# Patient Record
Sex: MALE | Race: WHITE | Employment: OTHER | URBAN - METROPOLITAN AREA
[De-identification: names, ages, dates, MRNs, and addresses within clinical notes are randomized per-mention and may not be internally consistent; named-entity substitution may affect disease eponyms.]

---

## 2017-01-13 NOTE — PERIOP NOTES
[facility]  Ambulatory Surgery Unit  Pre-operative Instructions    Surgery/Procedure Date  1/20/17            Tentative Arrival Time TBA      1. On the day of your surgery/procedure, please report to the Ambulatory Surgery Unit Registration Desk and sign in at your designated time. The Ambulatory Surgery Unit is located in [facility] on the [facility] side of the [facility] across from the [address] [place] [address]. Please have all of your health insurance cards and a photo ID. 2. You must have someone with you to drive you home, as you should not drive a car for 24 hours following anesthesia. Please make arrangements for a responsible adult friend or family member to stay with you for at least the first 24 hours after your surgery. 3. Do not have anything to eat or drink (including water, gum, mints, coffee, juice) after midnight   1/19/17. This may not apply to medications prescribed by your physician. (Please note below the special instructions with medications to take the morning of surgery, if applicable.)    4. We recommend you do not drink any alcoholic beverages for 24 hours before and after your surgery. 5. Stop all Aspirin, non-steroidal anti-inflammatory drugs (i.e. Advil, Aleve), vitamins, and supplements as directed by your surgeon's office. **If you are currently taking Plavix, Coumadin, or other blood-thinning agents, contact your surgeon for instructions. **    6. In an effort to help prevent surgical site infection, we ask that you shower with an anti-bacterial soap (i.e. Dial or Safeguard) for 3 days prior to and on the morning of surgery, using a fresh towel after each shower. (Please begin this process with fresh bed linens.) Do not apply any lotions, powders, or deodorants after the shower on the day of your procedure. If applicable, please do not shave the operative site for 48 hours prior to surgery. 7. Wear comfortable clothes. Wear glasses instead of contacts. Do not bring any jewelry or money (other than copays or fees as instructed). Do not wear make-up, particularly mascara, the morning of your surgery. Do not wear nail polish, particularly if you are having foot /hand surgery. Wear your hair loose or down, no ponytails, buns, la pins or clips. All body piercings must be removed. 8. You should understand that if you do not follow these instructions your surgery may be cancelled. If your physical condition changes (i.e. fever, cold or flu) please contact your surgeon as soon as possible. 9. It is important that you be on time. If a situation occurs where you may be late, or if you have any questions or problems, please call [phone].    10. Your surgery time may be subject to change. You will receive a phone call the day prior to surgery to confirm your arrival time. 11. Pediatric patients: please bring a change of clothes, diapers, bottle/sippy cup, pacifier, etc.      Special Instructions:    MEDICATIONS TO TAKE THE MORNING OF SURGERY WITH A SIP OF WATER: synthroid, coreg, amlodipine      I understand a pre-operative phone call will be made to verify my surgery time. In the event that I am not available, I give permission for a message to be left on my answering service and/or with another person?       Yes     (instructions given verbally during phone assessment- pt voiced understanding)     ___________________      ___________________      ________________  (Signature of Patient)          (Witness)                   (Date and Time)

## 2017-01-26 ENCOUNTER — ANESTHESIA EVENT (OUTPATIENT)
Dept: SURGERY | Age: 62
End: 2017-01-26
Payer: COMMERCIAL

## 2017-01-26 PROBLEM — M75.101 RIGHT ROTATOR CUFF TEAR: Status: ACTIVE | Noted: 2017-01-26

## 2017-01-26 NOTE — H&P
PRE- OP History and Physical                             Subjective:     Patient is a 59 y.o. male presented with a history of right shoulder pain. 2 weeks ago he was playing around rolling down a hill and developed some subsequent right shoulder pain, then 4 days later he simply went to zip up his pants and felt a pop in his right biceps. He had immediate 10/10 pain and subsequently developed some bruising. He has had severe pain ever since and rates it as a 9/10 at its worse. Pain with pushing and pulling, lifting, overhead reaching, reaching out and away from body, reaching behind, and reaching across body. He notes clicking and popping. Movement makes it worse. Rest makes it better. .  The patient's condition has been resistant to non-operative treatment and is being admitted for surgical management of this condition. Patient Active Problem List    Diagnosis Date Noted    Right rotator cuff tear 01/26/2017    SBO (small bowel obstruction) ([facility] [address] 11/03/2015     Past Medical History   Diagnosis Date    CAD (coronary artery disease)      h/o stent- '10    Cancer [facility]) 2010     prostate    Hypertension     Stroke [facility]) May or June 2010     lacunar  infarctions (found on scan- no sxs)    Thyroid disease      hypo      Past Surgical History   Procedure Laterality Date    Hx heent  1962     T&A    Pr excis knee cartilage,medial or lat  1980's x3    Hx orthopaedic  1974     Ganglion cyst left wrist    Hx cholecystectomy  1991    Hx hernia repair  1996    Hx prostatectomy  2010      Prior to Admission medications    Medication Sig Start Date End Date Taking? Authorizing Provider   Aliskiren (TEKTURNA) 300 mg tablet Take 1 Tab by mouth nightly. Start if blood pressure remain >=140 upper or 90/100 lower 11/7/15  Yes Michelle Tripp MD   lisinopril (PRINIVIL, ZESTRIL) 40 mg tablet Take 1 Tab by mouth two (2) times a day.  Start if blood pressure remain >=140 upper or 90/100 lower 11/7/15  Yes Kelvin Gallagher MD   amLODIPine (NORVASC) 5 mg tablet Take 5 mg by mouth daily. Yes Historical Provider   aspirin (ASPIRIN) 325 mg tablet Take 325 mg by mouth daily. Yes Asa Ross MD   carvedilol (COREG) 25 mg tablet Take 25 mg by mouth two (2) times daily (with meals). Yes Asa Ross MD   pantoprazole (PROTONIX) 40 mg tablet Take 40 mg by mouth daily. Yes Asa Ross MD   rosuvastatin (CRESTOR) 5 mg tablet Take 5 mg by mouth nightly. Yes Asa Ross MD   cholecalciferol, vitamin d3, (VITAMIN D) 1,000 unit tablet Take 2,000 Units by mouth daily. Yes Historical Provider   MULTI-VITAMIN HI-PO PO Take  by mouth daily. 10/11/10  Yes Historical Provider   levothyroxine (SYNTHROID) 50 mcg tablet Take 50 mcg by mouth daily (before breakfast). Yes Historical Provider     No Known Allergies   Social History   Substance Use Topics    Smoking status: Never Smoker    Smokeless tobacco: Current User    Alcohol use Yes      Comment: occas      History reviewed. No pertinent family history. Review of Systems  A comprehensive review of systems was negative except for that written in the HPI. Objective:     Patient Vitals for the past 8 hrs:   BP Temp Pulse Resp SpO2 Height Weight   01/27/17 0619 141/78 98.2 °F (36.8 °C) 64 18 96 % 6' (1.829 m) 111.6 kg (246 lb)     Visit Vitals    /78 (BP 1 Location: Right arm, BP Patient Position: At rest)    Pulse 64    Temp 98.2 °F (36.8 °C)    Resp 18    Ht 6' (1.829 m)    Wt 111.6 kg (246 lb)    SpO2 96%    BMI 33.36 kg/m2     General:  Alert, cooperative, no distress, appears stated age. Head:  Normocephalic, without obvious abnormality, atraumatic. Eyes:  Conjunctivae/corneas clear. PERRL, EOMs intact. Ears:  Normal TMs and external ear canals both ears. Nose: Nares normal. Septum midline. Mucosa normal. No drainage or sinus tenderness.    Throat: Lips, mucosa, and tongue normal. Teeth and gums normal.   Neck: Supple, symmetrical, trachea midline, no adenopathy, thyroid: no enlargement/tenderness/nodules, no carotid bruit and no JVD. Back:   Symmetric, no curvature. ROM normal. No CVA tenderness. Lungs:   Clear to auscultation bilaterally. Chest wall:  No tenderness or deformity. Heart:  Regular rate and rhythm, S1, S2, no murmur, click, rub or gallop. Abdomen:   Soft, non-tender. Bowel sounds normal. No masses,  No organomegaly. Extremities: Extremities normal except Range of motion right/left: elevation 170/170 with a positive drop arm test on the right. Positive Arlin Plump' on the right. Good strength with external rotation testing bilaterally. , atraumatic, no cyanosis or edema. Pulses: 2+ and symmetric all extremities. Skin: Skin color, texture, turgor normal. No rashes or lesions   Lymph nodes: Cervical, supraclavicular, and axillary nodes normal.   Neurologic: CNII-XII intact. Neurovascular exam intact in distal extremities        Imaging Review   MRI of his right shoulder which shows full thickness rotator cuff tear of supraspinatus, and medial biceps dislocation. Assessment:     Active Problems:    Right rotator cuff tear (1/26/2017)        Plan:     Operative and non-operative treatments have been discussed with the patient including risks and benefits of each. I reviewed risks, benefits, and recovery of RCR with patient, emphasizing risk of retear and stiffness, and gave a RCR handout detailing the same. I reviewed risk factors for reparability and healing to include smoking, diabetes, age over 61, genetic predisposition, and chronicity of tear. I also discussed risks, benefits, and recovery of biceps tenodesis. He agrees to proceed with my recommendation of open biceps tenodesis and rotator cuff repair. After consideration of risks, benefits limitations to the consented procedures and alternative options for treatment, the patient has consented to surgical interventions.  Questions were answered and Pre-op teaching was completed.       FAVIO Hernandez

## 2017-01-27 ENCOUNTER — ANESTHESIA (OUTPATIENT)
Dept: SURGERY | Age: 62
End: 2017-01-27
Payer: COMMERCIAL

## 2017-01-27 ENCOUNTER — HOSPITAL ENCOUNTER (OUTPATIENT)
Age: 62
Setting detail: OUTPATIENT SURGERY
Discharge: HOME | End: 2017-01-27
Attending: ORTHOPAEDIC SURGERY | Admitting: ORTHOPAEDIC SURGERY
Payer: COMMERCIAL

## 2017-01-27 VITALS
HEART RATE: 68 BPM | SYSTOLIC BLOOD PRESSURE: 133 MMHG | HEIGHT: 72 IN | RESPIRATION RATE: 16 BRPM | WEIGHT: 246 LBS | DIASTOLIC BLOOD PRESSURE: 77 MMHG | OXYGEN SATURATION: 95 % | TEMPERATURE: 97.8 F | BODY MASS INDEX: 33.32 KG/M2

## 2017-01-27 PROCEDURE — 77030018823 HC SLV COMPR VENO -B: Performed by: ORTHOPAEDIC SURGERY

## 2017-01-27 PROCEDURE — 77030020255 HC SOL INJ LR 1000ML BG: Performed by: ORTHOPAEDIC SURGERY

## 2017-01-27 PROCEDURE — 77030018074 HC RTVR SUT ARTH4 S&N -B: Performed by: ORTHOPAEDIC SURGERY

## 2017-01-27 PROCEDURE — 74011250636 HC RX REV CODE- 250/636: Performed by: ANESTHESIOLOGY

## 2017-01-27 PROCEDURE — 77030002923 HC SUT FBRWRE ARTH -A: Performed by: ORTHOPAEDIC SURGERY

## 2017-01-27 PROCEDURE — 77030019908 HC STETH ESOPH SIMS -A: Performed by: NURSE ANESTHETIST, CERTIFIED REGISTERED

## 2017-01-27 PROCEDURE — 77030013135: Performed by: ORTHOPAEDIC SURGERY

## 2017-01-27 PROCEDURE — 76210000046 HC AMBSU PH II REC FIRST 0.5 HR: Performed by: ORTHOPAEDIC SURGERY

## 2017-01-27 PROCEDURE — 74011250636 HC RX REV CODE- 250/636

## 2017-01-27 PROCEDURE — 74011000250 HC RX REV CODE- 250: Performed by: ANESTHESIOLOGY

## 2017-01-27 PROCEDURE — 74011000250 HC RX REV CODE- 250

## 2017-01-27 PROCEDURE — 77030013837 HC NERV BLK KT BBMI -B: Performed by: ORTHOPAEDIC SURGERY

## 2017-01-27 PROCEDURE — 77030018850 HC STOCK ANTIEMB THG COVD -A: Performed by: ORTHOPAEDIC SURGERY

## 2017-01-27 PROCEDURE — 74011250636 HC RX REV CODE- 250/636: Performed by: ORTHOPAEDIC SURGERY

## 2017-01-27 PROCEDURE — 77030020788: Performed by: ORTHOPAEDIC SURGERY

## 2017-01-27 PROCEDURE — 77030010516 HC APPL HEMA CLP TELE -B: Performed by: ORTHOPAEDIC SURGERY

## 2017-01-27 PROCEDURE — 77030011640 HC PAD GRND REM COVD -A: Performed by: ORTHOPAEDIC SURGERY

## 2017-01-27 PROCEDURE — 77030031139 HC SUT VCRL2 J&J -A: Performed by: ORTHOPAEDIC SURGERY

## 2017-01-27 PROCEDURE — 76060000062 HC AMB SURG ANES 1 TO 1.5 HR: Performed by: ORTHOPAEDIC SURGERY

## 2017-01-27 PROCEDURE — 76210000040 HC AMBSU PH I REC FIRST 0.5 HR: Performed by: ORTHOPAEDIC SURGERY

## 2017-01-27 PROCEDURE — 77030011265 HC ELECTRD BLD HEX COVD -A: Performed by: ORTHOPAEDIC SURGERY

## 2017-01-27 PROCEDURE — 76030000001 HC AMB SURG OR TIME 1 TO 1.5: Performed by: ORTHOPAEDIC SURGERY

## 2017-01-27 PROCEDURE — 77030013079 HC BLNKT BAIR HGGR 3M -A: Performed by: NURSE ANESTHETIST, CERTIFIED REGISTERED

## 2017-01-27 PROCEDURE — 77030003602 HC NDL NRV BLK BBMI -B: Performed by: ORTHOPAEDIC SURGERY

## 2017-01-27 PROCEDURE — 77030008684 HC TU ET CUF COVD -B: Performed by: NURSE ANESTHETIST, CERTIFIED REGISTERED

## 2017-01-27 PROCEDURE — 64415 NJX AA&/STRD BRCH PLXS IMG: CPT | Performed by: ORTHOPAEDIC SURGERY

## 2017-01-27 PROCEDURE — 74011000250 HC RX REV CODE- 250: Performed by: ORTHOPAEDIC SURGERY

## 2017-01-27 PROCEDURE — C1713 ANCHOR/SCREW BN/BN,TIS/BN: HCPCS | Performed by: ORTHOPAEDIC SURGERY

## 2017-01-27 PROCEDURE — 77030026438 HC STYL ET INTUB CARD -A: Performed by: NURSE ANESTHETIST, CERTIFIED REGISTERED

## 2017-01-27 DEVICE — ANCHOR SUT L14.7MM DIA5.5MM PEEK W/ 3 SZ 2 FIBERWIRE CRKSCR: Type: IMPLANTABLE DEVICE | Site: SHOULDER | Status: FUNCTIONAL

## 2017-01-27 RX ORDER — OXYCODONE AND ACETAMINOPHEN 5; 325 MG/1; MG/1
1 TABLET ORAL ONCE
Status: DISCONTINUED | OUTPATIENT
Start: 2017-01-27 | End: 2017-01-27 | Stop reason: HOSPADM

## 2017-01-27 RX ORDER — SODIUM CHLORIDE 0.9 % (FLUSH) 0.9 %
5-10 SYRINGE (ML) INJECTION AS NEEDED
Status: DISCONTINUED | OUTPATIENT
Start: 2017-01-27 | End: 2017-01-27 | Stop reason: HOSPADM

## 2017-01-27 RX ORDER — GLYCOPYRROLATE 0.2 MG/ML
INJECTION INTRAMUSCULAR; INTRAVENOUS AS NEEDED
Status: DISCONTINUED | OUTPATIENT
Start: 2017-01-27 | End: 2017-01-27 | Stop reason: HOSPADM

## 2017-01-27 RX ORDER — CEFAZOLIN SODIUM IN 0.9 % NACL 2 G/100 ML
PLASTIC BAG, INJECTION (ML) INTRAVENOUS
Status: DISCONTINUED
Start: 2017-01-27 | End: 2017-01-27 | Stop reason: HOSPADM

## 2017-01-27 RX ORDER — MIDAZOLAM HYDROCHLORIDE 1 MG/ML
INJECTION, SOLUTION INTRAMUSCULAR; INTRAVENOUS AS NEEDED
Status: DISCONTINUED | OUTPATIENT
Start: 2017-01-27 | End: 2017-01-27 | Stop reason: HOSPADM

## 2017-01-27 RX ORDER — HYDROMORPHONE HYDROCHLORIDE 1 MG/ML
.2-.5 INJECTION, SOLUTION INTRAMUSCULAR; INTRAVENOUS; SUBCUTANEOUS ONCE
Status: DISCONTINUED | OUTPATIENT
Start: 2017-01-27 | End: 2017-01-27 | Stop reason: HOSPADM

## 2017-01-27 RX ORDER — LIDOCAINE HYDROCHLORIDE 20 MG/ML
INJECTION, SOLUTION EPIDURAL; INFILTRATION; INTRACAUDAL; PERINEURAL AS NEEDED
Status: DISCONTINUED | OUTPATIENT
Start: 2017-01-27 | End: 2017-01-27 | Stop reason: HOSPADM

## 2017-01-27 RX ORDER — LIDOCAINE HYDROCHLORIDE 10 MG/ML
0.1 INJECTION, SOLUTION EPIDURAL; INFILTRATION; INTRACAUDAL; PERINEURAL AS NEEDED
Status: DISCONTINUED | OUTPATIENT
Start: 2017-01-27 | End: 2017-01-27 | Stop reason: HOSPADM

## 2017-01-27 RX ORDER — LIDOCAINE HYDROCHLORIDE 20 MG/ML
INJECTION, SOLUTION INFILTRATION; PERINEURAL
Status: DISCONTINUED
Start: 2017-01-27 | End: 2017-01-27 | Stop reason: HOSPADM

## 2017-01-27 RX ORDER — FENTANYL CITRATE 50 UG/ML
INJECTION, SOLUTION INTRAMUSCULAR; INTRAVENOUS AS NEEDED
Status: DISCONTINUED | OUTPATIENT
Start: 2017-01-27 | End: 2017-01-27 | Stop reason: HOSPADM

## 2017-01-27 RX ORDER — MORPHINE SULFATE 10 MG/ML
2 INJECTION, SOLUTION INTRAMUSCULAR; INTRAVENOUS
Status: DISCONTINUED | OUTPATIENT
Start: 2017-01-27 | End: 2017-01-27 | Stop reason: HOSPADM

## 2017-01-27 RX ORDER — FENTANYL CITRATE 50 UG/ML
25 INJECTION, SOLUTION INTRAMUSCULAR; INTRAVENOUS
Status: DISCONTINUED | OUTPATIENT
Start: 2017-01-27 | End: 2017-01-27 | Stop reason: HOSPADM

## 2017-01-27 RX ORDER — SUCCINYLCHOLINE CHLORIDE 20 MG/ML
INJECTION INTRAMUSCULAR; INTRAVENOUS AS NEEDED
Status: DISCONTINUED | OUTPATIENT
Start: 2017-01-27 | End: 2017-01-27 | Stop reason: HOSPADM

## 2017-01-27 RX ORDER — ONDANSETRON 2 MG/ML
INJECTION INTRAMUSCULAR; INTRAVENOUS AS NEEDED
Status: DISCONTINUED | OUTPATIENT
Start: 2017-01-27 | End: 2017-01-27 | Stop reason: HOSPADM

## 2017-01-27 RX ORDER — PHENYLEPHRINE HCL IN 0.9% NACL 0.4MG/10ML
SYRINGE (ML) INTRAVENOUS AS NEEDED
Status: DISCONTINUED | OUTPATIENT
Start: 2017-01-27 | End: 2017-01-27 | Stop reason: HOSPADM

## 2017-01-27 RX ORDER — MIDAZOLAM HYDROCHLORIDE 1 MG/ML
INJECTION, SOLUTION INTRAMUSCULAR; INTRAVENOUS
Status: DISCONTINUED
Start: 2017-01-27 | End: 2017-01-27 | Stop reason: HOSPADM

## 2017-01-27 RX ORDER — CEFAZOLIN SODIUM IN 0.9 % NACL 2 G/100 ML
2 PLASTIC BAG, INJECTION (ML) INTRAVENOUS ONCE
Status: COMPLETED | OUTPATIENT
Start: 2017-01-27 | End: 2017-01-27

## 2017-01-27 RX ORDER — SODIUM CHLORIDE, SODIUM LACTATE, POTASSIUM CHLORIDE, CALCIUM CHLORIDE 600; 310; 30; 20 MG/100ML; MG/100ML; MG/100ML; MG/100ML
25 INJECTION, SOLUTION INTRAVENOUS CONTINUOUS
Status: DISCONTINUED | OUTPATIENT
Start: 2017-01-27 | End: 2017-01-27 | Stop reason: HOSPADM

## 2017-01-27 RX ORDER — PROPOFOL 10 MG/ML
INJECTION, EMULSION INTRAVENOUS AS NEEDED
Status: DISCONTINUED | OUTPATIENT
Start: 2017-01-27 | End: 2017-01-27 | Stop reason: HOSPADM

## 2017-01-27 RX ORDER — ROCURONIUM BROMIDE 10 MG/ML
INJECTION, SOLUTION INTRAVENOUS AS NEEDED
Status: DISCONTINUED | OUTPATIENT
Start: 2017-01-27 | End: 2017-01-27 | Stop reason: HOSPADM

## 2017-01-27 RX ORDER — SODIUM CHLORIDE 0.9 % (FLUSH) 0.9 %
5-10 SYRINGE (ML) INJECTION EVERY 8 HOURS
Status: DISCONTINUED | OUTPATIENT
Start: 2017-01-27 | End: 2017-01-27 | Stop reason: HOSPADM

## 2017-01-27 RX ORDER — ROPIVACAINE HYDROCHLORIDE 5 MG/ML
INJECTION, SOLUTION EPIDURAL; INFILTRATION; PERINEURAL
Status: DISCONTINUED
Start: 2017-01-27 | End: 2017-01-27 | Stop reason: HOSPADM

## 2017-01-27 RX ORDER — LIDOCAINE HYDROCHLORIDE AND EPINEPHRINE 10; 10 MG/ML; UG/ML
INJECTION, SOLUTION INFILTRATION; PERINEURAL AS NEEDED
Status: DISCONTINUED | OUTPATIENT
Start: 2017-01-27 | End: 2017-01-27 | Stop reason: HOSPADM

## 2017-01-27 RX ORDER — DIPHENHYDRAMINE HYDROCHLORIDE 50 MG/ML
12.5 INJECTION, SOLUTION INTRAMUSCULAR; INTRAVENOUS AS NEEDED
Status: DISCONTINUED | OUTPATIENT
Start: 2017-01-27 | End: 2017-01-27 | Stop reason: HOSPADM

## 2017-01-27 RX ADMIN — CEFAZOLIN 2 G: 10 INJECTION, POWDER, FOR SOLUTION INTRAVENOUS; PARENTERAL at 07:29

## 2017-01-27 RX ADMIN — GLYCOPYRROLATE 0.2 MG: 0.2 INJECTION INTRAMUSCULAR; INTRAVENOUS at 08:22

## 2017-01-27 RX ADMIN — Medication 100 MCG: at 08:00

## 2017-01-27 RX ADMIN — ROCURONIUM BROMIDE 5 MG: 10 INJECTION, SOLUTION INTRAVENOUS at 07:35

## 2017-01-27 RX ADMIN — LIDOCAINE HYDROCHLORIDE 0.1 ML: 10 INJECTION, SOLUTION EPIDURAL; INFILTRATION; INTRACAUDAL; PERINEURAL at 06:37

## 2017-01-27 RX ADMIN — ONDANSETRON 4 MG: 2 INJECTION INTRAMUSCULAR; INTRAVENOUS at 07:53

## 2017-01-27 RX ADMIN — Medication 100 MCG: at 08:07

## 2017-01-27 RX ADMIN — ROCURONIUM BROMIDE 15 MG: 10 INJECTION, SOLUTION INTRAVENOUS at 07:47

## 2017-01-27 RX ADMIN — FENTANYL CITRATE 100 MCG: 50 INJECTION, SOLUTION INTRAMUSCULAR; INTRAVENOUS at 07:35

## 2017-01-27 RX ADMIN — MIDAZOLAM HYDROCHLORIDE 3 MG: 1 INJECTION, SOLUTION INTRAMUSCULAR; INTRAVENOUS at 07:09

## 2017-01-27 RX ADMIN — SODIUM CHLORIDE, SODIUM LACTATE, POTASSIUM CHLORIDE, AND CALCIUM CHLORIDE 25 ML/HR: 600; 310; 30; 20 INJECTION, SOLUTION INTRAVENOUS at 06:36

## 2017-01-27 RX ADMIN — SUCCINYLCHOLINE CHLORIDE 160 MG: 20 INJECTION INTRAMUSCULAR; INTRAVENOUS at 07:35

## 2017-01-27 RX ADMIN — LIDOCAINE HYDROCHLORIDE 80 MG: 20 INJECTION, SOLUTION EPIDURAL; INFILTRATION; INTRACAUDAL; PERINEURAL at 07:35

## 2017-01-27 RX ADMIN — PROPOFOL 200 MG: 10 INJECTION, EMULSION INTRAVENOUS at 07:35

## 2017-01-27 NOTE — PERIOP NOTES
Josselynmonica [facility]  1955  [id number]    Situation:  Verbal report given from:  ROLANDA Rangel  Procedure: Procedure(s):  RIGHT SHOULDER OPEN ROTATOR CUFF REPAIR, OPEN BICEPS TENODESIS    Background:    Preoperative diagnosis: Rotator Cuff Tear, Biceps Rupture Right    Postoperative diagnosis: Rotator Cuff Tear, Biceps Rupture Right    :  Dr. Tennille Vu    Assistant(s): Circ-1: Kalani Mcallister RN  Circ-2: Feliciano Oliveira  Circ-Relief: Jordy Small RN  Scrub Tech-1: Katelyn Rosa    Specimens: * No specimens in log *    Assessment:  Intra-procedure medications         Anesthesia gave intra-procedure sedation and medications, see anesthesia flow sheet     Intravenous fluids: LR@ KVO     Vital signs stable       Recommendation:    Permission to share finding with family or friend yes

## 2017-01-27 NOTE — PERIOP NOTES
PACU~    Pt awake & alert, joking. Discharge instructions reviewed, prescriptions reviewed. Dressing supplies given & instructed how and when to change. Questions answered. 0932- Pt discharged home in stable condition via w/c to car, gait steady.

## 2017-01-27 NOTE — IP AVS SNAPSHOT
[address] [facility] 
[phone] Patient: Sidra Serrano MRN: [id number] AOT:0/5/3316 You are allergic to the following No active allergies Recent Documentation Height Weight BMI Smoking Status 1.829 m 111.6 kg 33.36 kg/m2 Never Smoker Emergency Contacts Name Discharge Info Relation Home Work Mobile [id number] [facility] CAREGIVER [3] Spouse [3]   [phone] About your hospitalization You were admitted on:  January 27, 2017 You last received care in the:  [facility] A[facility] PACU You were discharged on:  January 27, 2017 Unit phone number:  [phone] Why you were hospitalized Your primary diagnosis was:  Not on File Your diagnoses also included:  Right Rotator Cuff Tear Providers Seen During Your Hospitalizations Provider Role Specialty Primary office phone Patricia Rodrigues MD Attending Provider Orthopedic Surgery [phone] Your Primary Care Physician (PCP) Primary Care Physician Office Phone Office Fax Domo Dick [phone] Follow-up Information Follow up With Details Comments Contact Info Danny Clayton MD   [address] [place] [address] [address] [facility] 
[phone] Current Discharge Medication List  
  
CONTINUE these medications which have NOT CHANGED Dose & Instructions Dispensing Information Comments Morning Noon Evening Bedtime  
 aliskiren 300 mg tablet Commonly known as:  V[employer]om Your next dose is: Today, Tomorrow Other:  _________ Dose:  300 mg Take 1 Tab by mouth nightly. Start if blood pressure remain >=140 upper or 90/100 lower Quantity:  30 Tab Refills:  0  
     
   
   
   
  
 aspirin 325 mg tablet Commonly known as:  ASPIRIN Your next dose is: Today, Tomorrow Other:  _________ Dose:  325 mg Take 325 mg by mouth daily. Refills:  0 COREG 25 mg tablet Generic drug:  carvedilol Your next dose is: Today, Tomorrow Other:  _________ Dose:  25 mg Take 25 mg by mouth two (2) times daily (with meals). Refills:  0  
     
   
   
   
  
 CRESTOR 5 mg tablet Generic drug:  rosuvastatin Your next dose is: Today, Tomorrow Other:  _________ Dose:  5 mg Take 5 mg by mouth nightly. Refills:  0  
     
   
   
   
  
 levothyroxine 50 mcg tablet Commonly known as:  SYNTHROID Your next dose is: Today, Tomorrow Other:  _________ Dose:  50 mcg Take 50 mcg by mouth daily (before breakfast). Refills:  0  
     
   
   
   
  
 lisinopril 40 mg tablet Commonly known as:  Ronalee Ruffing Your next dose is: Today, Tomorrow Other:  _________ Dose:  40 mg Take 1 Tab by mouth two (2) times a day. Start if blood pressure remain >=140 upper or 90/100 lower Quantity:  30 Tab Refills:  0 MULTI-VITAMIN HI-PO PO Your next dose is: Today, Tomorrow Other:  _________ Take  by mouth daily. Refills:  0 NORVASC 5 mg tablet Generic drug:  amLODIPine Your next dose is: Today, Tomorrow Other:  _________ Dose:  5 mg Take 5 mg by mouth daily. Refills:  0 PROTONIX 40 mg tablet Generic drug:  pantoprazole Your next dose is: Today, Tomorrow Other:  _________ Dose:  40 mg Take 40 mg by mouth daily. Refills:  0  
     
   
   
   
  
 VITAMIN D3 1,000 unit tablet Generic drug:  cholecalciferol Your next dose is: Today, Tomorrow Other:  _________ Dose:  2000 Units Take 2,000 Units by mouth daily. Refills:  0 Discharge Instructions Rotator Cuff Repair Post-Op Instructions Milagros Vargas M.D. 
[phone] Sling and Motion:  You will use your sling for five (5) weeks. You may remove your arm from the sling for showers. You may also remove your arm from the sling and let your arm hang down so your elbow doesn't get too stiff. You are encouraged to perform hand, wrist and elbow range of motion, arm dangles, and shoulder blade pinches at least 5 times per day. These exercises will help to decrease swelling and stiffness. I will teach your how to do dangles and shoulder blade pinches starting right after your surgery. Swelling and bruising is common after surgery. You may also notice swelling in you hand, if you do, adjust your sling so the hand is slightly above the elbow, you may squeeze a ball like a stress ball and you can do some bicep curls without weight. These exercises will help with the swelling. The essential point is that your are NOT allowed to actively lift your elbow away from your side (under its own power) for the first five (5) weeks. Dressing: Your dressing will be left in place for 2 days. You may notice bloody drainage on the dressing. That is fine. You will remove the dressing two (2) days after the surgery and cover the incisions with circular band-aids. Shower with band-aids on, then remove and replace band-aids after showers. Sleeping:  Patients are generally more comfortable sleeping in a reclining chair or with some pillows propped behind the shoulder. You can wear your sling when sleeping, or you may remove the sling and just slide a bed pillow up underneath your arm and sleep like that. Some difficulty with sleeping is common for 2-3 weeks after surgery. Therapy:  Arrangements will be made at your post-op appointment. Your Physical Therapist will progress your activity appropriately. Medications:  
-You should resume your daily medications unless instructed differently. -You will go home with two pain medication prescriptions. Hydromorphone and Oxycodone. DO NOT take both at once. Pick one or the other. I recommend trying Hydromorphone first.  If you have problems with it, wait three hours and switch over to the Oxycodone. 
 
-Do not wait until you are in a lot of pain before taking the medication. It takes the medication 30-45 minutes to take effect. -DO NOT take NSAIDs (Advil, Aleve, Motrin, Ibuprofen, etc.) of the first month. NSAIDs are reported to delay tendon healing. Take Tylenol or Acetaminophen instead. No more than 2000 mg daily. 
 
-It is not uncommon to have some stomach upset with the use of narcotic medication. For this reason, take you medication with food. If your symptoms are severe, please call the office. 
 
-The use of narcotics can lead to constipation. A high fiber diet, and lots of fluids can prevent this occurring. Over the counter laxatives (milk of magnesia, dulcolax, magnesium citrate) can be used as directed. -If a refill of medication is needed, please call the office during regular business hours, Monday - Friday 8:00AM-5:00PM.  Dr. Tyshawn Daily may be in surgery and not readily available to sign a prescription so it is important to call at least a day before your prescription will run out. Refills will not be made after hours, so please plan ahead. We also cannot guarantee a same day prescription. Driving: DO NOT drive while taking narcotic medication. It is okay to drive in your sling, just follow same rules, no lifting elbow away from your side. Return to school/work: This will depend on your job requirements and level of activity. If you work at a desk job or in a supervisory role, you may return as early as 3-4 days after surgery. Average return to regular activities is 4-6 months post-op. Follow up:   You will be given an appointment card for your follow-up appointment at our [facility] office. At that time your sutures will be removed and physical therapy will be arranged. If unexpected problems, emergencies or other issues occur and you need to speak with our office, please call. A physician is on call 24 hours a day, 7 days a week for emergencies and may be reached through the answering service by calling the regular office number [phone]. Lincoln Feliciano M.D. Do not move arm out and away from body. Only dangle. TAKE NARCOTIC PAIN MEDICATIONS WITH FOOD, and if given 2 pain narcotics do NOT take together! Narcotics tend to be constipating and we recommend taking a stool softener such as Colace or Miralax (follow package instructions). If you were given prescriptions, please review the written information on the prescribed medications. DO NOT DRIVE WHILE TAKING NARCOTIC PAIN MEDICATIONS. DISCHARGE SUMMARY from Nurse The following personal items collected during your admission are returned to you:  
Dental Appliance: Dental Appliances: None Vision: Visual Aid: Glasses (glasses given to wife) Hearing Aid:   
Jewelry: Jewelry: None Clothing: Clothing: Other (comment) (belonging bag) Other Valuables: Other Valuables: None Valuables sent to safe:   
 
 
PATIENT INSTRUCTIONS: 
 
After General Anesthesia or Intravenous Sedation, for 24 hours or while taking prescription Narcotics: · Someone should be with you for the next 24 hours. · For your own safety, a responsible adult must drive you home. · Limit your activities · Recommended activity: Rest today, up with assistance today. Do not climb stairs or shower unattended for the next 24 hours. · Start with a soft bland diet and advance as tolerated (no nausea) to regular diet. · If you have a sore throat some things that may help are: fluids, warm salt water gargle, or throat lozenges. If this does not improve after several days please follow up with your family physician. · Do not drive and operate hazardous machinery · Do not make important personal or business decisions · Do  not drink alcoholic beverages · If you have not urinated within 8 hours after discharge, please contact your surgeon on call. Report the following to your surgeon: 
· Excessive pain, swelling, redness or odor of or around the surgical area · Temperature over 100.5 · Nausea and vomiting lasting longer than 4 hours or if unable to take medications · Any signs of decreased circulation or nerve impairment to extremity: change in color, persistent  numbness, tingling, coldness or increase pain · If you received an upper extremity nerve block, please wear your sling until the block has worn off, then refer to your surgeons post-operative instructions. If you have had a shoulder block or a block near your collar bone, you may have symptoms such as: 1. Mild shortness of breath 2. A hoarse voice 3. Blurry vision 4. Unequal pupils 5. Drooping of your face on the same side as the nerve block. These symptoms will disappear as the nerve block wears off. · You will receive a Post Operative Call from one of the Recovery Room Nurses on the day after your surgery to check on you. It is very important for us to know how you are recovering after your surgery. If you have an issue please call your surgeon, do not wait for the post operative call. · You may receive an e-mail or letter in the mail from [facility] regarding your experience with us in the Ambulatory Surgery Unit. Your feedback is valuable to us and we appreciate your participation in the survey. ·  
· If the above instructions are not adequate, please contact Ish El RN, [facility] anesthesia Nurse Manager or our Anesthesiologist, at [phone]. If you are having problems after your surgery, call your physician at his office number. ·  
· We wish youre a speedy recovery ? What to do at Home: *  Please give a list of your current medications to your Primary Care Provider. *  Please update this list whenever your medications are discontinued, doses are 
    changed, or new medications (including over-the-counter products) are added. *  Please carry medication information at all times in case of emergency situations. [address] THROMBOSIS AND PULMONARY EMBOLUS 
 
SURGICAL PATIENTS Surgical patients are the #1 risk for DVT and PE. WHAT IS DVT? WHAT IS PE? 
DVT is a serious condition where blood clots develop deep in the veins of the legs. PE occurs when a blood clot breaks loose from the wall of a vein and travels to the lungs blocking the pulmonary artery or one of its branches impairing blood flow from the heart, which could result in death. RISK FACTORS 
? Surgery lasting longer than 45 minutes ? History of inflammatory bowel disease 
? Oral contraceptive or hormone replacement therapy ? Immobilization ? Varicose veins / swollen legs ? Smoking  
? CHF / Acute MI / Irregular heart beat ? Family history of thrombosis ? General anesthesia greater than 2 hours ? Obesity ? Infection of less than one month ? Less than 1 month postpartum 
? COPD / Pneumonia ? Arthroscopic surgery ? Malignancy / cancer ? Spine surgery ? Blood abnormalities ? Stroke / Paralysis / Coma SIGNS AND SYMPTOMS OF DEEP VEIN TROMBOSIS Usually occurs in one leg, above or below the knee ? Swelling  one calf or thigh may be larger than the other ? Feeling increased warmth in the area of the leg that is swollen or painful ? Leg pain, which may increase when standing or walking ? Swelling along the vein of the leg ? When swollen areas is pressed with a finger, a depression may remain ? Tenderness of the leg that may be confined to one area ? Change in leg color (bluish or red) SIGNS AND SYMPTOMS OF PULMONARY EMBOLUS 
 ? Chest pain that gets worse with deep breath, coughing or chest movement ? Coughing up blood ? Sweating ? Shortness of breath or difficulty breathing ? Rapid heart beat ? Lightheadedness HOW TO REDUCE THE POSSIBLE RISK OF DVT ? Exercise  simple activities as rotating ankles and wrists, wiggling toes and fingers, tightening and relaxing muscles in calves and thighs promotes circulation while recovering from surgery. Please do these exercises every hour during waking hours ? JOSEPH hose  If you have been given white support hose while having surgery, wear them home. You may remove them for half and hour every 8-hour period and stop wearing them 48 hours after surgery or as prescribed by your physician. JOSEPH hose may be reused for air travel or extended car travel ? Take mediation as prescribed by your physician (Lovenox, Coumadin, Aspirin) ? Resume your normal activities as soon as your doctor advises you to do so. 
? Remember, when traveling, to [place] your legs frequently. Wear non skid shoes when JOSEPH hose are on. They are very slippery! PATIENTS WHO BELIEVE THEY MAY BE EXPERIENCING SIGNS AND SYMPTOMS OF DVT OR PE SHOULD SEEK MEDICAL HELP IMMEDIATELY These are general instructions for a healthy lifestyle: No smoking/ No tobacco products/ Avoid exposure to second hand smoke Surgeon General's Warning:  Quitting smoking now greatly reduces serious risk to your health. Obesity, smoking, and sedentary lifestyle greatly increases your risk for illness A healthy diet, regular physical exercise & weight monitoring are important for maintaining a healthy lifestyle You may be retaining fluid if you have a history of heart failure or if you experience any of the following symptoms:  Weight gain of 3 pounds or more overnight or 5 pounds in a week, increased swelling in our hands or feet or shortness of breath while lying flat in bed.   Please call your doctor as soon as you notice any of these symptoms; do not wait until your next office visit. Recognize signs and symptoms of STROKE: 
 
F-face looks uneven A-arms unable to move or move even S-speech slurred or non-existent T-time-call 911 as soon as signs and symptoms begin-DO NOT go Back to bed or wait to see if you get better-TIME IS BRAIN. If you have not had your influenza or pneumococcal vaccines, please follow up with your primary care physician. The discharge information has been reviewed with the patient and family. The patient and family verbalized understanding. Discharge Instructions Attachments/References OXYCODONE, RAPID RELEASE (BY MOUTH) (ENGLISH) HYDROMORPHONE (BY MOUTH) (ENGLISH) Discharge Orders None Introducing [facility]! Dear Raffi Helm: Thank you for requesting a [employer] account. Our records indicate that you have previously registered for a [employer] account but its currently inactive. Please call our [employer] support line at [phone]. Additional Information If you have questions, please visit the Frequently Asked Questions section of the [employer] website at [email or web address] Remember, [employer] is NOT to be used for urgent needs. For medical emergencies, dial 911. Now available from your iPhone and Android! General Information Please provide this summary of care documentation to your next provider. Patient Signature:  ____________________________________________________________ Date:  ____________________________________________________________  
  
Hoang Arce Provider Signature:  ____________________________________________________________ Date:  ____________________________________________________________ More Information Oxycodone, Rapid Release (By mouth) Oxycodone Hydrochloride (an-l-GBE-done angela-rakesh-KLOR-montrell) Treats moderate to severe pain. This medicine is a narcotic pain reliever. Brand Name(s):ETH-Oxydose, Oxaydo, Oxy IR, Roxicodone There may be other brand names for this medicine. When This Medicine Should Not Be Used: This medicine is not right for everyone. Do not use it if you had an allergic reaction to oxycodone, codeine, hydrocodone, dihydrocodeine, or morphine. How to Use This Medicine:  
Capsule, Liquid, Tablet · Take your medicine as directed. Your dose may need to be changed several times to find what works best for you. · Measure the oral liquid medicine with a marked measuring spoon, oral syringe, or medicine cup. · Swallow the Oxaydo tablet whole with enough water to swallow it completely. Do not break, crush, chew, or dissolve it. Do not wet the tablet before you put it in your mouth. · Missed dose: Take a dose as soon as you remember. If it is almost time for your next dose, wait until then and take a regular dose. Do not take extra medicine to make up for a missed dose. · Store the medicine in a closed container at room temperature, away from heat, moisture, and direct light. Store the medicine in a secure place to prevent others from getting it. Ask your pharmacist about the best way to dispose of medicine you do not use. Drugs and Foods to Avoid: Ask your doctor or pharmacist before using any other medicine, including over-the-counter medicines, vitamins, and herbal products. · Some medicines can affect how oxycodone works. Tell your doctor if you are using any of the following: ¨ Amiodarone, quinidine ¨ MAO inhibitor (MAOI) ¨ Medicine to treat an infection (such as erythromycin, ketoconazole, rifampin) ¨ Medicine to treat HIV/AIDS (such as ritonavir) ¨ Medicine to treat depression or anxiety ¨ Medicine to treat seizures (such as carbamazepine, phenytoin) ¨ Phenothiazine medicine (such as chlorpromazine, perphenazine, prochlorperazine, promethazine, thioridazine) · Tell your doctor if you use anything else that makes you sleepy. Some examples are allergy medicine, narcotic pain medicine, and alcohol. Also tell your doctor if you are using buprenorphine, butorphanol, nalbuphine, pentazocine, or a muscle relaxer. · Do not drink alcohol while you are using this medicine. Warnings While Using This Medicine: · Tell your doctor if you are pregnant or breastfeeding, or if you have kidney disease, liver disease, heart disease, low blood pressure, lung disease or breathing problems (such as asthma, COPD), scoliosis, an enlarged prostate or trouble urinating, an underactive thyroid, [place] disease, gallbladder or pancreas problems, or stomach or bowel problems. Tell your doctor if you have a history of head injury, mental health problems, seizures, or alcohol or drug addiction. · This medicine may cause the following problems: 
¨ High risk of overdose, which can lead to death ¨ Respiratory depression (serious breathing problem that can be life-threatening) ¨ Low blood pressure · This medicine may make you dizzy, drowsy, or faint. Do not drive or do anything else that could be dangerous until you know how this medicine affects you. Sit or lie down if you feel dizzy. Stand up carefully. · This medicine may cause constipation, especially with long-term use. Ask your doctor if you should use a laxative to prevent and treat constipation. Drink plenty of liquids to help avoid constipation. · This medicine can be habit-forming. Do not use more than your prescribed dose. Call your doctor if you think your medicine is not working. · Do not stop using this medicine suddenly. Your doctor will need to slowly decrease your dose before you stop it completely. · Keep all medicine out of the reach of children. Never share your medicine with anyone. Possible Side Effects While Using This Medicine:  
Call your doctor right away if you notice any of these side effects: · Allergic reaction: Itching or hives, swelling in your face or hands, swelling or tingling in your mouth or throat, chest tightness, trouble breathing · Blue lips, fingernails, or skin, trouble breathing · Extreme dizziness or weakness, shallow breathing, slow heartbeat, sweating, cold or clammy skin, seizures · Lightheadedness, dizziness, fainting · Severe constipation If you notice these less serious side effects, talk with your doctor: · Mild constipation, nausea, or vomiting · Sleepiness, tiredness If you notice other side effects that you think are caused by this medicine, tell your doctor. Call your doctor for medical advice about side effects. You may report side effects to FDA at 7-108-WAR-3042 © 2016 [address] is for End User's use only and may not be sold, redistributed or otherwise used for commercial purposes. The above information is an  only. It is not intended as medical advice for individual conditions or treatments. Talk to your doctor, nurse or pharmacist before following any medical regimen to see if it is safe and effective for you. Hydromorphone (By mouth) Hydromorphone (skh-gvnu-PCM-fone) Treats moderate to severe pain. This medicine is a narcotic pain reliever. Brand Name(s):Jyotsna Landin There may be other brand names for this medicine. When This Medicine Should Not Be Used: This medicine is not right for everyone. Do not use it if you had an allergic reaction to hydromorphone or sulfites, or if you have severe lung or breathing problems, paralytic ileus, or stomach or bowel blockage or narrowing. How to Use This Medicine:  
Long Acting Capsule, Liquid, Tablet, Long Acting Tablet · Take your medicine as directed. Your dose may need to be changed several times to find what works best for you. An overdose can be dangerous. Follow directions carefully so you do not get too much medicine at one time. · Measure the oral liquid medicine with a marked measuring spoon, oral syringe, or medicine cup. · Swallow the extended-release capsule whole. Do not crush, break, or chew it. · Swallow the extended-release tablet whole. Do not crush, break, or chew it. · If you take the extended-release tablet, part of the tablet may pass into your stools. This is normal and is nothing to worry about. · If you get any of the liquid medicine on your skin, rinse it with cool water right away. · Hydromorphone extended-release capsules or tablets work differently than regular hydromorphone tablets, even at the same dose. Do not switch from one form to another unless your doctor tells you to. · This medicine should come with a Medication Guide. Ask your pharmacist for a copy if you do not have one. · Missed dose:  
¨ Extended-release capsules or tablets: If you miss a dose, skip the missed dose and take your next dose at the usual time the next day. Do not double doses. ¨ Liquid: Take a dose as soon as you remember. If it is almost time for your next dose, wait until then and take a regular dose. Do not take extra medicine to make up for a missed dose. · Store the medicine in a closed container at room temperature, away from heat, moisture, and direct light. Store the medicine in a safe and secure place. Do not throw unused medicine in the trash. Ask your pharmacist about the best way to dispose of medicine you do not use. Drugs and Foods to Avoid: Ask your doctor or pharmacist before using any other medicine, including over-the-counter medicines, vitamins, and herbal products. · Some medicines can affect how hydromorphone works. Tell your doctor if you are using any of the following: ¨ A phenothiazine medicine ¨ An MAO inhibitor (MAOI) within the past 14 days · Tell your doctor if you use anything else that makes you sleepy. Some examples are allergy medicine, narcotic pain medicine, and alcohol.  Tell your doctor if you are also using buprenorphine, butorphanol, nalbuphine, pentazocine, or a muscle relaxer. · Do not drink alcohol while you are using this medicine. Warnings While Using This Medicine: · Tell your doctor if you are pregnant or breastfeeding, or if you have kidney disease, liver disease, lung disease or breathing problems (such as asthma or COPD), low blood pressure, an underactive thyroid, Appling disease, cystic fibrosis, pancreas problems, gallbladder problems, an enlarged prostate, trouble urinating, or stomach or bowel problems. Tell your doctor if you have a history of head injury, brain tumor, seizures, depression, or alcohol or drug abuse. · This medicine may cause the following problems: 
¨ High risk of overdose, which can lead to death ¨ Respiratory depression (serious breathing problem that can be life-threatening) · This medicine can be habit-forming. Do not use more than your prescribed dose. Call your doctor if you think your medicine is not working. · Do not stop using this medicine suddenly. Your doctor will need to slowly decrease your dose before you stop it completely. · This medicine may make you dizzy, drowsy, or lightheaded. Do not drive or do anything else that could be dangerous until you know how this medicine affects you. Sit or lie down if you feel dizzy. Stand up carefully. · This medicine may cause constipation, especially with long-term use. Ask your doctor if you should use a laxative to prevent and treat constipation. · Keep all medicine out of the reach of children. Never share your medicine with anyone. Possible Side Effects While Using This Medicine:  
Call your doctor right away if you notice any of these side effects: · Allergic reaction: Itching or hives, swelling in your face or hands, swelling or tingling in your mouth or throat, chest tightness, trouble breathing · Blue lips, fingernails, or skin · Extreme dizziness or weakness, shallow breathing, slow or uneven heartbeat, sweating, cold or clammy skin, seizures · Severe confusion, lightheadedness, dizziness, fainting · Severe constipation, stomach pain, or vomiting · Trouble breathing or slow breathing If you notice these less serious side effects, talk with your doctor: · Mild constipation, nausea, or vomiting · Mild sleepiness or tiredness If you notice other side effects that you think are caused by this medicine, tell your doctor. Call your doctor for medical advice about side effects. You may report side effects to FDA at 1-415-QTG-0597 © 2016 [address] is for End User's use only and may not be sold, redistributed or otherwise used for commercial purposes. The above information is an  only. It is not intended as medical advice for individual conditions or treatments. Talk to your doctor, nurse or pharmacist before following any medical regimen to see if it is safe and effective for you.

## 2017-01-27 NOTE — ANESTHESIA PREPROCEDURE EVALUATION
Anesthetic History   No history of anesthetic complications            Review of Systems / Medical History  Patient summary reviewed, nursing notes reviewed and pertinent labs reviewed    Pulmonary  Within defined limits                 Neuro/Psych       CVA (no symptoms, seen on scan)       Cardiovascular    Hypertension          CAD (s/p stent 2010)    Exercise tolerance: >4 METS     GI/Hepatic/Renal  Within defined limits              Endo/Other      Hypothyroidism: well controlled  Obesity and cancer (prostate)     Other Findings              Physical Exam    Airway  Mallampati: I  TM Distance: > 6 cm  Neck ROM: normal range of motion   Mouth opening: Normal     Cardiovascular    Rhythm: regular  Rate: normal      Pertinent negatives: No murmur   Dental      Comments: Multiple missing teeth   Pulmonary  Breath sounds clear to auscultation               Abdominal  GI exam deferred       Other Findings            Anesthetic Plan    ASA: 2  Anesthesia type: general and regional - interscalene block    Monitoring Plan: BIS      Induction: Intravenous  Anesthetic plan and risks discussed with: Patient      Took beta blocker at 530 am

## 2017-01-27 NOTE — BRIEF OP NOTE
BRIEF OPERATIVE NOTE    Date of Procedure: 1/27/2017   Preoperative Diagnosis: Rotator Cuff Tear, Biceps Rupture Right  Postoperative Diagnosis: Rotator Cuff Tear, Biceps Rupture Right    Procedure(s):  RIGHT SHOULDER OPEN ROTATOR CUFF REPAIR, OPEN BICEPS TENODESIS  Surgeon(s) and Role: * Paulette Harrison MD - Primary        Shilpi Fish PA-C - Assist    Surgical Staff:  Circ-1: Xuan Simon RN  Circ-2: Cami Méndez  Circ-Relief: Hardy Cruz RN  Scrub Tech-1: Nahomi Hemphill  Event Time In   Incision Start 1007   Incision Close 1638     Anesthesia: General   Estimated Blood Loss: 50cc  Specimens: * No specimens in log *   Findings: see above   Complications: none  Implants:   Implant Name Type Inv.  Item Serial No.  Lot No. LRB No. Used Action   SCR CORK TRIPL PLAY PEEK 5.5MM --  - [id number]   SCR CORK TRIPL PLAY PEEK 5.5MM --  [id number] [id number] Right 3 Implanted

## 2017-01-27 NOTE — ANESTHESIA PROCEDURE NOTES
Peripheral Block    Start time: 1/27/2017 7:05 AM  End time: 1/27/2017 7:17 AM  Performed by: Cy Melendez  Authorized by: Cy Melendez       Pre-procedure: Indications: at surgeon's request and post-op pain management    Preanesthetic Checklist: patient identified, risks and benefits discussed, site marked, timeout performed, anesthesia consent given and patient being monitored    Timeout Time: 07:08          Block Type:   Block Type:   Interscalene  Laterality:  Right  Monitoring:  Standard ASA monitoring, responsive to questions and oxygen  Injection Technique:  Single shot  Procedures: ultrasound guided    Patient Position: supine  Prep: chlorhexidine    Location:  Interscalene  Needle Type:  Stimuplex  Needle Gauge:  22 G  Needle Localization:  Ultrasound guidance  Medication Injected:  0.5%  ropivacaine  Volume (mL):  30    Assessment:  Number of attempts:  1  Injection Assessment:  Incremental injection every 5 mL, no paresthesia, ultrasound image on chart, local visualized surrounding nerve on ultrasound, negative aspiration for blood and no intravascular symptoms  Patient tolerance:  Patient tolerated the procedure well with no immediate complications

## 2017-01-27 NOTE — PERIOP NOTES
Dr. Skylar Pro  Performed a Right ISB in pre-op with the U/S machine. Pt. Tolerated well. Pt. Was on cardiac monitoring, pulse oximetry, and 3L NC O2. VSS. Pt received 3mg of versed IV for sedation from Dr. Skylar Pro. Pt. Is awake and alert post block.   Will continue to monitor

## 2017-01-27 NOTE — ANESTHESIA POSTPROCEDURE EVALUATION
Post-Anesthesia Evaluation and Assessment    Patient: Abner Marques MRN: [id number]  SSN: [id number]    YOB: 1955  Age: 59 y.o. Sex: male       Cardiovascular Function/Vital Signs  Visit Vitals    /77    Pulse 68    Temp 36.6 °C (97.8 °F)    Resp 16    Ht 6' (1.829 m)    Wt 111.6 kg (246 lb)    SpO2 95%    BMI 33.36 kg/m2       Patient is status post general, regional anesthesia for Procedure(s):  RIGHT SHOULDER OPEN ROTATOR CUFF REPAIR, OPEN BICEPS TENODESIS. Nausea/Vomiting: None    Postoperative hydration reviewed and adequate. Pain:  Pain Scale 1: Numeric (0 - 10) (01/27/17 1307)  Pain Intensity 1: 0 (01/27/17 8800)   Managed. Right interscalene block working well. Neurological Status:   Neuro (WDL): Within Defined Limits (alert, talkative & joking) (01/27/17 0918)   At baseline    Mental Status and Level of Consciousness: Arousable    Pulmonary Status:   O2 Device: Room air (01/27/17 0918)   Adequate oxygenation and airway patent    Complications related to anesthesia: None    Post-anesthesia assessment completed.  No concerns    Signed By: Ghulam Perez MD     January 27, 2017

## 2017-01-27 NOTE — DISCHARGE INSTRUCTIONS
Rotator Cuff Repair  Post-Op Instructions  Thorp J. Lenna Canavan, M.D.  [phone]    Sling and Motion:  You will use your sling for five (5) weeks. You may remove your arm from the sling for showers. You may also remove your arm from the sling and let your arm hang down so your elbow doesn't get too stiff. You are encouraged to perform hand, wrist and elbow range of motion, arm dangles, and shoulder blade pinches at least 5 times per day. These exercises will help to decrease swelling and stiffness. I will teach your how to do dangles and shoulder blade pinches starting right after your surgery. Swelling and bruising is common after surgery. You may also notice swelling in you hand, if you do, adjust your sling so the hand is slightly above the elbow, you may squeeze a ball like a stress ball and you can do some bicep curls without weight. These exercises will help with the swelling. The essential point is that your are NOT allowed to actively lift your elbow away from your side (under its own power) for the first five (5) weeks. Dressing: Your dressing will be left in place for 2 days. You may notice bloody drainage on the dressing. That is fine. You will remove the dressing two (2) days after the surgery and cover the incisions with circular band-aids. Shower with band-aids on, then remove and replace band-aids after showers. Sleeping:  Patients are generally more comfortable sleeping in a reclining chair or with some pillows propped behind the shoulder. You can wear your sling when sleeping, or you may remove the sling and just slide a bed pillow up underneath your arm and sleep like that. Some difficulty with sleeping is common for 2-3 weeks after surgery. Therapy:  Arrangements will be made at your post-op appointment. Your Physical Therapist will progress your activity appropriately.     Medications:   -You should resume your daily medications unless instructed differently.    -You will go home with two pain medication prescriptions. Hydromorphone and Oxycodone. DO NOT take both at once. Pick one or the other. I recommend trying Hydromorphone first.  If you have problems with it, wait three hours and switch over to the Oxycodone.    -Do not wait until you are in a lot of pain before taking the medication. It takes the medication 30-45 minutes to take effect. -DO NOT take NSAIDs (Advil, Aleve, Motrin, Ibuprofen, etc.) of the first month. NSAIDs are reported to delay tendon healing. Take Tylenol or Acetaminophen instead. No more than 2000 mg daily.    -It is not uncommon to have some stomach upset with the use of narcotic medication. For this reason, take you medication with food. If your symptoms are severe, please call the office.    -The use of narcotics can lead to constipation. A high fiber diet, and lots of fluids can prevent this occurring. Over the counter laxatives (milk of magnesia, dulcolax, magnesium citrate) can be used as directed. -If a refill of medication is needed, please call the office during regular business hours, Monday - Friday 8:00AM-5:00PM.  Dr. Alice Vidal may be in surgery and not readily available to sign a prescription so it is important to call at least a day before your prescription will run out. Refills will not be made after hours, so please plan ahead. We also cannot guarantee a same day prescription. Driving: DO NOT drive while taking narcotic medication. It is okay to drive in your sling, just follow same rules, no lifting elbow away from your side. Return to school/work: This will depend on your job requirements and level of activity. If you work at a desk job or in a supervisory role, you may return as early as 3-4 days after surgery. Average return to regular activities is 4-6 months post-op. Follow up: You will be given an appointment card for your follow-up appointment at our [facility] office.  At that time your sutures will be removed and physical therapy will be arranged. If unexpected problems, emergencies or other issues occur and you need to speak with our office, please call. A physician is on call 24 hours a day, 7 days a week for emergencies and may be reached through the answering service by calling the regular office number [phone]. Jordana Hoover M.D. Do not move arm out and away from body. Only dangle. TAKE NARCOTIC PAIN MEDICATIONS WITH FOOD, and if given 2 pain narcotics do NOT take together! Narcotics tend to be constipating and we recommend taking a stool softener such as Colace or Miralax (follow package instructions). If you were given prescriptions, please review the written information on the prescribed medications. DO NOT DRIVE WHILE TAKING NARCOTIC PAIN MEDICATIONS. DISCHARGE SUMMARY from Nurse    The following personal items collected during your admission are returned to you:   Dental Appliance: Dental Appliances: None  Vision: Visual Aid: Glasses (glasses given to wife)  Hearing Aid:    Jewelry: Jewelry: None  Clothing: Clothing: Other (comment) (belonging bag)  Other Valuables: Other Valuables: None  Valuables sent to safe:        PATIENT INSTRUCTIONS:    After General Anesthesia or Intravenous Sedation, for 24 hours or while taking prescription Narcotics:  · Someone should be with you for the next 24 hours. · For your own safety, a responsible adult must drive you home. · Limit your activities  · Recommended activity: Rest today, up with assistance today. Do not climb stairs or shower unattended for the next 24 hours. · Start with a soft bland diet and advance as tolerated (no nausea) to regular diet. · If you have a sore throat some things that may help are: fluids, warm salt water gargle, or throat lozenges. If this does not improve after several days please follow up with your family physician.   · Do not drive and operate hazardous machinery  · Do not make important personal or business decisions  · Do  not drink alcoholic beverages  · If you have not urinated within 8 hours after discharge, please contact your surgeon on call. Report the following to your surgeon:  · Excessive pain, swelling, redness or odor of or around the surgical area  · Temperature over 100.5  · Nausea and vomiting lasting longer than 4 hours or if unable to take medications  · Any signs of decreased circulation or nerve impairment to extremity: change in color, persistent  numbness, tingling, coldness or increase pain    · If you received an upper extremity nerve block, please wear your sling until the block has worn off, then refer to your surgeons post-operative instructions. If you have had a shoulder block or a block near your collar bone, you may have symptoms such as:          1. Mild shortness of breath        2. A hoarse voice        3. Blurry vision        4. Unequal pupils        5. Drooping of your face on the same side as the nerve block. These symptoms will disappear as the nerve block wears off. · You will receive a Post Operative Call from one of the Recovery Room Nurses on the day after your surgery to check on you. It is very important for us to know how you are recovering after your surgery. If you have an issue please call your surgeon, do not wait for the post operative call. · You may receive an e-mail or letter in the mail from [place] regarding your experience with us in the Ambulatory Surgery Unit. Your feedback is valuable to us and we appreciate your participation in the survey. ·   · If the above instructions are not adequate, please contact Vera Dawn RN, [facility] anesthesia Nurse Manager or our Anesthesiologist, at [phone]. If you are having problems after your surgery, call your physician at his office number. ·   · We wish youre a speedy recovery ?       What to do at Home:    *  Please give a list of your current medications to your Primary Care Provider. *  Please update this list whenever your medications are discontinued, doses are      changed, or new medications (including over-the-counter products) are added. *  Please carry medication information at all times in case of emergency situations. David Hoang 41. THROMBOSIS AND PULMONARY EMBOLUS    SURGICAL PATIENTS  Surgical patients are the #1 risk for DVT and PE. WHAT IS DVT? WHAT IS PE?  DVT is a serious condition where blood clots develop deep in the veins of the legs. PE occurs when a blood clot breaks loose from the wall of a vein and travels to the lungs blocking the pulmonary artery or one of its branches impairing blood flow from the heart, which could result in death.   RISK FACTORS   Surgery lasting longer than 45 minutes   History of inflammatory bowel disease   Oral contraceptive or hormone replacement therapy   Immobilization   Varicose veins / swollen legs   Smoking    CHF / Acute MI / Irregular heart beat   Family history of thrombosis   General anesthesia greater than 2 hours   Obesity   Infection of less than one month   Less than 1 month postpartum   COPD / Pneumonia   Arthroscopic surgery   Malignancy / cancer   Spine surgery   Blood abnormalities   Stroke / Paralysis / Coma    SIGNS AND SYMPTOMS OF DEEP VEIN TROMBOSIS  Usually occurs in one leg, above or below the knee   Swelling - one calf or thigh may be larger than the other   Feeling increased warmth in the area of the leg that is swollen or painful   Leg pain, which may increase when standing or walking   Swelling along the vein of the leg   When swollen areas is pressed with a finger, a depression may remain   Tenderness of the leg that may be confined to one area   Change in leg color (bluish or red)    SIGNS AND SYMPTOMS OF PULMONARY EMBOLUS   Chest pain that gets worse with deep breath, coughing or chest movement   Coughing up blood   Sweating   Shortness of breath or difficulty breathing   Rapid heart beat   Lightheadedness    HOW TO REDUCE THE POSSIBLE RISK OF DVT   Exercise - simple activities as rotating ankles and wrists, wiggling toes and fingers, tightening and relaxing muscles in calves and thighs promotes circulation while recovering from surgery. Please do these exercises every hour during waking hours   JOSEPH hose - If you have been given white support hose while having surgery, wear them home. You may remove them for half and hour every 8-hour period and stop wearing them 48 hours after surgery or as prescribed by your physician. JOSEPH hose may be reused for air travel or extended car travel   Take mediation as prescribed by your physician (Lovenox, Coumadin, Aspirin)   Resume your normal activities as soon as your doctor advises you to do so.  Remember, when traveling, to [place] your legs frequently. Wear non skid shoes when JOSEPH hose are on. They are very slippery! PATIENTS WHO BELIEVE THEY MAY BE EXPERIENCING SIGNS AND SYMPTOMS OF DVT OR PE SHOULD SEEK MEDICAL HELP IMMEDIATELY                 These are general instructions for a healthy lifestyle:    No smoking/ No tobacco products/ Avoid exposure to second hand smoke    Surgeon General's Warning:  Quitting smoking now greatly reduces serious risk to your health. Obesity, smoking, and sedentary lifestyle greatly increases your risk for illness    A healthy diet, regular physical exercise & weight monitoring are important for maintaining a healthy lifestyle    You may be retaining fluid if you have a history of heart failure or if you experience any of the following symptoms:  Weight gain of 3 pounds or more overnight or 5 pounds in a week, increased swelling in our hands or feet or shortness of breath while lying flat in bed. Please call your doctor as soon as you notice any of these symptoms; do not wait until your next office visit.     Recognize signs and symptoms of STROKE:    F-face looks uneven    A-arms unable to move or move even    S-speech slurred or non-existent    T-time-call 911 as soon as signs and symptoms begin-DO NOT go       Back to bed or wait to see if you get better-TIME IS BRAIN. If you have not had your influenza or pneumococcal vaccines, please follow up with your primary care physician. The discharge information has been reviewed with the patient and family. The patient and family verbalized understanding.

## 2017-01-27 NOTE — OP NOTES
[facility]   [facility], [address]   OP NOTE       Name:  Aiyana Rose   MR#:  [id number]   :  1955   Account #:  [de-identified]    Surgery Date:  2017   Date of Adm:  2017       PREOPERATIVE DIAGNOSES    1. Right subscapularis rotator cuff tear. 2. Biceps instability medial.       POSTOPERATIVE DIAGNOSES    1. Right subscapularis rotator cuff tear. 2. Biceps instability medial.     PROCEDURES PERFORMED: Open rotator cuff repair, subscapularis   tendon and open biceps tenodesis. SURGEON: Zuleima Pinzon. Licha Ward MD    ASSISTANT: FAVIO Argueta    ESTIMATED BLOOD LOSS: 5 mL. SPECIMENS REMOVED: None. ANESTHESIA: General endotracheal.    INDICATION: This is a retracted subscap tear with biceps instability,   comes in for surgical repair. This is a complex surgical procedure   requiring assistant and 1 was used. DESCRIPTION OF PROCEDURE: The patient was brought to the   operating theater, given airway, IV antibiotics, preoperative   interscalene block. This patient was massively obese, so positioning   required extra assistance and about 50% extra surgical time due to his   obesity, so a modified 22 is addended. He was positioned and then prepped   and draped. After time-out, infiltrated ropivacaine along the   deltopectoral raphae and then made an incision in the deltopectoral   interval and gently dissected down through his mass, down to the   deltopectoral interval, where the vein was dissected free from the   pectoralis and taken laterally with the deltoid, developing the   deltopectoral interval. We carefully elevated the soft tissue, just lateral   to the short flexors of the biceps and retracted those medially and then   gently dissected up underneath the deltoid fascia so that we could put   a deltoid brown retractor in the subdeltoid space, exposing the   proximal humerus.  We found a ruptured subscap tendon retracted to   the glenoid rim with medial biceps instability. We amputated the biceps   at its stump on the proximal glenoid and then tenodesed it to the   pectoralis major tendon using #2 FiberWire sutures. Then excised and   removed the excess proximal biceps tendon. Then, we roughened up   the footprint of the lesser tuberosity and put in 3 anchors. These are   Arthrex 5.5 mm bioabsorbable anchors, each has 3 sutures on it. We   did release this around the subscapularis, so we could bounce out of   the lesser tuberosity and then put all these 6 stitches in the   subscapularis tendon. Two modified [place]-Andrzej sutures and 4 simple   sutures. We tied these down with the arm in internal rotation. We then   put a third anchor in the biceps groove and did 3 inverted horizontal   mattress sutures medial to the original suture line and  tied these down   as a reinforcing layer. When we then internally and externally rotated   the arm, there was no motion at the repair site. Copiously irrigated with   bacitracin and saline and closed in layers. Took the patient to recovery   in stable condition.         Ruel Cash MD TD / GEOVANY   D:  01/27/2017   09:30   T:  01/27/2017   11:53   Job #:  [id number]

## 2017-02-07 ENCOUNTER — HOSPITAL ENCOUNTER (EMERGENCY)
Age: 62
Discharge: HOME | End: 2017-02-07
Attending: EMERGENCY MEDICINE
Payer: COMMERCIAL

## 2017-02-07 ENCOUNTER — APPOINTMENT (OUTPATIENT)
Dept: GENERAL RADIOLOGY | Age: 62
End: 2017-02-07
Attending: EMERGENCY MEDICINE
Payer: COMMERCIAL

## 2017-02-07 ENCOUNTER — APPOINTMENT (OUTPATIENT)
Dept: CT IMAGING | Age: 62
End: 2017-02-07
Attending: EMERGENCY MEDICINE
Payer: COMMERCIAL

## 2017-02-07 VITALS
WEIGHT: 250 LBS | SYSTOLIC BLOOD PRESSURE: 133 MMHG | OXYGEN SATURATION: 98 % | HEIGHT: 72 IN | TEMPERATURE: 98 F | DIASTOLIC BLOOD PRESSURE: 74 MMHG | RESPIRATION RATE: 15 BRPM | HEART RATE: 65 BPM | BODY MASS INDEX: 33.86 KG/M2

## 2017-02-07 DIAGNOSIS — Z98.890 RECENT MAJOR SURGERY: ICD-10-CM

## 2017-02-07 DIAGNOSIS — R06.02 SOB (SHORTNESS OF BREATH): Primary | ICD-10-CM

## 2017-02-07 LAB
ALBUMIN SERPL BCP-MCNC: 3.7 G/DL (ref 3.5–5)
ALBUMIN/GLOB SERPL: 1 {RATIO} (ref 1.1–2.2)
ALP SERPL-CCNC: 84 U/L (ref 45–117)
ALT SERPL-CCNC: 21 U/L (ref 12–78)
ANION GAP BLD CALC-SCNC: 11 MMOL/L (ref 5–15)
AST SERPL W P-5'-P-CCNC: 11 U/L (ref 15–37)
ATRIAL RATE: 65 BPM
BASOPHILS # BLD AUTO: 0 K/UL (ref 0–0.1)
BASOPHILS # BLD: 1 % (ref 0–1)
BILIRUB SERPL-MCNC: 0.5 MG/DL (ref 0.2–1)
BUN SERPL-MCNC: 11 MG/DL (ref 6–20)
BUN/CREAT SERPL: 11 (ref 12–20)
CALCIUM SERPL-MCNC: 8.3 MG/DL (ref 8.5–10.1)
CALCULATED P AXIS, ECG09: 20 DEGREES
CALCULATED R AXIS, ECG10: 36 DEGREES
CALCULATED T AXIS, ECG11: 33 DEGREES
CHLORIDE SERPL-SCNC: 105 MMOL/L (ref 97–108)
CK SERPL-CCNC: 63 U/L (ref 39–308)
CO2 SERPL-SCNC: 26 MMOL/L (ref 21–32)
CREAT SERPL-MCNC: 0.99 MG/DL (ref 0.7–1.3)
DIAGNOSIS, 93000: NORMAL
EOSINOPHIL # BLD: 0.3 K/UL (ref 0–0.4)
EOSINOPHIL NFR BLD: 4 % (ref 0–7)
ERYTHROCYTE [DISTWIDTH] IN BLOOD BY AUTOMATED COUNT: 13 % (ref 11.5–14.5)
GLOBULIN SER CALC-MCNC: 3.7 G/DL (ref 2–4)
GLUCOSE SERPL-MCNC: 121 MG/DL (ref 65–100)
HCT VFR BLD AUTO: 32.4 % (ref 36.6–50.3)
HGB BLD-MCNC: 10.8 G/DL (ref 12.1–17)
LYMPHOCYTES # BLD AUTO: 16 % (ref 12–49)
LYMPHOCYTES # BLD: 1.1 K/UL (ref 0.8–3.5)
MCH RBC QN AUTO: 31.2 PG (ref 26–34)
MCHC RBC AUTO-ENTMCNC: 33.3 G/DL (ref 30–36.5)
MCV RBC AUTO: 93.6 FL (ref 80–99)
MONOCYTES # BLD: 0.5 K/UL (ref 0–1)
MONOCYTES NFR BLD AUTO: 7 % (ref 5–13)
NEUTS SEG # BLD: 5.3 K/UL (ref 1.8–8)
NEUTS SEG NFR BLD AUTO: 72 % (ref 32–75)
P-R INTERVAL, ECG05: 168 MS
PLATELET # BLD AUTO: 296 K/UL (ref 150–400)
POTASSIUM SERPL-SCNC: 3.5 MMOL/L (ref 3.5–5.1)
PROT SERPL-MCNC: 7.4 G/DL (ref 6.4–8.2)
Q-T INTERVAL, ECG07: 422 MS
QRS DURATION, ECG06: 88 MS
QTC CALCULATION (BEZET), ECG08: 438 MS
RBC # BLD AUTO: 3.46 M/UL (ref 4.1–5.7)
SODIUM SERPL-SCNC: 142 MMOL/L (ref 136–145)
TROPONIN I BLD-MCNC: <0.04 NG/ML (ref 0–0.08)
TROPONIN I SERPL-MCNC: <0.04 NG/ML
VENTRICULAR RATE, ECG03: 65 BPM
WBC # BLD AUTO: 7.2 K/UL (ref 4.1–11.1)

## 2017-02-07 PROCEDURE — 77030027138 HC INCENT SPIROMETER -A

## 2017-02-07 PROCEDURE — 84484 ASSAY OF TROPONIN QUANT: CPT | Performed by: EMERGENCY MEDICINE

## 2017-02-07 PROCEDURE — 85025 COMPLETE CBC W/AUTO DIFF WBC: CPT | Performed by: EMERGENCY MEDICINE

## 2017-02-07 PROCEDURE — 74011250637 HC RX REV CODE- 250/637: Performed by: EMERGENCY MEDICINE

## 2017-02-07 PROCEDURE — 36415 COLL VENOUS BLD VENIPUNCTURE: CPT | Performed by: EMERGENCY MEDICINE

## 2017-02-07 PROCEDURE — 74011000250 HC RX REV CODE- 250: Performed by: EMERGENCY MEDICINE

## 2017-02-07 PROCEDURE — 99284 EMERGENCY DEPT VISIT MOD MDM: CPT

## 2017-02-07 PROCEDURE — 82550 ASSAY OF CK (CPK): CPT | Performed by: EMERGENCY MEDICINE

## 2017-02-07 PROCEDURE — 71275 CT ANGIOGRAPHY CHEST: CPT

## 2017-02-07 PROCEDURE — 74011636320 HC RX REV CODE- 636/320: Performed by: EMERGENCY MEDICINE

## 2017-02-07 PROCEDURE — 93005 ELECTROCARDIOGRAM TRACING: CPT

## 2017-02-07 PROCEDURE — 93971 EXTREMITY STUDY: CPT

## 2017-02-07 PROCEDURE — 80053 COMPREHEN METABOLIC PANEL: CPT | Performed by: EMERGENCY MEDICINE

## 2017-02-07 PROCEDURE — 71010 XR CHEST PORT: CPT

## 2017-02-07 PROCEDURE — 74011250636 HC RX REV CODE- 250/636: Performed by: EMERGENCY MEDICINE

## 2017-02-07 PROCEDURE — 94640 AIRWAY INHALATION TREATMENT: CPT

## 2017-02-07 PROCEDURE — 77030029684 HC NEB SM VOL KT MONA -A

## 2017-02-07 RX ORDER — SODIUM CHLORIDE 0.9 % (FLUSH) 0.9 %
10 SYRINGE (ML) INJECTION
Status: COMPLETED | OUTPATIENT
Start: 2017-02-07 | End: 2017-02-07

## 2017-02-07 RX ORDER — SODIUM CHLORIDE 9 MG/ML
50 INJECTION, SOLUTION INTRAVENOUS
Status: COMPLETED | OUTPATIENT
Start: 2017-02-07 | End: 2017-02-07

## 2017-02-07 RX ORDER — OXYCODONE HYDROCHLORIDE 5 MG/1
10 TABLET ORAL
Status: COMPLETED | OUTPATIENT
Start: 2017-02-07 | End: 2017-02-07

## 2017-02-07 RX ORDER — ALBUTEROL SULFATE 90 UG/1
2 AEROSOL, METERED RESPIRATORY (INHALATION)
Qty: 1 INHALER | Refills: 1 | Status: ON HOLD | OUTPATIENT
Start: 2017-02-07 | End: 2019-08-07

## 2017-02-07 RX ORDER — IPRATROPIUM BROMIDE AND ALBUTEROL SULFATE 2.5; .5 MG/3ML; MG/3ML
3 SOLUTION RESPIRATORY (INHALATION)
Status: COMPLETED | OUTPATIENT
Start: 2017-02-07 | End: 2017-02-07

## 2017-02-07 RX ADMIN — IOPAMIDOL 100 ML: 755 INJECTION, SOLUTION INTRAVENOUS at 10:19

## 2017-02-07 RX ADMIN — Medication 10 ML: at 10:19

## 2017-02-07 RX ADMIN — IPRATROPIUM BROMIDE AND ALBUTEROL SULFATE 3 ML: .5; 3 SOLUTION RESPIRATORY (INHALATION) at 10:49

## 2017-02-07 RX ADMIN — SODIUM CHLORIDE 50 ML/HR: 900 INJECTION, SOLUTION INTRAVENOUS at 10:19

## 2017-02-07 RX ADMIN — OXYCODONE HYDROCHLORIDE 10 MG: 5 TABLET ORAL at 12:43

## 2017-02-07 NOTE — PROGRESS NOTES
[facility]  Preliminary Report:  Venous Duplex Arm    Right arm venous duplex was performed. All deep and superficial veins appear compressible with normal Doppler characteristics. Final report to follow.

## 2017-02-07 NOTE — CONSULTS
Cardiology Consult Note    CC: Dyspnea. Cordelia Santillan MD  Reason for consult:  Dyspnea; h/o CAD Requesting MD:  Dr. Jered Fagan. Admit Date: 2/7/2017   Today's Date: 2/7/2017   Cardiologist:  Dr Brodie Maki. Cardiac Assessment/Plan:   CAD: Dyspnea with no CP, No acute ECG changes; initial enzymes negative. If second set of cardiac enzymes are negative/unchanged > 4 hours from first set, ECG remains stable, and no new symptoms occur, I recommend discharge for outpatient stress cardiolite; please have the patient call [phone] to schedule (to be done in approx 2 weeks to allow further recovery from surgery). HTN: Stable. Rhythm: Sinus    Volume status:euvolemic  Renal function: stable    LUE swelling: per ER MD/ortho; Consider LUE ultrasound despite neg CTA for PE. Dispo: per Er MD.     The patient reports no CP nor DENG PTA. He has been active w/o Sx. No PND, orthopnea, palpitations, pre-syncope, syncope, peripheral edema, or decrease in exercise tolerance. No current complaints. He had shoulder surgery 2 weeks ago; recently worse swelling and ecchymosis. Significant shoulder pain: worse at night. Acute dyspnea/diaphoresis this am; No CP per se. Normal sats. Rx with neb: now feels much better. ECG: normal  Neg CK/trop x1    Neg CTA for PE.  ________________________________________________________________________________  Notable prior cardiac history:    @ OV 1/17/17:        Mr Denice Schreiber has a h/o HTN, dyslipidemia, CAD (D BMS 11/2010), and previous intermittent atypical CP.    6/2014: He has 1 month of intermittent atypical CP (1-2 x/week; 1 hr to 5 days; tight/sharp; to back/left arm/left flank; worse with deep breath/moving arm). No exertional CP. + DENG. He has occ palpitations (2 x/week; fast/skipping; 10-60 sec; last few weeks): Neg EVR and MPI. Dx with clavicle separation - Sx resolved with injection. Palps resolved with pain resolution. 12/2014: No Sx. Active.     1/2017: No recent CP/DENG; >4 METs exertional capacity w/o Sx. He is having shoulder surgery later this month. IMPRESSION AND PLAN  01. Encounter for preprocedural cardiovascular exam:  The proposed procedure in general carries a low risk of cardiac complications. Overall assessment suggests that the patient's risk of periprocedural cardiac complications should be low. I would proceed with the proposed procedure without further cardiac testing. 02. Pain in right shoulder   03. Atherosclerotic heart disease of native coronary artery without angina pectoris:  No anginal symptoms. Remote diagonal BMS; We discussed the signs and symptoms of unstable angina, myocardial infarction and malignant arrhythmia. The patient knows to seek immediate medical attention should they occur. ECG done today   04. Essential (primary) hypertension:  Adequately controlled. 05. Mixed hyperlipidemia:  Patient is tolerating lipid lowering therapy well. Lipid labs drawn by PCP.   06. Nicotine dependence, unspecified, uncomplicated:  He continues to use smokeless tobacco.  The patient was instructed on tobacco cessation. 07. Palpitations:  Previous negative evaluations. Resolved. 08. Body mass index (BMI) 34.0-34.9, adult:  The patient was instructed on AHA diet and regular exercise. ORDERS:  1 ECG done today   2 Return office visit with Ian Chowdhury MD in 1 Year. 3 The patient was instructed on AHA diet and regular exercise.    6 Healthy Food Choices Educational Materials   6 Hypertension Educational Materials   6 Tobacco cessation counseling       1/17/17 MEDICATION LIST  Medication Sig Description   amlodipine 5 mg Tab Take 1 tablet by mouth once a day   aspirin 325 mg Tab, Delayed Release Take 1 tablet by mouth once a day   Coreg 25 mg Tab Take 1 tablet by mouth twice a day   Crestor 5 mg tablet take 1 tablet by oral route  every day   lisinopril 40 mg Tab Take 1 tablet by mouth twice a day   Motrin  mg tablet take 3 tablet by oral route  every  day  as needed with food   multivitamin Tab Take 1 tablet by mouth once a day   Protonix 40 mg Tab Take 1 tablet by mouth once a day   Synthroid 50 mcg Tab Take 1 tablet by mouth once a day   Tekturna 300 mg Tab Take 1 tablet by mouth once a day   Vitamin D3 2,000 unit capsule 1 po qd     _________________________________________________________________________  CARDIAC HISTORY  CAD:  1 Chest Pain Syndrome, atypical. Pre-op eval. [Normal EF, 50% PLB, 75% Diagonal:  BMS to D.] - 18/7839   2 Chest Pain Syndrome, exertional/Dyspnea: ever since stent. [Non-Ischemic Stress, Marked HTN: Sx improved with BP control.] - 1/2011   3 Chest Pain Syndrome, atypical. [Non-Ischemic Stress] - 7/2014     ARRHYTHMIA:  1 Palpitations [Neg holter.] - 1/2011   2 Palpitations [Neg EVR and MPI.] - 6/2014     RISK FACTORS:  1 Hypertension   2 Dyslipidemia   3 Peripheral Vascular Disease       CARDIOVASCULAR PROCEDURES  CATH LAB:  Cath (Normal EF, 50% PLB, 75% Diagonal:  BMS to D.) - 80/8390   ECHO/MUGA:  Echo (EF 0.65 (65%), Moderate AR, Mild MR, No change vs 6/2010.) - 10/27/2010   ELECTROPHYSIOLOGY:  Holter (Sinus Rhythm, No Malignant Arrhythmias, No Sx.) - 7/13/2011   EKG (Sinus Chadwick, NS IVCD. ) - 12/11/2014   EVR (Sinus Rhythm, No Malignant Arrhythmias, No events. ) - 7/2014   EKG (Sinus Rhythm, borderline poor r wave progression, NS IVCD (111). ) - 1/17/2017   STRESS TESTS:  William MPI (EF 0.50 (50%), No Ischemia) - 1/19/2011   MPI (EF 0.58 (58%), No Ischemia, 9:30.) - 7/9/2014       ALLERGIES/INTOLERANCES:    Ingredient Reaction Medication Name Comment   FENOFIBRATE,MICRONIZED myalgia Tricor    FISH OIL couldn't tolerate     SIMVASTATIN myalgia     NIACIN decreased vision Niaspan Extended-Release    FENOFIBRATE NANOCRYSTALLIZED myalgia Tricor    ATORVASTATIN CALCIUM decreased vision Lipitor    CLONIDINE HCL anxiety CLONIDINE HCL        PROBLEM LIST:  Problem Description Chronic   Chest pain Y   CAD Y   Benign HTN Y   Mixed hyperlipidaemia Y   CVA Y         PAST MEDICAL/SURGICAL HISTORY  (Detailed)    Disease/disorder Onset Date Management Date Comments     Cholecystectomy       Tonsillectomy     Anxiety       Depression       Hyperlipidemia       Hypertension       Hypothyroidism. knee surgery       prostectomy           Family History  (Detailed)  Relationship Family Member Name  Age at Death Condition Onset Age Cause of Death   Brother  N  Hypertension  N   Brother  [de-identified] and well     Father  Y  Hypertension  N   Father  Y  Stroke 55 Y     SOCIAL HISTORY  (Detailed)  Tobacco use reviewed. Preferred language is [place]. MARITAL STATUS/FAMILY/SOCIAL SUPPORT  Currently . Smoking status: Current every day smoker. SMOKING STATUS  Use Status Type Smoking Status Usage Per Day Years Used Total Pack Years   yes Chewing Current every day smoker        TOBACCO CESSATION INFORMATION  Date Counseled By Order Status Description Code Tobacco Cessation Information   2014      Smoking cessation education   2014 Ahsan Yu Tobacco cessation counseling completed   Tobacco Cessation Counseling   2017 Mera Hitchcock Tobacco cessation counseling completed   Tobacco Cessation Counseling     ______________________________________________________________________  Patient Active Problem List    Diagnosis Date Noted    Right rotator cuff tear 2017    SBO (small bowel obstruction) ([facility] [address] 2015        Past Medical History   Diagnosis Date    CAD (coronary artery disease)      h/o stent- '10    Cancer [facility])      prostate    Hypertension     Stroke [facility]) May or 2010     lacunar  infarctions (found on scan- no sxs)    Thyroid disease      hypo      Past Surgical History   Procedure Laterality Date    Hx heent       T&A    Pr excis knee cartilage,medial or lat   x3    Hx orthopaedic       Ganglion cyst left wrist    Hx cholecystectomy      Hx hernia repair  1996    Hx prostatectomy  2010     No Known Allergies   History reviewed. No pertinent family history. Social History     Social History    Marital status:      Spouse name: N/A    Number of children: N/A    Years of education: N/A     Occupational History    Not on file. Social History Main Topics    Smoking status: Never Smoker    Smokeless tobacco: Current User    Alcohol use Yes      Comment: occas    Drug use: No    Sexual activity: Not on file     Other Topics Concern    Not on file     Social History Narrative     No current facility-administered medications for this encounter. Current Outpatient Prescriptions   Medication Sig    albuterol (PROVENTIL HFA, VENTOLIN HFA, PROAIR HFA) 90 mcg/actuation inhaler Take 2 Puffs by inhalation every four (4) hours as needed for Wheezing.  Aliskiren (TEKTURNA) 300 mg tablet Take 1 Tab by mouth nightly. Start if blood pressure remain >=140 upper or 90/100 lower    lisinopril (PRINIVIL, ZESTRIL) 40 mg tablet Take 1 Tab by mouth two (2) times a day. Start if blood pressure remain >=140 upper or 90/100 lower    amLODIPine (NORVASC) 5 mg tablet Take 5 mg by mouth daily.  aspirin (ASPIRIN) 325 mg tablet Take 325 mg by mouth daily.  carvedilol (COREG) 25 mg tablet Take 25 mg by mouth two (2) times daily (with meals).  pantoprazole (PROTONIX) 40 mg tablet Take 40 mg by mouth daily.  rosuvastatin (CRESTOR) 5 mg tablet Take 5 mg by mouth nightly.  cholecalciferol, vitamin d3, (VITAMIN D) 1,000 unit tablet Take 2,000 Units by mouth daily.  MULTI-VITAMIN HI-PO PO Take  by mouth daily.  levothyroxine (SYNTHROID) 50 mcg tablet Take 50 mcg by mouth daily (before breakfast). Prior to Admission Medications:  Prior to Admission medications    Medication Sig Start Date End Date Taking?  Authorizing Provider   albuterol (PROVENTIL HFA, VENTOLIN HFA, PROAIR HFA) 90 mcg/actuation inhaler Take 2 Puffs by inhalation every four (4) hours as needed for Wheezing. 17  Yes Josette Santillan MD   Aliskiren (TEKTURNA) 300 mg tablet Take 1 Tab by mouth nightly. Start if blood pressure remain >=140 upper or 90/100 lower 11/7/15   Agustin Venegas MD   lisinopril (PRINIVIL, ZESTRIL) 40 mg tablet Take 1 Tab by mouth two (2) times a day. Start if blood pressure remain >=140 upper or 90/100 lower 11/7/15   Agustin Venegas MD   amLODIPine (NORVASC) 5 mg tablet Take 5 mg by mouth daily. Historical Provider   aspirin (ASPIRIN) 325 mg tablet Take 325 mg by mouth daily. Asa Ross MD   carvedilol (COREG) 25 mg tablet Take 25 mg by mouth two (2) times daily (with meals). Asa Ross MD   pantoprazole (PROTONIX) 40 mg tablet Take 40 mg by mouth daily. Asa Ross MD   rosuvastatin (CRESTOR) 5 mg tablet Take 5 mg by mouth nightly. Asa Ross MD   cholecalciferol, vitamin d3, (VITAMIN D) 1,000 unit tablet Take 2,000 Units by mouth daily. Historical Provider   MULTI-VITAMIN HI-PO PO Take  by mouth daily. 10/11/10   Historical Provider   levothyroxine (SYNTHROID) 50 mcg tablet Take 50 mcg by mouth daily (before breakfast). Historical Provider        Review of Symptoms: Except as noted in HPI, patient denies recent fever or chills, nausea, vomiting, diarrhea, hemoptysis, hematemesis, dysuria, myalgias, focal neurologic symptoms, ecchymosis, angioedema, odynophagia, dysphagia, sore throat, earache,rash, melena, hematochezia, depression, GERD, cold intolerance, petechia, bleeding gums, or significant weight loss.      24 hr VS reviewed, overall VSSAF  Temp (24hrs), Av °F (36.7 °C), Min:98 °F (36.7 °C), Max:98 °F (36.7 °C)    Patient Vitals for the past 8 hrs:   Pulse   17 1015 65   17 0945 62   17 0924 65   17 0914 63    Patient Vitals for the past 8 hrs:   Resp   02/07/17 1015 15   17 0945 19   17 0924 12   17 0914 20    Patient Vitals for the past 8 hrs:   BP   17 1015 133/74   02/07/17 0945 143/67   02/07/17 0924 135/70   02/07/17 0914 147/69        No intake or output data in the 24 hours ending 02/07/17 1055      Physical Exam (complete single organ system exam)    Cons: The patient is no distress. Appears stated age. HEENT: Normal conjunctivae and palate. No xanthelasma. Neck: Flat JVP without appreciable HJR. Resp: Normal respiratory effort with clear lungs bilaterally. CV: Regular rate and rhythm. PMI not palpated. Normal S1,S2  No gallop or rubs appreciated. No murmur appreciated. Intact carotid upstroke bilaterally with right carotid bruits. Abdominal aorta not palpated; no abdominal bruit noted. Normal femoral pulses without bruits. Intact pedal pulses. No peripheral edema. GI: No abd mass noted, soft; no organomegaly noted. Bowel sounds present. Muscular:  No significant kyphosis. Strength WNL for age. Ext: No cyanosis, clubbing, or stigmata of peripheral embolization. Derm: No ulcers or stasis dermatitis of lower extremities. Neuro: Alert and oriented x 3;  Grossly non-focal. Normal mood and affect.      Labs:   Recent Results (from the past 24 hour(s))   EKG, 12 LEAD, INITIAL    Collection Time: 02/07/17  9:22 AM   Result Value Ref Range    Ventricular Rate 65 BPM    Atrial Rate 65 BPM    P-R Interval 168 ms    QRS Duration 88 ms    Q-T Interval 422 ms    QTC Calculation (Bezet) 438 ms    Calculated P Axis 20 degrees    Calculated R Axis 36 degrees    Calculated T Axis 33 degrees    Diagnosis       Sinus rhythm with premature atrial complexes  Confirmed by Jessica Verdin ([id number]) on 2/7/2017 10:43:30 AM     CBC WITH AUTOMATED DIFF    Collection Time: 02/07/17  9:28 AM   Result Value Ref Range    WBC 7.2 4.1 - 11.1 K/uL    RBC 3.46 (L) 4.10 - 5.70 M/uL    HGB 10.8 (L) 12.1 - 17.0 g/dL    HCT 32.4 (L) 36.6 - 50.3 %    MCV 93.6 80.0 - 99.0 FL    MCH 31.2 26.0 - 34.0 PG    MCHC 33.3 30.0 - 36.5 g/dL    RDW 13.0 11.5 - 14.5 %    PLATELET 450 059 - 400 K/uL    NEUTROPHILS 72 32 - 75 %    LYMPHOCYTES 16 12 - 49 %    MONOCYTES 7 5 - 13 %    EOSINOPHILS 4 0 - 7 %    BASOPHILS 1 0 - 1 %    ABS. NEUTROPHILS 5.3 1.8 - 8.0 K/UL    ABS. LYMPHOCYTES 1.1 0.8 - 3.5 K/UL    ABS. MONOCYTES 0.5 0.0 - 1.0 K/UL    ABS. EOSINOPHILS 0.3 0.0 - 0.4 K/UL    ABS. BASOPHILS 0.0 0.0 - 0.1 K/UL   TROPONIN I    Collection Time: 02/07/17  9:28 AM   Result Value Ref Range    Troponin-I, Qt. <0.04 <0.05 ng/mL   CK W/ REFLX CKMB    Collection Time: 02/07/17  9:28 AM   Result Value Ref Range    CK 63 39 - 287 U/L   METABOLIC PANEL, COMPREHENSIVE    Collection Time: 02/07/17  9:28 AM   Result Value Ref Range    Sodium 142 136 - 145 mmol/L    Potassium 3.5 3.5 - 5.1 mmol/L    Chloride 105 97 - 108 mmol/L    CO2 26 21 - 32 mmol/L    Anion gap 11 5 - 15 mmol/L    Glucose 121 (H) 65 - 100 mg/dL    BUN 11 6 - 20 MG/DL    Creatinine 0.99 0.70 - 1.30 MG/DL    BUN/Creatinine ratio 11 (L) 12 - 20      GFR est AA >60 >60 ml/min/1.73m2    GFR est non-AA >60 >60 ml/min/1.73m2    Calcium 8.3 (L) 8.5 - 10.1 MG/DL    Bilirubin, total 0.5 0.2 - 1.0 MG/DL    ALT (SGPT) 21 12 - 78 U/L    AST (SGOT) 11 (L) 15 - 37 U/L    Alk.  phosphatase 84 45 - 117 U/L    Protein, total 7.4 6.4 - 8.2 g/dL    Albumin 3.7 3.5 - 5.0 g/dL    Globulin 3.7 2.0 - 4.0 g/dL    A-G Ratio 1.0 (L) 1.1 - 2.2       Recent Labs      02/07/17   0928   TROIQ  <0.04       Debbie Collins MD

## 2017-02-07 NOTE — ED PROVIDER NOTES
HPI Comments: Natalia Angulo is a 57 y.o. male with PMhx significant for HTN, CAD, cardiac stent placement, and stroke who presents ambulatory to the ED with cc of sudden onset SOB and associated diaphoresis today (2/7/17). Pt describes the SOB as his \"lungs not working on the left side. \" Pt reports surgery on his right shoulder on (1/27/17); he reports associated pain, but states his shoulder is otherwise healing well. Pt denies any leg swelling since having surgery. Pt denies hx of similar symptoms. He denies hx of lung disease or kidney disease. Pt specifically denies any cough, fever, chest pain, palpitations, and abdominal pain. He notes that the pain is not exacerbated by inspiration. He denies recent travel or prolonged immobilization. Social: (-) tobacco    PCP: Pepper Anderson MD    There are no other complaints, changes or physical findings at this time. The history is provided by the patient. Past Medical History:   Diagnosis Date    CAD (coronary artery disease)      h/o stent- '10    Cancer [facility]) 2010     prostate    Hypertension     Stroke [facility]) May or June 2010     lacunar  infarctions (found on scan- no sxs)    Thyroid disease      hypo       Past Surgical History:   Procedure Laterality Date    Hx heent  1962     T&A    Pr excis knee cartilage,medial or lat  1980's x3    Hx orthopaedic  1974     Ganglion cyst left wrist    Hx cholecystectomy  1991    Hx hernia repair  1996    Hx prostatectomy  2010         History reviewed. No pertinent family history. Social History     Social History    Marital status:      Spouse name: N/A    Number of children: N/A    Years of education: N/A     Occupational History    Not on file.      Social History Main Topics    Smoking status: Never Smoker    Smokeless tobacco: Current User    Alcohol use Yes      Comment: occas    Drug use: No    Sexual activity: Not on file     Other Topics Concern    Not on file Social History Narrative         ALLERGIES: Review of patient's allergies indicates no known allergies. Review of Systems   Constitutional: Positive for diaphoresis. Negative for chills and fever. Respiratory: Positive for shortness of breath. Negative for cough. Cardiovascular: Negative for chest pain, palpitations and leg swelling. Gastrointestinal: Negative for abdominal pain, constipation, diarrhea, nausea and vomiting. Neurological: Negative for weakness and numbness. All other systems reviewed and are negative. Patient Vitals for the past 12 hrs:   Temp Pulse Resp BP SpO2   02/07/17 1015 - 65 15 133/74 98 %   02/07/17 0945 - 62 19 143/67 100 %   02/07/17 0924 - 65 12 135/70 97 %   02/07/17 0914 98 °F (36.7 °C) 63 20 147/69 98 %              Physical Exam   Constitutional: He is oriented to person, place, and time. He appears well-developed and well-nourished. HENT:   Head: Normocephalic and atraumatic. Eyes: Conjunctivae and EOM are normal.   Neck: Normal range of motion. Neck supple. Cardiovascular: Normal rate and regular rhythm. Pulmonary/Chest: Effort normal and breath sounds normal. Tachypnea (slight) noted. No respiratory distress. Diminished breath sounds on the left side more than the right. Abdominal: Soft. He exhibits no distension. There is no tenderness. Musculoskeletal: Normal range of motion. RUE surgical incision healing well. Old ecchymosis and swelling to right shoulder and upper arm     Neurological: He is alert and oriented to person, place, and time. Skin: Skin is warm and dry. Psychiatric: His mood appears anxious. Nursing note and vitals reviewed. MDM  Number of Diagnoses or Management Options  Recent major surgery:   SOB (shortness of breath):   Diagnosis management comments: Patient presents with SOB. DDx: COPD/Asthma exacerbation, CHF exacerbation, ACS, PE, PNA, PTX, Anxiety, atelectasis/mucus plug. Will get labs and cxr and ekg.  PE concerning diagnosis given recent surgery. Amount and/or Complexity of Data Reviewed  Clinical lab tests: ordered and reviewed  Tests in the radiology section of CPT®: ordered and reviewed  Tests in the medicine section of CPT®: ordered and reviewed  Review and summarize past medical records: yes  Discuss the patient with other providers: yes (Cardiology)  Independent visualization of images, tracings, or specimens: yes      ED Course       Procedures     ED EKG interpretation:  Rhythm: normal sinus rhythm; and regular . Rate (approx.): 65; Axis: normal; P wave: normal; QRS interval: normal ; ST/T wave: normal;. This EKG was interpreted by Sara Cordero MD,ED Provider. PROGRESS NOTE:  10:49 AM  Pt is not back to baseline, but reports feeling much better after being placed on non-rebreather and having CT. Written by Eddie López, ED Scribe, as dictated by Sara Cordero M.D. Consult Note:  11:02 AM  Sara Cordero MD spoke with Dr. Pablo Jones  Specialty: Cardiology  Discussed pts hx, disposition, and available diagnostic and imaging results. Reviewed care plans. Consultant agrees with plans as outlined.       LABORATORY TESTS:  Recent Results (from the past 12 hour(s))   EKG, 12 LEAD, INITIAL    Collection Time: 02/07/17  9:22 AM   Result Value Ref Range    Ventricular Rate 65 BPM    Atrial Rate 65 BPM    P-R Interval 168 ms    QRS Duration 88 ms    Q-T Interval 422 ms    QTC Calculation (Bezet) 438 ms    Calculated P Axis 20 degrees    Calculated R Axis 36 degrees    Calculated T Axis 33 degrees    Diagnosis       Sinus rhythm with premature atrial complexes  Confirmed by Viji Puentes ([id number]) on 2/7/2017 10:43:30 AM     CBC WITH AUTOMATED DIFF    Collection Time: 02/07/17  9:28 AM   Result Value Ref Range    WBC 7.2 4.1 - 11.1 K/uL    RBC 3.46 (L) 4.10 - 5.70 M/uL    HGB 10.8 (L) 12.1 - 17.0 g/dL    HCT 32.4 (L) 36.6 - 50.3 %    MCV 93.6 80.0 - 99.0 FL    MCH 31.2 26.0 - 34.0 PG    MCHC 33.3 30.0 - 36.5 g/dL    RDW 13.0 11.5 - 14.5 %    PLATELET 099 416 - 514 K/uL    NEUTROPHILS 72 32 - 75 %    LYMPHOCYTES 16 12 - 49 %    MONOCYTES 7 5 - 13 %    EOSINOPHILS 4 0 - 7 %    BASOPHILS 1 0 - 1 %    ABS. NEUTROPHILS 5.3 1.8 - 8.0 K/UL    ABS. LYMPHOCYTES 1.1 0.8 - 3.5 K/UL    ABS. MONOCYTES 0.5 0.0 - 1.0 K/UL    ABS. EOSINOPHILS 0.3 0.0 - 0.4 K/UL    ABS. BASOPHILS 0.0 0.0 - 0.1 K/UL   TROPONIN I    Collection Time: 02/07/17  9:28 AM   Result Value Ref Range    Troponin-I, Qt. <0.04 <0.05 ng/mL   CK W/ REFLX CKMB    Collection Time: 02/07/17  9:28 AM   Result Value Ref Range    CK 63 39 - 779 U/L   METABOLIC PANEL, COMPREHENSIVE    Collection Time: 02/07/17  9:28 AM   Result Value Ref Range    Sodium 142 136 - 145 mmol/L    Potassium 3.5 3.5 - 5.1 mmol/L    Chloride 105 97 - 108 mmol/L    CO2 26 21 - 32 mmol/L    Anion gap 11 5 - 15 mmol/L    Glucose 121 (H) 65 - 100 mg/dL    BUN 11 6 - 20 MG/DL    Creatinine 0.99 0.70 - 1.30 MG/DL    BUN/Creatinine ratio 11 (L) 12 - 20      GFR est AA >60 >60 ml/min/1.73m2    GFR est non-AA >60 >60 ml/min/1.73m2    Calcium 8.3 (L) 8.5 - 10.1 MG/DL    Bilirubin, total 0.5 0.2 - 1.0 MG/DL    ALT (SGPT) 21 12 - 78 U/L    AST (SGOT) 11 (L) 15 - 37 U/L    Alk. phosphatase 84 45 - 117 U/L    Protein, total 7.4 6.4 - 8.2 g/dL    Albumin 3.7 3.5 - 5.0 g/dL    Globulin 3.7 2.0 - 4.0 g/dL    A-G Ratio 1.0 (L) 1.1 - 2.2         IMAGING RESULTS:  CXR Results  (Last 48 hours)               02/07/17 0957  XR CHEST PORT Final result    Impression:  IMPRESSION: No acute cardiopulmonary disease. Narrative:  INDICATION: Woke up this morning shortness of breath and diaphoresis. Portable AP view of the chest.       Direct comparison made to prior chest x-ray dated November 3, 2015. Cardiomediastinal silhouette is stable. Lungs are clear bilaterally. Pleural   spaces are normal. Osseous structures are intact.                CT Results  (Last 48 hours)               02/07/17 [address] [facility] CONT Final result    Impression:  IMPRESSION: No evidence of pulmonary embolism or acute abnormality. Narrative:  EXAM:  CTA CHEST W WO CONT       INDICATION:   Acute shortness of breath this morning, recent shoulder surgery       COMPARISON: 7/19/2014. TECHNIQUE:    Precontrast  images were obtained to localize the volume for acquisition. Multislice helical CT arteriography was performed from the diaphragm to the   thoracic inlet during uneventful rapid bolus of 80 cc Isovue-370. Lung and soft   tissue windows were generated. Coronal and sagittal images were generated and   3D post processing consisting of coronal maximum intensity images was performed. CT dose reduction was achieved through use of a standardized protocol tailored   for this examination and automatic exposure control for dose modulation. FINDINGS:   CHEST:   Chest wall/thoracic inlet: Within normal limits. Thyroid: Within normal limits. Mediastinum/marge: Within normal limits. Heart/vessels: Within normal limits. There is no evidence of pulmonary embolism. Multivessel coronary artery calcification is noted. Lungs/Pleura: Within normal limits. ABDOMEN:   The gallbladder is surgically absent. The visualized solid organs are   unremarkable. MSK: Degenerative changes are seen in the thoracic spine. MEDICATIONS GIVEN:  Medications   iopamidol (ISOVUE-370) 76 % injection 100 mL (100 mL IntraVENous Given 2/7/17 1019)   sodium chloride (NS) flush 10 mL (10 mL IntraVENous Given 2/7/17 1019)   0.9% sodium chloride infusion (0 mL/hr IntraVENous IV Completed 2/7/17 1041)   albuterol-ipratropium (DUO-NEB) 2.5 MG-0.5 MG/3 ML (3 mL Nebulization Given 2/7/17 1049)   oxyCODONE IR (ROXICODONE) tablet 10 mg (10 mg Oral Given 2/7/17 1243)       IMPRESSION:  1. SOB (shortness of breath)    2. Recent major surgery        PLAN:  1.  Discharge for follow up with PCP    Current Discharge Medication List      START taking these medications    Details   albuterol (PROVENTIL HFA, VENTOLIN HFA, PROAIR HFA) 90 mcg/actuation inhaler Take 2 Puffs by inhalation every four (4) hours as needed for Wheezing. Qty: 1 Inhaler, Refills: 1         CONTINUE these medications which have NOT CHANGED    Details   Aliskiren (TEKTURNA) 300 mg tablet Take 1 Tab by mouth nightly. Start if blood pressure remain >=140 upper or 90/100 lower  Qty: 30 Tab, Refills: 0      lisinopril (PRINIVIL, ZESTRIL) 40 mg tablet Take 1 Tab by mouth two (2) times a day. Start if blood pressure remain >=140 upper or 90/100 lower  Qty: 30 Tab, Refills: 0      amLODIPine (NORVASC) 5 mg tablet Take 5 mg by mouth daily. aspirin (ASPIRIN) 325 mg tablet Take 325 mg by mouth daily. carvedilol (COREG) 25 mg tablet Take 25 mg by mouth two (2) times daily (with meals). pantoprazole (PROTONIX) 40 mg tablet Take 40 mg by mouth daily. rosuvastatin (CRESTOR) 5 mg tablet Take 5 mg by mouth nightly. cholecalciferol, vitamin d3, (VITAMIN D) 1,000 unit tablet Take 2,000 Units by mouth daily. MULTI-VITAMIN HI-PO PO Take  by mouth daily. levothyroxine (SYNTHROID) 50 mcg tablet Take 50 mcg by mouth daily (before breakfast). 2.   Follow-up Information     Follow up With Details Comments Contact Info    Kayli Bowie MD  As needed [address] [place] [address]  [phone]          Return to ED if worse     DISCHARGE NOTE:  1:12 PM  The patient is ready for discharge. The patients signs, symptoms, diagnosis, and instructions for discharge have been discussed and the pt has conveyed their understanding. The patient is to follow up as recommended with PCP or return to the ER should their symptoms worsen. Plan has been discussed and patient has conveyed their agreement.         This note is prepared by Ragini Jj, acting as Scribe for Anshu Hewitt MD.    Anshu Hewitt MD: The kianna's documentation has been prepared under my direction and personally reviewed by me in its entirety. I confirm that the note above accurately reflects all work, treatment, procedures, and medical decision making performed by me.

## 2017-02-07 NOTE — PROCEDURES
[facility]  *** FINAL REPORT ***    Name: Luz Carney  MRN: [id number]    Inpatient  : 1955  HIS Order #: [id number]  [address] [facility] Visit #: [id number]  Date: 2017    TYPE OF TEST: Peripheral Venous Testing    REASON FOR TEST  Limb swelling    Right Arm:-  Deep venous thrombosis:           No  Superficial venous thrombosis:    No      INTERPRETATION/FINDINGS  PROCEDURE:  Venous duplex examination using B-mode, color flow and  spectral Doppler of the upper extremity veins. Right arm :  1. Deep vein(s) visualized include the internal jugular, subclavian,  axillary, brachial, radial and ulnar veins. 2. No evidence of deep venous thrombosis detected in the veins  visualized. 3. No evidence of deep vein thrombosis in the contralateral subclavian   vein. 4. Superficial vein(s) visualized include the basilic (upper arm) and  cephalic (upper arm) veins. 5. No evidence of superficial thrombosis detected. ADDITIONAL COMMENTS    I have personally reviewed the data relevant to the interpretation of  this  study.     TECHNOLOGIST: Kim Mccollum RVT  Signed: 2017 01:36 PM    PHYSICIAN: Era Moss MD  Signed: 2017 08:50 AM

## 2019-05-03 ENCOUNTER — OFFICE VISIT (OUTPATIENT)
Dept: INTERNAL MEDICINE CLINIC | Age: 64
End: 2019-05-03

## 2019-05-03 VITALS
WEIGHT: 249 LBS | DIASTOLIC BLOOD PRESSURE: 76 MMHG | SYSTOLIC BLOOD PRESSURE: 170 MMHG | OXYGEN SATURATION: 96 % | HEIGHT: 72 IN | HEART RATE: 61 BPM | BODY MASS INDEX: 33.72 KG/M2 | RESPIRATION RATE: 16 BRPM | TEMPERATURE: 98.1 F

## 2019-05-03 DIAGNOSIS — C61 PROSTATE CANCER (HCC): ICD-10-CM

## 2019-05-03 DIAGNOSIS — D64.9 ANEMIA, UNSPECIFIED TYPE: ICD-10-CM

## 2019-05-03 DIAGNOSIS — I10 ESSENTIAL HYPERTENSION: ICD-10-CM

## 2019-05-03 DIAGNOSIS — E78.2 MIXED HYPERLIPIDEMIA: ICD-10-CM

## 2019-05-03 DIAGNOSIS — Z00.00 INITIAL MEDICARE ANNUAL WELLNESS VISIT: Primary | ICD-10-CM

## 2019-05-03 DIAGNOSIS — Z86.73 HISTORY OF LACUNAR CEREBROVASCULAR ACCIDENT (CVA): ICD-10-CM

## 2019-05-03 DIAGNOSIS — E03.9 ACQUIRED HYPOTHYROIDISM: ICD-10-CM

## 2019-05-03 DIAGNOSIS — I25.10 CORONARY ARTERY DISEASE INVOLVING NATIVE CORONARY ARTERY OF NATIVE HEART WITHOUT ANGINA PECTORIS: ICD-10-CM

## 2019-05-03 DIAGNOSIS — S46.011S TRAUMATIC TEAR OF RIGHT ROTATOR CUFF, UNSPECIFIED TEAR EXTENT, SEQUELA: ICD-10-CM

## 2019-05-03 RX ORDER — ALISKIREN 300 MG/1
300 TABLET, FILM COATED ORAL
Qty: 90 TAB | Refills: 3 | Status: SHIPPED | OUTPATIENT
Start: 2019-05-03 | End: 2020-02-26

## 2019-05-03 RX ORDER — AMLODIPINE BESYLATE 5 MG/1
5 TABLET ORAL DAILY
Qty: 90 TAB | Refills: 3 | Status: SHIPPED | OUTPATIENT
Start: 2019-05-03 | End: 2020-01-28

## 2019-05-03 RX ORDER — LEVOTHYROXINE SODIUM 50 UG/1
50 TABLET ORAL
COMMUNITY
End: 2019-05-03 | Stop reason: SDUPTHER

## 2019-05-03 RX ORDER — ROSUVASTATIN CALCIUM 5 MG/1
5 TABLET, COATED ORAL
Qty: 90 TAB | Refills: 1 | OUTPATIENT
Start: 2019-05-03

## 2019-05-03 RX ORDER — LEVOTHYROXINE SODIUM 50 UG/1
50 TABLET ORAL
Qty: 90 TAB | Refills: 3 | Status: SHIPPED | OUTPATIENT
Start: 2019-05-03 | End: 2020-07-12

## 2019-05-03 RX ORDER — CARVEDILOL 25 MG/1
25 TABLET ORAL 2 TIMES DAILY WITH MEALS
Qty: 180 TAB | Refills: 3 | Status: ON HOLD | OUTPATIENT
Start: 2019-05-03 | End: 2020-08-10

## 2019-05-03 RX ORDER — PANTOPRAZOLE SODIUM 40 MG/1
40 TABLET, DELAYED RELEASE ORAL DAILY
Qty: 90 TAB | Refills: 3 | Status: SHIPPED | OUTPATIENT
Start: 2019-05-03 | End: 2020-02-26

## 2019-05-03 RX ORDER — LISINOPRIL 40 MG/1
40 TABLET ORAL 2 TIMES DAILY
Qty: 180 TAB | Refills: 3 | Status: SHIPPED | OUTPATIENT
Start: 2019-05-03 | End: 2020-02-26

## 2019-05-03 NOTE — PATIENT INSTRUCTIONS
Medicare Wellness Visit, Male The best way to live healthy is to have a lifestyle where you eat a well-balanced diet, exercise regularly, limit alcohol use, and quit all forms of tobacco/nicotine, if applicable. Regular preventive services are another way to keep healthy. Preventive services (vaccines, screening tests, monitoring & exams) can help personalize your care plan, which helps you manage your own care. Screening tests can find health problems at the earliest stages, when they are easiest to treat. [facility] follows the current, evidence-based guidelines published by the [facility] Jairo Norma ([facility]) when recommending preventive services for our patients. Because we follow these guidelines, sometimes recommendations change over time as research supports it. (For example, a prostate screening blood test is no longer routinely recommended for men with no symptoms.) Of course, you and your doctor may decide to screen more often for some diseases, based on your risk and co-morbidities (chronic disease you are already diagnosed with). Preventive services for you include: - Medicare offers their members a free annual wellness visit, which is time for you and your primary care provider to discuss and plan for your preventive service needs. Take advantage of this benefit every year! 
-All adults over age 72 should receive the recommended pneumonia vaccines. Current USPSTF guidelines recommend a series of two vaccines for the best pneumonia protection.  
-All adults should have a flu vaccine yearly and an ECG.  All adults age 61 and older should receive a shingles vaccine once in their lifetime.   
-All adults age 38-68 who are overweight should have a diabetes screening test once every three years.  
-Other screening tests & preventive services for persons with diabetes include: an eye exam to screen for diabetic retinopathy, a kidney function test, a foot exam, and stricter control over your cholesterol.  
-Cardiovascular screening for adults with routine risk involves an electrocardiogram (ECG) at intervals determined by the provider.  
-Colorectal cancer screening should be done for adults age 54-65 with no increased risk factors for colorectal cancer. There are a number of acceptable methods of screening for this type of cancer. Each test has its own benefits and drawbacks. Discuss with your provider what is most appropriate for you during your annual wellness visit. The different tests include: colonoscopy (considered the best screening method), a fecal occult blood test, a fecal DNA test, and sigmoidoscopy. 
-All adults born between [facility] should be screened once for Hepatitis C. 
-An Abdominal Aortic Aneurysm (AAA) Screening is recommended for men age 73-68 who has ever smoked in their lifetime. Here is a list of your current Health Maintenance items (your personalized list of preventive services) with a due date: 
Health Maintenance Due Topic Date Due  
 Hepatitis C Test  1955  DTaP/Tdap/Td  (1 - Tdap) 02/07/1974  Shingles Vaccine (1 of 2) 02/07/2003  Stool testing for trace blood  02/07/2003  Glaucoma Screening   02/07/2018  Pneumococcal Vaccine (1 of 2 - PCV13) 02/07/2018 Come back for fasting labs, lab opens 8 am, mon-fri. No appt needed.

## 2019-05-03 NOTE — PROGRESS NOTES
Reviewed record in preparation for visit and have obtained necessary documentation. Identified pt with two pt identifiers(name and ). Chief Complaint Patient presents with [employer] Health Maintenance Due Topic Date Due  
 Hepatitis C Screening  1955  DTaP/Tdap/Td series (1 - Tdap) 1974  Shingrix Vaccine Age 50> (1 of 2) 2003  FOBT Q 1 YEAR AGE 50-75  2003  GLAUCOMA SCREENING Q2Y  2018  Pneumococcal 65+ years (1 of 2 - PCV13) 2018 Mr. Arthur Casiano has a reminder for a \"due or due soon\" health maintenance. I have asked that he discuss health maintenance topic(s) due with His  primary care provider. Coordination of Care Questionnaire: 
:  
 
1) Have you been to an emergency room, urgent care clinic since your last visit? no  
Hospitalized since your last visit? no          
 
2) Have you seen or consulted any other health care providers outside of [address] [place] [address] since your last visit? no  (Include any pap smears or colon screenings in this section.) 3) Do you have an Advance Directive on file? no 
 
4) Are you interested in receiving information on Advance Directives? YES Patient is accompanied by self I have received verbal consent from Nica Kebede to discuss any/all medical information while they are present in the room.

## 2019-05-03 NOTE — PROGRESS NOTES
Abner Marques is a 75 y.o. male who presents for evaluation of new pt visit, awv. Used to see dr Lorenza Young, last saw him over a year ago. Got frustrated with long waits in his office. Overall doing well, no concerns. Follows bp at home, usually 130-140/. ROS: 
Constitutional: negative for fevers, chills, anorexia and weight loss Eyes:   negative for visual disturbance and irritation ENT:   negative for tinnitus,sore throat,nasal congestion,ear pain,hoarseness Respiratory:  negative for cough, hemoptysis, dyspnea,wheezing CV:   negative for chest pain, palpitations, lower extremity edema GI:   negative for nausea, vomiting, diarrhea, abdominal pain,melena Genitourinary: negative for frequency, dysuria and hematuria Musculoskel: negative for myalgias, arthralgias, back pain, muscle weakness, joint pain Neurological:  negative for headaches, dizziness, focal weakness, numbness Psychiatric:     Negative for depression or anxiety Past Medical History:  
Diagnosis Date  CAD (coronary artery disease)   
 h/o stent- '10  
 Cancer [facility]) 2010  
 prostate  Hypertension  Stroke [facility]) May or June 2010  
 lacunar  infarctions (found on scan- no sxs)  Thyroid disease   
 hypo Past Surgical History:  
Procedure Laterality Date  CARDIAC SURG PROCEDURE UNLIST  2010  
 coronary stent  EXCIS KNEE CARTILAGE,MEDIAL OR LAT  1980's x3  
 HX CHOLECYSTECTOMY  1991  
  [address] [place] [address] [facility] [address] [place] [address] Nw  HX MALIGNANT SKIN LESION EXCISION    
 2004 and 2017 [address] Ganglion cyst left wrist  
 HX ORTHOPAEDIC Left 2017  
 shoulder repair  HX PROSTATECTOMY  2010 Family History Problem Relation Age of Onset  Dementia Mother  Other Father 55  
     cerebral hemorrhage Social History Socioeconomic History  Marital status:  Spouse name: Not on file  Number of children: Not on file  Years of education: Not on file  Highest education level: Not on file Occupational History  Not on file Social Needs  Financial resource strain: Not on file  Food insecurity:  
  Worry: Not on file Inability: Not on file  Transportation needs:  
  Medical: Not on file Non-medical: Not on file Tobacco Use  Smoking status: Never Smoker  Smokeless tobacco: Current User Substance and Sexual Activity  Alcohol use: Yes Comment: occas  Drug use: Never  Sexual activity: Not Currently Lifestyle  Physical activity:  
  Days per week: Not on file Minutes per session: Not on file  Stress: Not on file Relationships  Social connections:  
  Talks on phone: Not on file Gets together: Not on file Attends Judaism service: Not on file Active member of club or organization: Not on file Attends meetings of clubs or organizations: Not on file Relationship status: Not on file  Intimate partner violence:  
  Fear of current or ex partner: Not on file Emotionally abused: Not on file Physically abused: Not on file Forced sexual activity: Not on file Other Topics Concern  Not on file Social History Narrative  Not on file Visit Vitals /76 (BP 1 Location: Right arm, BP Patient Position: Sitting) Pulse 61 Temp 98.1 °F (36.7 °C) (Oral) Resp 16 Ht 6' (1.829 m) Wt 249 lb (112.9 kg) SpO2 96% BMI 33.77 kg/m² Physical Examination:  
General - Well appearing male HEENT - PERRL, TM no erythema/opacification, normal nasal turbinates, no oropharyngeal erythema or exudate, MMM Neck - supple, no bruits, no thyroidomegaly, no lymphadenopathy Pulm - clear to auscultation bilaterally Cardio - RRR, normal S1 S2, no murmur Abd - soft, nontender, no masses, no HSM Extrem - no edema, +2 distal pulses Neuro-  No focal deficits, CN intact Assessment/Plan: 1.  Htn, with component white coat--continue norvasc, lisinopril, coreg, tekturna. Had been seeing dr Rubina Marshall in the past 
2. Cad with stents--on asa, bb, acei 3. Hx lacunar cva--on asa 4. Hx prostate cancer--sp prostatectomy, check psa. Dr Reji Encinas 5. Hypothyroid--on synthroid, check tsh 6.  hyperlipids--check flp. Had to stop crestor due to myalgias 7. Right rotator cuff tear--sp sx with shahla still Had eye exam dr dick, had colon dr Leroy Fitzpatrick. Pneumovax 23 given today, as he had prevnar last year. Get records from dr Tray La. rtc [address], DO This is an Initial Medicare Annual Wellness Exam (AWV) (Performed 12 months after IPPE or effective date of Medicare Part B enrollment, Once in a lifetime) I have reviewed the patient's medical history in detail and updated the computerized patient record. History Past Medical History:  
Diagnosis Date  CAD (coronary artery disease)   
 h/o stent- '10  
 Cancer [facility]) 2010  
 prostate  Hypertension  Stroke [facility]) May or June 2010  
 lacunar  infarctions (found on scan- no sxs)  Thyroid disease   
 hypo Past Surgical History:  
Procedure Laterality Date  CARDIAC SURG PROCEDURE UNLIST  2010  
 coronary stent  EXCIS KNEE CARTILAGE,MEDIAL OR LAT  1980's x3  
 HX CHOLECYSTECTOMY  1991  
  [address] [facility] [address]  HX MALIGNANT SKIN LESION EXCISION    
 2004 and 2017 [address] Ganglion cyst left wrist  
 HX ORTHOPAEDIC Left 2017  
 shoulder repair  HX PROSTATECTOMY  2010 Current Outpatient Medications Medication Sig Dispense Refill  Aliskiren (TEKTURNA) 300 mg tablet Take 1 Tab by mouth nightly. Start if blood pressure remain >=140 upper or 90/100 lower 30 Tab 0  
 lisinopril (PRINIVIL, ZESTRIL) 40 mg tablet Take 1 Tab by mouth two (2) times a day.  Start if blood pressure remain >=140 upper or 90/100 lower 30 Tab 0  
  amLODIPine (NORVASC) 5 mg tablet Take 5 mg by mouth daily.  aspirin (ASPIRIN) 325 mg tablet Take 325 mg by mouth daily.  carvedilol (COREG) 25 mg tablet Take 25 mg by mouth two (2) times daily (with meals).  pantoprazole (PROTONIX) 40 mg tablet Take 40 mg by mouth daily.  cholecalciferol, vitamin d3, (VITAMIN D) 1,000 unit tablet Take 2,000 Units by mouth daily.  MULTI-VITAMIN HI-PO PO Take  by mouth daily.  levothyroxine (SYNTHROID) 50 mcg tablet Take 50 mcg by mouth daily (before breakfast).  albuterol (PROVENTIL HFA, VENTOLIN HFA, PROAIR HFA) 90 mcg/actuation inhaler Take 2 Puffs by inhalation every four (4) hours as needed for Wheezing. 1 Inhaler 1  
 rosuvastatin (CRESTOR) 5 mg tablet Take 5 mg by mouth nightly. No Known Allergies Family History Problem Relation Age of Onset  Dementia Mother  Other Father 55  
     cerebral hemorrhage Social History Tobacco Use  Smoking status: Never Smoker  Smokeless tobacco: Current User Substance Use Topics  Alcohol use: Yes Comment: occas Patient Active Problem List  
Diagnosis Code  
 [facility] R41.323  Right rotator cuff tear M75. [place]  Coronary artery disease involving native coronary artery of native heart without angina pectoris I25.10  History of lacunar cerebrovascular accident (CVA) Z80.78  
 Prostate cancer ([facility] C61  
 Essential hypertension I10  
 Acquired hypothyroidism E03.9  Mixed hyperlipidemia E78.2 Depression Risk Factor Screening:  
 
3 most recent PHQ Screens 5/3/2019 Little interest or pleasure in doing things Not at all Feeling down, depressed, irritable, or hopeless Not at all Total Score PHQ 2 0 Alcohol Risk Factor Screening: You average more than 14 drinks a week. Typically 2-3 drinks per day, most days of week. Functional Ability and Level of Safety:  
 
Hearing Loss Hearing is good. Activities of Daily Living The home contains: no safety equipment. Patient does total self care Fall Risk Fall Risk Assessment, last 12 mths 5/3/2019 Able to walk? Yes Fall in past 12 months? No  
 
 
Abuse Screen Patient is not abused. Lives with wife of 52 years. Cognitive Screening Evaluation of Cognitive Function: 
Has your family/caregiver stated any concerns about your memory: no 
Normal 
 
Patient Care Team  
Patient Care Team: 
Tiffanie Marc DO as PCP - General (Internal Medicine) Assessment/Plan Education and counseling provided: 
Are appropriate based on today's review and evaluation End-of-Life planning (with patient's consent) Pneumococcal Vaccine Influenza Vaccine Diagnoses and all orders for this visit: 
 
1. Initial Medicare annual wellness visit Health Maintenance Due Topic Date Due  
 Hepatitis C Screening  1955  DTaP/Tdap/Td series (1 - Tdap) 02/07/1974  Shingrix Vaccine Age 50> (1 of 2) 02/07/2003  FOBT Q 1 YEAR AGE 50-75  02/07/2003  GLAUCOMA SCREENING Q2Y  02/07/2018  Pneumococcal 65+ years (1 of 2 - PCV13) 02/07/2018

## 2019-05-06 ENCOUNTER — TELEPHONE (OUTPATIENT)
Dept: INTERNAL MEDICINE CLINIC | Age: 64
End: 2019-05-06

## 2019-05-06 NOTE — TELEPHONE ENCOUNTER
Called, spoke to pt. Two identifiers confirmed. Clarified pt's AVS.   Pt verbalized understanding of information discussed w/ no further questions at this time.

## 2019-05-06 NOTE — TELEPHONE ENCOUNTER
----- Message from Devendra Marques sent at 5/3/2019  4:51 PM EDT -----  Regarding: Dr Earl Dalton  Pt is reaching out to Katelyn regarding vaccination he received.     Best contact # [phone]        Message copied/pasted from [facility]

## 2019-05-13 ENCOUNTER — HOSPITAL ENCOUNTER (OUTPATIENT)
Dept: LAB | Age: 64
Discharge: HOME | End: 2019-05-13
Payer: MEDICARE

## 2019-05-13 PROCEDURE — 86803 HEPATITIS C AB TEST: CPT

## 2019-05-13 PROCEDURE — 83550 IRON BINDING TEST: CPT

## 2019-05-13 PROCEDURE — 84153 ASSAY OF PSA TOTAL: CPT

## 2019-05-13 PROCEDURE — 85025 COMPLETE CBC W/AUTO DIFF WBC: CPT

## 2019-05-13 PROCEDURE — 84443 ASSAY THYROID STIM HORMONE: CPT

## 2019-05-13 PROCEDURE — 80061 LIPID PANEL: CPT

## 2019-05-13 PROCEDURE — 87389 HIV-1 AG W/HIV-1&-2 AB AG IA: CPT

## 2019-05-13 PROCEDURE — 83036 HEMOGLOBIN GLYCOSYLATED A1C: CPT

## 2019-05-13 PROCEDURE — 80053 COMPREHEN METABOLIC PANEL: CPT

## 2019-05-13 PROCEDURE — 36415 COLL VENOUS BLD VENIPUNCTURE: CPT

## 2019-05-13 PROCEDURE — 82728 ASSAY OF FERRITIN: CPT

## 2019-05-14 DIAGNOSIS — E78.2 MIXED HYPERLIPIDEMIA: Primary | ICD-10-CM

## 2019-05-14 LAB
ALBUMIN SERPL-MCNC: 4.4 G/DL (ref 3.6–4.8)
ALBUMIN/GLOB SERPL: 1.9 {RATIO} (ref 1.2–2.2)
ALP SERPL-CCNC: 59 IU/L (ref 39–117)
ALT SERPL-CCNC: 22 IU/L (ref 0–44)
AST SERPL-CCNC: 17 IU/L (ref 0–40)
BASOPHILS # BLD AUTO: 0 X10E3/UL (ref 0–0.2)
BASOPHILS NFR BLD AUTO: 1 %
BILIRUB SERPL-MCNC: 0.4 MG/DL (ref 0–1.2)
BUN SERPL-MCNC: 15 MG/DL (ref 8–27)
BUN/CREAT SERPL: 17 (ref 10–24)
CALCIUM SERPL-MCNC: 9.2 MG/DL (ref 8.6–10.2)
CHLORIDE SERPL-SCNC: 98 MMOL/L (ref 96–106)
CHOLEST SERPL-MCNC: 253 MG/DL (ref 100–199)
CO2 SERPL-SCNC: 25 MMOL/L (ref 20–29)
CREAT SERPL-MCNC: 0.9 MG/DL (ref 0.76–1.27)
EOSINOPHIL # BLD AUTO: 0.3 X10E3/UL (ref 0–0.4)
EOSINOPHIL NFR BLD AUTO: 6 %
ERYTHROCYTE [DISTWIDTH] IN BLOOD BY AUTOMATED COUNT: 13.5 % (ref 12.3–15.4)
EST. AVERAGE GLUCOSE BLD GHB EST-MCNC: 103 MG/DL
FERRITIN SERPL-MCNC: 161 NG/ML (ref 30–400)
GLOBULIN SER CALC-MCNC: 2.3 G/DL (ref 1.5–4.5)
GLUCOSE SERPL-MCNC: 117 MG/DL (ref 65–99)
HBA1C MFR BLD: 5.2 % (ref 4.8–5.6)
HCT VFR BLD AUTO: 42.1 % (ref 37.5–51)
HCV AB S/CO SERPL IA: <0.1 S/CO RATIO (ref 0–0.9)
HDLC SERPL-MCNC: 41 MG/DL
HGB BLD-MCNC: 14 G/DL (ref 13–17.7)
HIV 1+2 AB+HIV1 P24 AG SERPL QL IA: NON REACTIVE
IMM GRANULOCYTES # BLD AUTO: 0 X10E3/UL (ref 0–0.1)
IMM GRANULOCYTES NFR BLD AUTO: 0 %
IRON SATN MFR SERPL: 21 % (ref 15–55)
IRON SERPL-MCNC: 74 UG/DL (ref 38–169)
LDLC SERPL CALC-MCNC: 164 MG/DL (ref 0–99)
LYMPHOCYTES # BLD AUTO: 1.9 X10E3/UL (ref 0.7–3.1)
LYMPHOCYTES NFR BLD AUTO: 35 %
MCH RBC QN AUTO: 31.4 PG (ref 26.6–33)
MCHC RBC AUTO-ENTMCNC: 33.3 G/DL (ref 31.5–35.7)
MCV RBC AUTO: 94 FL (ref 79–97)
MONOCYTES # BLD AUTO: 0.4 X10E3/UL (ref 0.1–0.9)
MONOCYTES NFR BLD AUTO: 8 %
NEUTROPHILS # BLD AUTO: 2.8 X10E3/UL (ref 1.4–7)
NEUTROPHILS NFR BLD AUTO: 50 %
PLATELET # BLD AUTO: 222 X10E3/UL (ref 150–379)
POTASSIUM SERPL-SCNC: 4.1 MMOL/L (ref 3.5–5.2)
PROT SERPL-MCNC: 6.7 G/DL (ref 6–8.5)
PSA SERPL-MCNC: <0.1 NG/ML (ref 0–4)
RBC # BLD AUTO: 4.46 X10E6/UL (ref 4.14–5.8)
SODIUM SERPL-SCNC: 139 MMOL/L (ref 134–144)
TIBC SERPL-MCNC: 345 UG/DL (ref 250–450)
TRIGL SERPL-MCNC: 242 MG/DL (ref 0–149)
TSH SERPL DL<=0.005 MIU/L-ACNC: 2.17 UIU/ML (ref 0.45–4.5)
UIBC SERPL-MCNC: 271 UG/DL (ref 111–343)
VLDLC SERPL CALC-MCNC: 48 MG/DL (ref 5–40)
WBC # BLD AUTO: 5.5 X10E3/UL (ref 3.4–10.8)

## 2019-05-14 NOTE — PROGRESS NOTES
Called, spoke to pt. Two identifiers confirmed. Pt notified of results/recommendations per Dr. Yonas Rajput. Pt verbalized understanding of information discussed w/ no further questions at this time. Copy of lab results mailed to pt.

## 2019-05-14 NOTE — PROGRESS NOTES
Overall labs look good. No longer anemic, and iron counts are good. Cholesterol levels are way too high though, for somebody who had cva and has heart stents. If he could not tolerate statins (crestor, lipitor, zocor), then he should be on repatha--so please work on arranging for him. Otherwise, continue with same meds.

## 2019-08-06 ENCOUNTER — HOSPITAL ENCOUNTER (INPATIENT)
Age: 64
LOS: 3 days | Discharge: HOME | DRG: 286 | End: 2019-08-10
Attending: EMERGENCY MEDICINE | Admitting: INTERNAL MEDICINE
Payer: MEDICARE

## 2019-08-06 ENCOUNTER — APPOINTMENT (OUTPATIENT)
Dept: GENERAL RADIOLOGY | Age: 64
DRG: 286 | End: 2019-08-06
Attending: EMERGENCY MEDICINE
Payer: MEDICARE

## 2019-08-06 DIAGNOSIS — I24.9 ACS (ACUTE CORONARY SYNDROME) (HCC): ICD-10-CM

## 2019-08-06 DIAGNOSIS — I50.9 CONGESTIVE HEART FAILURE, UNSPECIFIED HF CHRONICITY, UNSPECIFIED HEART FAILURE TYPE (HCC): Primary | ICD-10-CM

## 2019-08-06 PROCEDURE — 96375 TX/PRO/DX INJ NEW DRUG ADDON: CPT

## 2019-08-06 PROCEDURE — 96376 TX/PRO/DX INJ SAME DRUG ADON: CPT

## 2019-08-06 PROCEDURE — 93005 ELECTROCARDIOGRAM TRACING: CPT

## 2019-08-06 PROCEDURE — 82550 ASSAY OF CK (CPK): CPT

## 2019-08-06 PROCEDURE — 71046 X-RAY EXAM CHEST 2 VIEWS: CPT

## 2019-08-06 PROCEDURE — 83880 ASSAY OF NATRIURETIC PEPTIDE: CPT

## 2019-08-06 PROCEDURE — 80053 COMPREHEN METABOLIC PANEL: CPT

## 2019-08-06 PROCEDURE — 85025 COMPLETE CBC W/AUTO DIFF WBC: CPT

## 2019-08-06 PROCEDURE — 84484 ASSAY OF TROPONIN QUANT: CPT

## 2019-08-06 PROCEDURE — 96374 THER/PROPH/DIAG INJ IV PUSH: CPT

## 2019-08-06 PROCEDURE — 99285 EMERGENCY DEPT VISIT HI MDM: CPT

## 2019-08-06 NOTE — Clinical Note
Single view of the left ventricle obtained using power injection. Total volume = 24 mL. Rate = 8 mL/sec. Pressure = 900 PSI. Rate of rise = 1.0 sec.

## 2019-08-06 NOTE — Clinical Note
TRANSFER - OUT REPORT:  
 
Verbal report given to: Deedee Gaitan RN. Report consisted of patient's Situation, Background, Assessment and  
Recommendations(SBAR). Opportunity for questions and clarification was provided. Patient transported with a Registered Nurse and [address] [place] [address] / [facility]. Patient transported to: [facility].

## 2019-08-06 NOTE — Clinical Note
Patient transported with a Registered Nurse and [address] [place] [address] / [facility].

## 2019-08-07 ENCOUNTER — APPOINTMENT (OUTPATIENT)
Dept: NON INVASIVE DIAGNOSTICS | Age: 64
DRG: 286 | End: 2019-08-07
Attending: INTERNAL MEDICINE
Payer: MEDICARE

## 2019-08-07 ENCOUNTER — APPOINTMENT (OUTPATIENT)
Dept: CT IMAGING | Age: 64
DRG: 286 | End: 2019-08-07
Attending: EMERGENCY MEDICINE
Payer: MEDICARE

## 2019-08-07 PROBLEM — I50.9 CHF (CONGESTIVE HEART FAILURE) (HCC): Status: ACTIVE | Noted: 2019-08-07

## 2019-08-07 LAB
ALBUMIN SERPL-MCNC: 3.8 G/DL (ref 3.5–5)
ALBUMIN/GLOB SERPL: 1.1 {RATIO} (ref 1.1–2.2)
ALP SERPL-CCNC: 74 U/L (ref 45–117)
ALT SERPL-CCNC: 30 U/L (ref 12–78)
ANION GAP SERPL CALC-SCNC: 6 MMOL/L (ref 5–15)
AST SERPL-CCNC: 13 U/L (ref 15–37)
ATRIAL RATE: 58 BPM
ATRIAL RATE: 66 BPM
BASOPHILS # BLD: 0 K/UL (ref 0–0.1)
BASOPHILS NFR BLD: 1 % (ref 0–1)
BILIRUB SERPL-MCNC: 0.5 MG/DL (ref 0.2–1)
BNP SERPL-MCNC: 794 PG/ML
BUN SERPL-MCNC: 12 MG/DL (ref 6–20)
BUN/CREAT SERPL: 12 (ref 12–20)
CALCIUM SERPL-MCNC: 8.5 MG/DL (ref 8.5–10.1)
CALCULATED P AXIS, ECG09: 46 DEGREES
CALCULATED P AXIS, ECG09: 70 DEGREES
CALCULATED R AXIS, ECG10: 34 DEGREES
CALCULATED R AXIS, ECG10: 59 DEGREES
CALCULATED T AXIS, ECG11: 53 DEGREES
CALCULATED T AXIS, ECG11: 65 DEGREES
CHLORIDE SERPL-SCNC: 108 MMOL/L (ref 97–108)
CK SERPL-CCNC: 90 U/L (ref 39–308)
CO2 SERPL-SCNC: 27 MMOL/L (ref 21–32)
CREAT SERPL-MCNC: 0.98 MG/DL (ref 0.7–1.3)
D DIMER PPP FEU-MCNC: 1.16 MG/L FEU (ref 0–0.65)
DIAGNOSIS, 93000: NORMAL
DIAGNOSIS, 93000: NORMAL
DIFFERENTIAL METHOD BLD: ABNORMAL
ECHO AV PEAK GRADIENT: 10.4 MMHG
ECHO AV PEAK VELOCITY: 161.62 CM/S
ECHO AV REGURGITANT PHT: 847.7 CM
ECHO EST RA PRESSURE: 10 MMHG
ECHO LV INTERNAL DIMENSION DIASTOLIC: 4.82 CM (ref 4.2–5.9)
ECHO LV INTERNAL DIMENSION SYSTOLIC: 2.96 CM
ECHO LV IVSD: 1.55 CM (ref 0.6–1)
ECHO LV MASS 2D: 380.1 G (ref 88–224)
ECHO LV MASS INDEX 2D: 162.3 G/M2 (ref 49–115)
ECHO LV POSTERIOR WALL DIASTOLIC: 1.52 CM (ref 0.6–1)
ECHO LVOT DIAM: 1.98 CM
ECHO MV A VELOCITY: 65.8 CM/S
ECHO MV AREA PHT: 2.4 CM2
ECHO MV E DECELERATION TIME (DT): 312.6 MS
ECHO MV E VELOCITY: 88.41 CM/S
ECHO MV E/A RATIO: 1.34
ECHO MV PRESSURE HALF TIME (PHT): 90.6 MS
ECHO MV REGURGITANT PEAK GRADIENT: 3.8 MMHG
ECHO MV REGURGITANT PEAK VELOCITY: 96.98 CM/S
ECHO PULMONARY ARTERY SYSTOLIC PRESSURE (PASP): 16.1 MMHG
ECHO RIGHT VENTRICULAR SYSTOLIC PRESSURE (RVSP): 16.1 MMHG
ECHO TV REGURGITANT MAX VELOCITY: 123.73 CM/S
ECHO TV REGURGITANT PEAK GRADIENT: 6.1 MMHG
EOSINOPHIL # BLD: 0.2 K/UL (ref 0–0.4)
EOSINOPHIL NFR BLD: 3 % (ref 0–7)
ERYTHROCYTE [DISTWIDTH] IN BLOOD BY AUTOMATED COUNT: 12.5 % (ref 11.5–14.5)
GLOBULIN SER CALC-MCNC: 3.5 G/DL (ref 2–4)
GLUCOSE SERPL-MCNC: 96 MG/DL (ref 65–100)
HCT VFR BLD AUTO: 38.6 % (ref 36.6–50.3)
HGB BLD-MCNC: 12.8 G/DL (ref 12.1–17)
IMM GRANULOCYTES # BLD AUTO: 0 K/UL (ref 0–0.04)
IMM GRANULOCYTES NFR BLD AUTO: 0 % (ref 0–0.5)
LYMPHOCYTES # BLD: 1.7 K/UL (ref 0.8–3.5)
LYMPHOCYTES NFR BLD: 23 % (ref 12–49)
MCH RBC QN AUTO: 31.6 PG (ref 26–34)
MCHC RBC AUTO-ENTMCNC: 33.2 G/DL (ref 30–36.5)
MCV RBC AUTO: 95.3 FL (ref 80–99)
MONOCYTES # BLD: 0.5 K/UL (ref 0–1)
MONOCYTES NFR BLD: 7 % (ref 5–13)
NEUTS SEG # BLD: 5 K/UL (ref 1.8–8)
NEUTS SEG NFR BLD: 66 % (ref 32–75)
NRBC # BLD: 0 K/UL (ref 0–0.01)
NRBC BLD-RTO: 0 PER 100 WBC
P-R INTERVAL, ECG05: 160 MS
P-R INTERVAL, ECG05: 182 MS
PISA AR MAX VEL: 282.46 CM/S
PLATELET # BLD AUTO: 197 K/UL (ref 150–400)
PMV BLD AUTO: 11.8 FL (ref 8.9–12.9)
POTASSIUM SERPL-SCNC: 3.5 MMOL/L (ref 3.5–5.1)
PROT SERPL-MCNC: 7.3 G/DL (ref 6.4–8.2)
Q-T INTERVAL, ECG07: 450 MS
Q-T INTERVAL, ECG07: 490 MS
QRS DURATION, ECG06: 104 MS
QRS DURATION, ECG06: 104 MS
QTC CALCULATION (BEZET), ECG08: 471 MS
QTC CALCULATION (BEZET), ECG08: 481 MS
RBC # BLD AUTO: 4.05 M/UL (ref 4.1–5.7)
SODIUM SERPL-SCNC: 141 MMOL/L (ref 136–145)
TROPONIN I SERPL-MCNC: <0.05 NG/ML
VENTRICULAR RATE, ECG03: 58 BPM
VENTRICULAR RATE, ECG03: 66 BPM
WBC # BLD AUTO: 7.5 K/UL (ref 4.1–11.1)

## 2019-08-07 PROCEDURE — 74011250636 HC RX REV CODE- 250/636: Performed by: INTERNAL MEDICINE

## 2019-08-07 PROCEDURE — 74011250637 HC RX REV CODE- 250/637: Performed by: INTERNAL MEDICINE

## 2019-08-07 PROCEDURE — 36415 COLL VENOUS BLD VENIPUNCTURE: CPT

## 2019-08-07 PROCEDURE — 96375 TX/PRO/DX INJ NEW DRUG ADDON: CPT

## 2019-08-07 PROCEDURE — 74011250637 HC RX REV CODE- 250/637: Performed by: NEUROMUSCULOSKELETAL MEDICINE & OMM

## 2019-08-07 PROCEDURE — 85379 FIBRIN DEGRADATION QUANT: CPT

## 2019-08-07 PROCEDURE — 87040 BLOOD CULTURE FOR BACTERIA: CPT

## 2019-08-07 PROCEDURE — 74011250636 HC RX REV CODE- 250/636: Performed by: EMERGENCY MEDICINE

## 2019-08-07 PROCEDURE — 96374 THER/PROPH/DIAG INJ IV PUSH: CPT

## 2019-08-07 PROCEDURE — 74011000250 HC RX REV CODE- 250: Performed by: EMERGENCY MEDICINE

## 2019-08-07 PROCEDURE — 71275 CT ANGIOGRAPHY CHEST: CPT

## 2019-08-07 PROCEDURE — 74011636320 HC RX REV CODE- 636/320: Performed by: EMERGENCY MEDICINE

## 2019-08-07 PROCEDURE — 93005 ELECTROCARDIOGRAM TRACING: CPT

## 2019-08-07 PROCEDURE — 96376 TX/PRO/DX INJ SAME DRUG ADON: CPT

## 2019-08-07 PROCEDURE — 94640 AIRWAY INHALATION TREATMENT: CPT

## 2019-08-07 PROCEDURE — 94760 N-INVAS EAR/PLS OXIMETRY 1: CPT

## 2019-08-07 PROCEDURE — 74011250637 HC RX REV CODE- 250/637: Performed by: EMERGENCY MEDICINE

## 2019-08-07 PROCEDURE — 65660000000 HC RM CCU STEPDOWN

## 2019-08-07 PROCEDURE — 77030029684 HC NEB SM VOL KT MONA -A

## 2019-08-07 PROCEDURE — 93306 TTE W/DOPPLER COMPLETE: CPT

## 2019-08-07 RX ORDER — ROSUVASTATIN CALCIUM 10 MG/1
5 TABLET, COATED ORAL
Status: DISCONTINUED | OUTPATIENT
Start: 2019-08-07 | End: 2019-08-09 | Stop reason: CLARIF

## 2019-08-07 RX ORDER — NITROGLYCERIN 0.4 MG/1
0.4 TABLET SUBLINGUAL AS NEEDED
Status: DISCONTINUED | OUTPATIENT
Start: 2019-08-07 | End: 2019-08-10 | Stop reason: HOSPADM

## 2019-08-07 RX ORDER — IBUPROFEN 200 MG
600 TABLET ORAL
Status: ON HOLD | COMMUNITY
End: 2020-01-30

## 2019-08-07 RX ORDER — MELATONIN
2000 DAILY
Status: DISCONTINUED | OUTPATIENT
Start: 2019-08-07 | End: 2019-08-10 | Stop reason: HOSPADM

## 2019-08-07 RX ORDER — FUROSEMIDE 10 MG/ML
40 INJECTION INTRAMUSCULAR; INTRAVENOUS
Status: COMPLETED | OUTPATIENT
Start: 2019-08-07 | End: 2019-08-07

## 2019-08-07 RX ORDER — ACETAMINOPHEN 325 MG/1
650 TABLET ORAL
Status: DISCONTINUED | OUTPATIENT
Start: 2019-08-07 | End: 2019-08-10 | Stop reason: HOSPADM

## 2019-08-07 RX ORDER — FENTANYL CITRATE 50 UG/ML
25 INJECTION, SOLUTION INTRAMUSCULAR; INTRAVENOUS
Status: COMPLETED | OUTPATIENT
Start: 2019-08-07 | End: 2019-08-07

## 2019-08-07 RX ORDER — ENOXAPARIN SODIUM 100 MG/ML
40 INJECTION SUBCUTANEOUS EVERY 24 HOURS
Status: DISCONTINUED | OUTPATIENT
Start: 2019-08-07 | End: 2019-08-10 | Stop reason: HOSPADM

## 2019-08-07 RX ORDER — ALBUTEROL SULFATE 90 UG/1
2 AEROSOL, METERED RESPIRATORY (INHALATION)
Status: DISCONTINUED | OUTPATIENT
Start: 2019-08-07 | End: 2019-08-10 | Stop reason: HOSPADM

## 2019-08-07 RX ORDER — HYDROCODONE BITARTRATE AND ACETAMINOPHEN 5; 325 MG/1; MG/1
1 TABLET ORAL
Status: DISCONTINUED | OUTPATIENT
Start: 2019-08-07 | End: 2019-08-10 | Stop reason: HOSPADM

## 2019-08-07 RX ORDER — ASPIRIN 325 MG
325 TABLET ORAL DAILY
Status: DISCONTINUED | OUTPATIENT
Start: 2019-08-08 | End: 2019-08-07 | Stop reason: SDUPTHER

## 2019-08-07 RX ORDER — IPRATROPIUM BROMIDE AND ALBUTEROL SULFATE 2.5; .5 MG/3ML; MG/3ML
3 SOLUTION RESPIRATORY (INHALATION)
Status: COMPLETED | OUTPATIENT
Start: 2019-08-07 | End: 2019-08-07

## 2019-08-07 RX ORDER — ALISKIREN 150 MG/1
300 TABLET, FILM COATED ORAL
Status: DISCONTINUED | OUTPATIENT
Start: 2019-08-07 | End: 2019-08-08

## 2019-08-07 RX ORDER — ENOXAPARIN SODIUM 100 MG/ML
40 INJECTION SUBCUTANEOUS EVERY 24 HOURS
Status: DISCONTINUED | OUTPATIENT
Start: 2019-08-07 | End: 2019-08-07

## 2019-08-07 RX ORDER — SODIUM CHLORIDE 0.9 % (FLUSH) 0.9 %
10 SYRINGE (ML) INJECTION
Status: COMPLETED | OUTPATIENT
Start: 2019-08-07 | End: 2019-08-07

## 2019-08-07 RX ORDER — ONDANSETRON 2 MG/ML
4 INJECTION INTRAMUSCULAR; INTRAVENOUS
Status: DISCONTINUED | OUTPATIENT
Start: 2019-08-07 | End: 2019-08-10 | Stop reason: HOSPADM

## 2019-08-07 RX ORDER — ASPIRIN 325 MG
325 TABLET ORAL DAILY
Status: DISCONTINUED | OUTPATIENT
Start: 2019-08-07 | End: 2019-08-08

## 2019-08-07 RX ORDER — AMLODIPINE BESYLATE 5 MG/1
5 TABLET ORAL DAILY
Status: DISCONTINUED | OUTPATIENT
Start: 2019-08-07 | End: 2019-08-08

## 2019-08-07 RX ORDER — LISINOPRIL 20 MG/1
40 TABLET ORAL 2 TIMES DAILY
Status: DISCONTINUED | OUTPATIENT
Start: 2019-08-07 | End: 2019-08-10 | Stop reason: HOSPADM

## 2019-08-07 RX ORDER — PANTOPRAZOLE SODIUM 40 MG/1
40 TABLET, DELAYED RELEASE ORAL DAILY
Status: DISCONTINUED | OUTPATIENT
Start: 2019-08-07 | End: 2019-08-10 | Stop reason: HOSPADM

## 2019-08-07 RX ORDER — SODIUM CHLORIDE 0.9 % (FLUSH) 0.9 %
5-40 SYRINGE (ML) INJECTION AS NEEDED
Status: DISCONTINUED | OUTPATIENT
Start: 2019-08-07 | End: 2019-08-10 | Stop reason: HOSPADM

## 2019-08-07 RX ORDER — CARVEDILOL 12.5 MG/1
25 TABLET ORAL 2 TIMES DAILY WITH MEALS
Status: DISCONTINUED | OUTPATIENT
Start: 2019-08-07 | End: 2019-08-10 | Stop reason: HOSPADM

## 2019-08-07 RX ORDER — SODIUM CHLORIDE 0.9 % (FLUSH) 0.9 %
5-40 SYRINGE (ML) INJECTION EVERY 8 HOURS
Status: DISCONTINUED | OUTPATIENT
Start: 2019-08-07 | End: 2019-08-10 | Stop reason: HOSPADM

## 2019-08-07 RX ORDER — LEVOTHYROXINE SODIUM 25 UG/1
50 TABLET ORAL
Status: DISCONTINUED | OUTPATIENT
Start: 2019-08-07 | End: 2019-08-10 | Stop reason: HOSPADM

## 2019-08-07 RX ORDER — CYCLOBENZAPRINE HCL 10 MG
10 TABLET ORAL
Status: COMPLETED | OUTPATIENT
Start: 2019-08-07 | End: 2019-08-07

## 2019-08-07 RX ORDER — ENOXAPARIN SODIUM 100 MG/ML
40 INJECTION SUBCUTANEOUS EVERY 12 HOURS
Status: DISCONTINUED | OUTPATIENT
Start: 2019-08-07 | End: 2019-08-07

## 2019-08-07 RX ORDER — AZITHROMYCIN 250 MG/1
500 TABLET, FILM COATED ORAL DAILY
Status: DISCONTINUED | OUTPATIENT
Start: 2019-08-07 | End: 2019-08-10 | Stop reason: HOSPADM

## 2019-08-07 RX ORDER — KETOROLAC TROMETHAMINE 30 MG/ML
15 INJECTION, SOLUTION INTRAMUSCULAR; INTRAVENOUS
Status: COMPLETED | OUTPATIENT
Start: 2019-08-07 | End: 2019-08-07

## 2019-08-07 RX ADMIN — LEVOTHYROXINE SODIUM 50 MCG: 50 TABLET ORAL at 08:50

## 2019-08-07 RX ADMIN — CYCLOBENZAPRINE HYDROCHLORIDE 10 MG: 10 TABLET, FILM COATED ORAL at 00:36

## 2019-08-07 RX ADMIN — FUROSEMIDE 40 MG: 10 INJECTION, SOLUTION INTRAMUSCULAR; INTRAVENOUS at 05:14

## 2019-08-07 RX ADMIN — Medication 10 ML: at 14:00

## 2019-08-07 RX ADMIN — HYDROCODONE BITARTRATE AND ACETAMINOPHEN 1 TABLET: 5; 325 TABLET ORAL at 23:49

## 2019-08-07 RX ADMIN — VITAMIN D, TAB 1000IU (100/BT) 2000 UNITS: 25 TAB at 08:50

## 2019-08-07 RX ADMIN — CARVEDILOL 25 MG: 12.5 TABLET, FILM COATED ORAL at 08:50

## 2019-08-07 RX ADMIN — FENTANYL CITRATE 25 MCG: 50 INJECTION, SOLUTION INTRAMUSCULAR; INTRAVENOUS at 03:02

## 2019-08-07 RX ADMIN — CARVEDILOL 25 MG: 12.5 TABLET, FILM COATED ORAL at 17:05

## 2019-08-07 RX ADMIN — PANTOPRAZOLE SODIUM 40 MG: 40 TABLET, DELAYED RELEASE ORAL at 08:50

## 2019-08-07 RX ADMIN — IOPAMIDOL 80 ML: 755 INJECTION, SOLUTION INTRAVENOUS at 03:30

## 2019-08-07 RX ADMIN — Medication 10 ML: at 03:30

## 2019-08-07 RX ADMIN — FENTANYL CITRATE 25 MCG: 50 INJECTION, SOLUTION INTRAMUSCULAR; INTRAVENOUS at 06:06

## 2019-08-07 RX ADMIN — KETOROLAC TROMETHAMINE 15 MG: 30 INJECTION, SOLUTION INTRAMUSCULAR at 00:35

## 2019-08-07 RX ADMIN — AMLODIPINE BESYLATE 5 MG: 5 TABLET ORAL at 08:50

## 2019-08-07 RX ADMIN — AZITHROMYCIN 500 MG: 250 TABLET, FILM COATED ORAL at 13:02

## 2019-08-07 RX ADMIN — LISINOPRIL 40 MG: 20 TABLET ORAL at 08:50

## 2019-08-07 RX ADMIN — Medication 10 ML: at 23:51

## 2019-08-07 RX ADMIN — ENOXAPARIN SODIUM 40 MG: 40 INJECTION SUBCUTANEOUS at 13:12

## 2019-08-07 RX ADMIN — LISINOPRIL 40 MG: 20 TABLET ORAL at 17:05

## 2019-08-07 RX ADMIN — ASPIRIN 325 MG: 325 TABLET ORAL at 06:45

## 2019-08-07 RX ADMIN — HYDROCODONE BITARTRATE AND ACETAMINOPHEN 1 TABLET: 5; 325 TABLET ORAL at 17:27

## 2019-08-07 RX ADMIN — ACETAMINOPHEN 650 MG: 325 TABLET ORAL at 16:09

## 2019-08-07 RX ADMIN — IPRATROPIUM BROMIDE AND ALBUTEROL SULFATE 3 ML: .5; 3 SOLUTION RESPIRATORY (INHALATION) at 00:36

## 2019-08-07 NOTE — ED TRIAGE NOTES
Patient presents with complaints of chest pain that goes up into his neck for past 3 days. Also complaints of feelings of shortness of breath. Dyspnea on exertion. History of cardiac stent. Wife with patient.

## 2019-08-07 NOTE — ED NOTES
Pt awaiting reassessment by this md at this time. No distress noted. Pt's wife remains at bedside. Comfort and safety measures in place. Call light in reach.

## 2019-08-07 NOTE — ED PROVIDER NOTES
EMERGENCY DEPARTMENT HISTORY AND PHYSICAL EXAM      Date: 8/6/2019  Patient Name: Mario Adkins    History of Presenting Illness     Chief Complaint   Patient presents with    Chest Pain    Shortness of Breath       History Provided By: Patient    HPI: Mario Adkins, 75 y.o. male with PMHx significant for coronary artery disease, status post stent and hypertension, presents to the emergency room with chief complaint of chest pain and shortness of breath especially with exertion for the last 3 days. Patient reports that 3 days ago he was playing in the pool with his grandchildren and he did a balance on the diving board and then dove into the pool. Night he had some pain in his back that he attributed to his diving episode. The next day he developed some chest pains, left-sided neck pain, and some shortness of breath at rest that was worsened with exertion. He denies nausea, vomiting, diarrhea. He does report some palpitations. He denies calf pain, recent travel, smoking history. His chest pain is not worsened with deep breathing. Damion Jones He reports he has not followed up with Dr. Thad Mckeon as he has been instructed, and he thinks his last stress test was about 5 or 6 years ago. PCP: Waleska Bethea,     No current facility-administered medications on file prior to encounter. Current Outpatient Medications on File Prior to Encounter   Medication Sig Dispense Refill    evolocumab (REPATHA SURECLICK) pen injection 1 mL by SubCUTAneous route every fourteen (14) days. 2 Pen 1    aliskiren (TEKTURNA) 300 mg tablet Take 1 Tab by mouth nightly. Start if blood pressure remain >=140 upper or 90/100 lower 90 Tab 3    amLODIPine (NORVASC) 5 mg tablet Take 1 Tab by mouth daily. 90 Tab 3    carvedilol (COREG) 25 mg tablet Take 1 Tab by mouth two (2) times daily (with meals). 180 Tab 3    levothyroxine (SYNTHROID) 50 mcg tablet Take 1 Tab by mouth Daily (before breakfast).  90 Tab 3    lisinopril (PRINIVIL, ZESTRIL) 40 mg tablet Take 1 Tab by mouth two (2) times a day. Start if blood pressure remain >=140 upper or 90/100 lower 180 Tab 3    pantoprazole (PROTONIX) 40 mg tablet Take 1 Tab by mouth daily. 90 Tab 3    albuterol (PROVENTIL HFA, VENTOLIN HFA, PROAIR HFA) 90 mcg/actuation inhaler Take 2 Puffs by inhalation every four (4) hours as needed for Wheezing. 1 Inhaler 1    aspirin (ASPIRIN) 325 mg tablet Take 325 mg by mouth daily.  rosuvastatin (CRESTOR) 5 mg tablet Take 5 mg by mouth nightly.  cholecalciferol, vitamin d3, (VITAMIN D) 1,000 unit tablet Take 2,000 Units by mouth daily.  MULTI-VITAMIN HI-PO PO Take  by mouth daily. Past History     Past Medical History:  Past Medical History:   Diagnosis Date    CAD (coronary artery disease)     h/o stent- '10    Cancer [facility]) 2010    prostate    Hypertension     Stroke [facility]) May or June 2010    lacunar  infarctions (found on scan- no sxs)    Thyroid disease     hypo       Past Surgical History:  Past Surgical History:   Procedure Laterality Date    CARDIAC SURG PROCEDURE UNLIST  2010    coronary stent    EXCIS KNEE CARTILAGE,MEDIAL OR LAT  1980's x3    HX CHOLECYSTECTOMY  1991    HX HEENT  1962    T&A    HX HERNIA REPAIR  1996    HX MALIGNANT SKIN LESION EXCISION      2004 and 2017    HX ORTHOPAEDIC  1974    Ganglion cyst left wrist    HX ORTHOPAEDIC Left 2017    shoulder repair    HX PROSTATECTOMY  2010       Family History:  Family History   Problem Relation Age of Onset    Dementia Mother     Other Father 55        cerebral hemorrhage       Social History:  Social History     Tobacco Use    Smoking status: Never Smoker    Smokeless tobacco: Current User   Substance Use Topics    Alcohol use: Yes     Comment: occas    Drug use: Never       Allergies:  No Known Allergies      Review of Systems   Review of Systems   Constitutional: Negative for chills and fever.    HENT: Negative for congestion, ear pain, rhinorrhea and sore throat. Eyes: Negative. Respiratory: Positive for shortness of breath. Negative for cough, chest tightness and wheezing. Cardiovascular: Positive for chest pain, palpitations and leg swelling. Gastrointestinal: Negative for abdominal pain, constipation, diarrhea, nausea and vomiting. Genitourinary: Negative for dysuria, flank pain and hematuria. Musculoskeletal: Positive for back pain, myalgias and neck pain. Skin: Negative for rash and wound. Neurological: Negative for dizziness, syncope, light-headedness and headaches. Psychiatric/Behavioral: Negative for confusion. The patient is not nervous/anxious.           Physical Exam   General appearance -overweight, well appearing, and in no distress  Eyes - pupils equal and reactive, extraocular eye movements intact  ENT - mucous membranes moist, pharynx normal without lesions  Neck - supple, no significant adenopathy; tender to palpation left lateral neck and left para cervical musculature  Chest - clear to auscultation, no wheezes, rales or rhonchi; tender to palpation anterior chest wall  Heart - normal rate and regular rhythm, S1 and S2 normal, no murmurs noted  Abdomen - soft, nontender, nondistended, no masses or organomegaly  Musculoskeletal - no joint tenderness, deformity or swelling; normal ROM  Back-tender to palpation over left trapezius muscle and left mid back, no midline vertebral tenderness  Extremities - peripheral pulses normal, trace bilateral pedal edema  Skin - normal coloration and turgor, no rashes  Neurological - alert, oriented x3, normal speech, no focal findings or movement disorder noted    Diagnostic Study Results     Labs -     Recent Results (from the past 12 hour(s))   EKG, 12 LEAD, INITIAL    Collection Time: 08/06/19 10:50 PM   Result Value Ref Range    Ventricular Rate 66 BPM    Atrial Rate 66 BPM    P-R Interval 182 ms    QRS Duration 104 ms    Q-T Interval 450 ms    QTC Calculation (Bezet) 471 ms    Calculated P Axis 46 degrees    Calculated R Axis 59 degrees    Calculated T Axis 65 degrees    Diagnosis       Normal sinus rhythm  Normal ECG  When compared with ECG of 07-FEB-2017 09:22,  premature atrial complexes are no longer present     CBC WITH AUTOMATED DIFF    Collection Time: 08/06/19 11:51 PM   Result Value Ref Range    WBC 7.5 4.1 - 11.1 K/uL    RBC 4.05 (L) 4.10 - 5.70 M/uL    HGB 12.8 12.1 - 17.0 g/dL    HCT 38.6 36.6 - 50.3 %    MCV 95.3 80.0 - 99.0 FL    MCH 31.6 26.0 - 34.0 PG    MCHC 33.2 30.0 - 36.5 g/dL    RDW 12.5 11.5 - 14.5 %    PLATELET 944 783 - 223 K/uL    MPV 11.8 8.9 - 12.9 FL    NRBC 0.0 0  WBC    ABSOLUTE NRBC 0.00 0.00 - 0.01 K/uL    NEUTROPHILS 66 32 - 75 %    LYMPHOCYTES 23 12 - 49 %    MONOCYTES 7 5 - 13 %    EOSINOPHILS 3 0 - 7 %    BASOPHILS 1 0 - 1 %    IMMATURE GRANULOCYTES 0 0.0 - 0.5 %    ABS. NEUTROPHILS 5.0 1.8 - 8.0 K/UL    ABS. LYMPHOCYTES 1.7 0.8 - 3.5 K/UL    ABS. MONOCYTES 0.5 0.0 - 1.0 K/UL    ABS. EOSINOPHILS 0.2 0.0 - 0.4 K/UL    ABS. BASOPHILS 0.0 0.0 - 0.1 K/UL    ABS. IMM. GRANS. 0.0 0.00 - 0.04 K/UL    DF AUTOMATED     METABOLIC PANEL, COMPREHENSIVE    Collection Time: 08/06/19 11:51 PM   Result Value Ref Range    Sodium 141 136 - 145 mmol/L    Potassium 3.5 3.5 - 5.1 mmol/L    Chloride 108 97 - 108 mmol/L    CO2 27 21 - 32 mmol/L    Anion gap 6 5 - 15 mmol/L    Glucose 96 65 - 100 mg/dL    BUN 12 6 - 20 MG/DL    Creatinine 0.98 0.70 - 1.30 MG/DL    BUN/Creatinine ratio 12 12 - 20      GFR est AA >60 >60 ml/min/1.73m2    GFR est non-AA >60 >60 ml/min/1.73m2    Calcium 8.5 8.5 - 10.1 MG/DL    Bilirubin, total 0.5 0.2 - 1.0 MG/DL    ALT (SGPT) 30 12 - 78 U/L    AST (SGOT) 13 (L) 15 - 37 U/L    Alk.  phosphatase 74 45 - 117 U/L    Protein, total 7.3 6.4 - 8.2 g/dL    Albumin 3.8 3.5 - 5.0 g/dL    Globulin 3.5 2.0 - 4.0 g/dL    A-G Ratio 1.1 1.1 - 2.2     CK W/ REFLX CKMB    Collection Time: 08/06/19 11:51 PM   Result Value Ref Range    CK 90 39 - 308 U/L   TROPONIN I Collection Time: 08/06/19 11:51 PM   Result Value Ref Range    Troponin-I, Qt. <0.05 <0.05 ng/mL       Radiologic Studies -   XR CHEST PA LAT   Final Result   impression: Blunting of the costophrenic angles bilaterally. CT Results  (Last 48 hours)    None        CXR Results  (Last 48 hours)               08/07/19 0005  XR CHEST PA LAT Final result    Impression:  impression: Blunting of the costophrenic angles bilaterally. Narrative:  Clinical indication: Chest pain. Frontal and lateral views of the chest obtained. Comparison February 7, 2017. The a heart size is stable normal. There is no acute infiltrate. Mild blunting   of the costophrenic angles. Medical Decision Making   I am the first provider for this patient. I reviewed the vital signs, available nursing notes, past medical history, past surgical history, family history and social history. Vital Signs-Reviewed the patient's vital signs. Patient Vitals for the past 12 hrs:   Temp Pulse Resp BP SpO2   08/06/19 2246 98 °F (36.7 °C) 67 16 158/80 98 %       EKG: Normal sinus rhythm, 66 bpm, normal axis, normal CO, QRS, QTc intervals, no ischemic changes    Records Reviewed: Nursing Notes and Old Medical Records    Provider Notes (Medical Decision Making):   Differential diagnosis: Congestive heart failure, muscle strain, pneumonia, acute coronary syndrome, PE    ED Course:   Initial assessment performed. The patients presenting problems have been discussed, and they are in agreement with the care plan formulated and outlined with them. I have encouraged them to ask questions as they arise throughout their visit. Progress Notes:   CTA negative for PE but shows bilateral infiltrates and bilateral pleural effusions consistent with congestive heart failure. Will diurese and admit to hospitalist    Disposition:  Admit to hospitalist        Diagnosis     Clinical Impression:   1. Congestive heart failure  2. Bilateral pleural effusions and bibasilar infiltrates

## 2019-08-07 NOTE — PROGRESS NOTES
Problem: Patient Education: Go to Patient Education Activity  Goal: Patient/Family Education  8/7/2019 1354 by Jalil Lerma  Outcome: Progressing Towards Goal  8/7/2019 1128 by Jalil Lerma  Outcome: Progressing Towards Goal     Problem: Unstable angina/NSTEMI: Day of Admission/Day 1  Goal: Off Pathway (Use only if patient is Off Pathway)  8/7/2019 1354 by Jalil Lerma  Outcome: Progressing Towards Goal  8/7/2019 1128 by Jalil Lerma  Outcome: Progressing Towards Goal  Goal: Activity/Safety  8/7/2019 1354 by Jalil Lerma  Outcome: Progressing Towards Goal  8/7/2019 1128 by Jalil Lerma  Outcome: Progressing Towards Goal

## 2019-08-07 NOTE — PROGRESS NOTES
TRANSFER - IN REPORT:    Verbal report received from [address] on [address]  being received from ED (unit) for routine progression of care      Report consisted of patients Situation, Background, Assessment and   Recommendations(SBAR). Information from the following report(s) SBAR, Kardex and Recent Results was reviewed with the receiving nurse. Opportunity for questions and clarification was provided. Assessment completed upon patients arrival to unit and care assumed.

## 2019-08-07 NOTE — PROGRESS NOTES
1015    Patient complaining of chest pressure and chest pain that radiates to back and neck. States it is the same as the chest pain he experienced in ED. Clinical Care Lead Ana at bedside to assess patient. Determination was made to call Rapid Response: Chest Pain. Patient stated he did not want rapid response called. Stated, \"once the cardiologist gets here, I'm out of here. \"     Patient refusing NTG and now denying chest pain. Patient stated \"don't you worry about my pain. I'm not having pain. \"     1025    Patient continues to deny chest pain, resting quietly in bed with eyes closed. EKG obtained. Most recent vitals listed below:    Visit Vitals  /71   Pulse (!) 57   Temp 98.1 °F (36.7 °C)   Resp 16   Ht 6' (1.829 m)   Wt 113.5 kg (250 lb 3.6 oz)   SpO2 95%   BMI 33.94 kg/m²     1800    Spoke with Dr. Gretchen Ward who said patient can eat overnight    1900      Bedside shift change report GIVEN TO Cyril Newman RN. Report included the following information SBAR and Kardex. SIGNIFICANT CHANGES DURING SHIFT:  See above      CONCERNS TO ADDRESS WITH MD:  None          [facility] NURSING NOTE   Admission Date 8/6/2019   Admission Diagnosis CHF (congestive heart failure) ([facility] [address] [I50.9]   Consults IP CONSULT TO HOSPITALIST  IP CONSULT TO CARDIOLOGY      Cardiac Monitoring [x] Yes [] No      Purposeful Hourly Rounding [x] Yes    Lillian Score Total Score: 1   Lillian score 3 or > [] Bed Alarm [] Avasys [] 1:1 sitter [] Patient refused (Signed refusal form in chart)   Kenroy Score Kenroy Score: 21   Kenroy score 14 or < [] PMT consult [] Wound Care consult    []  Specialty bed  [] Nutrition consult      Influenza Vaccine Received Flu Vaccine for Current Season (usually Sept-March): Not Flu Season           Oxygen needs? [x] Room air Oxygen @  []1L    []2L    []3L   []4L    []5L   []6L via  NC   Chronic home O2 use?  [] Yes [x] No  Perform O2 challenge test and document in progress note using smartphrase (.Homeoxygen) Last bowel movement Last Bowel Movement Date: 08/06/19      Urinary Catheter             LDAs               Peripheral IV 08/06/19 Left Antecubital (Active)   Site Assessment Clean, dry, & intact 8/7/2019  4:00 PM   Phlebitis Assessment 0 8/7/2019  4:00 PM   Infiltration Assessment 0 8/7/2019  4:00 PM   Dressing Status Clean, dry, & intact 8/7/2019  4:00 PM   Dressing Type Transparent 8/7/2019  4:00 PM                         Readmission Risk Assessment Tool Score Medium Risk            13       Total Score        2 . Living with Significant Other. Assisted Living. LTAC. SNF. or   Rehab    5 Pt.  Coverage (Medicare=5 , Medicaid, or Self-Pay=4)    6 Charlson Comorbidity Score (Age + Comorbid Conditions)        Criteria that do not apply:    Has Seen PCP in Last 6 Months (Yes=3, No=0)    Patient Length of Stay (>5 days = 3)    IP Visits Last 12 Months (1-3=4, 4=9, >4=11)       Expected Length of Stay 1d 19h   Actual Length of Stay 0

## 2019-08-07 NOTE — PROGRESS NOTES
Hospitalist Progress Note    NAME: Faith Lee   :  1955   MRN:  [id number]       Assessment / Plan:  Chest pain rule out non-STEMI  sp stent to lad in . No issues since then. Cardiology consult pneding. Reports compliance with aspirin. Shortness of breath most likely secondary to acute CHF  40 lasix was given   echo pending. penumonia - mild. Place on azithromcyin and get blood culture. Hypothyroidism  continue Synthyroid 50 mcg daily    Hypertension  continue home coreg, norvasc, tecturna, prinivil    Hyperlipidemia  Continue Crestor 5 mg daily        Code Status: Full   Surrogate Decision Maker:  Jacqueline Peck Spouse            DVT Prophylaxis: Lovenox   GI Prophylaxis: not indicated     Baseline: independent         30.0 - 39.9 Obese / Body mass index is 33.94 kg/m². Subjective:     Chief Complaint / Reason for Physician Visit  \"no cp today. Has been having cp for last 4 days. \". Discussed with RN events overnight. Review of Systems:  Symptom Y/N Comments  Symptom Y/N Comments   Fever/Chills    Chest Pain     Poor Appetite    Edema     Cough    Abdominal Pain     Sputum    Joint Pain     SOB/DENG    Pruritis/Rash     Nausea/vomit    Tolerating PT/OT     Diarrhea    Tolerating Diet     Constipation    Other       Could NOT obtain due to:      Objective:     VITALS:   Last 24hrs VS reviewed since prior progress note.  Most recent are:  Patient Vitals for the past 24 hrs:   Temp Pulse Resp BP SpO2   19 0749 98 °F (36.7 °C) (!) 58 16 158/80 96 %   19 0645  (!) 59 14 141/72 92 %   19 0630  61 15 152/70 93 %   19 0615  61 10 149/76 95 %   19 0600  (!) 59 14 152/76 95 %   19 0545  61 16 153/64 94 %   19 0530  61 14 152/69 96 %   19 0515  61 19 156/78 92 %   19 0514  67  156/78    19 0500  61 21 137/65 90 %   19 0445  62 21 142/63 92 %   19 0430  61 17 139/64 94 %   19 0426 98.3 °F (36.8 °C) 60 18 138/66 96 %   08/07/19 0300  63 12 147/66 95 %   08/07/19 0052     96 %   08/06/19 2246 98 °F (36.7 °C) 67 16 158/80 98 %     No intake or output data in the 24 hours ending 08/07/19 1015     PHYSICAL EXAM:  General: WD, WN. Alert, cooperative, no acute distress    EENT:  EOMI. Anicteric sclerae. MMM  Resp:  CTA bilaterally, no wheezing or rales. No accessory muscle use  CV:  Regular  rhythm,  No edema  GI:  Soft, Non distended, Non tender.  +Bowel sounds  Neurologic:  Alert and oriented X 3, normal speech,   Psych:   Good insight. Not anxious nor agitated  Skin:  No rashes. No jaundice    Reviewed most current lab test results and cultures  YES  Reviewed most current radiology test results   YES  Review and summation of old records today    NO  Reviewed patient's current orders and MAR    YES  PMH/SH reviewed - no change compared to H&P  ________________________________________________________________________  Care Plan discussed with:    Comments   Patient     Family      RN     Care Manager     Consultant                        Multidiciplinary team rounds were held today with , nursing, pharmacist and clinical coordinator. Patient's plan of care was discussed; medications were reviewed and discharge planning was addressed. ________________________________________________________________________  Total NON critical care TIME:  20   Minutes    Total CRITICAL CARE TIME Spent:   Minutes non procedure based      Comments   >50% of visit spent in counseling and coordination of care     ________________________________________________________________________  Zenobia Chamberlainws, DO     Procedures: see electronic medical records for all procedures/Xrays and details which were not copied into this note but were reviewed prior to creation of Plan. LABS:  I reviewed today's most current labs and imaging studies.   Pertinent labs include:  Recent Labs     08/06/19  2351   WBC 7.5 HGB 12.8   HCT 38.6        Recent Labs     08/06/19  2351      K 3.5      CO2 27   GLU 96   BUN 12   CREA 0.98   CA 8.5   ALB 3.8   TBILI 0.5   SGOT 13*   ALT 30       Signed: Evelyne Lane DO

## 2019-08-07 NOTE — PROGRESS NOTES
Spiritual Care Assessment/Progress Note  [facility]      NAME: Arnol Ta      MRN: [id number]  AGE: 75 y.o. SEX: male  Yazidism Affiliation: Restoration   Language: English     8/7/2019     Total Time (in minutes): 14     Spiritual Assessment begun in [facility] CARDIOPULMONARY CARE through conversation with:         [x]Patient        [] Family    [] Friend(s)        Reason for Consult: Rapid response team     Spiritual beliefs: (Please include comment if needed)     [x] Identifies with a keyona tradition:         [] Supported by a keyona community:            [] Claims no spiritual orientation:           [] Seeking spiritual identity:                [] Adheres to an individual form of spirituality:           [] Not able to assess:                           Identified resources for coping:      [x] Prayer                               [] Music                  [] Guided Imagery     [x] Family/friends                 [] Pet visits     [] Devotional reading                         [] Unknown     [] Other:                                           Interventions offered during this visit: (See comments for more details)    Patient Interventions: Crisis     Family/Friend(s):  Affirmation of keyona, Affirmation of emotions/emotional suffering, Catharsis/review of pertinent events in supportive environment, Iconic (affirming the presence of God/Higher Power), Prayer (assurance of), Normalization of emotional/spiritual concerns     Plan of Care:     [x] Support spiritual and/or cultural needs    [] Support AMD and/or advance care planning process      [] Support grieving process   [] Coordinate Rites and/or Rituals    [] Coordination with community clergy   [] No spiritual needs identified at this time   [] Detailed Plan of Care below (See Comments)  [] Make referral to Music Therapy  [] Make referral to Pet Therapy     [] Make referral to Addiction services  [] Make referral to [facility]  [] Make referral to Spiritual Care Partner  [] No future visits requested        [x] Follow up visits as needed     Comments:  Responded to RRT on [facility] unit. Patient's wife was present. Patient is being evaluated by clinical staff. Provided pastoral presence and support as wife spoke about patient's current medical concerns. She added that they spent a good amount of time in the ER and neither of them have rested well, but she is coping effectively at this time. She was receptive to assurance of prayer. Advised her of ongoing availability of  as needed.     ROMA Tripp, [facility], [address] [place] [address] Avenue    [address] Paging Service  [phone])

## 2019-08-07 NOTE — PROGRESS NOTES
Rapid response calling,nurse reporting that patient complained of chest pain. However patient is denying chest pain,saying that he did not have any chest pain  He is laying comfortably in bed. VS stable.   EKG ordered

## 2019-08-07 NOTE — CONSULTS
Pt seen and examined. Full consult dictated. Troponins negative so far. Would start with an echocardiogram which has been ordered. Would not cath at this point.  Suspect this is more CHF

## 2019-08-07 NOTE — CONSULTS
[facility]    Name:  Kait Muñiz  MR#:  [id number]  :  1955  ACCOUNT #:  [de-identified]  DATE OF SERVICE:  2019    REQUESTING PHYSICIAN:  Lynn Anderson MD    REASON FOR CONSULTATION:  Evaluate possible acute coronary syndrome. CHIEF COMPLAINT:  Shortness of breath and weakness. HISTORY OF PRESENT ILLNESS:  The patient is a 57-year-old man with a history of known coronary artery disease with remote PTCA and stenting of a diagonal branch in  with a bare-metal stent. The patient also has a history of hypertension and dyslipidemia, but no history of diabetes. His prior ejection fraction has been normal.  No history of valve problems or CHF. He is followed by Dr. Eleazar Branham and Dr. Kaden Briceno. The patient presented to the ER because of progressive shortness of breath. He noticed weakness and shortness of breath that had been present for some time, but seemed to get worse over the last 4 days. He has had some vague chest discomfort radiating to his neck, not related to exertion. He denies cough, fever, or chills. He denies swelling. He may have gained some weight, but this has been fairly gradual.  He came to the [facility] ER where his initial cardiac enzymes were negative. An EKG showed no obvious ischemia. ProBNP was mildly elevated and a CTA of the chest showed no evidence of pulmonary embolism, but bibasilar infiltrates predominantly on the left as well as right pleural effusion. Because of this, the patient was evaluated. He has had 2 sets of negative cardiac enzymes. He says his blood pressure has been reasonably well controlled and he has felt okay since admission. PAST MEDICAL HISTORY:  As noted above. Also remote history of prostate cancer, history of hypothyroidism. SURGERIES:  Prior prostatectomy, prior cholecystectomy, prior tonsillectomy, prior knee surgery, prior left wrist surgery for ganglion cysts.     CURRENT MEDICATIONS:  Lisinopril, Tekturna, Norvasc, carvedilol, Crestor, aspirin, L-thyroxine, Lovenox 40 mg every 12 hours, vitamin D3, Protonix. SOCIAL HISTORY:  The patient does not smoke. He drinks about 2 alcoholic drinks a day. He is . FAMILY HISTORY:  The patient's brother had hypertension. REVIEW OF SYSTEMS:  No abdominal or unusual back pain. No melena, no hematochezia. No syncope. No palpitations. No fever, no chills. No cough. PHYSICAL EXAMINATION:  GENERAL:  Exam reveals an obese, late middle-aged white male, in no acute distress. VITAL SIGNS:  Blood pressure 158/80, pulse 58, respirations 16, temperature 98. HEENT:  Pupils are equal.  Oropharynx reveals moist oral mucosa. NECK:  No obvious masses or thyromegaly. No cervical or supraclavicular adenopathy. No definite carotid bruits. No JVD. CHEST:  Crackles at the bases bilaterally, right greater than left. SKIN:  Warm and dry. No rashes. CARDIAC:  Regular rate and rhythm. Normal S1 and S2. No definite murmurs, rubs, or gallops. ABDOMEN:  Obese, soft, nontender, no masses or organomegaly. Bowel sounds positive. EXTREMITIES:  No cyanosis, clubbing, or edema. Distal pulses 1+ in the feet bilaterally. NEUROLOGIC:  No obvious gross motor deficits. IMPRESSION:  1. Possible mild congestive heart failure with unknown ejection fraction. 2.  History of coronary artery disease with remote percutaneous transluminal coronary angioplasty and stenting of a diagonal branch. 3.  Hypertension and hypertensive heart disease. 4.  Dyslipidemia. 5.  Obesity. 6.  Remote history of prostate cancer status post prostatectomy. 7.  Hypothyroidism. RECOMMENDATIONS:  At this point with negative cardiac enzymes and unremarkable EKG, I think the likelihood of an acute coronary syndrome is quite low. I am more concerned about the possibility of  heart failure. I would start with an echocardiogram as you are doing.   I think for now, the patient can eat today and we can adjust medications and see how he does. We also may want to add a little dose of a diuretic at some point. I will review the echocardiogram and make further recommendations. Thank you for this consult.       MD CINDY [email or web address]  D:  08/07/2019 12:52  T:  08/07/2019 12:59  JOB #:  [id number]  CC:  MD Flaco Herrera MD Spero Cleverly, MD

## 2019-08-07 NOTE — H&P
Hospitalist Admission Note    NAME: Radha Li   :  1955   MRN:  [id number]     Date/Time:  2019 6:47 AM    Patient PCP: Sharlene Butterfield, DO  ______________________________________________________________________  Given the patient's current clinical presentation, I have a high level of concern for decompensation if discharged from the emergency department. Complex decision making was performed, which includes reviewing the patient's available past medical records, laboratory results, and x-ray films. My assessment of this patient's clinical condition and my plan of care is as follows.     Assessment / Plan:    Chest pain rule out non-STEMI  Admit patient to [facility]  Follow-up serial troponin  echocardiogram  cardiology consultation  continue aspirin    Shortness of breath most likely secondary to acute CHF  40 lasix was given   Cardiology consultation  -daily weight     Hypothyroidism  continue Synthyroid 50 mcg daily    Hypertension  continue home antihypertensive medication    Hyperlipidemia  Continue Crestor 5 mg daily      Code Status: Full   Surrogate Decision Maker:  Diaz Motley Spouse         DVT Prophylaxis: Lovenox   GI Prophylaxis: not indicated    Baseline: independent       Subjective:   CHIEF COMPLAINT: chest pain     HISTORY OF PRESENT ILLNESS:     75years old male from home with past medical history significant for coronary artery disease, hypertension, hyperlipidemia, hypothyroidism presented to the hospital complaining from chest pain associated with shortness of breath, patient stated his chest pain started about 45 days ago worsened with exertion chest pain in the left side radiated to the back on the left arm radiated to the neck patient denies any nausea vomiting any dizziness, blood work in ED show no significant abnormality EKG was done shortness acute abnormality CTA chest was done and show bibasilar infiltrate and bilateral pleural effusion. We were asked to admit for work up and evaluation of the above problems. Past Medical History:   Diagnosis Date    CAD (coronary artery disease)     h/o stent- '10    Cancer [facility]) 2010    prostate    Hypertension     Stroke [facility]) May or June 2010    lacunar  infarctions (found on scan- no sxs)    Thyroid disease     hypo        Past Surgical History:   Procedure Laterality Date    CARDIAC SURG PROCEDURE UNLIST  2010    coronary stent    EXCIS KNEE CARTILAGE,MEDIAL OR LAT  1980's x3    HX CHOLECYSTECTOMY  1991    HX HEENT  1962    T&A    HX HERNIA REPAIR  1996    HX MALIGNANT SKIN LESION EXCISION      2004 and 2017    HX ORTHOPAEDIC  1974    Ganglion cyst left wrist    HX ORTHOPAEDIC Left 2017    shoulder repair    HX PROSTATECTOMY  2010       Social History     Tobacco Use    Smoking status: Never Smoker    Smokeless tobacco: Current User   Substance Use Topics    Alcohol use: Yes     Comment: occas        Family History   Problem Relation Age of Onset    Dementia Mother     Other Father 55        cerebral hemorrhage     No Known Allergies     Prior to Admission medications    Medication Sig Start Date End Date Taking? Authorizing Provider   evolocumab (REPATHA SURECLICK) pen injection 1 mL by SubCUTAneous route every fourteen (14) days. 5/14/19   Ai Villa III, DO   aliskiren (TEKTURNA) 300 mg tablet Take 1 Tab by mouth nightly. Start if blood pressure remain >=140 upper or 90/100 lower 5/3/19   Herman Villa III, DO   amLODIPine (NORVASC) 5 mg tablet Take 1 Tab by mouth daily. 5/3/19   Herman Villa III, DO   carvedilol (COREG) 25 mg tablet Take 1 Tab by mouth two (2) times daily (with meals). 5/3/19   Xin Racer III, DO   levothyroxine (SYNTHROID) 50 mcg tablet Take 1 Tab by mouth Daily (before breakfast).  5/3/19   Herman Villa III, DO   lisinopril (PRINIVIL, ZESTRIL) 40 mg tablet Take 1 Tab by mouth two (2) times a day. Start if blood pressure remain >=140 upper or 90/100 lower 5/3/19   Wellington Villa III, DO   pantoprazole (PROTONIX) 40 mg tablet Take 1 Tab by mouth daily. 5/3/19   Wellington Villa III, DO   albuterol (PROVENTIL HFA, VENTOLIN HFA, PROAIR HFA) 90 mcg/actuation inhaler Take 2 Puffs by inhalation every four (4) hours as needed for Wheezing. 2/7/17   Lamar Tello MD   aspirin (ASPIRIN) 325 mg tablet Take 325 mg by mouth daily. Other, MD Asa   rosuvastatin (CRESTOR) 5 mg tablet Take 5 mg by mouth nightly. Other, MD Asa   cholecalciferol, vitamin d3, (VITAMIN D) 1,000 unit tablet Take 2,000 Units by mouth daily. Provider, Historical   MULTI-VITAMIN HI-PO PO Take  by mouth daily. 10/11/10   Provider, Historical       REVIEW OF SYSTEMS:     I am not able to complete the review of systems because:    The patient is intubated and sedated    The patient has altered mental status due to his acute medical problems    The patient has baseline aphasia from prior stroke(s)    The patient has baseline dementia and is not reliable historian    The patient is in acute medical distress and unable to provide information           Total of 12 systems reviewed as follows:       POSITIVE= underlined text  Negative = text not underlined  General:  fever, chills, sweats, generalized weakness, weight loss/gain,      loss of appetite   Eyes:    blurred vision, eye pain, loss of vision, double vision  ENT:    rhinorrhea, pharyngitis   Respiratory:   cough, sputum production, SOB, DENG, wheezing, pleuritic pain   Cardiology:   chest pain, palpitations, orthopnea, PND, edema, syncope   Gastrointestinal:  abdominal pain , N/V, diarrhea, dysphagia, constipation, bleeding   Genitourinary:  frequency, urgency, dysuria, hematuria, incontinence   Muskuloskeletal :  arthralgia, myalgia, back pain  Hematology:  easy bruising, nose or gum bleeding, lymphadenopathy   Dermatological: rash, ulceration, pruritis, color change / jaundice  Endocrine:   hot flashes or polydipsia   Neurological:  headache, dizziness, confusion, focal weakness, paresthesia,     Speech difficulties, memory loss, gait difficulty  Psychological: Feelings of anxiety, depression, agitation    Objective:   VITALS:    Visit Vitals  /76   Pulse 61   Temp 98.3 °F (36.8 °C)   Resp 10   Ht 6' (1.829 m)   Wt 113.5 kg (250 lb 3.6 oz)   SpO2 95%   BMI 33.94 kg/m²       PHYSICAL EXAM:    General:    Alert, cooperative, no distress, appears stated age. HEENT: Atraumatic, anicteric sclerae, pink conjunctivae     No oral ulcers, mucosa moist, throat clear, dentition fair  Neck:  Supple, symmetrical,  thyroid: non tender  Lungs:   Clear to auscultation bilaterally. No Wheezing or Rhonchi. No rales. Chest wall:  No tenderness  No Accessory muscle use. Heart:   Regular  rhythm,  No  murmur b/l edema  Abdomen:   Soft, non-tender. Not distended. Bowel sounds normal  Extremities: No cyanosis. No clubbing,      Skin turgor normal, Capillary refill normal, Radial dial pulse 2+  Skin:     Not pale. Not Jaundiced  No rashes   Psych:  Good insight. Not depressed. Not anxious or agitated. Neurologic: EOMs intact. No facial asymmetry. No aphasia or slurred speech. Symmetrical strength, Sensation grossly intact.  Alert and oriented X 4.     _______________________________________________________________________  Care Plan discussed with:    Comments   Patient y    Family      RN y    Care Manager                    Consultant:      _______________________________________________________________________  Expected  Disposition:   Home with Family y   HH/PT/OT/RN    SNF/LTC    CARLOS    ________________________________________________________________________  TOTAL TIME:  72  Minutes    Critical Care Provided     Minutes non procedure based      Comments    y Reviewed previous records   >50% of visit spent in counseling and coordination of care y Discussion with patient and/or family and questions answered       ________________________________________________________________________  Signed: Lashell Sheridan MD    Procedures: see electronic medical records for all procedures/Xrays and details which were not copied into this note but were reviewed prior to creation of Plan. LAB DATA REVIEWED:    Recent Results (from the past 24 hour(s))   EKG, 12 LEAD, INITIAL    Collection Time: 08/06/19 10:50 PM   Result Value Ref Range    Ventricular Rate 66 BPM    Atrial Rate 66 BPM    P-R Interval 182 ms    QRS Duration 104 ms    Q-T Interval 450 ms    QTC Calculation (Bezet) 471 ms    Calculated P Axis 46 degrees    Calculated R Axis 59 degrees    Calculated T Axis 65 degrees    Diagnosis       Normal sinus rhythm  Normal ECG  When compared with ECG of 07-FEB-2017 09:22,  premature atrial complexes are no longer present     CBC WITH AUTOMATED DIFF    Collection Time: 08/06/19 11:51 PM   Result Value Ref Range    WBC 7.5 4.1 - 11.1 K/uL    RBC 4.05 (L) 4.10 - 5.70 M/uL    HGB 12.8 12.1 - 17.0 g/dL    HCT 38.6 36.6 - 50.3 %    MCV 95.3 80.0 - 99.0 FL    MCH 31.6 26.0 - 34.0 PG    MCHC 33.2 30.0 - 36.5 g/dL    RDW 12.5 11.5 - 14.5 %    PLATELET 856 715 - 470 K/uL    MPV 11.8 8.9 - 12.9 FL    NRBC 0.0 0  WBC    ABSOLUTE NRBC 0.00 0.00 - 0.01 K/uL    NEUTROPHILS 66 32 - 75 %    LYMPHOCYTES 23 12 - 49 %    MONOCYTES 7 5 - 13 %    EOSINOPHILS 3 0 - 7 %    BASOPHILS 1 0 - 1 %    IMMATURE GRANULOCYTES 0 0.0 - 0.5 %    ABS. NEUTROPHILS 5.0 1.8 - 8.0 K/UL    ABS. LYMPHOCYTES 1.7 0.8 - 3.5 K/UL    ABS. MONOCYTES 0.5 0.0 - 1.0 K/UL    ABS. EOSINOPHILS 0.2 0.0 - 0.4 K/UL    ABS. BASOPHILS 0.0 0.0 - 0.1 K/UL    ABS. IMM.  GRANS. 0.0 0.00 - 0.04 K/UL    DF AUTOMATED     METABOLIC PANEL, COMPREHENSIVE    Collection Time: 08/06/19 11:51 PM   Result Value Ref Range    Sodium 141 136 - 145 mmol/L    Potassium 3.5 3.5 - 5.1 mmol/L    Chloride 108 97 - 108 mmol/L    CO2 27 21 - 32 mmol/L    Anion gap 6 5 - 15 mmol/L    Glucose 96 65 - 100 mg/dL    BUN 12 6 - 20 MG/DL    Creatinine 0.98 0.70 - 1.30 MG/DL    BUN/Creatinine ratio 12 12 - 20      GFR est AA >60 >60 ml/min/1.73m2    GFR est non-AA >60 >60 ml/min/1.73m2    Calcium 8.5 8.5 - 10.1 MG/DL    Bilirubin, total 0.5 0.2 - 1.0 MG/DL    ALT (SGPT) 30 12 - 78 U/L    AST (SGOT) 13 (L) 15 - 37 U/L    Alk.  phosphatase 74 45 - 117 U/L    Protein, total 7.3 6.4 - 8.2 g/dL    Albumin 3.8 3.5 - 5.0 g/dL    Globulin 3.5 2.0 - 4.0 g/dL    A-G Ratio 1.1 1.1 - 2.2     CK W/ REFLX CKMB    Collection Time: 08/06/19 11:51 PM   Result Value Ref Range    CK 90 39 - 308 U/L   TROPONIN I    Collection Time: 08/06/19 11:51 PM   Result Value Ref Range    Troponin-I, Qt. <0.05 <0.05 ng/mL   NT-PRO BNP    Collection Time: 08/06/19 11:51 PM   Result Value Ref Range    NT pro- (H) <125 PG/ML   D DIMER    Collection Time: 08/07/19 12:47 AM   Result Value Ref Range    D-dimer 1.16 (H) 0.00 - 0.65 mg/L FEU

## 2019-08-07 NOTE — PROGRESS NOTES
RAPID RESPONSE TEAM  Responded to overhead page for rapid response-chest pain. Upon Padilla , RN stated that patient requested for rapid response to be cancelled. Apparently he is concerned about billing. Per her assessment, patient describes pressure radiating to back and neck, recurrent from last night,     Dr. Clovis Neville was apparently here and left prior to completion of the EKG. Tigertext sent to Dr. Clovis Neville to notify of completion of EKG. I see no significant changes. Plan for patient to stay in room for now. Please call back if needed. Ext. [phone]    Troponin-I, Qt. Date Value Ref Range Status   08/07/2019 <0.05 <0.05 ng/mL Final   08/06/2019 <0.05 <0.05 ng/mL Final     Comment:     The presence of detectable troponin above the reference range indicates myocardial injury which may be due to ischemia, myocarditis, trauma, etc.  Clinical correlation is necessary to establish the significance of this finding. Sequential testing is recommended to determine if the typical rise and fall of cTnI is demonstrated. Note:  Cardiac troponin I has a relatively long half life and may be present well after the CK MB has returned to baseline. The reference range is based on the 99th percentile of the referent population.

## 2019-08-07 NOTE — CDMP QUERY
Pt admitted with ?nstemi. Pt noted to have chf. If possible, please document in progress notes and d/c summary if you are evaluating and /or treating any of the following: ? Acute on Chronic Systolic CHF ? Acute on Chronic Diastolic CHF ? Acute on Chronic Systolic and Diastolic CHF ? Acute Systolic CHF ? Acute Diastolic CHF ? Acute Systolic and Diastolic CHF ? Chronic Systolic CHF ? Chronic Diastolic CHF ? Chronic Systolic and Diastolic CHF 
? Other, please specify ? Clinically unable to determine The medical record reflects the following: 
  Risk Factors: htn, stroke Clinical Indicators: *H&P-Shortness of breath most likely secondary to acute CHF, pbnp 794 Treatment:card cx, IV Lasix, daily wt Thanks, Madiha Prieto RN/CDMP

## 2019-08-07 NOTE — PROGRESS NOTES
Problem: Patient Education: Go to Patient Education Activity  Goal: Patient/Family Education  Outcome: Progressing Towards Goal     Problem: Unstable angina/NSTEMI: Day of Admission/Day 1  Goal: Off Pathway (Use only if patient is Off Pathway)  Outcome: Progressing Towards Goal  Goal: Activity/Safety  Outcome: Progressing Towards Goal

## 2019-08-07 NOTE — PROGRESS NOTES
Pharmacy Clarification of the Prior to Admission Medication Regimen Retrospective to the Admission Medication Reconciliation    The patient was interviewed regarding clarification of the prior to admission medication regimen. Wife was present in room and obtained permission from patient to discuss drug regimen with visitor(s) present. Patient was questioned regarding use of any other inhalers, topical products, over the counter medications, herbal medications, vitamin products or ophthalmic/nasal/otic medication use. Information Obtained From: Patient, personal med list, RX Query    Recommendations/Findings: The following amendments were made to the patient's active medication list on [place] at [id number]:     1) Additions:   ibuprofen (MOTRIN) 200 mg tablet    2) Removals:   repatha  rosuvastatin    3) Changes: NONE    4) Pertinent Pharmacy Findings: NONE     PTA medication list was corrected to the following:     Prior to Admission Medications   Prescriptions Last Dose Informant Patient Reported? Taking? MULTI-VITAMIN HI-PO PO 8/6/2019 at Unknown time Self Yes Yes   Sig: Take 1 Tab by mouth daily. aliskiren (TEKTURNA) 300 mg tablet 8/5/2019 at Unknown time Self No Yes   Sig: Take 1 Tab by mouth nightly. Start if blood pressure remain >=140 upper or 90/100 lower   amLODIPine (NORVASC) 5 mg tablet 8/6/2019 at Unknown time Self No Yes   Sig: Take 1 Tab by mouth daily. aspirin (ASPIRIN) 325 mg tablet 8/5/2019 at Unknown time Self Yes Yes   Sig: Take 325 mg by mouth nightly. carvedilol (COREG) 25 mg tablet 8/6/2019 at Unknown time Self No Yes   Sig: Take 1 Tab by mouth two (2) times daily (with meals). cholecalciferol, vitamin D3, (VITAMIN D3) 2,000 unit tab 8/6/2019 at Unknown time Self Yes Yes   Sig: Take 2,000 Units by mouth daily. ibuprofen (MOTRIN) 200 mg tablet 8/5/2019 at Unknown time Self Yes Yes   Sig: Take 600 mg by mouth nightly.    levothyroxine (SYNTHROID) 50 mcg tablet 8/6/2019 at Unknown time Self No Yes   Sig: Take 1 Tab by mouth Daily (before breakfast). lisinopril (PRINIVIL, ZESTRIL) 40 mg tablet 8/6/2019 at Unknown time Self No Yes   Sig: Take 1 Tab by mouth two (2) times a day. Start if blood pressure remain >=140 upper or 90/100 lower   pantoprazole (PROTONIX) 40 mg tablet 8/6/2019 at Unknown time Self No Yes   Sig: Take 1 Tab by mouth daily.       Facility-Administered Medications: None          Thank you,  Dara Soto CPhT  Medication History Pharmacy Technician

## 2019-08-07 NOTE — PROGRESS NOTES
Lovenox 40 mg sc daily for dvt prophylaxis,  pt wt is 113 kg,  crcl=96,  bmi=34. Lovenox dosing adjusted to 40 mg sc q12h.  MALVIN GómezD

## 2019-08-08 ENCOUNTER — HOSPITAL ENCOUNTER (OUTPATIENT)
Dept: NON INVASIVE DIAGNOSTICS | Age: 64
Discharge: HOME | DRG: 286 | End: 2019-08-08
Attending: INTERNAL MEDICINE
Payer: MEDICARE

## 2019-08-08 ENCOUNTER — APPOINTMENT (OUTPATIENT)
Dept: GENERAL RADIOLOGY | Age: 64
DRG: 286 | End: 2019-08-08
Attending: INTERNAL MEDICINE
Payer: MEDICARE

## 2019-08-08 ENCOUNTER — DOCUMENTATION ONLY (OUTPATIENT)
Dept: CASE MANAGEMENT | Age: 64
End: 2019-08-08

## 2019-08-08 ENCOUNTER — APPOINTMENT (OUTPATIENT)
Dept: CT IMAGING | Age: 64
DRG: 286 | End: 2019-08-08
Attending: INTERNAL MEDICINE
Payer: MEDICARE

## 2019-08-08 ENCOUNTER — APPOINTMENT (OUTPATIENT)
Dept: NUCLEAR MEDICINE | Age: 64
DRG: 286 | End: 2019-08-08
Attending: INTERNAL MEDICINE
Payer: MEDICARE

## 2019-08-08 LAB
ANION GAP SERPL CALC-SCNC: 7 MMOL/L (ref 5–15)
BUN SERPL-MCNC: 14 MG/DL (ref 6–20)
BUN/CREAT SERPL: 13 (ref 12–20)
CALCIUM SERPL-MCNC: 8.4 MG/DL (ref 8.5–10.1)
CHLORIDE SERPL-SCNC: 108 MMOL/L (ref 97–108)
CO2 SERPL-SCNC: 26 MMOL/L (ref 21–32)
CREAT SERPL-MCNC: 1.08 MG/DL (ref 0.7–1.3)
ERYTHROCYTE [DISTWIDTH] IN BLOOD BY AUTOMATED COUNT: 12.6 % (ref 11.5–14.5)
GLUCOSE SERPL-MCNC: 101 MG/DL (ref 65–100)
HCT VFR BLD AUTO: 38.1 % (ref 36.6–50.3)
HGB BLD-MCNC: 12.6 G/DL (ref 12.1–17)
MCH RBC QN AUTO: 31.7 PG (ref 26–34)
MCHC RBC AUTO-ENTMCNC: 33.1 G/DL (ref 30–36.5)
MCV RBC AUTO: 95.7 FL (ref 80–99)
NRBC # BLD: 0 K/UL (ref 0–0.01)
NRBC BLD-RTO: 0 PER 100 WBC
PLATELET # BLD AUTO: 222 K/UL (ref 150–400)
PMV BLD AUTO: 11.5 FL (ref 8.9–12.9)
POTASSIUM SERPL-SCNC: 3.5 MMOL/L (ref 3.5–5.1)
RBC # BLD AUTO: 3.98 M/UL (ref 4.1–5.7)
SODIUM SERPL-SCNC: 141 MMOL/L (ref 136–145)
WBC # BLD AUTO: 6.1 K/UL (ref 4.1–11.1)

## 2019-08-08 PROCEDURE — 93017 CV STRESS TEST TRACING ONLY: CPT

## 2019-08-08 PROCEDURE — 78452 HT MUSCLE IMAGE SPECT MULT: CPT

## 2019-08-08 PROCEDURE — 74011250636 HC RX REV CODE- 250/636: Performed by: INTERNAL MEDICINE

## 2019-08-08 PROCEDURE — 74011250637 HC RX REV CODE- 250/637: Performed by: NEUROMUSCULOSKELETAL MEDICINE & OMM

## 2019-08-08 PROCEDURE — 70450 CT HEAD/BRAIN W/O DYE: CPT

## 2019-08-08 PROCEDURE — 74011250637 HC RX REV CODE- 250/637: Performed by: INTERNAL MEDICINE

## 2019-08-08 PROCEDURE — 85027 COMPLETE CBC AUTOMATED: CPT

## 2019-08-08 PROCEDURE — 36415 COLL VENOUS BLD VENIPUNCTURE: CPT

## 2019-08-08 PROCEDURE — 65660000000 HC RM CCU STEPDOWN

## 2019-08-08 PROCEDURE — 72052 X-RAY EXAM NECK SPINE 6/>VWS: CPT

## 2019-08-08 PROCEDURE — 80048 BASIC METABOLIC PNL TOTAL CA: CPT

## 2019-08-08 PROCEDURE — 94760 N-INVAS EAR/PLS OXIMETRY 1: CPT

## 2019-08-08 RX ORDER — EZETIMIBE 10 MG/1
10 TABLET ORAL DAILY
Status: DISCONTINUED | OUTPATIENT
Start: 2019-08-08 | End: 2019-08-10 | Stop reason: HOSPADM

## 2019-08-08 RX ORDER — AMLODIPINE BESYLATE 5 MG/1
5 TABLET ORAL
Status: COMPLETED | OUTPATIENT
Start: 2019-08-08 | End: 2019-08-08

## 2019-08-08 RX ORDER — AMLODIPINE BESYLATE 5 MG/1
10 TABLET ORAL DAILY
Status: DISCONTINUED | OUTPATIENT
Start: 2019-08-09 | End: 2019-08-10 | Stop reason: HOSPADM

## 2019-08-08 RX ORDER — HYDRALAZINE HYDROCHLORIDE 20 MG/ML
20 INJECTION INTRAMUSCULAR; INTRAVENOUS
Status: DISCONTINUED | OUTPATIENT
Start: 2019-08-08 | End: 2019-08-10 | Stop reason: HOSPADM

## 2019-08-08 RX ORDER — ALISKIREN 300 MG/1
300 TABLET, FILM COATED ORAL
Status: DISCONTINUED | OUTPATIENT
Start: 2019-08-08 | End: 2019-08-08

## 2019-08-08 RX ORDER — ALISKIREN 300 MG/1
300 TABLET, FILM COATED ORAL
Status: DISCONTINUED | OUTPATIENT
Start: 2019-08-08 | End: 2019-08-10 | Stop reason: HOSPADM

## 2019-08-08 RX ORDER — CYCLOBENZAPRINE HCL 10 MG
5 TABLET ORAL 2 TIMES DAILY
Status: DISCONTINUED | OUTPATIENT
Start: 2019-08-08 | End: 2019-08-10 | Stop reason: HOSPADM

## 2019-08-08 RX ORDER — IBUPROFEN 200 MG
1 TABLET ORAL DAILY
Status: DISCONTINUED | OUTPATIENT
Start: 2019-08-09 | End: 2019-08-08

## 2019-08-08 RX ORDER — IBUPROFEN 200 MG
1 TABLET ORAL DAILY
Status: DISCONTINUED | OUTPATIENT
Start: 2019-08-08 | End: 2019-08-10 | Stop reason: HOSPADM

## 2019-08-08 RX ORDER — ASPIRIN 325 MG
325 TABLET ORAL
Status: DISCONTINUED | OUTPATIENT
Start: 2019-08-08 | End: 2019-08-10 | Stop reason: HOSPADM

## 2019-08-08 RX ADMIN — LEVOTHYROXINE SODIUM 50 MCG: 50 TABLET ORAL at 06:09

## 2019-08-08 RX ADMIN — Medication 10 ML: at 06:09

## 2019-08-08 RX ADMIN — AMLODIPINE BESYLATE 5 MG: 5 TABLET ORAL at 08:11

## 2019-08-08 RX ADMIN — HYDROCODONE BITARTRATE AND ACETAMINOPHEN 1 TABLET: 5; 325 TABLET ORAL at 06:09

## 2019-08-08 RX ADMIN — LISINOPRIL 40 MG: 20 TABLET ORAL at 18:09

## 2019-08-08 RX ADMIN — CARVEDILOL 25 MG: 12.5 TABLET, FILM COATED ORAL at 08:11

## 2019-08-08 RX ADMIN — AMLODIPINE BESYLATE 5 MG: 5 TABLET ORAL at 13:14

## 2019-08-08 RX ADMIN — CYCLOBENZAPRINE HYDROCHLORIDE 5 MG: 10 TABLET, FILM COATED ORAL at 16:32

## 2019-08-08 RX ADMIN — ALISKIREN 300 MG: 150 TABLET, FILM COATED ORAL at 00:17

## 2019-08-08 RX ADMIN — EZETIMIBE 10 MG: 10 TABLET ORAL at 13:14

## 2019-08-08 RX ADMIN — CYCLOBENZAPRINE HYDROCHLORIDE 5 MG: 10 TABLET, FILM COATED ORAL at 21:24

## 2019-08-08 RX ADMIN — ENOXAPARIN SODIUM 40 MG: 40 INJECTION SUBCUTANEOUS at 13:15

## 2019-08-08 RX ADMIN — ASPIRIN 325 MG: 325 TABLET ORAL at 21:24

## 2019-08-08 RX ADMIN — Medication 10 ML: at 21:24

## 2019-08-08 RX ADMIN — CARVEDILOL 25 MG: 12.5 TABLET, FILM COATED ORAL at 18:09

## 2019-08-08 RX ADMIN — NITROGLYCERIN 1 INCH: 20 OINTMENT TOPICAL at 22:49

## 2019-08-08 RX ADMIN — PANTOPRAZOLE SODIUM 40 MG: 40 TABLET, DELAYED RELEASE ORAL at 08:11

## 2019-08-08 RX ADMIN — VITAMIN D, TAB 1000IU (100/BT) 2000 UNITS: 25 TAB at 08:11

## 2019-08-08 RX ADMIN — ALISKIREN 300 MG: 300 TABLET, FILM COATED ORAL at 21:26

## 2019-08-08 RX ADMIN — LISINOPRIL 40 MG: 20 TABLET ORAL at 08:11

## 2019-08-08 RX ADMIN — Medication 10 ML: at 13:15

## 2019-08-08 RX ADMIN — AZITHROMYCIN 500 MG: 250 TABLET, FILM COATED ORAL at 08:11

## 2019-08-08 NOTE — PROGRESS NOTES
Nuc Med - Stress test Friday morning - please call stress lab [phone] for time   Please keep pt.  NPO after midnight

## 2019-08-08 NOTE — PROGRESS NOTES
0700  Bedside shift change report given to JUAN JOSE Andrews (oncoming nurse) by Jose Champion (offgoing nurse). Report included the following information SBAR, Kardex, Intake/Output, MAR, Accordion, Recent Results, Med Rec Status and Cardiac Rhythm NSR/ Sinus Joao Pryor. 0800  Pt stated he takes Aspirin at nighttime and would like to have it rescheduled to take as he does at home. He would also like his neck pain addressed. He said he is not feeling pain at this time but when he does feel it, it is a pressure feeling he can feel with his heart beat. When asked where he feels the pain he points to his occipital down the back of his neck to his shoulder.   - Pt also states after receiving lovenox yesterday experienced worsening of neck pain into head.

## 2019-08-08 NOTE — PROGRESS NOTES
SUZY:   1. OP f/u appts with PCP and Cardiologist  2. In Basket message to Ambulatory NN - sent  3. Will need insurance information updated in chart - wife to bring card to hospital      Reason for Admission: Chest pain, SOB secondary to CHF  *Pt is on the HF Bundle, Sound Bundle, and followed by the HUG team.                  RRAT Score:  16                Do you (patient/family) have any concerns for transition/discharge? No concerns regarding discharge indicated at this time. However, pt informed CM that his secondary insurance is no long [facility]. Pt's wife stated she will bring his [place] insurance card to the hospital tomorrow. Plan for utilizing home health: None. Pt declined H2H when offered. No hx of HH/SNF/Rehab. Hx of OP PT at [facility]. Current Advanced Directive/Advance Care Plan:  AMD not on file. Full code. Transition of Care Plan: Home with PCP/Cardio follow-up appts    CM met with patient and his wife at the bedside to introduce role, verify demographics, and discuss discharge planning. Patient was awake and alert/oriented x4. Pt is a 76 yo  male admitted to [facility] on 8/7/19 for c/o chest pain and SOB secondary to CHF. PMHx includes CAD, HTN, hypothyroidism, hyperlipidemia, and hx of prostate cancer. Pt is being followed by Cardiology during this admission and is scheduled to undergo stress testing today and tomorrow. Pt is on the HF Bundle, Sound Bundle, and HUG. Pt lives with his wife in a two story home. Pt declined concern regarding ambulation/mobility and reported no DME use. Pt has previously received OP PT following a rotator cuff injury. No hx of HH/SNF/Rehab. Pt is independent in ADL/IADLs to include driving. Pt's wife will transport upon d/c. Pt declined [facility] visit when offered and stated that his wife and daughter can assist if needed. CM will continue to follow and facilitate and appropriate SUZY plan.      PCP: Dr. Hammad García III  Cardiologist: Dr. Alejandrina Jules  Ambulatory NN: Tami Lopez  HF Bundle NN: [address]: Dharmesh Hammond on [address] [place] [address] in [address] [place] [address] Management Interventions  PCP Verified by CM: Yes(Dr. Cam Ortega)  Last Visit to PCP: 05/03/19  Mode of Transport at Discharge:  Other (see comment)(wife)  Transition of Care Consult (CM Consult): Discharge Planning(initial eval - anticipate home with PCP/Cardio appts)  MyChart Signup: No  Discharge Durable Medical Equipment: No(BP monitor)  Physical Therapy Consult: No  Occupational Therapy Consult: No  Speech Therapy Consult: No  Current Support Network: Lives with Spouse, Own Home(lives with wife in two story home)  Confirm Follow Up Transport: Self  Plan discussed with Pt/Family/Caregiver: Yes(discussed with pt and wife at the bedside)  Discharge Location  Discharge Placement: Home with family assistance    Brenton Ruby, MSW  Care Manager  [phone]

## 2019-08-08 NOTE — PROGRESS NOTES
Cardiology Progress Note  8/8/2019     Admit Date: 8/6/2019  Admit Diagnosis: CHF (congestive heart failure) ([facility] [I50.9]  CC: none currently  Cardiac Assessment/Plan:   1) CAD: Diagonal BMS in 2010; Intermittent atypical CP since then; Neg MPI 2011 & 2014 with stress test rec in 2017 (atypical CP after shoulder surgery) but pt didn't set it up.  2) AI: moderate in 2010.   3) dyslipidemia  4) palpitations: neg holter/EVR 2011/2014.  5) Remote history of prostate cancer status post prostatectomy. 6)  Hypothyroidism. Admitted with CP/dyspnea; CXR: \"Blunting of the costophrenic angles bilaterally\"; proBNP 700s. Echo 8/7: EF 55-60%; No WMA; Mild LVH; Mod AI; Mild MR & TR; PAp 16. CAD: atypical/non-cardiac CP; no acute ecg changes; no MI.  DENG certainly could be related to CAD: MPI tomorrow (resting today if able). HTN: Too high; adjust Rx. Rhythm: sinus; Brief ASx PAT: watch/cont bblocker. Lipids: LDL way too high 5/2019; add zetia. Volume status: looks euvolemic  Renal function: stable   For other plans, see orders. ______________________________________________________________________  @ last OV 1/17/17:       Mr Wyatt Perez has a h/o HTN, dyslipidemia, CAD (D BMS 11/2010), and previous intermittent atypical CP.    6/2014: He has 1 month of intermittent atypical CP (1-2 x/week; 1 hr to 5 days; tight/sharp; to back/left arm/left flank; worse with deep breath/moving arm). No exertional CP. + DENG. He has occ palpitations (2 x/week; fast/skipping; 10-60 sec; last few weeks): Neg EVR and MPI. Dx with clavicle separation - Sx resolved with injection. Palps resolved with pain resolution. 12/2014: No Sx. Active. 1/2017: No recent CP/DENG; >4 METs exertional capacity w/o Sx. He is having shoulder surgery later this month. IMPRESSION AND PLAN  01. Encounter for preprocedural cardiovascular exam:  The proposed procedure in general carries a low risk of cardiac complications.   Overall assessment suggests that the patient's risk of periprocedural cardiac complications should be low. I would proceed with the proposed procedure without further cardiac testing. 02. Pain in right shoulder   03. Atherosclerotic heart disease of native coronary artery without angina pectoris:  No anginal symptoms. Remote diagonal BMS; We discussed the signs and symptoms of unstable angina, myocardial infarction and malignant arrhythmia. The patient knows to seek immediate medical attention should they occur. ECG done today   04. Essential (primary) hypertension:  Adequately controlled. 05. Mixed hyperlipidemia:  Patient is tolerating lipid lowering therapy well. Lipid labs drawn by PCP.   06. Nicotine dependence, unspecified, uncomplicated:  He continues to use smokeless tobacco.  The patient was instructed on tobacco cessation. 07. Palpitations:  Previous negative evaluations. Resolved. 08. Body mass index (BMI) 34.0-34.9, adult:  The patient was instructed on AHA diet and regular exercise. ORDERS:  1 ECG done today   2 Return office visit with Gerardo Harmon. Trenton BRYAN in 1 Year. 3 The patient was instructed on AHA diet and regular exercise.    Tato   6 Hypertension Educational Materials   6 Tobacco cessation counseling       1/2017 MEDICATION LIST    Medication Sig Description   amlodipine 5 mg Tab Take 1 tablet by mouth once a day   aspirin 325 mg Tab, Delayed Release Take 1 tablet by mouth once a day   Coreg 25 mg Tab Take 1 tablet by mouth twice a day   Crestor 5 mg tablet take 1 tablet by oral route  every day   lisinopril 40 mg Tab Take 1 tablet by mouth twice a day   Motrin  mg tablet take 3 tablet by oral route  every  day  as needed with food   multivitamin Tab Take 1 tablet by mouth once a day   Protonix 40 mg Tab Take 1 tablet by mouth once a day   Synthroid 50 mcg Tab Take 1 tablet by mouth once a day   Tekturna 300 mg Tab Take 1 tablet by mouth once a day   Vitamin D3 2,000 unit capsule 1 po qd       _________________________________________________________________________  CARDIAC HISTORY  CAD:  1 Chest Pain Syndrome, atypical. Pre-op eval. [Normal EF, 50% PLB, 75% Diagonal:  BMS to D.] - 56/9000   2 Chest Pain Syndrome, exertional/Dyspnea: ever since stent. [Non-Ischemic Stress, Marked HTN: Sx improved with BP control.] - 1/2011   3 Chest Pain Syndrome, atypical. [Non-Ischemic Stress] - 7/2014     ARRHYTHMIA:  1 Palpitations [Neg holter.] - 1/2011   2 Palpitations [Neg EVR and MPI.] - 6/2014     RISK FACTORS:  1 Hypertension   2 Dyslipidemia   3 Peripheral Vascular Disease       CARDIOVASCULAR PROCEDURES  CATH LAB:  Cath (Normal EF, 50% PLB, 75% Diagonal:  BMS to D.) - 31/9591   ECHO/MUGA:  Echo (EF 0.65 (65%), Moderate AR, Mild MR, No change vs 6/2010.) - 10/27/2010   ELECTROPHYSIOLOGY:  Holter (Sinus Rhythm, No Malignant Arrhythmias, No Sx.) - 7/13/2011   EKG (Sinus Chadwick, NS IVCD. ) - 12/11/2014   EVR (Sinus Rhythm, No Malignant Arrhythmias, No events. ) - 7/2014   EKG (Sinus Rhythm, borderline poor r wave progression, NS IVCD (111). ) - 1/17/2017   STRESS TESTS:  William MPI (EF 0.50 (50%), No Ischemia) - 1/19/2011   MPI (EF 0.58 (58%), No Ischemia, 9:30.) - 7/9/2014       ALLERGIES/INTOLERANCES:    Ingredient Reaction Medication Name   FENOFIBRATE,MICRONIZED myalgia Tricor   FISH OIL couldn't tolerate    SIMVASTATIN myalgia    NIACIN decreased vision Niaspan Extended-Release   FENOFIBRATE NANOCRYSTALLIZED myalgia Tricor   ATORVASTATIN CALCIUM decreased vision Lipitor   CLONIDINE HCL anxiety CLONIDINE HCL       PROBLEM LIST:  Problem Description Chronic   Chest pain Y   CAD Y   Benign HTN Y   Mixed hyperlipidaemia Y   CVA Y         PAST MEDICAL/SURGICAL HISTORY  (Detailed)    Disease/disorder Onset Date Management Date Comments    1991 Cholecystectomy       Tonsillectomy     Anxiety       Depression       Hyperlipidemia       Hypertension Hypothyroidism. knee surgery       prostectomy 2010          Family History  (Detailed)  Relationship Family Member Name  Age at Death Condition Onset Age Cause of Death   Brother  N  Hypertension  N   Brother  [de-identified] and well     Father  Y  Hypertension  N   Father  Y  Stroke 55 Y     SOCIAL HISTORY  (Detailed)  Tobacco use reviewed. Preferred language is [place]. MARITAL STATUS/FAMILY/SOCIAL SUPPORT  Currently . Smoking status: Current every day smoker. SMOKING STATUS  Use Status Type Smoking Status Usage Per Day Years Used Total Pack Years   yes Chewing Current every day smoker        TOBACCO CESSATION INFORMATION  Date Counseled By Order Status Description Code Tobacco Cessation Information   2014      Smoking cessation education   2014 Rebecca Bird Tobacco cessation counseling completed   Tobacco Cessation Counseling   2017 Mera Hitchcock Tobacco cessation counseling completed   Tobacco Cessation Counseling       Hospital problem list   Active Hospital Problems    Diagnosis Date Noted    [facility]        Subjective: Pablo Srivastava reports   Chest pain X none  consistent with:  Non-cardiac CP         Atypical CP     None now  On going  Anginal CP     Dyspnea X none  at rest  with exertion         improved  unchanged  worse              PND X none  overnight       Orthopnea X none  improved  unchanged  worse   Presyncope X none  improved  unchanged  worse     Ambulated in hallway without symptoms   Yes   Ambulated in room without symptoms  Yes   Objective:    Physical Exam:  Overall VSSAF;    Visit Vitals  /88 (BP 1 Location: Right arm, BP Patient Position: Sitting)   Pulse 62   Temp 98.6 °F (37 °C)   Resp 20   Ht 6' (1.829 m)   Wt 112 kg (246 lb 14.4 oz)   SpO2 94%   BMI 33.49 kg/m²     Temp (24hrs), Av.3 °F (36.8 °C), Min:98.1 °F (36.7 °C), Max:98.6 °F (37 °C)    Patient Vitals for the past 8 hrs:   Pulse 08/08/19 0748 62   08/08/19 0346 (!) 58     Patient Vitals for the past 8 hrs:   Resp   08/08/19 0748 20   08/08/19 0346 20     Patient Vitals for the past 8 hrs:   BP   08/08/19 0748 193/88   08/08/19 0627 176/72   08/08/19 0346 171/77       Intake/Output Summary (Last 24 hours) at 8/8/2019 0908  Last data filed at 8/8/2019 0805  Gross per 24 hour   Intake 2460 ml   Output 2550 ml   Net -90 ml     General Appearance: Well developed, well nourished, no acute distress. Ears/Nose/Mouth/Throat:   Normal MM; anicteric. JVP: WNL   Resp:   clear to auscultation bilaterally. Nl resp effort. Cardiovascular:  RRR, S1, S2 normal, no new murmur. No gallop or rub. Abdomen:   Soft, non-tender, bowel sounds are present. Extremities: No edema bilaterally. Skin:  Neuro: Warm and dry. A/O x3, grossly nonfocal   cath site intact w/o hematoma or new bruit; distal pulse unchanged  Yes   Data Review:     Telemetry independently reviewed x sinus  chronic afib x Brief ASx PAT  NSVT     ECG independently reviewed  NSR  afib  no significant changes  NSST-Tw chgs   x no new ECG provided for review   Lab results reviewed as noted below. Current medications reviewed as noted below. No results for input(s): PH, PCO2, PO2 in the last 72 hours.   Recent Labs     08/07/19  1214 08/07/19  0636 08/06/19  2351   TROIQ <0.05 <0.05 <0.05     Recent Labs     08/08/19  0301 08/06/19  2351    141   K 3.5 3.5    108   CO2 26 27   BUN 14 12   CREA 1.08 0.98   GFRAA >60 >60   * 96   CA 8.4* 8.5   ALB  --  3.8   WBC 6.1 7.5   HGB 12.6 12.8   HCT 38.1 38.6    197     Lab Results   Component Value Date/Time    Cholesterol, total 253 (H) 05/13/2019 11:51 AM    HDL Cholesterol 41 05/13/2019 11:51 AM    LDL, calculated 164 (H) 05/13/2019 11:51 AM    Triglyceride 242 (H) 05/13/2019 11:51 AM    CHOL/HDL Ratio 5.5 (H) 11/02/2010 04:40 AM     Recent Labs     08/06/19  2351   SGOT 13*   AP 74   TP 7.3   ALB 3.8   GLOB 3.5 No results for input(s): INR, PTP, APTT in the last 72 hours.     No lab exists for component: INREXT   No components found for: GLPOC    Current Facility-Administered Medications   Medication Dose Route Frequency    aliskiren (TEKTURNA) tablet 300 mg (Patient Supplied)  300 mg Oral QHS    aspirin tablet 325 mg  325 mg Oral DAILY    sodium chloride (NS) flush 5-40 mL  5-40 mL IntraVENous Q8H    sodium chloride (NS) flush 5-40 mL  5-40 mL IntraVENous PRN    acetaminophen (TYLENOL) tablet 650 mg  650 mg Oral Q6H PRN    ondansetron (ZOFRAN) injection 4 mg  4 mg IntraVENous Q6H PRN    nitroglycerin (NITROSTAT) tablet 0.4 mg  0.4 mg SubLINGual PRN    carvedilol (COREG) tablet 25 mg  25 mg Oral BID WITH MEALS    rosuvastatin (CRESTOR) tablet 5 mg  5 mg Oral QHS    lisinopril (PRINIVIL, ZESTRIL) tablet 40 mg  40 mg Oral BID    albuterol (PROVENTIL HFA, VENTOLIN HFA, PROAIR HFA) inhaler 2 Puff  2 Puff Inhalation Q4H PRN    amLODIPine (NORVASC) tablet 5 mg  5 mg Oral DAILY    pantoprazole (PROTONIX) tablet 40 mg  40 mg Oral DAILY    levothyroxine (SYNTHROID) tablet 50 mcg  50 mcg Oral ACB    cholecalciferol (VITAMIN D3) tablet 2,000 Units  2,000 Units Oral DAILY    azithromycin (ZITHROMAX) tablet 500 mg  500 mg Oral DAILY    enoxaparin (LOVENOX) injection 40 mg  40 mg SubCUTAneous Q24H    HYDROcodone-acetaminophen (NORCO) 5-325 mg per tablet 1 Tab  1 Tab Oral Q6H PRN        Darlene Zhao MD

## 2019-08-08 NOTE — PROGRESS NOTES
Transitional Care Team: Initial HUG Note    Date of Assessment: 08/08/19  Time of Assessment:  1:34 PM    Yoli Lawson is a 75 y.o. male inpatient at  [facility]. Assessment & Plan   Pt off nielsen having stress test performed. Spouse in room. Per MD notes still evaluating cardiac disease, NT pro BNP only mildly elevated at 700 range. Echocardiogram done yesterday with EF in 55% range with left ventricle normal wall motion. Pt with elevated triglycerides and Cholesterol. MD adding Zetia to previously prescribed Crestor. Spouse requesting more information about food choices to improve his health. Anticipates return home- with possible benefit for [facility] services. Primary Diagnosis: heart failure? Advance Directive:  Has no AMD on file. Current Code Status:  full    Referral to Hospice/ Palliative Care Appropriate: no.    Awareness of Medical Conditions: (Trajectory of illness and pts expectations). await results of current scans and tests    Discharge Needs: (to include safety issues) possible need for [facility]     Barriers Identified: none    Patient is willing to go to SNF/Inpatient Rehab if recommended: does not seem will be needed    Medication Review:  Await AVS.    Can patient afford medications:  Current meds. Patient is Compliant with Medication regimen:yes    Who manages medications at home: self    Best Patient Contact Number: [phone]  HUG (Healthy Understanding of Goals) program introduced to patient/family. The Transitional Care Team bridges the gaps in care and education surrounding discharge from the acute care facility. The objective is to empower the patient and family in taking a proactive role in preventing readmission within the first thirty days after discharge. The team is also involved in the efforts to reduce readmission to the acute care setting after stabilization and discharge from the acute care environment either to skilled nursing facilities or community. G RN/NP will return with [facility] Calendar/ follow up appointments/ Ambulatory Nurse Navigator name and contact information when the patient is ready for discharge. Future Appointments   Date Time Provider Josefa Foster   8/9/2019  2:35 PM [facility] RM [facility]       Non-[facility] follow up appointment(s): none yet  [facility]: call information given to on discharge calendar       Patient education focused on readmission zones as described as: The Red Zone: High risk for readmission, days 1-21  The Yellow Zone: Moderate  risk for readmission, days 22-29   The Green Zone: Lower risk for readmission, days                30 and after    The [facility] Team will attempt to follow the patient from a distance while inpatient as well as be available for further transition/disposition needs. The JEAN Murdock team will continue to offer support during the 30- 90 day discharge from acute care setting. Will notify Ambulatory {Blank Single Select Template:20061[de-identified] \"SUZY   RN.     Past Medical History:   Diagnosis Date    CAD (coronary artery disease)     h/o stent- '10    Cancer [facility]) 2010    prostate    Hypertension     Stroke [facility]) May or June 2010    lacunar  infarctions (found on scan- no sxs)    Thyroid disease     hypo

## 2019-08-08 NOTE — PROGRESS NOTES
Problem: Unstable angina/NSTEMI: Day of Admission/Day 1  Goal: Off Pathway (Use only if patient is Off Pathway)  Outcome: Progressing Towards Goal

## 2019-08-08 NOTE — PROGRESS NOTES
Bedside shift change report GIVEN TO Kelton Leone RN. Report included the following information SBAR. SIGNIFICANT CHANGES DURING SHIFT:          CONCERNS TO ADDRESS WITH MD:    Pt would like aspirin switched to nightly med. Pt refused crestor stating he hasnt taken it in a year    Pain medication only relieving pain for about an hour. Pain is in neck and back of head. Pt describes it as throbbing. Have applied heat to help relieve pain. BP's consistently in 170's, no PRN's ordered. [facility] NURSING NOTE   Admission Date 8/6/2019   Admission Diagnosis CHF (congestive heart failure) ([facility] [I50.9]   Consults IP CONSULT TO HOSPITALIST  IP CONSULT TO CARDIOLOGY      Cardiac Monitoring [x] Yes [] No      Purposeful Hourly Rounding [x] Yes    Lillian Score Total Score: 1   Lillian score 3 or > [] Bed Alarm [] Avasys [] 1:1 sitter [] Patient refused (Signed refusal form in chart)   Kenroy Score Kenroy Score: 21   Kenroy score 14 or < [] PMT consult [] Wound Care consult    []  Specialty bed  [] Nutrition consult      Influenza Vaccine Received Flu Vaccine for Current Season (usually Sept-March): Not Flu Season           Oxygen needs? [x] Room air Oxygen @  []1L    []2L    []3L   []4L    []5L   []6L via  NC   Chronic home O2 use? [] Yes [] No  Perform O2 challenge test and document in progress note using smartphrase (.Homeoxygen)      Last bowel movement Last Bowel Movement Date: 08/06/19      Urinary Catheter             LDAs               Peripheral IV 08/06/19 Left Antecubital (Active)   Site Assessment Clean, dry, & intact 8/8/2019  3:46 AM   Phlebitis Assessment 0 8/8/2019  3:46 AM   Infiltration Assessment 0 8/8/2019  3:46 AM   Dressing Status Clean, dry, & intact 8/8/2019  3:46 AM   Dressing Type Transparent 8/8/2019  3:46 AM   Hub Color/Line Status Pink 8/8/2019  3:46 AM                         Readmission Risk Assessment Tool Score Medium Risk            13       Total Score        2 .  Living with Significant Other. Assisted Living. LTAC. SNF. or   Rehab    5 Pt.  Coverage (Medicare=5 , Medicaid, or Self-Pay=4)    6 Charlson Comorbidity Score (Age + Comorbid Conditions)        Criteria that do not apply:    Has Seen PCP in Last 6 Months (Yes=3, No=0)    Patient Length of Stay (>5 days = 3)    IP Visits Last 12 Months (1-3=4, 4=9, >4=11)       Expected Length of Stay 1d 19h   Actual Length of Stay 1

## 2019-08-08 NOTE — PROGRESS NOTES
[facility] INTERDISCIPLINARY ROUNDS    Cardiopulmonary Care Interdisciplinary Rounds were held today to discuss patient's plan of care and outcomes. The following members were present: NP/Physician, Pharmacy, Nursing and Case Management.   Expected Length of Stay:  1d 19h    PLAN OF CARE:   Continue current treatment plan

## 2019-08-08 NOTE — CARDIO/PULMONARY
Cardiac Rehab Note: chart review Pt is a 76 yo admitted with chest pain and shortness of breath. CHF BUNDLE   
 
EF 60%  on 8/7/19 per echo Pt established with Dr. Kassandra Broussard and seen by him on rounds this am.  Pt and family member reported that MD \"gave no indication that he had new CHF\". Pt is scheduled for stress test for further evaluation of chest pain. Educational materials on CHF NOT given to pt after clarification of MD findings with pt. CP Rehab will follow pt during this admission and provide pt education as appropriate.

## 2019-08-08 NOTE — PROGRESS NOTES
Hospitalist Progress Note    NAME: Zully Mcmahan   :  1955   MRN:  [id number]       Assessment / Plan:  Chest pain:atypical  -Serial troponin:not elevated  -ECHO:EF 56 - 60%,no wall motion abnormality.  -Cardiology consulted. Has indicated that the likelihood of ACS is low. Possible acute CHF  -Pt presenting with sob and elevated pro-BNP  -40 lasix was given   -ECHO c/w EF 56-60%  -Cardiology consulted. Will follow recommendations.  -Daily weight     Neck pain  -Xray of neck    Headaches  -CT head  -Tylenol prn    Hypothyroidism  -Continue Synthyroid 50 mcg daily    Hypertension  -Ccontinue home antihypertensive medication    Hyperlipidemia  -Continue Crestor 5 mg daily    Body mass index is 33.49 kg/m².     Code Status: Full   Surrogate Decision Maker:  Ambreen Rell Spouse     DVT Prophylaxis: Lovenox   GI Prophylaxis: not indicated  Baseline: independent        Subjective:     Chief Complaint / Reason for Physician Visit  Admitted for chest pain and possible CHF as he presented with chest which has been going on for sometimes,short of breath. Pt c/o headaches and neck pain. Discussed with RN events overnight. Review of Systems:  Symptom Y/N Comments  Symptom Y/N Comments   Fever/Chills n   Chest Pain n    Poor Appetite n   Edema n    Cough n   Abdominal Pain n    Sputum n   Joint Pain n    SOB/DENG y   Pruritis/Rash     Nausea/vomit n   Tolerating PT/OT     Diarrhea    Tolerating Diet     Constipation    Other y Headaches,neck pain     Could NOT obtain due to:      Objective:     VITALS:   Last 24hrs VS reviewed since prior progress note.  Most recent are:  Patient Vitals for the past 24 hrs:   Temp Pulse Resp BP SpO2   19 0748 98.6 °F (37 °C) 62 20 193/88 94 %   19 0627    176/72    19 0346 98.3 °F (36.8 °C) (!) 58 20 171/77 95 %   19 0001 98.1 °F (36.7 °C) (!) 59 18 156/70 94 %   19 1929 98.2 °F (36.8 °C) 92 18 123/59 100 %   19 1528 98.4 °F (36.9 °C) 60 16 135/66 94 %   08/07/19 1304    149/76    08/07/19 1010 98.1 °F (36.7 °C) (!) 57 16 152/71 95 %       Intake/Output Summary (Last 24 hours) at 8/8/2019 0832  Last data filed at 8/8/2019 0805  Gross per 24 hour   Intake 2460 ml   Output 2550 ml   Net -90 ml        PHYSICAL EXAM:  General: WD, WN. Alert, cooperative, no acute distress    EENT:  EOMI. Anicteric sclerae. MMM  Neck:               Neck pain with range of motions. Resp:  CTA bilaterally, no wheezing or rales. No accessory muscle use  CV:  Regular  rhythm,  No edema  GI:  Soft, Non distended, Non tender.  +Bowel sounds  Neurologic:  Alert and oriented X 3, normal speech,   Psych:   Good insight. Not anxious nor agitated  Skin:  No rashes. No jaundice    Reviewed most current lab test results and cultures  YES  Reviewed most current radiology test results   YES  Review and summation of old records today    NO  Reviewed patient's current orders and MAR    YES  PMH/ reviewed - no change compared to H&P  ________________________________________________________________________  Care Plan discussed with:    Comments   Patient y    Family  y wife   RN y    Care Manager     Consultant                        Multidiciplinary team rounds were held today with , nursing, pharmacist and clinical coordinator. Patient's plan of care was discussed; medications were reviewed and discharge planning was addressed. ________________________________________________________________________  Total NON critical care TIME:  30   Minutes    Total CRITICAL CARE TIME Spent:   Minutes non procedure based      Comments   >50% of visit spent in counseling and coordination of care     ________________________________________________________________________  Patrick Joshi MD     Procedures: see electronic medical records for all procedures/Xrays and details which were not copied into this note but were reviewed prior to creation of Plan.       LABS:  I reviewed today's most current labs and imaging studies.   Pertinent labs include:  Recent Labs     08/08/19  0301 08/06/19  2351   WBC 6.1 7.5   HGB 12.6 12.8   HCT 38.1 38.6    197     Recent Labs     08/08/19  0301 08/06/19  2351    141   K 3.5 3.5    108   CO2 26 27   * 96   BUN 14 12   CREA 1.08 0.98   CA 8.4* 8.5   ALB  --  3.8   TBILI  --  0.5   SGOT  --  13*   ALT  --  30       Signed: Mukesh Rock MD

## 2019-08-08 NOTE — PROGRESS NOTES
RAPID RESPONSE TEAM-Follow up  Rounded on patient due to rapid response chest pain on 8/7. D/W Dr. Yogesh Sandoval and Kelton Leone, RN  No reports of chest pain, but now pain in neck/head. Dr. Yogesh Sandvoal considering CT head/neck. No RRT interventions at this time. Please call back if needed.  Ext. [phone]  Vitals w/ MEWS Score (last day)     Date/Time MEWS Score Pulse Resp Temp BP Level of Consciousness SpO2    08/08/19 0748  1  62  20  98.6 °F (37 °C)  193/88  Alert  94 %    08/08/19 0627          176/72        08/08/19 0346  1  (!) 58  20  98.3 °F (36.8 °C)  171/77  Alert  95 %    08/08/19 0001  1  (!) 59  18  98.1 °F (36.7 °C)  156/70  Alert  94 %    08/07/19 1929  1  92  18  98.2 °F (36.8 °C)  123/59  Alert  100 %    08/07/19 1528  1  60  16  98.4 °F (36.9 °C)  135/66  Alert  94 %    08/07/19 1304          149/76        08/07/19 1010  1  (!) 57  16  98.1 °F (36.7 °C)  152/71  Alert  95 %    08/07/19 0749  1  (!) 58  16  98 °F (36.7 °C)  158/80  Alert  96 %    08/07/19 0645    (!) 59  14    141/72    92 %    08/07/19 0630    61  15    152/70    93 %    08/07/19 0615    61  10    149/76    95 %    08/07/19 0600    (!) 59  14    152/76    95 %    08/07/19 0545    61  16    153/64    94 %    08/07/19 0530    61  14    152/69    96 %    08/07/19 0515    61  19    156/78    92 %    08/07/19 0514    67      156/78        08/07/19 0500    61  21    137/65    90 %    08/07/19 0445    62  21    142/63    92 %    08/07/19 0430    61  17    139/64    94 %    08/07/19 04:26:50  1  60  18  98.3 °F (36.8 °C)  138/66  Alert  96 %    08/07/19 0300    63  12    147/66    95 %    08/07/19 00:52:44              96 %

## 2019-08-08 NOTE — PROGRESS NOTES
RAPID RESPONSE TEAM- Follow Up    Rounded on patient due to recent rapid response chest pain (8/7/19). Discussed with primary RN, Huang Serrano. No acute concerns, VSS, MEWS 1. Troponin 0.05. No RRT interventions indicated at this time. Please call with any questions or concerns.      Opal Oakley  Rapid Response RN  Stephen Gar

## 2019-08-09 ENCOUNTER — APPOINTMENT (OUTPATIENT)
Dept: NUCLEAR MEDICINE | Age: 64
DRG: 286 | End: 2019-08-09
Attending: INTERNAL MEDICINE
Payer: MEDICARE

## 2019-08-09 LAB
ANION GAP SERPL CALC-SCNC: 10 MMOL/L (ref 5–15)
BUN SERPL-MCNC: 13 MG/DL (ref 6–20)
BUN/CREAT SERPL: 14 (ref 12–20)
CALCIUM SERPL-MCNC: 8.3 MG/DL (ref 8.5–10.1)
CHLORIDE SERPL-SCNC: 110 MMOL/L (ref 97–108)
CO2 SERPL-SCNC: 25 MMOL/L (ref 21–32)
CREAT SERPL-MCNC: 0.91 MG/DL (ref 0.7–1.3)
END DIASTOLIC PRESSURE: 20
ERYTHROCYTE [DISTWIDTH] IN BLOOD BY AUTOMATED COUNT: 12.4 % (ref 11.5–14.5)
GLUCOSE SERPL-MCNC: 101 MG/DL (ref 65–100)
HCT VFR BLD AUTO: 36.1 % (ref 36.6–50.3)
HGB BLD-MCNC: 12 G/DL (ref 12.1–17)
MCH RBC QN AUTO: 31.8 PG (ref 26–34)
MCHC RBC AUTO-ENTMCNC: 33.2 G/DL (ref 30–36.5)
MCV RBC AUTO: 95.8 FL (ref 80–99)
NRBC # BLD: 0 K/UL (ref 0–0.01)
NRBC BLD-RTO: 0 PER 100 WBC
PLATELET # BLD AUTO: 197 K/UL (ref 150–400)
PMV BLD AUTO: 10.9 FL (ref 8.9–12.9)
POTASSIUM SERPL-SCNC: 3.4 MMOL/L (ref 3.5–5.1)
RBC # BLD AUTO: 3.77 M/UL (ref 4.1–5.7)
SODIUM SERPL-SCNC: 145 MMOL/L (ref 136–145)
STRESS BASELINE HR: 63 BPM
STRESS ESTIMATED WORKLOAD: 1 METS
STRESS EXERCISE DUR MIN: NORMAL
STRESS PEAK DIAS BP: 81 MMHG
STRESS PEAK SYS BP: 178 MMHG
STRESS PERCENT HR ACHIEVED: 51 %
STRESS POST PEAK HR: 78 BPM
STRESS RATE PRESSURE PRODUCT: NORMAL BPM*MMHG
STRESS TARGET HR: 154 BPM
WBC # BLD AUTO: 5.9 K/UL (ref 4.1–11.1)

## 2019-08-09 PROCEDURE — 74011250636 HC RX REV CODE- 250/636: Performed by: INTERNAL MEDICINE

## 2019-08-09 PROCEDURE — 77030004549 HC CATH ANGI DX PRF MRTM -A: Performed by: INTERNAL MEDICINE

## 2019-08-09 PROCEDURE — 94760 N-INVAS EAR/PLS OXIMETRY 1: CPT

## 2019-08-09 PROCEDURE — 99152 MOD SED SAME PHYS/QHP 5/>YRS: CPT | Performed by: INTERNAL MEDICINE

## 2019-08-09 PROCEDURE — 74011250636 HC RX REV CODE- 250/636

## 2019-08-09 PROCEDURE — B2111ZZ FLUOROSCOPY OF MULTIPLE CORONARY ARTERIES USING LOW OSMOLAR CONTRAST: ICD-10-PCS | Performed by: INTERNAL MEDICINE

## 2019-08-09 PROCEDURE — 4A023N7 MEASUREMENT OF CARDIAC SAMPLING AND PRESSURE, LEFT HEART, PERCUTANEOUS APPROACH: ICD-10-PCS | Performed by: INTERNAL MEDICINE

## 2019-08-09 PROCEDURE — 99153 MOD SED SAME PHYS/QHP EA: CPT | Performed by: INTERNAL MEDICINE

## 2019-08-09 PROCEDURE — C1894 INTRO/SHEATH, NON-LASER: HCPCS | Performed by: INTERNAL MEDICINE

## 2019-08-09 PROCEDURE — 93458 L HRT ARTERY/VENTRICLE ANGIO: CPT | Performed by: INTERNAL MEDICINE

## 2019-08-09 PROCEDURE — 74011250637 HC RX REV CODE- 250/637: Performed by: INTERNAL MEDICINE

## 2019-08-09 PROCEDURE — 74011636320 HC RX REV CODE- 636/320: Performed by: INTERNAL MEDICINE

## 2019-08-09 PROCEDURE — 77030029065 HC DRSG HEMO QCLOT ZMED -B: Performed by: INTERNAL MEDICINE

## 2019-08-09 PROCEDURE — 80048 BASIC METABOLIC PNL TOTAL CA: CPT

## 2019-08-09 PROCEDURE — 36415 COLL VENOUS BLD VENIPUNCTURE: CPT

## 2019-08-09 PROCEDURE — 85027 COMPLETE CBC AUTOMATED: CPT

## 2019-08-09 PROCEDURE — 77030028837 HC SYR ANGI PWR INJ COEU -A: Performed by: INTERNAL MEDICINE

## 2019-08-09 PROCEDURE — 65660000000 HC RM CCU STEPDOWN

## 2019-08-09 PROCEDURE — B2151ZZ FLUOROSCOPY OF LEFT HEART USING LOW OSMOLAR CONTRAST: ICD-10-PCS | Performed by: INTERNAL MEDICINE

## 2019-08-09 PROCEDURE — 36252 INS CATH REN ART 1ST BILAT: CPT | Performed by: INTERNAL MEDICINE

## 2019-08-09 PROCEDURE — C1769 GUIDE WIRE: HCPCS | Performed by: INTERNAL MEDICINE

## 2019-08-09 PROCEDURE — B4181ZZ FLUOROSCOPY OF BILATERAL RENAL ARTERIES USING LOW OSMOLAR CONTRAST: ICD-10-PCS | Performed by: INTERNAL MEDICINE

## 2019-08-09 RX ORDER — MIDAZOLAM HYDROCHLORIDE 1 MG/ML
INJECTION, SOLUTION INTRAMUSCULAR; INTRAVENOUS AS NEEDED
Status: DISCONTINUED | OUTPATIENT
Start: 2019-08-09 | End: 2019-08-09 | Stop reason: HOSPADM

## 2019-08-09 RX ORDER — DIPHENHYDRAMINE HYDROCHLORIDE 50 MG/ML
25 INJECTION, SOLUTION INTRAMUSCULAR; INTRAVENOUS
Status: DISCONTINUED | OUTPATIENT
Start: 2019-08-09 | End: 2019-08-10 | Stop reason: HOSPADM

## 2019-08-09 RX ORDER — NALOXONE HYDROCHLORIDE 0.4 MG/ML
0.4 INJECTION, SOLUTION INTRAMUSCULAR; INTRAVENOUS; SUBCUTANEOUS AS NEEDED
Status: DISCONTINUED | OUTPATIENT
Start: 2019-08-09 | End: 2019-08-10 | Stop reason: HOSPADM

## 2019-08-09 RX ORDER — SODIUM CHLORIDE 9 MG/ML
100 INJECTION, SOLUTION INTRAVENOUS CONTINUOUS
Status: DISPENSED | OUTPATIENT
Start: 2019-08-09 | End: 2019-08-10

## 2019-08-09 RX ORDER — POTASSIUM CHLORIDE 1.5 G/1.77G
40 POWDER, FOR SOLUTION ORAL
Status: ACTIVE | OUTPATIENT
Start: 2019-08-09 | End: 2019-08-09

## 2019-08-09 RX ORDER — HEPARIN SODIUM 200 [USP'U]/100ML
INJECTION, SOLUTION INTRAVENOUS
Status: COMPLETED | OUTPATIENT
Start: 2019-08-09 | End: 2019-08-09

## 2019-08-09 RX ORDER — SODIUM CHLORIDE 0.9 % (FLUSH) 0.9 %
5-40 SYRINGE (ML) INJECTION AS NEEDED
Status: DISCONTINUED | OUTPATIENT
Start: 2019-08-09 | End: 2019-08-10 | Stop reason: HOSPADM

## 2019-08-09 RX ORDER — FENTANYL CITRATE 50 UG/ML
INJECTION, SOLUTION INTRAMUSCULAR; INTRAVENOUS AS NEEDED
Status: DISCONTINUED | OUTPATIENT
Start: 2019-08-09 | End: 2019-08-09 | Stop reason: HOSPADM

## 2019-08-09 RX ORDER — LIDOCAINE HYDROCHLORIDE 10 MG/ML
INJECTION, SOLUTION EPIDURAL; INFILTRATION; INTRACAUDAL; PERINEURAL AS NEEDED
Status: DISCONTINUED | OUTPATIENT
Start: 2019-08-09 | End: 2019-08-09 | Stop reason: HOSPADM

## 2019-08-09 RX ORDER — SENNOSIDES 8.6 MG/1
1 TABLET ORAL 2 TIMES DAILY
Status: DISCONTINUED | OUTPATIENT
Start: 2019-08-09 | End: 2019-08-10 | Stop reason: HOSPADM

## 2019-08-09 RX ORDER — ATORVASTATIN CALCIUM 20 MG/1
20 TABLET, FILM COATED ORAL
Status: DISCONTINUED | OUTPATIENT
Start: 2019-08-09 | End: 2019-08-09

## 2019-08-09 RX ORDER — SODIUM CHLORIDE 0.9 % (FLUSH) 0.9 %
10 SYRINGE (ML) INJECTION AS NEEDED
Status: DISCONTINUED | OUTPATIENT
Start: 2019-08-09 | End: 2019-08-12 | Stop reason: HOSPADM

## 2019-08-09 RX ORDER — SODIUM CHLORIDE 0.9 % (FLUSH) 0.9 %
5-40 SYRINGE (ML) INJECTION EVERY 8 HOURS
Status: DISCONTINUED | OUTPATIENT
Start: 2019-08-09 | End: 2019-08-10 | Stop reason: HOSPADM

## 2019-08-09 RX ADMIN — ASPIRIN 325 MG: 325 TABLET ORAL at 21:01

## 2019-08-09 RX ADMIN — CARVEDILOL 25 MG: 12.5 TABLET, FILM COATED ORAL at 18:34

## 2019-08-09 RX ADMIN — Medication 10 ML: at 06:12

## 2019-08-09 RX ADMIN — LEVOTHYROXINE SODIUM 50 MCG: 50 TABLET ORAL at 06:12

## 2019-08-09 RX ADMIN — CYCLOBENZAPRINE HYDROCHLORIDE 5 MG: 10 TABLET, FILM COATED ORAL at 21:01

## 2019-08-09 RX ADMIN — Medication 10 ML: at 08:24

## 2019-08-09 RX ADMIN — Medication 10 ML: at 18:34

## 2019-08-09 RX ADMIN — SODIUM CHLORIDE 100 ML/HR: 900 INJECTION, SOLUTION INTRAVENOUS at 17:30

## 2019-08-09 RX ADMIN — ACETAMINOPHEN 650 MG: 325 TABLET ORAL at 00:21

## 2019-08-09 RX ADMIN — Medication 10 ML: at 21:02

## 2019-08-09 RX ADMIN — Medication 10 ML: at 14:00

## 2019-08-09 RX ADMIN — LISINOPRIL 40 MG: 20 TABLET ORAL at 18:34

## 2019-08-09 RX ADMIN — ALISKIREN 300 MG: 300 TABLET, FILM COATED ORAL at 21:01

## 2019-08-09 RX ADMIN — REGADENOSON 0.4 MG: 0.08 INJECTION, SOLUTION INTRAVENOUS at 08:24

## 2019-08-09 NOTE — PROGRESS NOTES
Problem: Hypertension  Goal: *Blood pressure within specified parameters  Outcome: Progressing Towards Goal  Goal: *Fluid volume balance  Outcome: Progressing Towards Goal  Goal: *Labs within defined limits  Outcome: Progressing Towards Goal     Problem: Patient Education: Go to Patient Education Activity  Goal: Patient/Family Education  Outcome: Progressing Towards Goal

## 2019-08-09 NOTE — PROGRESS NOTES
[address] INTERDISCIPLINARY ROUNDS    Cardiopulmonary Care Interdisciplinary Rounds were held today to discuss patient's plan of care and outcomes. The following members were present: NP/Physician, Pharmacy, Nursing and Case Management.   Expected Length of Stay:  1d 19h    PLAN OF CARE:   Continue current treatment plan

## 2019-08-09 NOTE — PROGRESS NOTES
Bedside shift change report GIVEN TO Karmen Sears RN. Report included the following information SBAR. SIGNIFICANT CHANGES DURING SHIFT:          CONCERNS TO ADDRESS WITH MD:              [address] NURSING NOTE   Admission Date 8/6/2019   Admission Diagnosis CHF (congestive heart failure) ([facility] [address] [I50.9]   Consults IP CONSULT TO HOSPITALIST  IP CONSULT TO CARDIOLOGY      Cardiac Monitoring [x] Yes [] No      Purposeful Hourly Rounding [x] Yes    Lillian Score Total Score: 1   Lillian score 3 or > [] Bed Alarm [] Avasys [] 1:1 sitter [] Patient refused (Signed refusal form in chart)   Kenroy Score Kenroy Score: 22   Kenroy score 14 or < [] PMT consult [] Wound Care consult    []  Specialty bed  [] Nutrition consult      Influenza Vaccine Received Flu Vaccine for Current Season (usually Sept-March): Not Flu Season           Oxygen needs? [x] Room air Oxygen @  []1L    []2L    []3L   []4L    []5L   []6L via  NC   Chronic home O2 use? [] Yes [] No  Perform O2 challenge test and document in progress note using smartphrase (.Homeoxygen)      Last bowel movement Last Bowel Movement Date: 08/07/19      Urinary Catheter             LDAs               Peripheral IV 08/06/19 Left Antecubital (Active)   Site Assessment Clean, dry, & intact 8/9/2019  4:18 AM   Phlebitis Assessment 0 8/9/2019  4:18 AM   Infiltration Assessment 0 8/9/2019  4:18 AM   Dressing Status Clean, dry, & intact 8/9/2019  4:18 AM   Dressing Type Transparent 8/9/2019  4:18 AM   Hub Color/Line Status Pink 8/9/2019  4:18 AM                         Readmission Risk Assessment Tool Score Medium Risk            16       Total Score        3 Has Seen PCP in Last 6 Months (Yes=3, No=0)    2 . Living with Significant Other. Assisted Living. LTAC. SNF. or   Rehab    5 Pt.  Coverage (Medicare=5 , Medicaid, or Self-Pay=4)    6 Charlson Comorbidity Score (Age + Comorbid Conditions)        Criteria that do not apply:    Patient Length of Stay (>5 days = 3)    IP Visits Last 12 Months (1-3=4, 4=9, >4=11)       Expected Length of Stay 1d 19h   Actual Length of Stay 2

## 2019-08-09 NOTE — PROGRESS NOTES
Problem: Unstable angina/NSTEMI: Day of Admission/Day 1  Goal: Activity/Safety  Outcome: Progressing Towards Goal  Goal: Medications  Outcome: Progressing Towards Goal  Goal: Respiratory  Outcome: Progressing Towards Goal  Goal: Psychosocial  Outcome: Progressing Towards Goal

## 2019-08-09 NOTE — PROGRESS NOTES
MPI d/w pt; I think his images show some reversibility and rec cath; (also with early + ETT). I have recommended diagnostic cath for definitive evaluation of the patient's coronary anatomy and PCI if appropriate; All risks, benefits, and alternatives were discussed and patient wishes to proceed. Will plan for this afternoon.

## 2019-08-09 NOTE — PROGRESS NOTES
TRANSFER - IN REPORT:    Verbal report received from Mustapha Ayoub Newswired (name) on Nitish Hard  being received from Cath Lab (unit) for routine post - op      Report consisted of patients Situation, Background, Assessment and   Recommendations(SBAR). Information from the following report(s) Procedure Summary, Recent Results and Cardiac Rhythm NSR was reviewed with the receiving nurse. Opportunity for questions and clarification was provided. Assessment completed upon patients arrival to unit and care assumed. TRANSFER - IN REPORT:    Verbal report received from Kimberly Quintero RN (name) on Nitish Hard  being received from [facility] (unit) for routine progression of care      Report consisted of patients Situation, Background, Assessment and   Recommendations(SBAR). Information from the following report(s) SBAR, Kardex, Procedure Summary, Recent Results and Cardiac Rhythm NSR was reviewed with the receiving nurse. Opportunity for questions and clarification was provided. Assessment completed upon patients arrival to unit and care assumed. Verbal report given to oncoming nurse Clyde Otero RN    Report consisted of patients Situation, Background, Assessment, and   Recommendations    Information was reviewed with the receiving nurse. Opportunity for questions and clarification was provided.       Chelly Gandara RN

## 2019-08-09 NOTE — PROGRESS NOTES
0700  Bedside shift change report given to JUAN JOSE Andrews (oncoming nurse) by Catarina Roberson (offgoing nurse). Report included the following information SBAR, Kardex, ED Summary, Intake/Output, MAR, Accordion, Recent Results, Med Rec Status and Cardiac Rhythm NSR.     1225  Spoke with cardiology, has decided to move forward with cath and instructed to keep pt NPO.     1610  Pt left floor for cath lab.    1748  TRANSFER - OUT REPORT:    Verbal report given to JUAN JOSE Wallis(name) on Dennis Higginbotham  being transferred to [facility](unit) for routine post - op       Report consisted of patients Situation, Background, Assessment and   Recommendations(SBAR). Information from the following report(s) SBAR, Kardex, ED Summary, Procedure Summary, Intake/Output, MAR, Accordion, Recent Results, Med Rec Status and Cardiac Rhythm NSR/SB was reviewed with the receiving nurse. Lines:   Peripheral IV 08/06/19 Left Antecubital (Active)   Site Assessment Clean, dry, & intact 8/9/2019  5:25 PM   Phlebitis Assessment 0 8/9/2019  5:25 PM   Infiltration Assessment 0 8/9/2019  5:25 PM   Dressing Status Clean, dry, & intact 8/9/2019  5:25 PM   Dressing Type Tape;Transparent 8/9/2019  5:25 PM   Hub Color/Line Status Pink; Infusing 8/9/2019  5:25 PM        Opportunity for questions and clarification was provided.       Patient transported with:   Patient's medications from home  Registered Nurse

## 2019-08-09 NOTE — PROGRESS NOTES
Cardiology Progress Note  8/9/2019     Admit Date: 8/6/2019  Ad[facility]  CC: none currently  Cardiac Assessment/Plan:   1) CAD: Diagonal BMS in 2010; Intermittent atypical CP since then; Neg MPI 2011 & 2014 with stress test rec in 2017 (atypical CP after shoulder surgery) but pt didn't set it up.  2) AI: moderate in 2010.   3) dyslipidemia  4) palpitations: neg holter/EVR 2011/2014.  5) Remote history of prostate cancer status post prostatectomy. 6)  Hypothyroidism. Admitted with CP/dyspnea; CXR: \"Blunting of the costophrenic angles bilaterally\"; proBNP 700s. Echo 8/7: EF 55-60%; No WMA; Mild LVH; Mod AI; Mild MR & TR; PAp 16. CAD: atypical/non-cardiac CP; no acute ecg changes; no MI.  DENG certainly could be related to CAD: MPI finishes today. HTN: Still occ too high; adjust Rx. Intolerant of NTG paste; just increased norvasc: may need hydralazine; will need eval for SARAH. Rhythm: sinus; Brief ASx PAT 8/7: watch/cont bblocker. Lipids: LDL way too high 5/2019; added zetia. Volume status: looks euvolemic  Renal function: stable   For other plans, see orders. ______________________________________________________________________  @ last OV 1/17/17:       Mr Anisa Victoria has a h/o HTN, dyslipidemia, CAD (D BMS 11/2010), and previous intermittent atypical CP.    6/2014: He has 1 month of intermittent atypical CP (1-2 x/week; 1 hr to 5 days; tight/sharp; to back/left arm/left flank; worse with deep breath/moving arm). No exertional CP. + DENG. He has occ palpitations (2 x/week; fast/skipping; 10-60 sec; last few weeks): Neg EVR and MPI. Dx with clavicle separation - Sx resolved with injection. Palps resolved with pain resolution. 12/2014: No Sx. Active. 1/2017: No recent CP/DENG; >4 METs exertional capacity w/o Sx. He is having shoulder surgery later this month. IMPRESSION AND PLAN  01.  Encounter for preprocedural cardiovascular exam:  The proposed procedure in general carries a low risk of cardiac complications. Overall assessment suggests that the patient's risk of periprocedural cardiac complications should be low. I would proceed with the proposed procedure without further cardiac testing. 02. Pain in right shoulder   03. Atherosclerotic heart disease of native coronary artery without angina pectoris:  No anginal symptoms. Remote diagonal BMS; We discussed the signs and symptoms of unstable angina, myocardial infarction and malignant arrhythmia. The patient knows to seek immediate medical attention should they occur. ECG done today   04. Essential (primary) hypertension:  Adequately controlled. 05. Mixed hyperlipidemia:  Patient is tolerating lipid lowering therapy well. Lipid labs drawn by PCP.   06. Nicotine dependence, unspecified, uncomplicated:  He continues to use smokeless tobacco.  The patient was instructed on tobacco cessation. 07. Palpitations:  Previous negative evaluations. Resolved. 08. Body mass index (BMI) 34.0-34.9, adult:  The patient was instructed on AHA diet and regular exercise. ORDERS:  1 ECG done today   2 Return office visit with Reji Mercedes. [place] BRYAN in 1 Year. 3 The patient was instructed on AHA diet and regular exercise.    Tato   6 Hypertension Educational Materials   6 Tobacco cessation counseling       1/2017 MEDICATION LIST    Medication Sig Description   amlodipine 5 mg Tab Take 1 tablet by mouth once a day   aspirin 325 mg Tab, Delayed Release Take 1 tablet by mouth once a day   Coreg 25 mg Tab Take 1 tablet by mouth twice a day   Crestor 5 mg tablet take 1 tablet by oral route  every day   lisinopril 40 mg Tab Take 1 tablet by mouth twice a day   Motrin  mg tablet take 3 tablet by oral route  every  day  as needed with food   multivitamin Tab Take 1 tablet by mouth once a day   Protonix 40 mg Tab Take 1 tablet by mouth once a day Synthroid 50 mcg Tab Take 1 tablet by mouth once a day   Tekturna 300 mg Tab Take 1 tablet by mouth once a day   Vitamin D3 2,000 unit capsule 1 po qd       _________________________________________________________________________  CARDIAC HISTORY  CAD:  1 Chest Pain Syndrome, atypical. Pre-op eval. [Normal EF, 50% PLB, 75% Diagonal:  BMS to D.] - 06/0551   2 Chest Pain Syndrome, exertional/Dyspnea: ever since stent. [Non-Ischemic Stress, Marked HTN: Sx improved with BP control.] - 1/2011   3 Chest Pain Syndrome, atypical. [Non-Ischemic Stress] - 7/2014     ARRHYTHMIA:  1 Palpitations [Neg holter.] - 1/2011   2 Palpitations [Neg EVR and MPI.] - 6/2014     RISK FACTORS:  1 Hypertension   2 Dyslipidemia   3 Peripheral Vascular Disease       CARDIOVASCULAR PROCEDURES  CATH LAB:  Cath (Normal EF, 50% PLB, 75% Diagonal:  BMS to D.) - 37/9846   ECHO/MUGA:  Echo (EF 0.65 (65%), Moderate AR, Mild MR, No change vs 6/2010.) - 10/27/2010   ELECTROPHYSIOLOGY:  Holter (Sinus Rhythm, No Malignant Arrhythmias, No Sx.) - 7/13/2011   EKG (Sinus Chadwick, NS IVCD. ) - 12/11/2014   EVR (Sinus Rhythm, No Malignant Arrhythmias, No events. ) - 7/2014   EKG (Sinus Rhythm, borderline poor r wave progression, NS IVCD (111). ) - 1/17/2017   STRESS TESTS:  William MPI (EF 0.50 (50%), No Ischemia) - 1/19/2011   MPI (EF 0.58 (58%), No Ischemia, 9:30.) - 7/9/2014       ALLERGIES/INTOLERANCES:    Ingredient Reaction Medication Name   FENOFIBRATE,MICRONIZED myalgia Tricor   FISH OIL couldn't tolerate    SIMVASTATIN myalgia    NIACIN decreased vision Niaspan Extended-Release   FENOFIBRATE NANOCRYSTALLIZED myalgia Tricor   ATORVASTATIN CALCIUM decreased vision Lipitor   CLONIDINE HCL anxiety CLONIDINE HCL       PROBLEM LIST:  Problem Description Chronic   Chest pain Y   CAD Y   Benign HTN Y   Mixed hyperlipidaemia Y   CVA Y         PAST MEDICAL/SURGICAL HISTORY  (Detailed)    Disease/disorder Onset Date Management Date Comments    12 Cholecystectomy       Tonsillectomy     Anxiety       Depression       Hyperlipidemia       Hypertension       Hypothyroidism. knee surgery       prostectomy 2010          Family History  (Detailed)  Relationship Family Member Name  Age at Death Condition Onset Age Cause of Death   Brother  N  Hypertension  N   Brother  [de-identified] and well     Father  Y  Hypertension  N   Father  Y  Stroke 55 Y     SOCIAL HISTORY  (Detailed)  Tobacco use reviewed. Preferred language is [place]. MARITAL STATUS/FAMILY/SOCIAL SUPPORT  Currently . Smoking status: Current every day smoker. SMOKING STATUS  Use Status Type Smoking Status Usage Per Day Years Used Total Pack Years   yes Chewing Current every day smoker        TOBACCO CESSATION INFORMATION  Date Counseled By Order Status Description Code Tobacco Cessation Information   2014      Smoking cessation education   2014 Chayo Liz Tobacco cessation counseling completed   Tobacco Cessation Counseling   2017 [facility] BLANCA Hitchcock Tobacco cessation counseling completed   Tobacco Cessation Counseling       Hospital problem list   Active Hospital Problems    Diagnosis Date Noted    [facility]        Subjective: Delphia Apley reports   Chest pain X none  consistent with:  Non-cardiac CP         Atypical CP     None now  On going  Anginal CP     Dyspnea X none  at rest  with exertion         improved  unchanged  worse              PND X none  overnight       Orthopnea X none  improved  unchanged  worse   Presyncope X none  improved  unchanged  worse     Ambulated in hallway without symptoms   Yes   Ambulated in room without symptoms  Yes   Objective:    Physical Exam:  Overall VSSAF;    Visit Vitals  /78 (BP 1 Location: Left arm, BP Patient Position: At rest)   Pulse 62   Temp 97.7 °F (36.5 °C)   Resp 18   Ht 6' (1.829 m)   Wt 111.9 kg (246 lb 11.1 oz)   SpO2 92%   BMI 33.46 kg/m²     Temp (24hrs), Av.9 °F (36.6 °C), Min:97.7 °F (36.5 °C), Max:98.1 °F (36.7 °C)    Patient Vitals for the past 8 hrs:   Pulse   19 62     Patient Vitals for the past 8 hrs:   Resp   19 18     Patient Vitals for the past 8 hrs:   BP   19 147/78       Intake/Output Summary (Last 24 hours) at 2019 0817  Last data filed at 2019 6124  Gross per 24 hour   Intake 240 ml   Output 3400 ml   Net -3160 ml     General Appearance: Well developed, well nourished, no acute distress. Ears/Nose/Mouth/Throat:   Normal MM; anicteric. JVP: WNL   Resp:   clear to auscultation bilaterally. Nl resp effort. Cardiovascular:  RRR, S1, S2 normal, no new murmur. No gallop or rub. Abdomen:   Soft, non-tender, bowel sounds are present. Extremities: No edema bilaterally. Skin:  Neuro: Warm and dry. A/O x3, grossly nonfocal   cath site intact w/o hematoma or new bruit; distal pulse unchanged  Yes   Data Review:     Telemetry independently reviewed x sinus  chronic afib x Brief ASx PAT  NSVT     ECG independently reviewed  NSR  afib  no significant changes  NSST-Tw chgs   x no new ECG provided for review   Lab results reviewed as noted below. Current medications reviewed as noted below. No results for input(s): PH, PCO2, PO2 in the last 72 hours.   Recent Labs     19  1214 19  0636 19  2351   TROIQ <0.05 <0.05 <0.05     Recent Labs     19  0420 19  0301 19  2351    141 141   K 3.4* 3.5 3.5   * 108 108   CO2 25 26 27   BUN 13 14 12   CREA 0.91 1.08 0.98   GFRAA >60 >60 >60   * 101* 96   CA 8.3* 8.4* 8.5   ALB  --   --  3.8   WBC 5.9 6.1 7.5   HGB 12.0* 12.6 12.8   HCT 36.1* 38.1 38.6    222 197     Lab Results   Component Value Date/Time    Cholesterol, total 253 (H) 2019 11:51 AM    HDL Cholesterol 41 2019 11:51 AM    LDL, calculated 164 (H) 2019 11:51 AM    Triglyceride 242 (H) 2019 11:51 AM    CHOL/HDL Ratio 5.5 (H) 11/02/2010 04:40 AM     Recent Labs     08/06/19  2351   SGOT 13*   AP 74   TP 7.3   ALB 3.8   GLOB 3.5     No results for input(s): INR, PTP, APTT in the last 72 hours.     No lab exists for component: INREXT, INREXT   No components found for: GLPOC    Current Facility-Administered Medications   Medication Dose Route Frequency    aliskiren (TEKTURNA) tablet 300 mg (Patient Supplied)  300 mg Oral QHS    amLODIPine (NORVASC) tablet 10 mg  10 mg Oral DAILY    ezetimibe (ZETIA) tablet 10 mg  10 mg Oral DAILY    hydrALAZINE (APRESOLINE) 20 mg/mL injection 20 mg  20 mg IntraVENous Q6H PRN    nitroglycerin (NITROBID) 2 % ointment 1 Inch  1 Inch Topical Q6H PRN    aspirin tablet 325 mg  325 mg Oral QHS    cyclobenzaprine (FLEXERIL) tablet 5 mg  5 mg Oral BID    nicotine (NICODERM CQ) 14 mg/24 hr patch 1 Patch  1 Patch TransDERmal DAILY    sodium chloride (NS) flush 5-40 mL  5-40 mL IntraVENous Q8H    sodium chloride (NS) flush 5-40 mL  5-40 mL IntraVENous PRN    acetaminophen (TYLENOL) tablet 650 mg  650 mg Oral Q6H PRN    ondansetron (ZOFRAN) injection 4 mg  4 mg IntraVENous Q6H PRN    nitroglycerin (NITROSTAT) tablet 0.4 mg  0.4 mg SubLINGual PRN    carvedilol (COREG) tablet 25 mg  25 mg Oral BID WITH MEALS    rosuvastatin (CRESTOR) tablet 5 mg  5 mg Oral QHS    lisinopril (PRINIVIL, ZESTRIL) tablet 40 mg  40 mg Oral BID    albuterol (PROVENTIL HFA, VENTOLIN HFA, PROAIR HFA) inhaler 2 Puff  2 Puff Inhalation Q4H PRN    pantoprazole (PROTONIX) tablet 40 mg  40 mg Oral DAILY    levothyroxine (SYNTHROID) tablet 50 mcg  50 mcg Oral ACB    cholecalciferol (VITAMIN D3) tablet 2,000 Units  2,000 Units Oral DAILY    azithromycin (ZITHROMAX) tablet 500 mg  500 mg Oral DAILY    enoxaparin (LOVENOX) injection 40 mg  40 mg SubCUTAneous Q24H    HYDROcodone-acetaminophen (NORCO) 5-325 mg per tablet 1 Tab  1 Tab Oral Q6H PRN        Abilio Mcnally MD

## 2019-08-09 NOTE — PROGRESS NOTES
Hospitalist Progress Note    NAME: Shawn Angulo   :  1955   MRN:  [id number]       Assessment / Plan:  Chest pain:suspected ACS   Serial troponin:not elevated, stress test today   ECHO:EF 56 - 60%,no wall motion abnormality. Possible acute diastolic CHF  Pt presenting with sob and elevated pro-BNP  40 lasix was given yesterday   ECHO c/w EF 56-60%  Cardiology consulted. Will follow recommendations. mild hypokalemia , po K , check level in am.  Neck pain, likely musculoskeletal   Xray of neck, No acute fracture. Moderate multilevel degenerative spondylosis. No pathologic motion with flexion or extension. Headaches , resolved   CT head  Tylenol prn  Hypothyroidism  Continue Synthyroid 50 mcg daily  Hypertension  Ccontinue home antihypertensive medication  Hyperlipidemia  Continue Crestor 5 mg daily  Body mass index is 33.49 kg/m². Code Status: Full   DVT Prophylaxis: Lovenox   GI Prophylaxis: not indicated  Baseline: independent      Subjective:     Chief Complaint / Reason for Physician Visit  He said he is feeling a bit better , neck pain still there but better      Review of Systems:  Symptom Y/N Comments  Symptom Y/N Comments   Fever/Chills n   Chest Pain n    Poor Appetite n   Edema n    Cough n   Abdominal Pain n    Sputum n   Joint Pain n    SOB/DENG y   Pruritis/Rash     Nausea/vomit n   Tolerating PT/OT     Diarrhea    Tolerating Diet     Constipation    Other y Headaches,neck pain     Could NOT obtain due to:      Objective:     VITALS:   Last 24hrs VS reviewed since prior progress note.  Most recent are:  Patient Vitals for the past 24 hrs:   Temp Pulse Resp BP SpO2   19 1037 97.6 °F (36.4 °C) 60 18 137/67 97 %   19 0843 98 °F (36.7 °C) 68 18 176/79 96 %   19 0418 97.7 °F (36.5 °C) 62 18 147/78 92 %   19 0008    164/86    19 2249  61  147/78    19 2237 98 °F (36.7 °C) 60 20 147/62 94 %   19 1956 97.8 °F (36.6 °C) 64 22 144/68 94 %   19 1809  66      08/08/19 1600  (!) 57      08/08/19 1526 98 °F (36.7 °C) (!) 57 20 130/65 95 %   08/08/19 1316 98.1 °F (36.7 °C) 60 20 150/85 93 %       Intake/Output Summary (Last 24 hours) at 8/9/2019 1201  Last data filed at 8/9/2019 1071  Gross per 24 hour   Intake 240 ml   Output 3400 ml   Net -3160 ml        PHYSICAL EXAM:  General: WD, WN. Alert, cooperative, no acute distress    EENT:  EOMI. Anicteric sclerae. MMM  Neck:               Neck pain with range of motions. Resp:  CTA bilaterally, no wheezing or rales. No accessory muscle use  CV:  Regular  rhythm,  No edema  GI:  Soft, Non distended, Non tender.  +Bowel sounds  Neurologic:  Alert and oriented X 3, normal speech,   Psych:   Good insight. Not anxious nor agitated  Skin:  No rashes. No jaundice    Reviewed most current lab test results and cultures  YES  Reviewed most current radiology test results   YES  Review and summation of old records today    NO  Reviewed patient's current orders and MAR    YES  PMH/SH reviewed - no change compared to H&P  ________________________________________________________________________  Care Plan discussed with:    Comments   Patient y    Family  y wife   RN y    Care Manager     Consultant                        Multidiciplinary team rounds were held today with , nursing, pharmacist and clinical coordinator. Patient's plan of care was discussed; medications were reviewed and discharge planning was addressed.      ________________________________________________________________________  Total NON critical care TIME:  30   Minutes    Total CRITICAL CARE TIME Spent:   Minutes non procedure based      Comments   >50% of visit spent in counseling and coordination of care     ________________________________________________________________________  Massiel Melo MD     Procedures: see electronic medical records for all procedures/Xrays and details which were not copied into this note but were reviewed prior to creation of Plan. LABS:  I reviewed today's most current labs and imaging studies. Pertinent labs include:  Recent Labs     08/09/19  0420 08/08/19  0301 08/06/19  2351   WBC 5.9 6.1 7.5   HGB 12.0* 12.6 12.8   HCT 36.1* 38.1 38.6    222 197     Recent Labs     08/09/19  0420 08/08/19  0301 08/06/19  2351    141 141   K 3.4* 3.5 3.5   * 108 108   CO2 25 26 27   * 101* 96   BUN 13 14 12   CREA 0.91 1.08 0.98   CA 8.3* 8.4* 8.5   ALB  --   --  3.8   TBILI  --   --  0.5   SGOT  --   --  13*   ALT  --   --  30       Signed:  Eloisa Crisostomo MD

## 2019-08-09 NOTE — PROGRESS NOTES
ETT note: Marked dyspnea/HTN (>200) after 2:56 on Std Benjie; 1 mm ST depression; unstable on treadmill: changed to [facility]. KEEP NPO UNTIL MPI IMAGES SEEN.

## 2019-08-10 VITALS
WEIGHT: 240.3 LBS | DIASTOLIC BLOOD PRESSURE: 66 MMHG | OXYGEN SATURATION: 95 % | BODY MASS INDEX: 32.55 KG/M2 | TEMPERATURE: 98.3 F | HEIGHT: 72 IN | SYSTOLIC BLOOD PRESSURE: 148 MMHG | HEART RATE: 60 BPM | RESPIRATION RATE: 18 BRPM

## 2019-08-10 LAB
ALBUMIN SERPL-MCNC: 3.4 G/DL (ref 3.5–5)
ALBUMIN/GLOB SERPL: 1.1 {RATIO} (ref 1.1–2.2)
ALP SERPL-CCNC: 60 U/L (ref 45–117)
ALT SERPL-CCNC: 29 U/L (ref 12–78)
ANION GAP SERPL CALC-SCNC: 7 MMOL/L (ref 5–15)
AST SERPL-CCNC: 17 U/L (ref 15–37)
BASOPHILS # BLD: 0 K/UL (ref 0–0.1)
BASOPHILS NFR BLD: 1 % (ref 0–1)
BILIRUB SERPL-MCNC: 0.4 MG/DL (ref 0.2–1)
BUN SERPL-MCNC: 11 MG/DL (ref 6–20)
BUN/CREAT SERPL: 14 (ref 12–20)
CALCIUM SERPL-MCNC: 8.2 MG/DL (ref 8.5–10.1)
CHLORIDE SERPL-SCNC: 108 MMOL/L (ref 97–108)
CO2 SERPL-SCNC: 27 MMOL/L (ref 21–32)
CREAT SERPL-MCNC: 0.79 MG/DL (ref 0.7–1.3)
DIFFERENTIAL METHOD BLD: ABNORMAL
EOSINOPHIL # BLD: 0.3 K/UL (ref 0–0.4)
EOSINOPHIL NFR BLD: 5 % (ref 0–7)
ERYTHROCYTE [DISTWIDTH] IN BLOOD BY AUTOMATED COUNT: 12.6 % (ref 11.5–14.5)
GLOBULIN SER CALC-MCNC: 3.2 G/DL (ref 2–4)
GLUCOSE SERPL-MCNC: 94 MG/DL (ref 65–100)
HCT VFR BLD AUTO: 38.9 % (ref 36.6–50.3)
HGB BLD-MCNC: 12.5 G/DL (ref 12.1–17)
IMM GRANULOCYTES # BLD AUTO: 0 K/UL (ref 0–0.04)
IMM GRANULOCYTES NFR BLD AUTO: 0 % (ref 0–0.5)
LYMPHOCYTES # BLD: 1.5 K/UL (ref 0.8–3.5)
LYMPHOCYTES NFR BLD: 27 % (ref 12–49)
MCH RBC QN AUTO: 31 PG (ref 26–34)
MCHC RBC AUTO-ENTMCNC: 32.1 G/DL (ref 30–36.5)
MCV RBC AUTO: 96.5 FL (ref 80–99)
MONOCYTES # BLD: 0.4 K/UL (ref 0–1)
MONOCYTES NFR BLD: 8 % (ref 5–13)
NEUTS SEG # BLD: 3.4 K/UL (ref 1.8–8)
NEUTS SEG NFR BLD: 59 % (ref 32–75)
NRBC # BLD: 0 K/UL (ref 0–0.01)
NRBC BLD-RTO: 0 PER 100 WBC
PLATELET # BLD AUTO: 210 K/UL (ref 150–400)
PMV BLD AUTO: 11.3 FL (ref 8.9–12.9)
POTASSIUM SERPL-SCNC: 3.2 MMOL/L (ref 3.5–5.1)
PROT SERPL-MCNC: 6.6 G/DL (ref 6.4–8.2)
RBC # BLD AUTO: 4.03 M/UL (ref 4.1–5.7)
SODIUM SERPL-SCNC: 142 MMOL/L (ref 136–145)
WBC # BLD AUTO: 5.7 K/UL (ref 4.1–11.1)

## 2019-08-10 PROCEDURE — 36415 COLL VENOUS BLD VENIPUNCTURE: CPT

## 2019-08-10 PROCEDURE — 74011250637 HC RX REV CODE- 250/637: Performed by: INTERNAL MEDICINE

## 2019-08-10 PROCEDURE — 80053 COMPREHEN METABOLIC PANEL: CPT

## 2019-08-10 PROCEDURE — 85025 COMPLETE CBC W/AUTO DIFF WBC: CPT

## 2019-08-10 RX ORDER — IBUPROFEN 200 MG
1 TABLET ORAL DAILY
Qty: 30 PATCH | Refills: 0 | Status: SHIPPED | OUTPATIENT
Start: 2019-08-11 | End: 2019-08-21

## 2019-08-10 RX ORDER — EZETIMIBE 10 MG/1
10 TABLET ORAL DAILY
Qty: 30 TAB | Refills: 12 | Status: SHIPPED | OUTPATIENT
Start: 2019-08-11 | End: 2019-08-10 | Stop reason: SDUPTHER

## 2019-08-10 RX ORDER — SENNOSIDES 8.6 MG/1
1 TABLET ORAL 2 TIMES DAILY
Qty: 10 TAB | Refills: 0 | Status: SHIPPED | OUTPATIENT
Start: 2019-08-10 | End: 2019-08-21

## 2019-08-10 RX ORDER — POTASSIUM CHLORIDE 1500 MG/1
40 TABLET, FILM COATED, EXTENDED RELEASE ORAL ONCE
Qty: 2 TAB | Refills: 0 | Status: SHIPPED | OUTPATIENT
Start: 2019-08-10 | End: 2019-08-10

## 2019-08-10 RX ORDER — EZETIMIBE 10 MG/1
10 TABLET ORAL DAILY
Qty: 30 TAB | Refills: 12 | Status: SHIPPED | OUTPATIENT
Start: 2019-08-11 | End: 2020-01-28

## 2019-08-10 RX ORDER — POTASSIUM CHLORIDE 750 MG/1
40 TABLET, FILM COATED, EXTENDED RELEASE ORAL ONCE
Status: DISCONTINUED | OUTPATIENT
Start: 2019-08-10 | End: 2019-08-10 | Stop reason: HOSPADM

## 2019-08-10 RX ADMIN — AMLODIPINE BESYLATE 10 MG: 5 TABLET ORAL at 08:43

## 2019-08-10 RX ADMIN — AZITHROMYCIN 500 MG: 250 TABLET, FILM COATED ORAL at 08:46

## 2019-08-10 RX ADMIN — LISINOPRIL 40 MG: 20 TABLET ORAL at 08:43

## 2019-08-10 RX ADMIN — PANTOPRAZOLE SODIUM 40 MG: 40 TABLET, DELAYED RELEASE ORAL at 08:44

## 2019-08-10 RX ADMIN — VITAMIN D, TAB 1000IU (100/BT) 2000 UNITS: 25 TAB at 08:44

## 2019-08-10 RX ADMIN — EZETIMIBE 10 MG: 10 TABLET ORAL at 08:43

## 2019-08-10 RX ADMIN — CYCLOBENZAPRINE HYDROCHLORIDE 5 MG: 10 TABLET, FILM COATED ORAL at 08:44

## 2019-08-10 RX ADMIN — Medication 10 ML: at 06:43

## 2019-08-10 RX ADMIN — CARVEDILOL 25 MG: 12.5 TABLET, FILM COATED ORAL at 08:43

## 2019-08-10 RX ADMIN — LEVOTHYROXINE SODIUM 50 MCG: 50 TABLET ORAL at 06:43

## 2019-08-10 NOTE — PROGRESS NOTES
Stable from cardiac standpoint. May need OP pulmonary eval for SOB . ?? Can discharge from our standpoint.

## 2019-08-10 NOTE — PROGRESS NOTES
Bedside shift change report given to Manuel Marshall RN (oncoming nurse) by Yasmin Higgins RN (offgoing nurse). Report included the following information SBAR, Kardex, ED Summary, Procedure Summary, Intake/Output, MAR, Accordion, Recent Results, Med Rec Status, Cardiac Rhythm NSR, Sinus Malon Lolling, Alarm Parameters , Pre Procedure Checklist, Procedure Verification and Quality Measures.

## 2019-08-10 NOTE — PROGRESS NOTES
Pt. Ambulated to bathroom and in the brasher without difficulty. Pt. A&Ox4, stable vital signs, asymptomatic. Pt. Denied chest pain, SOB and dizziness. Right groin cath site dressing intact, no bleeding, no hematoma.

## 2019-08-10 NOTE — PROGRESS NOTES
Bedside shift change report given to  Josiane Walker RN (oncoming nurse) by Kait Mike RN (offgoing nurse). Report included the following information SBAR, Kardex, ED Summary, OR Summary, Procedure Summary, Intake/Output, MAR, Accordion, Recent Results, Med Rec Status, Cardiac Rhythm NSR, Sinus Daisy Fails, Alarm Parameters , Pre Procedure Checklist, Procedure Verification and Quality Measures.

## 2019-08-10 NOTE — DISCHARGE INSTRUCTIONS
[address], [address]    [phone]  [place], [place] [address]    [email or web address]    Patient Discharge Instructions    Aura Oropeza / [id number] : 1955    Admitted 2019 Discharged 8/10/2019     · It is important that you take the medication exactly as they are prescribed. · Keep your medication in the bottles provided by the pharmacist and keep a list of the medication names, dosages, and times to be taken in your wallet. · Do not take other medications without consulting your doctor. BRING ALL OF YOUR MEDICINES or a list of medicines with dosages TO YOUR OFFICE VISIT with Dr. Mariah Kauffman. Cardiac Catheterization  Discharge Instructions     Do not drive, operate any machinery, or sign any legal documents for 24 hours after your procedure. You must have someone to drive you home.  You may take a shower 24 hours after your cardiac catheterization. Be sure to get the dressing wet and then remove it; gently wash the area with warm soapy water. Pat dry and leave open to air. To help prevent infections, be sure to keep the cath site clean and dry. No lotions, creams, powders, ointments, etc. in the cath site for approximately 1 week.  Do not take a tub bath, get in a hot tub or swimming pool for approximately 5 days or until the cath site is completely healed.  No strenuous activity or heavy lifting over 10 lbs. for 7 days.  Drink plenty of fluids for 24-48 hours after your cath to flush the contrast dye from your kidneys. No alcoholic beverages for 24 hours. You may resume your previous diet (low fat, low cholesterol) after your cath.  After your cath, some bruising or discomfort is common during the healing process. Tylenol, 1-2 tablets every 6 hours as needed, is recommended if you experience any discomfort.   If you experience any signs or symptoms of infection such as fever, chills, or poorly healing incision, persistent tenderness or swelling in the groin, redness and/or warmth to the touch, numbness, significant tingling or pain at the groin site or affected extremity, rash, drainage from the cath site, or if the leg feels tight or swollen, call your physician right away.  If bleeding at the cath site occurs, take a clean gauze pad and apply direct pressure to the groin just above the puncture site. Call 911 immediately, and continue to apply direct pressure until an ambulance gets to your location.  You may return to work  2  days after your cardiac cath if no groin bleeding. If you are smoking, please stop. Smoking Cessation Program is a free, phone/text/email based, smoking cessation program. The program is individualized to meet each patient's needs. To enroll use this link https://[email or web address].[email or web address]/ra/survey/2860      Follow-up:   Follow-up Information     Follow up With Specialties Details Why Terry Cervantes, Amol Gaitan, DO Internal Medicine In 1 week For blood pressure check [facility] [address]  [address] [facility]      Rui Collier MD Cardiology In 1 month  [address]  [id number]  [facility] [address].  [phone]            Information obtained by :  I understand that if any problems occur once I am at home I am to contact my physician. I understand and acknowledge receipt of the instructions indicated above.                                                                                                                                            R.N.'s Signature                                                                  Date/Time                                                                                                                                              Patient or Representative Signature                                                          Date/Time      Dee Dee Smalls MD      [address]    [phone], [address]    [email or web address]

## 2019-08-10 NOTE — DISCHARGE SUMMARY
Hospitalist Discharge Summary     Patient ID:  Jonas Carrera  [id number]  75 y.o.  1955    PCP on record: Suzanne Cruz DO    Admit date: 8/6/2019  Discharge date and time: 8/10/2019      DISCHARGE DIAGNOSIS:    Atypical chest pain s/p cath       CONSULTATIONS:  IP CONSULT TO HOSPITALIST  IP CONSULT TO CARDIOLOGY    Excerpted HPI from H&P of Pawel Lea MD:    75years old male from home with past medical history significant for coronary artery disease, hypertension, hyperlipidemia, hypothyroidism presented to the hospital complaining from chest pain associated with shortness of breath, patient stated his chest pain started about 45 days ago worsened with exertion chest pain in the left side radiated to the back on the left arm radiated to the neck patient denies any nausea vomiting any dizziness, blood work in ED show no significant abnormality EKG was done shortness acute abnormality CTA chest was done and show bibasilar infiltrate and bilateral pleural effusion    ______________________________________________________________________  DISCHARGE SUMMARY/HOSPITAL COURSE:  for full details see H&P, daily progress notes, labs, consult notes. Chest pain atypical and ruled out   Serial troponin:not elevated,s/p cath no blocked vv and medical management decided   ECHO:EF 56 - 60%,no wall motion abnormality. SOB probably from non cardiac origin , cardiology did not think it is CHF   Patient to have OP pulmonary follow up on DC    ECHO c/w EF 56-60%  mild hypokalemia , po K , given for 4 days on DC and PCP to check BMP in one week   Neck pain, likely musculoskeletal resolved   Xray of neck, No acute fracture. Moderate multilevel degenerative spondylosis. No pathologic motion with flexion or extension. Headaches , resolved   CT head negative   Patient to take the rest of his meds as  Zetia added to medication list as patient not tolerating statins at all.    DC today _______________________________________________________________________  Patient seen and examined by me on discharge day. General:          WD, WN. Alert, cooperative, no acute distress    EENT:              EOMI. Anicteric sclerae. MMM  Neck:               Neck pain with range of motions. Resp:               CTA bilaterally, no wheezing or rales. No accessory muscle use  CV:                  Regular  rhythm,  No edema  GI:                   Soft, Non distended, Non tender.  +Bowel sounds  Neurologic:      Alert and oriented X 3, normal speech,   Psych:             Good insight. Not anxious nor agitated  Skin:                No rashes. No jaundice     _______________________________________________________________________  DISCHARGE MEDICATIONS:   Current Discharge Medication List      START taking these medications    Details   nicotine (NICODERM CQ) 14 mg/24 hr patch 1 Patch by TransDERmal route daily for 30 days. Qty: 30 Patch, Refills: 0      potassium chloride SR (K-TAB) 20 mEq tablet Take 2 Tabs by mouth once for 1 dose. Qty: 2 Tab, Refills: 0      senna (SENOKOT) 8.6 mg tablet Take 1 Tab by mouth two (2) times a day. Qty: 10 Tab, Refills: 0      ezetimibe (ZETIA) 10 mg tablet Take 1 Tab by mouth daily. Qty: 30 Tab, Refills: 12         CONTINUE these medications which have NOT CHANGED    Details   ibuprofen (MOTRIN) 200 mg tablet Take 600 mg by mouth nightly. aliskiren (TEKTURNA) 300 mg tablet Take 1 Tab by mouth nightly. Start if blood pressure remain >=140 upper or 90/100 lower  Qty: 90 Tab, Refills: 3      amLODIPine (NORVASC) 5 mg tablet Take 1 Tab by mouth daily. Qty: 90 Tab, Refills: 3      carvedilol (COREG) 25 mg tablet Take 1 Tab by mouth two (2) times daily (with meals). Qty: 180 Tab, Refills: 3      levothyroxine (SYNTHROID) 50 mcg tablet Take 1 Tab by mouth Daily (before breakfast).   Qty: 90 Tab, Refills: 3      lisinopril (PRINIVIL, ZESTRIL) 40 mg tablet Take 1 Tab by mouth two (2) times a day. Start if blood pressure remain >=140 upper or 90/100 lower  Qty: 180 Tab, Refills: 3      pantoprazole (PROTONIX) 40 mg tablet Take 1 Tab by mouth daily. Qty: 90 Tab, Refills: 3      aspirin (ASPIRIN) 325 mg tablet Take 325 mg by mouth nightly. cholecalciferol, vitamin D3, (VITAMIN D3) 2,000 unit tab Take 2,000 Units by mouth daily. MULTI-VITAMIN HI-PO PO Take 1 Tab by mouth daily. My Recommended Diet, Activity, Wound Care, and follow-up labs are listed in the patient's Discharge Insturctions which I have personally completed and reviewed.     ______________________________________________________________________    Risk of deterioration: Moderate    Condition at Discharge:  Stable  ______________________________________________________________________    Disposition  Home with family, no needs  ____________________________________    Care Plan discussed with:   Patient, Family, RN, Care Manager, Consultant    Comment:   ______________________________________________________________________    Code Status: Full Code  ___

## 2019-08-10 NOTE — PROGRESS NOTES
Problem: Patient Education: Go to Patient Education Activity  Goal: Patient/Family Education  Outcome: Progressing Towards Goal     Problem: Unstable angina/NSTEMI: Day of Admission/Day 1  Goal: Off Pathway (Use only if patient is Off Pathway)  Outcome: Progressing Towards Goal  Goal: Activity/Safety  Outcome: Progressing Towards Goal  Goal: Consults, if ordered  Outcome: Progressing Towards Goal  Goal: Diagnostic Test/Procedures  Outcome: Progressing Towards Goal  Goal: Nutrition/Diet  Outcome: Progressing Towards Goal  Goal: Discharge Planning  Outcome: Progressing Towards Goal  Goal: Medications  Outcome: Progressing Towards Goal  Goal: Respiratory  Outcome: Progressing Towards Goal  Goal: Treatments/Interventions/Procedures  Outcome: Progressing Towards Goal  Goal: Psychosocial  Outcome: Progressing Towards Goal  Goal: *Hemodynamically stable  Outcome: Progressing Towards Goal  Goal: *Optimal pain control at patient's stated goal  Outcome: Progressing Towards Goal  Goal: *Lungs clear or at baseline  Outcome: Progressing Towards Goal     Problem: Unstable angina/NSTEMI: Day 2  Goal: Off Pathway (Use only if patient is Off Pathway)  Outcome: Progressing Towards Goal  Goal: Activity/Safety  Outcome: Progressing Towards Goal  Goal: Consults, if ordered  Outcome: Progressing Towards Goal  Goal: Diagnostic Test/Procedures  Outcome: Progressing Towards Goal  Goal: Nutrition/Diet  Outcome: Progressing Towards Goal  Goal: Discharge Planning  Outcome: Progressing Towards Goal  Goal: Medications  Outcome: Progressing Towards Goal  Goal: Respiratory  Outcome: Progressing Towards Goal  Goal: Treatments/Interventions/Procedures  Outcome: Progressing Towards Goal  Goal: Psychosocial  Outcome: Progressing Towards Goal  Goal: *Hemodynamically stable  Outcome: Progressing Towards Goal  Goal: *Optimal pain control at patient's stated goal  Outcome: Progressing Towards Goal  Goal: *Lungs clear or at baseline  Outcome: Progressing Towards Goal     Problem: Unstable Angina/NSTEMI: Discharge Outcomes  Goal: *Hemodynamically stable  Outcome: Progressing Towards Goal  Goal: *Stable cardiac rhythm  Outcome: Progressing Towards Goal  Goal: *Lungs clear or at baseline  Outcome: Progressing Towards Goal  Goal: *Optimal pain control at patient's stated goal  Outcome: Progressing Towards Goal  Goal: *Identifies cardiac risk factors  Outcome: Progressing Towards Goal  Goal: *Verbalizes home exercise program, activity guidelines, cardiac precautions  Outcome: Progressing Towards Goal  Goal: *Verbalizes understanding and describes prescribed diet  Outcome: Progressing Towards Goal  Goal: *Verbalizes name, dosage, time, side effects, and number of days to continue medications  Outcome: Progressing Towards Goal  Goal: *Anxiety reduced or absent  Outcome: Progressing Towards Goal  Goal: *Understands and describes signs and symptoms to report to providers(Stroke Metric)  Outcome: Progressing Towards Goal  Goal: *Describes follow-up/return visits to physicians  Outcome: Progressing Towards Goal  Goal: *Describes available resources and support systems  Outcome: Progressing Towards Goal  Goal: *Influenza immunization  Outcome: Progressing Towards Goal  Goal: *Pneumococcal immunization  Outcome: Progressing Towards Goal  Goal: *Describes smoking cessation resources  Outcome: Progressing Towards Goal     Problem: Falls - Risk of  Goal: *Absence of Falls  Description  Document Candida Lightning Fall Risk and appropriate interventions in the flowsheet.   Outcome: Progressing Towards Goal  Note:   Fall Risk Interventions:            Medication Interventions: Assess postural VS orthostatic hypotension, Evaluate medications/consider consulting pharmacy, Patient to call before getting OOB, Teach patient to arise slowly                   Problem: Patient Education: Go to Patient Education Activity  Goal: Patient/Family Education  Outcome: Progressing Towards Goal     Problem: Patient Education: Go to Patient Education Activity  Goal: Patient/Family Education  Outcome: Progressing Towards Goal     Problem: Heart Failure: Day 4  Goal: Off Pathway (Use only if patient is Off Pathway)  Outcome: Progressing Towards Goal  Goal: Activity/Safety  Outcome: Progressing Towards Goal  Goal: Diagnostic Test/Procedures  Outcome: Progressing Towards Goal  Goal: Nutrition/Diet  Outcome: Progressing Towards Goal  Goal: Discharge Planning  Outcome: Progressing Towards Goal  Goal: Medications  Outcome: Progressing Towards Goal  Goal: Respiratory  Outcome: Progressing Towards Goal  Goal: Treatments/Interventions/Procedures  Outcome: Progressing Towards Goal  Goal: Psychosocial  Outcome: Progressing Towards Goal  Goal: *Oxygen saturation within defined limits  Outcome: Progressing Towards Goal  Goal: *Hemodynamically stable  Outcome: Progressing Towards Goal  Goal: *Optimal pain control at patient's stated goal  Outcome: Progressing Towards Goal  Goal: *Anxiety reduced or absent  Outcome: Progressing Towards Goal  Goal: *Demonstrates progressive activity  Outcome: Progressing Towards Goal     Problem: Heart Failure: Day 5  Goal: Off Pathway (Use only if patient is Off Pathway)  Outcome: Progressing Towards Goal  Goal: Activity/Safety  Outcome: Progressing Towards Goal  Goal: Diagnostic Test/Procedures  Outcome: Progressing Towards Goal  Goal: Nutrition/Diet  Outcome: Progressing Towards Goal  Goal: Discharge Planning  Outcome: Progressing Towards Goal  Goal: Medications  Outcome: Progressing Towards Goal  Goal: Respiratory  Outcome: Progressing Towards Goal  Goal: Treatments/Interventions/Procedures  Outcome: Progressing Towards Goal  Goal: Psychosocial  Outcome: Progressing Towards Goal     Problem: Heart Failure: Discharge Outcomes  Goal: *Demonstrates ability to perform prescribed activity without shortness of breath or discomfort  Outcome: Progressing Towards Goal  Goal: *Left ventricular function assessment completed prior to or during stay, or planned for post-discharge  Outcome: Progressing Towards Goal  Goal: *ACEI prescribed if LVEF less than 40% and no contraindications or ARB prescribed  Outcome: Progressing Towards Goal  Goal: *Verbalizes understanding and describes prescribed diet  Outcome: Progressing Towards Goal  Goal: *Verbalizes understanding/describes prescribed medications  Outcome: Progressing Towards Goal  Goal: *Describes available resources and support systems  Description  (eg: Home Health, Palliative Care, Advanced Medical Directive)  Outcome: Progressing Towards Goal  Goal: *Describes smoking cessation resources  Outcome: Progressing Towards Goal  Goal: *Understands and describes signs and symptoms to report to providers(Stroke Metric)  Outcome: Progressing Towards Goal  Goal: *Describes/verbalizes understanding of follow-up/return appt  Description  (eg: to physicians, diabetes treatment coordinator, and other resources  Outcome: Progressing Towards Goal  Goal: *Describes importance of continuing daily weights and changes to report to physician  Outcome: Progressing Towards Goal     Problem: Breathing Pattern - Ineffective  Goal: *Absence of hypoxia  Outcome: Progressing Towards Goal     Problem: Patient Education: Go to Patient Education Activity  Goal: Patient/Family Education  Outcome: Progressing Towards Goal     Problem: Hypertension  Goal: *Blood pressure within specified parameters  Outcome: Progressing Towards Goal  Goal: *Fluid volume balance  Outcome: Progressing Towards Goal  Goal: *Labs within defined limits  Outcome: Progressing Towards Goal     Problem: Patient Education: Go to Patient Education Activity  Goal: Patient/Family Education  Outcome: Progressing Towards Goal     Problem: Pain  Goal: *Control of Pain  Outcome: Progressing Towards Goal  Goal: *PALLIATIVE CARE:  Alleviation of Pain  Outcome: Progressing Towards Goal     Problem: Patient Education: Go to Patient Education Activity  Goal: Patient/Family Education  Outcome: Progressing Towards Goal     Problem: Pressure Injury - Risk of  Goal: *Prevention of pressure injury  Description  Document Kenroy Scale and appropriate interventions in the flowsheet. Outcome: Progressing Towards Goal  Note:   Pressure Injury Interventions:             Activity Interventions: Assess need for specialty bed, Increase time out of bed, Pressure redistribution bed/mattress(bed type), PT/OT evaluation         Nutrition Interventions: Document food/fluid/supplement intake, Offer support with meals,snacks and hydration                     Problem: Patient Education: Go to Patient Education Activity  Goal: Patient/Family Education  Outcome: Progressing Towards Goal

## 2019-08-10 NOTE — PROGRESS NOTES
Bedside report received from [facility], RN                 Assessment, Background, Procedure summary, Intake/Output, MAR, and recent results discussed. Care assumed. Pt ambulated in hallway. Pt appears steady and has no complaints of pain, shortness of breath, dizziness, or light-headedness. Incision appears clean, dry, and intact with no swelling or hematoma present. Discharge instructions reviewed with patient and family. Allowed adequate time to ask questions, all questions answered. Printed copy of AVS given to patient. All belongings gathered, IV and tele discontinued. Transported via wheelchair by volunteer to main entrance and into care of family.

## 2019-08-12 ENCOUNTER — PATIENT OUTREACH (OUTPATIENT)
Dept: FAMILY MEDICINE CLINIC | Age: 64
End: 2019-08-12

## 2019-08-12 ENCOUNTER — PATIENT OUTREACH (OUTPATIENT)
Dept: CARDIOLOGY CLINIC | Age: 64
End: 2019-08-12

## 2019-08-12 NOTE — PROGRESS NOTES
Patient discharged 8/10/19 over the weekend with no followup appt. Patient scheduled with PCP Dr Héctor Castro 8/21/19 @ 11:30 and Cardiology Dr Julio César Wheeler 8/16/19 @ 10:45. Patients wife advised of the follow up appointments.

## 2019-08-12 NOTE — PROGRESS NOTES
Hospital Discharge Follow-Up      Date/Time:  2019 2:15 PM    Patient was admitted to [facility] on 19 and discharged on 8/10/19 for chest pain, SOB secondary to CHF. The physician discharge summary was available at the time of outreach. Patient was contacted within 1 business days of discharge. Top Challenges reviewed with the provider   Referral to pulm  Discharge K is 3.2. PCP to check BMP in one week   Patient declines NN services at this time         Method of communication with provider :chart routing    Inpatient RRAT score: 12  Was this a readmission? no   Patient stated reason for the readmission: n/a    Nurse Navigator (NN) contacted the patient by telephone to perform post hospital discharge assessment. Verified name and  with patient as identifiers. Provided introduction to self, and explanation of the Nurse Navigator role. Reviewed discharge instructions and red flags with patient who verbalized understanding. Patient given an opportunity to ask questions and does not have any further questions or concerns at this time. The patient agrees to contact the PCP office for questions related to their healthcare. NN provided contact information for future reference. Disease Specific:   N/A    Summary of patient's top problems:  1. SOB probably from non cardiac origin , cardiology did not think it is CHF   Patient to have OP pulmonary follow up on DC    ECHO c/w EF 56-60%  2. No advance care plan on file  3. Patient declines NN services at this time.       Home Health orders at discharge: [facility], patient refused  [address]: n/a  Date of initial visit: [facility] ordered/company: none  Durable Medical Equipment received: n/a    Barriers to care? lack of knowledge about disease, level of motivation    Advance Care Planning:   Does patient have an Advance Directive:  not on file     Medication(s):   New Medications at Discharge: nicotine (NICODERM CQ) 14 mg/24 hr patch 1 Patch by TransDERmal route daily for 30 days. Qty: 30 Patch, Refills: 0     potassium chloride SR (K-TAB) 20 mEq tablet Take 2 Tabs by mouth once for 1 dose. Qty: 2 Tab, Refills: 0     senna (SENOKOT) 8.6 mg tablet Take 1 Tab by mouth two (2) times a day. Qty: 10 Tab, Refills: 0     ezetimibe (ZETIA) 10 mg tablet Take 1 Tab by mouth daily. Qty: 30 Tab, Refills: 12       Changed Medications at Discharge: none    Discontinued Medications at Discharge: none    Medication reconciliation was performed with patient, who verbalizes understanding of administration of home medications. There were no barriers to obtaining medications identified at this time. Referral to Pharm D needed: no     Current Outpatient Medications   Medication Sig    nicotine (NICODERM CQ) 14 mg/24 hr patch 1 Patch by TransDERmal route daily for 30 days.  senna (SENOKOT) 8.6 mg tablet Take 1 Tab by mouth two (2) times a day.  ezetimibe (ZETIA) 10 mg tablet Take 1 Tab by mouth daily.  ibuprofen (MOTRIN) 200 mg tablet Take 600 mg by mouth nightly.  aliskiren (TEKTURNA) 300 mg tablet Take 1 Tab by mouth nightly. Start if blood pressure remain >=140 upper or 90/100 lower    amLODIPine (NORVASC) 5 mg tablet Take 1 Tab by mouth daily.  carvedilol (COREG) 25 mg tablet Take 1 Tab by mouth two (2) times daily (with meals).  levothyroxine (SYNTHROID) 50 mcg tablet Take 1 Tab by mouth Daily (before breakfast).  lisinopril (PRINIVIL, ZESTRIL) 40 mg tablet Take 1 Tab by mouth two (2) times a day. Start if blood pressure remain >=140 upper or 90/100 lower    pantoprazole (PROTONIX) 40 mg tablet Take 1 Tab by mouth daily.  aspirin (ASPIRIN) 325 mg tablet Take 325 mg by mouth nightly.  cholecalciferol, vitamin D3, (VITAMIN D3) 2,000 unit tab Take 2,000 Units by mouth daily.  MULTI-VITAMIN HI-PO PO Take 1 Tab by mouth daily. No current facility-administered medications for this visit. There are no discontinued medications. [facility] follow up appointment(s):   Future Appointments   Date Time Provider Josefa Foster   8/21/2019 11:30 AM Krystle Villa      Non-[facility] follow up appointment(s): [facility] 8/16/19 @ 0708  [facility]:  n/a     Goals      Attend follow up appointments on schedule      8/12/19 Patient is currently scheduled to see PCP on 8/21/19 @ 5344 and cardio on 8/16/19 @ 4445. Patient will report attendance at next week outreach.  EVANS

## 2019-08-12 NOTE — PROGRESS NOTES
Transitions of Care - Pharmacist Discharge Medication Reconciliation     S/O: Mr. Zofia Rasmussen 75 y.o., referred by G team, to pharmacy for transitions of care. Patient was recently hospitalized at [facility] from 8/6/19 to 8/10/19. Patient's PCP: Macario Potts, DO       Reason for hospitalization: Atypical chest pain s/p cath    HUG/Bundle patient?: YES  Core Measure: Heart Failure    Did patient receive antibiotic therapy during hospitalization?: YES  If yes: Azithromycin 500 mg PO daily - empiric coverage for CAP (completed 3/5 days, last dose: 8/10/19)  Discharged with abx? NO    Pharmacist Review:   EF: 56-60%; s/p left heart cath: no apparent complications, continue medical management per cardiology. BP: 148/66 mmHg, HR: 60 bpm on discharge  Patient has been on lisinopril and aliskiren since at least 2010 for HTN. Renal function (SCr: ~0.9 mg/dL baseline) and potassium have been stable (K+ a little low this admission; was repleted)  Re-trial of statin (Crestor 5 mg) this admission - patient intolerant and allergy has been officially added. Provider opted to prescribe ezetimibe 10 mg daily. Per 5/3/19 office visit with Dr. Kemi Mike, patient may have been prescribed Repatha. Unclear if medication was approved/picked up by patient    Admission medication history completed by pharmacy?: YES     Patient was discharged by Dr. Brenda Brothers with the following medication list:    Discharge Medication List as of 8/10/2019 11:07 AM        START taking these medications    Details   nicotine (NICODERM CQ) 14 mg/24 hr patch 1 Patch by TransDERmal route daily for 30 days. , Print, Disp-30 Patch, R-0      potassium chloride SR (K-TAB) 20 mEq tablet Take 2 Tabs by mouth once for 1 dose., Print, Disp-2 Tab, R-0      senna (SENOKOT) 8.6 mg tablet Take 1 Tab by mouth two (2) times a day., Print, Disp-10 Tab, R-0           CONTINUE these medications which have CHANGED    Details   ezetimibe (ZETIA) 10 mg tablet Take 1 Tab by mouth daily. , Print, Disp-30 Tab, R-12           CONTINUE these medications which have NOT CHANGED    Details   ibuprofen (MOTRIN) 200 mg tablet Take 600 mg by mouth nightly., Historical Med      aliskiren (TEKTURNA) 300 mg tablet Take 1 Tab by mouth nightly. Start if blood pressure remain >=140 upper or 90/100 lower, Normal, Disp-90 Tab, R-3      amLODIPine (NORVASC) 5 mg tablet Take 1 Tab by mouth daily. , Normal, Disp-90 Tab, R-3      carvedilol (COREG) 25 mg tablet Take 1 Tab by mouth two (2) times daily (with meals). , Normal, Disp-180 Tab, R-3      levothyroxine (SYNTHROID) 50 mcg tablet Take 1 Tab by mouth Daily (before breakfast). , Normal, Disp-90 Tab, R-3      lisinopril (PRINIVIL, ZESTRIL) 40 mg tablet Take 1 Tab by mouth two (2) times a day. Start if blood pressure remain >=140 upper or 90/100 lower, Normal, Disp-180 Tab, R-3      pantoprazole (PROTONIX) 40 mg tablet Take 1 Tab by mouth daily. , Normal, Disp-90 Tab, R-3      aspirin (ASPIRIN) 325 mg tablet Take 325 mg by mouth nightly., Historical Med      cholecalciferol, vitamin D3, (VITAMIN D3) 2,000 unit tab Take 2,000 Units by mouth daily. , Historical Med      MULTI-VITAMIN HI-PO PO Take 1 Tab by mouth daily. , Historical Med           STOP taking these medications       albuterol (PROVENTIL HFA, VENTOLIN HFA, PROAIR HFA) 90 mcg/actuation inhaler Comments:   Reason for Stopping:             Recommendation(s)/Plan(s):   Lisinopril and aliskiren combination is generally not recommended. Patient has been tolerating okay but it may be appropriate to trial an alternative option. More information is needed to determine why this combination was chosen  Consider following up regarding need/approval for Repatha     Other interventions completed by pharmacy (patient education, medication access, changes to AVS, etc.): None    -Note will be routed to ambulatory PharmD for review.     Thank you,  Blair Longoria, MALVIND

## 2019-08-13 LAB
BACTERIA SPEC CULT: NORMAL
SERVICE CMNT-IMP: NORMAL

## 2019-08-20 ENCOUNTER — PATIENT OUTREACH (OUTPATIENT)
Dept: FAMILY MEDICINE CLINIC | Age: 64
End: 2019-08-20

## 2019-08-20 NOTE — PROGRESS NOTES
Goals      Attend follow up appointments on schedule      8/12/19 Patient is currently scheduled to see PCP on 8/21/19 @ 1130 and cardio on 8/16/19 @ 1045. Patient will report attendance at next week outreach. EVANS    8/20/19 Patient declined NN services. Kelly with [facility] reports patient attended appointment on 8/16/19. Will monitor chart for attendance of PCP appointment on 8/21/19.  EVANS

## 2019-08-21 ENCOUNTER — OFFICE VISIT (OUTPATIENT)
Dept: INTERNAL MEDICINE CLINIC | Age: 64
End: 2019-08-21

## 2019-08-21 VITALS
TEMPERATURE: 98.5 F | HEART RATE: 62 BPM | OXYGEN SATURATION: 95 % | WEIGHT: 242 LBS | RESPIRATION RATE: 18 BRPM | HEIGHT: 72 IN | DIASTOLIC BLOOD PRESSURE: 69 MMHG | BODY MASS INDEX: 32.78 KG/M2 | SYSTOLIC BLOOD PRESSURE: 132 MMHG

## 2019-08-21 DIAGNOSIS — I50.21 SYSTOLIC CHF, ACUTE (HCC): Primary | ICD-10-CM

## 2019-08-21 DIAGNOSIS — I10 ESSENTIAL HYPERTENSION: ICD-10-CM

## 2019-08-21 DIAGNOSIS — Z86.73 HISTORY OF LACUNAR CEREBROVASCULAR ACCIDENT (CVA): ICD-10-CM

## 2019-08-21 DIAGNOSIS — E78.2 MIXED HYPERLIPIDEMIA: ICD-10-CM

## 2019-08-21 DIAGNOSIS — I25.10 CORONARY ARTERY DISEASE INVOLVING NATIVE CORONARY ARTERY OF NATIVE HEART WITHOUT ANGINA PECTORIS: ICD-10-CM

## 2019-08-21 DIAGNOSIS — E03.9 ACQUIRED HYPOTHYROIDISM: ICD-10-CM

## 2019-08-21 NOTE — PATIENT INSTRUCTIONS
Office Policies    Phone calls/patient messages:            Please allow up to 24 hours for someone in the office to contact you about your call or message. Be mindful your provider may be out of the office or your message may require further review. We encourage you to use [employer] for your messages as this is a faster, more efficient way to communicate with our office                         Medication Refills:            Prescription medications require 48-72 business hours to process. We encourage you to use [employer] for your refills. For controlled medications: Please allow 72 business hours to process. Certain medications may require you to  a written prescription at our office. NO narcotic/controlled medications will be prescribed after 4pm Monday through Friday or on weekends              Form/Paperwork Completion:            Please note a $25 fee may incur for all paperwork for completed by our providers. We ask that you allow 7-10 business days. Pre-payment is due prior to picking up/faxing the completed form. You may also download your forms to [employer] to have your doctor print off. Angina: Care Instructions  Your Care Instructions    You have a problem called angina. Angina happens when there is not enough blood flow to your heart muscle. Angina is a sign of coronary artery disease (CAD). CAD occurs when blood vessels that supply the heart become narrowed. Having CAD increases your risk of a heart attack. Chest pain or pressure is the most common symptom of angina. But some people have other symptoms, like:  · Pain, pressure, or a strange feeling in the back, neck, jaw, or upper belly, or in one or both shoulders or arms. · Shortness of breath. · Nausea or vomiting. · Lightheadedness or sudden weakness. · Fast or irregular heartbeat. Women are somewhat more likely than men to have angina symptoms like shortness of breath, nausea, and back or jaw pain.   Angina can be dangerous. That's why it is important to pay attention to your symptoms. Know what is typical for you, learn how to control your symptoms, and understand when you need to get treatment. A change in your usual pattern of symptoms is an emergency. It may mean that you are having a heart attack. The doctor has checked you carefully, but problems can develop later. If you notice any problems or new symptoms, get medical treatment right away. Follow-up care is a key part of your treatment and safety. Be sure to make and go to all appointments, and call your doctor if you are having problems. It's also a good idea to know your test results and keep a list of the medicines you take. How can you care for yourself at home? Medicines    · If your doctor has given you nitroglycerin for angina symptoms, keep it with you at all times. If you have symptoms, sit down and rest, and take the first dose of nitroglycerin as directed. If your symptoms get worse or are not getting better within 5 minutes, call 911 right away. Stay on the phone. The emergency  will give you further instructions.     · If your doctor advises it, take 1 low-dose aspirin a day to prevent heart attack.     · Be safe with medicines. Take your medicines exactly as prescribed. Call your doctor if you think you are having a problem with your medicine. You will get more details on the specific medicines your doctor prescribes.    Lifestyle changes    · Do not smoke. If you need help quitting, talk to your doctor about stop-smoking programs and medicines. These can increase your chances of quitting for good.     · Eat a heart-healthy diet that is low in saturated fat and salt, and is high in fiber. Talk to your doctor or a dietitian about healthy eating.     · Stay at a healthy weight. Or lose weight if you need to.    Activity    · Talk to your doctor about a level of activity that is safe for you.     · If an activity causes angina symptoms, stop and rest.   When should you call for help? Call 911 anytime you think you may need emergency care. For example, call if:    · You passed out (lost consciousness).     · You have symptoms of a heart attack. These may include:  ? Chest pain or pressure, or a strange feeling in the chest.  ? Sweating. ? Shortness of breath. ? Nausea or vomiting. ? Pain, pressure, or a strange feeling in the back, neck, jaw, or upper belly or in one or both shoulders or arms. ? Lightheadedness or sudden weakness. ? A fast or irregular heartbeat. After you call 911, the  may tell you to chew 1 adult-strength or 2 to 4 low-dose aspirin. Wait for an ambulance. Do not try to drive yourself.     · You have angina symptoms that do not go away with rest or are not getting better within 5 minutes after you take a dose of nitroglycerin.    Call your doctor now or seek immediate medical care if:    · You are having angina symptoms more often than usual, or they are different or worse than usual.     · You feel dizzy or lightheaded, or you feel like you may faint.    Watch closely for changes in your health, and be sure to contact your doctor if you have any problems. Where can you learn more? Go to http://compa-lucia.info/. Enter H129 in the search box to learn more about \"Angina: Care Instructions. \"  Current as of: July 22, 2018  Content Version: 12.1  © 6953-6470 [employer]wise, [employer]. Care instructions adapted under license by [employer] (which disclaims liability or warranty for this information). If you have questions about a medical condition or this instruction, always ask your healthcare professional. [place] [address] any warranty or liability for your use of this information.

## 2019-08-21 NOTE — PROGRESS NOTES
Reviewed record in preparation for visit and have obtained necessary documentation. Identified pt with two pt identifiers(name and ). Chief Complaint   Patient presents with   [facility] Follow Up     for SOB       Health Maintenance Due   Topic Date Due    DTaP/Tdap/Td series (1 - Tdap) 1974    Shingrix Vaccine Age 50> (1 of 2) 2003    Influenza Age 5 to Adult  2019       Mr. Julissa Roy has a reminder for a \"due or due soon\" health maintenance. I have asked that he discuss health maintenance topic(s) due with His  primary care provider. Coordination of Care Questionnaire:  :     1) Have you been to an emergency room, urgent care clinic since your last visit? no   Hospitalized since your last visit? yes             2) Have you seen or consulted any other health care providers outside of [address] [place] [address] since your last visit? no  (Include any pap smears or colon screenings in this section.)    3) Do you have an Advance Directive on file? no    4) Are you interested in receiving information on Advance Directives? NO    Patient is accompanied by self I have received verbal consent from Yoli Lawson to discuss any/all medical information while they are present in the room.

## 2019-08-21 NOTE — PROGRESS NOTES
Pablo Srivastava is a 75 y.o. male who presents for evaluation of transition of care. Last seen by me may 3, 2019 in npv. Was inpt [facility] aug 6-10 with cp/sob. cxr with mild pleural effusions. nt pro bnp 700. Received lasix. Underwent stress test, which he failed, had ST dep and echo 44%. Then had heart cath that looked fine, no intervention needed. D/c to home and has done well since. Weight down 7 lbs from last visit with me.       ROS:  Constitutional: negative for fevers, chills, anorexia and weight loss  Eyes:   negative for visual disturbance and irritation  ENT:   negative for tinnitus,sore throat,nasal congestion,ear pain,hoarseness  Respiratory:  negative for cough, hemoptysis, dyspnea,wheezing  CV:   negative for chest pain, palpitations, lower extremity edema  GI:   negative for nausea, vomiting, diarrhea, abdominal pain,melena  Genitourinary: negative for frequency, dysuria and hematuria  Musculoskel: negative for myalgias, arthralgias, back pain, muscle weakness, joint pain  Neurological:  negative for headaches, dizziness, focal weakness, numbness  Psychiatric:     Negative for depression or anxiety      Past Medical History:   Diagnosis Date    CAD (coronary artery disease)     h/o stent- '10    Cancer [facility]) 2010    prostate    Hypertension     Stroke [facility]) May or June 2010    lacunar  infarctions (found on scan- no sxs)    Thyroid disease     hypo       Past Surgical History:   Procedure Laterality Date    CARDIAC SURG PROCEDURE UNLIST  2010    coronary stent    EXCIS KNEE CARTILAGE,MEDIAL OR LAT  1980's x3    HX CHOLECYSTECTOMY  1991    HX HEENT  1962    T&A    HX HERNIA REPAIR  1996    HX MALIGNANT SKIN LESION EXCISION      2004 and 2017    HX ORTHOPAEDIC  1974    Ganglion cyst left wrist    HX ORTHOPAEDIC Left 2017    shoulder repair    HX PROSTATECTOMY  2010       Family History   Problem Relation Age of Onset    Dementia Mother    Marixa Robles Other Father 55        cerebral hemorrhage       Social History     Socioeconomic History    Marital status:      Spouse name: Not on file    Number of children: Not on file    Years of education: Not on file    Highest education level: Not on file   Occupational History    Not on file   Social Needs    Financial resource strain: Not on file    Food insecurity:     Worry: Not on file     Inability: Not on file    Transportation needs:     Medical: Not on file     Non-medical: Not on file   Tobacco Use    Smoking status: Never Smoker    Smokeless tobacco: Current User   Substance and Sexual Activity    Alcohol use: Yes     Comment: occas    Drug use: Never    Sexual activity: Not Currently   Lifestyle    Physical activity:     Days per week: Not on file     Minutes per session: Not on file    Stress: Not on file   Relationships    Social connections:     Talks on phone: Not on file     Gets together: Not on file     Attends Restorationism service: Not on file     Active member of club or organization: Not on file     Attends meetings of clubs or organizations: Not on file     Relationship status: Not on file    Intimate partner violence:     Fear of current or ex partner: Not on file     Emotionally abused: Not on file     Physically abused: Not on file     Forced sexual activity: Not on file   Other Topics Concern    Not on file   Social History Narrative    Not on file            Visit Vitals  /69 (BP 1 Location: Left arm, BP Patient Position: Sitting)   Pulse 62   Temp 98.5 °F (36.9 °C) (Oral)   Resp 18   Ht 6' (1.829 m)   Wt 242 lb (109.8 kg)   SpO2 95%   BMI 32.82 kg/m²       Physical Examination:   General - Well appearing male  HEENT - PERRL, TM no erythema/opacification, normal nasal turbinates, no oropharyngeal erythema or exudate, MMM  Neck - supple, no bruits, no thyroidomegaly, no lymphadenopathy  Pulm - clear to auscultation bilaterally  Cardio - RRR, normal S1 S2, no murmur  Abd - soft, nontender, no masses, no HSM  Extrem - no edema, +2 distal pulses  Neuro-  No focal deficits, CN intact     Assessment/Plan:    1. Acute systolic chf--heart cath was fine, no edema on exam, not on any diuretics now, though did receive lasix for a few days while in hospital.  Saw dr Batool Vargas last week already. 2.  Hx cad with stents--on asa. Heart cath looked clear  3.  htn--controlled now, on coreg, norvasc, tekturna  4. Hx lacunar cva--on asa  5. Hx prostate cancer--sp prostatecomy  6.  hyperlipids--on zetia now. Last   7.   Tobacco use--chews, does not smoke    rtc for regular visit        Jose G Marcum III, DO

## 2019-08-27 ENCOUNTER — PATIENT OUTREACH (OUTPATIENT)
Dept: FAMILY MEDICINE CLINIC | Age: 64
End: 2019-08-27

## 2019-09-10 ENCOUNTER — PATIENT OUTREACH (OUTPATIENT)
Dept: FAMILY MEDICINE CLINIC | Age: 64
End: 2019-09-10

## 2019-09-10 NOTE — PROGRESS NOTES
Goals      Attend follow up appointments on schedule      8/12/19 Patient is currently scheduled to see PCP on 8/21/19 @ 1130 and cardio on 8/16/19 @ 1045. Patient will report attendance at next week outreach. EVANS    8/20/19 Patient declined NN services. Kelly with [facility] reports patient attended appointment on 8/16/19. Will monitor chart for attendance of PCP appointment on 8/21/19. EVANS    8/27/19 Per review of chart patient attended PCP appointment on 8/21/19. EVANS    9/10/19 Per review of chart patient has not been to ED or had a hospitalization at this time. Will continue to monitor chart. Due to patient declining services. EVANS.

## 2019-10-10 ENCOUNTER — PATIENT OUTREACH (OUTPATIENT)
Dept: FAMILY MEDICINE CLINIC | Age: 64
End: 2019-10-10

## 2020-01-03 ENCOUNTER — APPOINTMENT (OUTPATIENT)
Dept: GENERAL RADIOLOGY | Age: 65
End: 2020-01-03
Attending: EMERGENCY MEDICINE
Payer: MEDICARE

## 2020-01-03 ENCOUNTER — OFFICE VISIT (OUTPATIENT)
Dept: INTERNAL MEDICINE CLINIC | Age: 65
End: 2020-01-03

## 2020-01-03 ENCOUNTER — HOSPITAL ENCOUNTER (EMERGENCY)
Age: 65
Discharge: HOME | End: 2020-01-03
Attending: EMERGENCY MEDICINE
Payer: MEDICARE

## 2020-01-03 VITALS
SYSTOLIC BLOOD PRESSURE: 145 MMHG | RESPIRATION RATE: 18 BRPM | BODY MASS INDEX: 34.27 KG/M2 | DIASTOLIC BLOOD PRESSURE: 85 MMHG | OXYGEN SATURATION: 97 % | HEART RATE: 74 BPM | WEIGHT: 253 LBS | TEMPERATURE: 98 F | HEIGHT: 72 IN

## 2020-01-03 VITALS
TEMPERATURE: 98.8 F | OXYGEN SATURATION: 93 % | HEIGHT: 72 IN | DIASTOLIC BLOOD PRESSURE: 71 MMHG | HEART RATE: 85 BPM | SYSTOLIC BLOOD PRESSURE: 150 MMHG | RESPIRATION RATE: 15 BRPM | BODY MASS INDEX: 34.1 KG/M2 | WEIGHT: 251.77 LBS

## 2020-01-03 DIAGNOSIS — R06.02 SOB (SHORTNESS OF BREATH): Primary | ICD-10-CM

## 2020-01-03 DIAGNOSIS — R06.09 DYSPNEA ON EXERTION: ICD-10-CM

## 2020-01-03 DIAGNOSIS — E87.6 HYPOKALEMIA: ICD-10-CM

## 2020-01-03 DIAGNOSIS — R53.83 FATIGUE, UNSPECIFIED TYPE: ICD-10-CM

## 2020-01-03 DIAGNOSIS — I48.19 PERSISTENT ATRIAL FIBRILLATION (HCC): Primary | ICD-10-CM

## 2020-01-03 DIAGNOSIS — I49.9 IRREGULAR HEART BEAT: ICD-10-CM

## 2020-01-03 LAB
ALBUMIN SERPL-MCNC: 4 G/DL (ref 3.5–5)
ALBUMIN/GLOB SERPL: 1.2 {RATIO} (ref 1.1–2.2)
ALP SERPL-CCNC: 59 U/L (ref 45–117)
ALT SERPL-CCNC: 35 U/L (ref 12–78)
ANION GAP SERPL CALC-SCNC: 5 MMOL/L (ref 5–15)
AST SERPL-CCNC: 19 U/L (ref 15–37)
BASOPHILS # BLD: 0.1 K/UL (ref 0–0.1)
BASOPHILS NFR BLD: 1 % (ref 0–1)
BILIRUB SERPL-MCNC: 0.6 MG/DL (ref 0.2–1)
BNP SERPL-MCNC: 1100 PG/ML
BUN SERPL-MCNC: 14 MG/DL (ref 6–20)
BUN/CREAT SERPL: 16 (ref 12–20)
CALCIUM SERPL-MCNC: 8.7 MG/DL (ref 8.5–10.1)
CHLORIDE SERPL-SCNC: 106 MMOL/L (ref 97–108)
CK SERPL-CCNC: 96 U/L (ref 39–308)
CO2 SERPL-SCNC: 29 MMOL/L (ref 21–32)
CREAT SERPL-MCNC: 0.87 MG/DL (ref 0.7–1.3)
DIFFERENTIAL METHOD BLD: ABNORMAL
EOSINOPHIL # BLD: 0.4 K/UL (ref 0–0.4)
EOSINOPHIL NFR BLD: 5 % (ref 0–7)
ERYTHROCYTE [DISTWIDTH] IN BLOOD BY AUTOMATED COUNT: 12.5 % (ref 11.5–14.5)
GLOBULIN SER CALC-MCNC: 3.4 G/DL (ref 2–4)
GLUCOSE SERPL-MCNC: 123 MG/DL (ref 65–100)
HCT VFR BLD AUTO: 42.1 % (ref 36.6–50.3)
HGB BLD-MCNC: 14 G/DL (ref 12.1–17)
IMM GRANULOCYTES # BLD AUTO: 0 K/UL (ref 0–0.04)
IMM GRANULOCYTES NFR BLD AUTO: 1 % (ref 0–0.5)
INR PPP: 1.1 (ref 0.9–1.1)
LYMPHOCYTES # BLD: 1.9 K/UL (ref 0.8–3.5)
LYMPHOCYTES NFR BLD: 29 % (ref 12–49)
MCH RBC QN AUTO: 31.7 PG (ref 26–34)
MCHC RBC AUTO-ENTMCNC: 33.3 G/DL (ref 30–36.5)
MCV RBC AUTO: 95.5 FL (ref 80–99)
MONOCYTES # BLD: 0.5 K/UL (ref 0–1)
MONOCYTES NFR BLD: 8 % (ref 5–13)
NEUTS SEG # BLD: 3.8 K/UL (ref 1.8–8)
NEUTS SEG NFR BLD: 56 % (ref 32–75)
NRBC # BLD: 0 K/UL (ref 0–0.01)
NRBC BLD-RTO: 0 PER 100 WBC
PLATELET # BLD AUTO: 223 K/UL (ref 150–400)
PMV BLD AUTO: 11.1 FL (ref 8.9–12.9)
POTASSIUM SERPL-SCNC: 3.3 MMOL/L (ref 3.5–5.1)
PROT SERPL-MCNC: 7.4 G/DL (ref 6.4–8.2)
PROTHROMBIN TIME: 10.9 SEC (ref 9–11.1)
RBC # BLD AUTO: 4.41 M/UL (ref 4.1–5.7)
SODIUM SERPL-SCNC: 140 MMOL/L (ref 136–145)
TROPONIN I SERPL-MCNC: <0.05 NG/ML
WBC # BLD AUTO: 6.6 K/UL (ref 4.1–11.1)

## 2020-01-03 PROCEDURE — 71046 X-RAY EXAM CHEST 2 VIEWS: CPT

## 2020-01-03 PROCEDURE — 93005 ELECTROCARDIOGRAM TRACING: CPT

## 2020-01-03 PROCEDURE — 99285 EMERGENCY DEPT VISIT HI MDM: CPT

## 2020-01-03 PROCEDURE — 83880 ASSAY OF NATRIURETIC PEPTIDE: CPT

## 2020-01-03 PROCEDURE — 74011250637 HC RX REV CODE- 250/637: Performed by: EMERGENCY MEDICINE

## 2020-01-03 PROCEDURE — 80053 COMPREHEN METABOLIC PANEL: CPT

## 2020-01-03 PROCEDURE — 85610 PROTHROMBIN TIME: CPT

## 2020-01-03 PROCEDURE — 84484 ASSAY OF TROPONIN QUANT: CPT

## 2020-01-03 PROCEDURE — 36415 COLL VENOUS BLD VENIPUNCTURE: CPT

## 2020-01-03 PROCEDURE — 85025 COMPLETE CBC W/AUTO DIFF WBC: CPT

## 2020-01-03 PROCEDURE — 82550 ASSAY OF CK (CPK): CPT

## 2020-01-03 RX ORDER — FUROSEMIDE 40 MG/1
20 TABLET ORAL
Status: COMPLETED | OUTPATIENT
Start: 2020-01-03 | End: 2020-01-03

## 2020-01-03 RX ADMIN — POTASSIUM BICARBONATE 40 MEQ: 782 TABLET, EFFERVESCENT ORAL at 13:25

## 2020-01-03 RX ADMIN — FUROSEMIDE 20 MG: 40 TABLET ORAL at 13:25

## 2020-01-03 NOTE — PROGRESS NOTES
SUBJECTIVE:   Mr. Sidra Serrano is a 75 y.o. male who is here c/o SOB and fatigue. HTN: Pt's BP in the office today was elevated at 145/85. Pt is compliant in taking lisinopril 40 mg tablet every day, Tekturna 300 mg tablet every day and carvedilol 25 mg tablet every day. He has not been taking his amlodipine 5 mg tablet because the medication changed manufacturers. He noticed SOB, chest discomfort, fatigue and dizzy spells immediately after taking his medication for the past few days. He has not taken any of his medication today and does not feel any of those sx. Patient denies chest pain, DENG/SOB, edema, headache, visual changes, dizziness, palpitations or syncope. He notes that he has experienced intermittent palpitations in the past. Pt is scheduled to see Dr. Abby Naidu (cardiology) in a week. Pt is compliant with levothyroxine 50 mcg tablet every day. Pt notes that he has had some sinus problems recently and was taking Mucinex for management. He stopped taking Mucinex when these sx began to determine if it was causing the dizziness and SOB. Pt's PCP is Dr. Margarita Mckeon. At this time, he is otherwise doing well and has brought no other complaints to my attention today. PMH:   Past Medical History:   Diagnosis Date    CAD (coronary artery disease)     h/o stent- '10    Cancer [facility]) 2010    prostate    Hypertension     Stroke [facility]) May or June 2010    lacunar  infarctions (found on scan- no sxs)    Thyroid disease     hypo     PSH:  has a past surgical history that includes hx prostatectomy (2010); hx cholecystectomy (1991); pr cardiac surg procedure unlist (2010); pr excis knee cartilage,medial or lat (1980's x3); hx orthopaedic (1974); hx orthopaedic (Left, 2017); hx hernia repair (1996); hx heent (1962); and hx malignant skin lesion excision. All: is allergic to statins-hmg-coa reductase inhibitors and zetia [ezetimibe].    MEDS:   Current Outpatient Medications   Medication Sig    ibuprofen (MOTRIN) 200 mg tablet Take 600 mg by mouth nightly.  aliskiren (TEKTURNA) 300 mg tablet Take 1 Tab by mouth nightly. Start if blood pressure remain >=140 upper or 90/100 lower    amLODIPine (NORVASC) 5 mg tablet Take 1 Tab by mouth daily.  carvedilol (COREG) 25 mg tablet Take 1 Tab by mouth two (2) times daily (with meals).  levothyroxine (SYNTHROID) 50 mcg tablet Take 1 Tab by mouth Daily (before breakfast).  lisinopril (PRINIVIL, ZESTRIL) 40 mg tablet Take 1 Tab by mouth two (2) times a day. Start if blood pressure remain >=140 upper or 90/100 lower    pantoprazole (PROTONIX) 40 mg tablet Take 1 Tab by mouth daily.  aspirin (ASPIRIN) 325 mg tablet Take 325 mg by mouth nightly.  cholecalciferol, vitamin D3, (VITAMIN D3) 2,000 unit tab Take 2,000 Units by mouth daily.  MULTI-VITAMIN HI-PO PO Take 1 Tab by mouth daily.  ezetimibe (ZETIA) 10 mg tablet Take 1 Tab by mouth daily. (Patient not taking: Reported on 1/3/2020)     No current facility-administered medications for this visit. FH: family history includes Dementia in his mother; Other (age of onset: 55) in his father. SH:  reports that he has never smoked. He uses smokeless tobacco. He reports current alcohol use. He reports that he does not use drugs. Review of Systems - History obtained from the patient  General ROS: +fatigue. Psychological ROS: negative  Ophthalmic ROS: negative  ENT ROS: negative  Respiratory ROS: +SOB.  no cough or wheezing  Cardiovascular ROS: no chest pain or dyspnea on exertion  Gastrointestinal ROS: no abdominal pain, change in bowel habits, or black or bloody stools  Genito-Urinary ROS: negative  Musculoskeletal ROS: negative  Neurological ROS: negative  Dermatological ROS: negative    OBJECTIVE:   Vitals:   Visit Vitals  /85 (BP 1 Location: Left arm, BP Patient Position: Sitting)   Pulse 74   Temp 98 °F (36.7 °C) (Oral)   Resp 18   Ht 6' (1.829 m)   Wt 253 lb (114.8 kg)   SpO2 97%   BMI 34.31 kg/m²      Gen: Pleasant 75 y.o.  male in NAD. HEENT: NC/AT. HEART: +irregular rhythm to auscultation. No M/G/R.   LUNGS: CTAB No W/R. EXTREMITIES: Warm. No C/C/E.   NEURO: Alert and oriented x 3. Cranial nerves grossly intact. No focal sensory or motor deficits noted. SKIN: Warm. Dry. No rashes or other lesions noted. ASSESSMENT/ PLAN:     Diagnoses and all orders for this visit:    1. SOB (shortness of breath)  -     METABOLIC PANEL, COMPREHENSIVE  -     CBC WITH AUTOMATED DIFF    2. Fatigue, unspecified type  -     METABOLIC PANEL, COMPREHENSIVE  -     CBC WITH AUTOMATED DIFF    3. Irregular heart beat  -     AMB POC EKG ROUTINE W/ 12 LEADS, INTER & REP    1. SOB  Check CMP and CBC. 2. Fatigue  Check CMP and CBC. I advised pt to continue his BP medication and hold amlodipine. He will record his BP at home and report back to the office his recordings to see how holding amlodipine affects his BP.       3. Irregular Heart Beat  I ordered an EKG which revealed atrial fibrillation. Pt was sent to the ER for further evaluation. Follow-up and Dispositions    · Return in about 1 month (around 2/3/2020) for follow up PCP. I have reviewed the patient's medications and risks/side effects/benefits were discussed. Diagnosis(-es) explained to patient and questions answered. Literature provided where appropriate.      Written by Baldo Marquez, as dictated by Avani Brown MD.

## 2020-01-03 NOTE — ED NOTES
Dr. Iker Henderson reviewed discharge instructions with the patient. The patient verbalized understanding. All questions and concerns were addressed. The patient declined a wheelchair and is discharged ambulatory in the care of family members with instructions and prescriptions in hand. Pt is alert and oriented x 4. Respirations are clear and unlabored.

## 2020-01-03 NOTE — ED PROVIDER NOTES
EMERGENCY DEPARTMENT HISTORY AND PHYSICAL EXAM      Date: 1/3/2020  Patient Name: Sidra Serrano    History of Presenting Illness     Chief Complaint   Patient presents with    Irregular Heart Beat     sent by PCP for irregular HR; c/o chest pain and DENG onset 1 week       History Provided By: Patient    HPI: Sidra Serrano, 75 y.o. male presents to the ED with a history of hypertension, hypercholesterolemia, chewing tobacco and increase in hard liquor use over the holidays here after referral from primary care doctor with concern for new onset atrial fibrillation. Patient relays a history of 2 weeks of shortness of breath and dyspnea on exertion, especially when he is hunting in the woods and walking up hills. He intermittently feels a pressure in his chest.  He is having no nighttime symptoms or increased swelling in his legs or increased urination. He is otherwise feeling fine without fever or cough. He does not have a history of atrial fibrillation. He does drink hard liquor at baseline but increased over the holidays, stating he drinks about 1/5 of alcohol a day. He has no prior heart attack history or heart issues that he is aware of but does see Dr. Saulo Louis. There are no other complaints, changes, or physical findings at this time. PCP: Estee Colvin, DO    No current facility-administered medications on file prior to encounter. Current Outpatient Medications on File Prior to Encounter   Medication Sig Dispense Refill    ezetimibe (ZETIA) 10 mg tablet Take 1 Tab by mouth daily. (Patient not taking: Reported on 1/3/2020) 30 Tab 12    ibuprofen (MOTRIN) 200 mg tablet Take 600 mg by mouth nightly.  aliskiren (TEKTURNA) 300 mg tablet Take 1 Tab by mouth nightly. Start if blood pressure remain >=140 upper or 90/100 lower 90 Tab 3    amLODIPine (NORVASC) 5 mg tablet Take 1 Tab by mouth daily.  90 Tab 3    carvedilol (COREG) 25 mg tablet Take 1 Tab by mouth two (2) times daily (with meals). 180 Tab 3    levothyroxine (SYNTHROID) 50 mcg tablet Take 1 Tab by mouth Daily (before breakfast). 90 Tab 3    lisinopril (PRINIVIL, ZESTRIL) 40 mg tablet Take 1 Tab by mouth two (2) times a day. Start if blood pressure remain >=140 upper or 90/100 lower 180 Tab 3    pantoprazole (PROTONIX) 40 mg tablet Take 1 Tab by mouth daily. 90 Tab 3    aspirin (ASPIRIN) 325 mg tablet Take 325 mg by mouth nightly.  cholecalciferol, vitamin D3, (VITAMIN D3) 2,000 unit tab Take 2,000 Units by mouth daily.  MULTI-VITAMIN HI-PO PO Take 1 Tab by mouth daily. Past History     Past Medical History:  Past Medical History:   Diagnosis Date    CAD (coronary artery disease)     h/o stent- '10    Cancer [facility]) 2010    prostate    Hypertension     Stroke [facility]) May or June 2010    lacunar  infarctions (found on scan- no sxs)    Thyroid disease     hypo       Past Surgical History:  Past Surgical History:   Procedure Laterality Date    CARDIAC SURG PROCEDURE UNLIST  2010    coronary stent    EXCIS KNEE CARTILAGE,MEDIAL OR LAT  1980's x3    HX CHOLECYSTECTOMY  1991    HX HEENT  1962    T&A    HX HERNIA REPAIR  1996    HX MALIGNANT SKIN LESION EXCISION      2004 and 2017    HX ORTHOPAEDIC  1974    Ganglion cyst left wrist    HX ORTHOPAEDIC Left 2017    shoulder repair    HX PROSTATECTOMY  2010       Family History:  Family History   Problem Relation Age of Onset    Dementia Mother     Other Father 55        cerebral hemorrhage       Social History:  Social History     Tobacco Use    Smoking status: Never Smoker    Smokeless tobacco: Current User   Substance Use Topics    Alcohol use: Yes     Comment: occas    Drug use: Never       Allergies: Allergies   Allergen Reactions    Statins-Hmg-Coa Reductase Inhibitors Other (comments)     Muscle and Joint pains    Zetia [Ezetimibe] Myalgia     Pt reports getting joint and muscle pain.           Review of Systems   Review of Systems Constitutional: Negative. HENT: Negative. Respiratory: Positive for chest tightness and shortness of breath. Negative for cough, wheezing and stridor. Cardiovascular: Positive for chest pain. Negative for palpitations and leg swelling. Gastrointestinal: Negative for abdominal distention and abdominal pain. Genitourinary: Negative for difficulty urinating. Musculoskeletal: Negative for arthralgias and neck pain. Skin: Negative for rash. Neurological: Negative for syncope, weakness and numbness. Hematological: Does not bruise/bleed easily. Patient states has no history of GI bleeding or stomach ulcers or bleeding disorders of any kind. Denies any blood in his urine. All other systems reviewed and are negative. Physical Exam   Physical Exam   Vital signs and nursing notes reviewed    CONSTITUTIONAL: Alert, in mild distress; well-developed; well-nourished. Obese body habitus. HEAD:  Normocephalic, atraumatic  EYES: PERRL; EOM's intact. ENTM: Nose: no rhinorrhea; Throat: no erythema or exudate, mucous membranes moist  Neck:  Supple. trachea is midline. No JVD, no carotid bruits. RESP: Chest clear, equal breath sounds. - W/R/R  CV: S1 and S2 WNL; No murmurs, gallops or rubs. 2+ radial and DP pulses bilaterally. GI: non-distended, normal bowel sounds, abdomen soft and non-tender. No masses or organomegaly. : No costo-vertebral angle tenderness. BACK:  Non-tender, normal appearance  UPPER EXT:  Normal inspection. no joint or soft tissue swelling  LOWER EXT: Trace peripheral edema, no calf tenderness. NEURO: Alert and oriented x3, 5/5 strength and light touch sensation intact in bilateral upper and lower extremities. SKIN: No rashes;  Warm and dry  PSYCH: Normal mood, normal affect    Diagnostic Study Results     Labs -     Recent Results (from the past 12 hour(s))   EKG, 12 LEAD, INITIAL    Collection Time: 01/03/20 11:56 AM   Result Value Ref Range    Ventricular Rate 95 BPM    Atrial Rate 76 BPM    QRS Duration 100 ms    Q-T Interval 354 ms    QTC Calculation (Bezet) 444 ms    Calculated R Axis 40 degrees    Calculated T Axis 38 degrees    Diagnosis       Atrial fibrillation  Nonspecific ST abnormality  When compared with ECG of 07-AUG-2019 10:11,  Atrial fibrillation has replaced Sinus rhythm  Vent. rate has increased BY  37 BPM     METABOLIC PANEL, COMPREHENSIVE    Collection Time: 01/03/20 12:19 PM   Result Value Ref Range    Sodium 140 136 - 145 mmol/L    Potassium 3.3 (L) 3.5 - 5.1 mmol/L    Chloride 106 97 - 108 mmol/L    CO2 29 21 - 32 mmol/L    Anion gap 5 5 - 15 mmol/L    Glucose 123 (H) 65 - 100 mg/dL    BUN 14 6 - 20 MG/DL    Creatinine 0.87 0.70 - 1.30 MG/DL    BUN/Creatinine ratio 16 12 - 20      GFR est AA >60 >60 ml/min/1.73m2    GFR est non-AA >60 >60 ml/min/1.73m2    Calcium 8.7 8.5 - 10.1 MG/DL    Bilirubin, total 0.6 0.2 - 1.0 MG/DL    ALT (SGPT) 35 12 - 78 U/L    AST (SGOT) 19 15 - 37 U/L    Alk. phosphatase 59 45 - 117 U/L    Protein, total 7.4 6.4 - 8.2 g/dL    Albumin 4.0 3.5 - 5.0 g/dL    Globulin 3.4 2.0 - 4.0 g/dL    A-G Ratio 1.2 1.1 - 2.2     CBC WITH AUTOMATED DIFF    Collection Time: 01/03/20 12:19 PM   Result Value Ref Range    WBC 6.6 4.1 - 11.1 K/uL    RBC 4.41 4.10 - 5.70 M/uL    HGB 14.0 12.1 - 17.0 g/dL    HCT 42.1 36.6 - 50.3 %    MCV 95.5 80.0 - 99.0 FL    MCH 31.7 26.0 - 34.0 PG    MCHC 33.3 30.0 - 36.5 g/dL    RDW 12.5 11.5 - 14.5 %    PLATELET 122 433 - 467 K/uL    MPV 11.1 8.9 - 12.9 FL    NRBC 0.0 0  WBC    ABSOLUTE NRBC 0.00 0.00 - 0.01 K/uL    NEUTROPHILS 56 32 - 75 %    LYMPHOCYTES 29 12 - 49 %    MONOCYTES 8 5 - 13 %    EOSINOPHILS 5 0 - 7 %    BASOPHILS 1 0 - 1 %    IMMATURE GRANULOCYTES 1 (H) 0.0 - 0.5 %    ABS. NEUTROPHILS 3.8 1.8 - 8.0 K/UL    ABS. LYMPHOCYTES 1.9 0.8 - 3.5 K/UL    ABS. MONOCYTES 0.5 0.0 - 1.0 K/UL    ABS. EOSINOPHILS 0.4 0.0 - 0.4 K/UL    ABS. BASOPHILS 0.1 0.0 - 0.1 K/UL    ABS. IMM.  GRANS. 0.0 0.00 - 0.04 K/UL    DF AUTOMATED     TROPONIN I    Collection Time: 01/03/20 12:19 PM   Result Value Ref Range    Troponin-I, Qt. <0.05 <0.05 ng/mL   CK W/ REFLX CKMB    Collection Time: 01/03/20 12:19 PM   Result Value Ref Range    CK 96 39 - 308 U/L   NT-PRO BNP    Collection Time: 01/03/20 12:19 PM   Result Value Ref Range    NT pro-BNP 1,100 (H) <125 PG/ML       Radiologic Studies -   XR CHEST PA LAT   Final Result   IMPRESSION:       Cardiomegaly. No acute process. CT Results  (Last 48 hours)    None        CXR Results  (Last 48 hours)               01/03/20 1236  XR CHEST PA LAT Final result    Impression:  IMPRESSION:        Cardiomegaly. No acute process. Narrative:  EXAM:  XR CHEST PA LAT       INDICATION:  CP, DENG x one week       COMPARISON:  8/6/2019        FINDINGS:       PA and lateral radiographs of the chest demonstrate mild linear scarring or   chronic atelectasis in the left base, unchanged since the previous study. The   lungs are otherwise clear. There is mild to moderate enlargement of the cardiac   silhouette and mild uncoiling of the thoracic aorta. There is no vascular   congestion or pleural fluid. The bones and soft tissues are unremarkable. Medical Decision Making   I am the first provider for this patient. I reviewed the vital signs, available nursing notes, past medical history, past surgical history, family history and social history. Vital Signs-Reviewed the patient's vital signs. Patient Vitals for the past 12 hrs:   Temp Pulse Resp BP SpO2   01/03/20 1434  85 15  93 %   01/03/20 1333  (!) 104 21 150/71 95 %   01/03/20 1153 98.8 °F (37.1 °C) (!) 103 18  97 %       EKG interpretation: (Preliminary)  EKG performed at 11:56 AM, as interpreted by me shows an atrial fib rhythm at a rate of 95, normal axis, normal QRS intervals, no visible acute ischemic changes.     Records Reviewed: Nursing Notes and Old Medical Records    Provider Notes (Medical Decision Making):   57-year-old male with new atrial fibrillation, not requiring rate control today but given prolonged likely 2-week period of symptoms, will start on anticoagulation. Repleted potassium here and provided single dose of diuretic given elevated BNP to assist patient with symptoms. Plan for discharge and cardiology follow-up with Dr. Domitila Begum within the week. ED Course:   Initial assessment performed. The patients presenting problems have been discussed, and they are in agreement with the care plan formulated and outlined with them. I have encouraged them to ask questions as they arise throughout their visit. 2:57 PM  Spoke with Dr. Abhi Ariza by phone. Recommends Eliquis 5 mg twice daily. Concurs with having repleted patient's potassium. Discussed new medication with patient, need for follow-up with Dr. Domitila Begum in the next week. Plan for discharge. Disposition:  Discharge    Discharge Note:  2:57 PM  The pt is ready for discharge. The pt's signs, symptoms, diagnosis, and discharge instructions have been discussed and pt has conveyed their understanding. The pt is to follow up as recommended or return to ER should their symptoms worsen. Plan has been discussed and pt is in agreement. PLAN:  1. Current Discharge Medication List      START taking these medications    Details   apixaban (ELIQUIS) 5 mg tablet Take 1 Tab by mouth two (2) times a day for 60 doses. Qty: 60 Tab, Refills: 0           2. Follow-up Information     Follow up With Specialties Details Why Coleen Murphy MD Cardiology  Please call Dr. Blondie Koyanagi office for a follow-up appointment in the next week to follow-up with Dr. brennan razo. [address]  [id number]  [facility] [address] [phone]      [facility] EMERGENCY DEPT Emergency Medicine  If symptoms worsen including new chest pain, new shortness of breath, any new episode of bleeding or other new concerning symptoms.  [address] [address] [place] [address] [facility]  [phone]        Return to ED if worse     Diagnosis     Clinical Impression:   1. Persistent atrial fibrillation    2. Hypokalemia    3. Dyspnea on exertion        Attestations:    Jennifer Mccain MD    Please note that this dictation was completed with [employer], the computer voice recognition software. Quite often unanticipated grammatical, syntax, homophones, and other interpretive errors are inadvertently transcribed by the computer software. Please disregard these errors. Please excuse any errors that have escaped final proofreading. Thank you.

## 2020-01-03 NOTE — ED NOTES
Pt reports intermittent left side chest pain x1 week. Pt states pain is sometimes in left chest, left side or left back. Pt reports he has had several episodes of unexplained diaphoresis. Pt has hx of cardiac stent placed in 2010. Pt denies known hx of A-fib but is in A-fib this morning. Pt reports SOB with chest pain and upon exertion. Pt has clear lung sounds noted in all fields, pt has 2+ pulses noted in all extremities x4. Pt denies chest pain and Sob upon arrival to exam room. Pt is alert and oriented x4 and speaking in complete clear sentences. Pt appears to be in NAD.

## 2020-01-03 NOTE — PROGRESS NOTES
Identified pt with two pt identifiers(name and ). Reviewed record in preparation for visit and have obtained necessary documentation. Chief Complaint   Patient presents with    Breathing Problem     Pt reports having SOB when he goes hunting in the woods    Fatigue        Visit Vitals  /85 (BP 1 Location: Left arm, BP Patient Position: Sitting)   Pulse 74   Temp 98 °F (36.7 °C) (Oral)   Resp 18   Ht 6' (1.829 m)   Wt 253 lb (114.8 kg)   SpO2 97%   BMI 34.31 kg/m²       Health Maintenance Due   Topic    DTaP/Tdap/Td series (1 - Tdap)    Shingrix Vaccine Age 49> (1 of 2)    Influenza Age 5 to Adult        Med Reconciliation: Completed    Coordination of Care Questionnaire:  :   1) Have you been to an emergency room, urgent care, or hospitalized since your last visit? If yes, where when, and reason for visit? No       2. Have seen or consulted any other health care provider since your last visit? If yes, where when, and reason for visit? No       3) Do you have an Advanced Directive/ Living Will in place? No  If yes, do we have a copy on file   If no, would you like information     Patient is accompanied by self I have received verbal consent from Alejandra Moreno to discuss any/all medical information while they are present in the room.

## 2020-01-04 LAB
ATRIAL RATE: 76 BPM
CALCULATED R AXIS, ECG10: 40 DEGREES
CALCULATED T AXIS, ECG11: 38 DEGREES
DIAGNOSIS, 93000: NORMAL
Q-T INTERVAL, ECG07: 354 MS
QRS DURATION, ECG06: 100 MS
QTC CALCULATION (BEZET), ECG08: 444 MS
VENTRICULAR RATE, ECG03: 95 BPM

## 2020-01-28 ENCOUNTER — HOSPITAL ENCOUNTER (OUTPATIENT)
Dept: NON INVASIVE DIAGNOSTICS | Age: 65
Discharge: HOME | DRG: 292 | End: 2020-01-28
Attending: INTERNAL MEDICINE
Payer: MEDICARE

## 2020-01-28 VITALS
BODY MASS INDEX: 32.51 KG/M2 | DIASTOLIC BLOOD PRESSURE: 62 MMHG | HEIGHT: 72 IN | WEIGHT: 240 LBS | RESPIRATION RATE: 13 BRPM | OXYGEN SATURATION: 94 % | SYSTOLIC BLOOD PRESSURE: 115 MMHG | HEART RATE: 54 BPM

## 2020-01-28 DIAGNOSIS — I48.91 ATRIAL FIBRILLATION, UNSPECIFIED TYPE (HCC): ICD-10-CM

## 2020-01-28 LAB
ATRIAL RATE: 63 BPM
CALCULATED P AXIS, ECG09: 76 DEGREES
CALCULATED R AXIS, ECG10: 26 DEGREES
CALCULATED R AXIS, ECG10: 45 DEGREES
CALCULATED T AXIS, ECG11: 106 DEGREES
CALCULATED T AXIS, ECG11: 68 DEGREES
DIAGNOSIS, 93000: NORMAL
DIAGNOSIS, 93000: NORMAL
P-R INTERVAL, ECG05: 166 MS
Q-T INTERVAL, ECG07: 416 MS
Q-T INTERVAL, ECG07: 430 MS
QRS DURATION, ECG06: 102 MS
QRS DURATION, ECG06: 98 MS
QTC CALCULATION (BEZET), ECG08: 425 MS
QTC CALCULATION (BEZET), ECG08: 460 MS
VENTRICULAR RATE, ECG03: 63 BPM
VENTRICULAR RATE, ECG03: 69 BPM

## 2020-01-28 PROCEDURE — 99152 MOD SED SAME PHYS/QHP 5/>YRS: CPT

## 2020-01-28 PROCEDURE — 99153 MOD SED SAME PHYS/QHP EA: CPT

## 2020-01-28 PROCEDURE — 92960 CARDIOVERSION ELECTRIC EXT: CPT

## 2020-01-28 PROCEDURE — 77030018729 HC ELECTRD DEFIB PAD CARD -B

## 2020-01-28 PROCEDURE — 74011250636 HC RX REV CODE- 250/636: Performed by: INTERNAL MEDICINE

## 2020-01-28 PROCEDURE — 93005 ELECTROCARDIOGRAM TRACING: CPT

## 2020-01-28 PROCEDURE — 74011000250 HC RX REV CODE- 250: Performed by: INTERNAL MEDICINE

## 2020-01-28 RX ORDER — LIDOCAINE HYDROCHLORIDE 20 MG/ML
15 SOLUTION OROPHARYNGEAL ONCE
Status: COMPLETED | OUTPATIENT
Start: 2020-01-28 | End: 2020-01-28

## 2020-01-28 RX ORDER — DILTIAZEM HYDROCHLORIDE EXTENDED-RELEASE TABLETS 180 MG/1
180 TABLET, EXTENDED RELEASE ORAL DAILY
COMMUNITY
End: 2020-01-31

## 2020-01-28 RX ORDER — MIDAZOLAM HYDROCHLORIDE 1 MG/ML
.5-2 INJECTION, SOLUTION INTRAMUSCULAR; INTRAVENOUS
Status: DISCONTINUED | OUTPATIENT
Start: 2020-01-28 | End: 2020-01-28

## 2020-01-28 RX ADMIN — BENZOCAINE, BUTAMBEN, AND TETRACAINE HYDROCHLORIDE 1 SPRAY: .028; .004; .004 AEROSOL, SPRAY TOPICAL at 10:31

## 2020-01-28 RX ADMIN — MEPERIDINE HYDROCHLORIDE 25 MG: 50 INJECTION, SOLUTION INTRAMUSCULAR; INTRAVENOUS; SUBCUTANEOUS at 10:32

## 2020-01-28 RX ADMIN — MIDAZOLAM 2 MG: 1 INJECTION INTRAMUSCULAR; INTRAVENOUS at 10:51

## 2020-01-28 RX ADMIN — MIDAZOLAM 1 MG: 1 INJECTION INTRAMUSCULAR; INTRAVENOUS at 10:39

## 2020-01-28 RX ADMIN — MIDAZOLAM 1 MG: 1 INJECTION INTRAMUSCULAR; INTRAVENOUS at 10:32

## 2020-01-28 RX ADMIN — MIDAZOLAM 1 MG: 1 INJECTION INTRAMUSCULAR; INTRAVENOUS at 10:53

## 2020-01-28 RX ADMIN — MIDAZOLAM 0.5 MG: 1 INJECTION INTRAMUSCULAR; INTRAVENOUS at 10:55

## 2020-01-28 RX ADMIN — LIDOCAINE HYDROCHLORIDE 15 ML: 20 SOLUTION ORAL; TOPICAL at 10:30

## 2020-01-28 RX ADMIN — MEPERIDINE HYDROCHLORIDE 25 MG: 50 INJECTION, SOLUTION INTRAMUSCULAR; INTRAVENOUS; SUBCUTANEOUS at 10:39

## 2020-01-28 NOTE — PROGRESS NOTES
Patient arrived to Non-Invasive Cardiology Lab for Out Patient BELKIS and Cardioversion Procedure. Staff introduced to patient. Patient identifiers verified with Name and Date of Birth. Procedure verified with patient. Consent forms reviewed and signed by patient or authorized representative and verified. Allergies verified. Patient informed of procedure and plan of care. Questions answered with review. Patient on cardiac monitor, non-invasive blood pressure, SPO2 monitor. On room air. Patient is A&Ox3. Patient reports no complaints. Patient on stretcher, in low position, with side rails up. Patient instructed to call for assistance as needed. Family in waiting room.

## 2020-01-28 NOTE — PROCEDURES
DC CARDIOVERSION REPORT    PROCEDURE DATE:  1/28/2020    PROCEDURE PERFORMED:  Cardioversion. INDICATION:  Atrial fibrillation. BELKIS shows no evidence of intracardiac thrombus. Maintained on therapeutic anticoagulation. Moderate sedation start time: 1050  Moderate sedation stop time: 1101  Sedation used: versed/fentanyl. Sedation was administered by a cath lab nurse; I directly supervised the cath lab staff as the patient was monitored for the duration of the procedure. FINDINGS:    After informed consent of all potential complications the patient was sedated per flow sheet. After appropriate sedation was acheived, a single biphasic synchronized 300 joule shock was delivered in AP configuration. This resulted in conversion from atrial fibrillation into normal sinus rhythm. The patient remained hemodynamically stable throughout the procedure. No apparent complications. No specimens. No blood loss. No specimens/implants. CONCLUSION:  Successful cardioversion from atrial fibrillation to sinus rhythm as described above. Pt/wife know to continue anticoagulation.

## 2020-01-28 NOTE — PROGRESS NOTES
Pt sedated with 2mg Versed and 50mg Demerol for BELKIS     Pt sedated with an additional 3.5mg Versed, given 1 synchronized shock(s) at 300 Joules, Afib converted to Sinus bradycardia with PACs. (monitored sedation from 1031 to 1100)    EKG obtained

## 2020-01-28 NOTE — PROGRESS NOTES
Discharge instructions reviewed with patient and wife, Kitty Soto. Allowed adequate time to ask questions, all questions answered. Printed copy of AVS given to patient. All belongings gathered, IV and tele discontinued. Transported via wheelchair to main entrance and into care of family.

## 2020-01-29 ENCOUNTER — APPOINTMENT (OUTPATIENT)
Dept: GENERAL RADIOLOGY | Age: 65
DRG: 292 | End: 2020-01-29
Attending: INTERNAL MEDICINE
Payer: MEDICARE

## 2020-01-29 ENCOUNTER — HOSPITAL ENCOUNTER (INPATIENT)
Age: 65
LOS: 2 days | Discharge: HOME | DRG: 292 | End: 2020-01-31
Attending: EMERGENCY MEDICINE | Admitting: INTERNAL MEDICINE
Payer: MEDICARE

## 2020-01-29 ENCOUNTER — APPOINTMENT (OUTPATIENT)
Dept: GENERAL RADIOLOGY | Age: 65
DRG: 292 | End: 2020-01-29
Attending: EMERGENCY MEDICINE
Payer: MEDICARE

## 2020-01-29 DIAGNOSIS — I50.9 ACUTE CONGESTIVE HEART FAILURE, UNSPECIFIED HEART FAILURE TYPE (HCC): Primary | ICD-10-CM

## 2020-01-29 LAB
ALBUMIN SERPL-MCNC: 3.7 G/DL (ref 3.5–5)
ALBUMIN/GLOB SERPL: 1 {RATIO} (ref 1.1–2.2)
ALP SERPL-CCNC: 70 U/L (ref 45–117)
ALT SERPL-CCNC: 23 U/L (ref 12–78)
ANION GAP SERPL CALC-SCNC: 7 MMOL/L (ref 5–15)
AST SERPL-CCNC: 14 U/L (ref 15–37)
BASOPHILS # BLD: 0.1 K/UL (ref 0–0.1)
BASOPHILS NFR BLD: 1 % (ref 0–1)
BILIRUB SERPL-MCNC: 0.4 MG/DL (ref 0.2–1)
BNP SERPL-MCNC: 604 PG/ML
BUN SERPL-MCNC: 17 MG/DL (ref 6–20)
BUN/CREAT SERPL: 17 (ref 12–20)
CALCIUM SERPL-MCNC: 8.4 MG/DL (ref 8.5–10.1)
CHLORIDE SERPL-SCNC: 108 MMOL/L (ref 97–108)
CO2 SERPL-SCNC: 25 MMOL/L (ref 21–32)
CREAT SERPL-MCNC: 1.01 MG/DL (ref 0.7–1.3)
DIFFERENTIAL METHOD BLD: NORMAL
EOSINOPHIL # BLD: 0.3 K/UL (ref 0–0.4)
EOSINOPHIL NFR BLD: 3 % (ref 0–7)
ERYTHROCYTE [DISTWIDTH] IN BLOOD BY AUTOMATED COUNT: 12 % (ref 11.5–14.5)
GLOBULIN SER CALC-MCNC: 3.6 G/DL (ref 2–4)
GLUCOSE SERPL-MCNC: 109 MG/DL (ref 65–100)
HCT VFR BLD AUTO: 40.5 % (ref 36.6–50.3)
HGB BLD-MCNC: 13.7 G/DL (ref 12.1–17)
IMM GRANULOCYTES # BLD AUTO: 0 K/UL (ref 0–0.04)
IMM GRANULOCYTES NFR BLD AUTO: 0 % (ref 0–0.5)
LIPASE SERPL-CCNC: 78 U/L (ref 73–393)
LYMPHOCYTES # BLD: 1.9 K/UL (ref 0.8–3.5)
LYMPHOCYTES NFR BLD: 19 % (ref 12–49)
MCH RBC QN AUTO: 31.7 PG (ref 26–34)
MCHC RBC AUTO-ENTMCNC: 33.8 G/DL (ref 30–36.5)
MCV RBC AUTO: 93.8 FL (ref 80–99)
MONOCYTES # BLD: 0.5 K/UL (ref 0–1)
MONOCYTES NFR BLD: 5 % (ref 5–13)
NEUTS SEG # BLD: 7.3 K/UL (ref 1.8–8)
NEUTS SEG NFR BLD: 72 % (ref 32–75)
NRBC # BLD: 0 K/UL (ref 0–0.01)
NRBC BLD-RTO: 0 PER 100 WBC
PLATELET # BLD AUTO: 199 K/UL (ref 150–400)
PMV BLD AUTO: 11.8 FL (ref 8.9–12.9)
POTASSIUM SERPL-SCNC: 3.9 MMOL/L (ref 3.5–5.1)
PROT SERPL-MCNC: 7.3 G/DL (ref 6.4–8.2)
RBC # BLD AUTO: 4.32 M/UL (ref 4.1–5.7)
SODIUM SERPL-SCNC: 140 MMOL/L (ref 136–145)
TROPONIN I SERPL-MCNC: <0.05 NG/ML
TROPONIN I SERPL-MCNC: <0.05 NG/ML
WBC # BLD AUTO: 10.1 K/UL (ref 4.1–11.1)

## 2020-01-29 PROCEDURE — 83690 ASSAY OF LIPASE: CPT

## 2020-01-29 PROCEDURE — 94640 AIRWAY INHALATION TREATMENT: CPT

## 2020-01-29 PROCEDURE — 74011250636 HC RX REV CODE- 250/636: Performed by: INTERNAL MEDICINE

## 2020-01-29 PROCEDURE — 83880 ASSAY OF NATRIURETIC PEPTIDE: CPT

## 2020-01-29 PROCEDURE — 74011000250 HC RX REV CODE- 250: Performed by: EMERGENCY MEDICINE

## 2020-01-29 PROCEDURE — 77030029684 HC NEB SM VOL KT MONA -A

## 2020-01-29 PROCEDURE — 71046 X-RAY EXAM CHEST 2 VIEWS: CPT

## 2020-01-29 PROCEDURE — 99285 EMERGENCY DEPT VISIT HI MDM: CPT

## 2020-01-29 PROCEDURE — 36415 COLL VENOUS BLD VENIPUNCTURE: CPT

## 2020-01-29 PROCEDURE — 74011250637 HC RX REV CODE- 250/637: Performed by: INTERNAL MEDICINE

## 2020-01-29 PROCEDURE — 65660000000 HC RM CCU STEPDOWN

## 2020-01-29 PROCEDURE — 96374 THER/PROPH/DIAG INJ IV PUSH: CPT

## 2020-01-29 PROCEDURE — 74019 RADEX ABDOMEN 2 VIEWS: CPT

## 2020-01-29 PROCEDURE — 93005 ELECTROCARDIOGRAM TRACING: CPT

## 2020-01-29 PROCEDURE — 74011250636 HC RX REV CODE- 250/636: Performed by: EMERGENCY MEDICINE

## 2020-01-29 PROCEDURE — 80053 COMPREHEN METABOLIC PANEL: CPT

## 2020-01-29 PROCEDURE — 85025 COMPLETE CBC W/AUTO DIFF WBC: CPT

## 2020-01-29 PROCEDURE — 84484 ASSAY OF TROPONIN QUANT: CPT

## 2020-01-29 RX ORDER — LABETALOL HYDROCHLORIDE 5 MG/ML
10 INJECTION, SOLUTION INTRAVENOUS
Status: DISCONTINUED | OUTPATIENT
Start: 2020-01-29 | End: 2020-01-31 | Stop reason: HOSPADM

## 2020-01-29 RX ORDER — IBUPROFEN 200 MG
1 TABLET ORAL DAILY
Status: DISCONTINUED | OUTPATIENT
Start: 2020-01-30 | End: 2020-01-30

## 2020-01-29 RX ORDER — POLYETHYLENE GLYCOL 3350 17 G/17G
17 POWDER, FOR SOLUTION ORAL DAILY
Status: DISCONTINUED | OUTPATIENT
Start: 2020-01-29 | End: 2020-01-31 | Stop reason: HOSPADM

## 2020-01-29 RX ORDER — DILTIAZEM HYDROCHLORIDE 180 MG/1
180 CAPSULE, COATED, EXTENDED RELEASE ORAL DAILY
Status: DISCONTINUED | OUTPATIENT
Start: 2020-01-30 | End: 2020-01-30

## 2020-01-29 RX ORDER — PANTOPRAZOLE SODIUM 40 MG/1
40 TABLET, DELAYED RELEASE ORAL DAILY
Status: DISCONTINUED | OUTPATIENT
Start: 2020-01-30 | End: 2020-01-31 | Stop reason: HOSPADM

## 2020-01-29 RX ORDER — FUROSEMIDE 10 MG/ML
40 INJECTION INTRAMUSCULAR; INTRAVENOUS EVERY 12 HOURS
Status: DISCONTINUED | OUTPATIENT
Start: 2020-01-29 | End: 2020-01-31

## 2020-01-29 RX ORDER — LISINOPRIL 20 MG/1
40 TABLET ORAL 2 TIMES DAILY
Status: DISCONTINUED | OUTPATIENT
Start: 2020-01-30 | End: 2020-01-31 | Stop reason: HOSPADM

## 2020-01-29 RX ORDER — IPRATROPIUM BROMIDE AND ALBUTEROL SULFATE 2.5; .5 MG/3ML; MG/3ML
3 SOLUTION RESPIRATORY (INHALATION)
Status: COMPLETED | OUTPATIENT
Start: 2020-01-29 | End: 2020-01-29

## 2020-01-29 RX ORDER — ONDANSETRON 2 MG/ML
4 INJECTION INTRAMUSCULAR; INTRAVENOUS
Status: DISCONTINUED | OUTPATIENT
Start: 2020-01-29 | End: 2020-01-31 | Stop reason: HOSPADM

## 2020-01-29 RX ORDER — MORPHINE SULFATE 2 MG/ML
2 INJECTION, SOLUTION INTRAMUSCULAR; INTRAVENOUS
Status: DISCONTINUED | OUTPATIENT
Start: 2020-01-29 | End: 2020-01-31 | Stop reason: HOSPADM

## 2020-01-29 RX ORDER — LISINOPRIL 20 MG/1
40 TABLET ORAL 2 TIMES DAILY
Status: DISCONTINUED | OUTPATIENT
Start: 2020-01-30 | End: 2020-01-29

## 2020-01-29 RX ORDER — ALISKIREN 150 MG/1
300 TABLET, FILM COATED ORAL
Status: DISCONTINUED | OUTPATIENT
Start: 2020-01-29 | End: 2020-01-31 | Stop reason: HOSPADM

## 2020-01-29 RX ORDER — ACETAMINOPHEN 325 MG/1
650 TABLET ORAL
Status: DISCONTINUED | OUTPATIENT
Start: 2020-01-29 | End: 2020-01-29

## 2020-01-29 RX ORDER — MELATONIN
2000 DAILY
Status: DISCONTINUED | OUTPATIENT
Start: 2020-01-30 | End: 2020-01-31 | Stop reason: HOSPADM

## 2020-01-29 RX ORDER — ACETAMINOPHEN 325 MG/1
650 TABLET ORAL
Status: DISCONTINUED | OUTPATIENT
Start: 2020-01-29 | End: 2020-01-31 | Stop reason: HOSPADM

## 2020-01-29 RX ORDER — LEVOTHYROXINE SODIUM 50 UG/1
50 TABLET ORAL
Status: DISCONTINUED | OUTPATIENT
Start: 2020-01-30 | End: 2020-01-31 | Stop reason: HOSPADM

## 2020-01-29 RX ORDER — FUROSEMIDE 10 MG/ML
40 INJECTION INTRAMUSCULAR; INTRAVENOUS
Status: COMPLETED | OUTPATIENT
Start: 2020-01-29 | End: 2020-01-29

## 2020-01-29 RX ORDER — CARVEDILOL 12.5 MG/1
25 TABLET ORAL 2 TIMES DAILY WITH MEALS
Status: DISCONTINUED | OUTPATIENT
Start: 2020-01-30 | End: 2020-01-31 | Stop reason: HOSPADM

## 2020-01-29 RX ADMIN — ACETAMINOPHEN 650 MG: 325 TABLET ORAL at 23:31

## 2020-01-29 RX ADMIN — IPRATROPIUM BROMIDE AND ALBUTEROL SULFATE 3 ML: .5; 2.5 SOLUTION RESPIRATORY (INHALATION) at 17:57

## 2020-01-29 RX ADMIN — FUROSEMIDE 40 MG: 10 INJECTION, SOLUTION INTRAMUSCULAR; INTRAVENOUS at 23:34

## 2020-01-29 RX ADMIN — FUROSEMIDE 40 MG: 10 INJECTION, SOLUTION INTRAMUSCULAR; INTRAVENOUS at 18:59

## 2020-01-29 RX ADMIN — ALISKIREN 300 MG: 150 TABLET, FILM COATED ORAL at 23:31

## 2020-01-29 NOTE — ED NOTES
Assumed care of pt via triage. Pt having midsternal chest pain and SOB since this morning. Pt had a BELKIS and cardioversion done yesterday. Pt did not have any issues with the procedure and any complaints last night. Pt resting comfortably on stretcher in position of comfort. Pt in no apparent distress at this time. Call bell within reach. Side rails x2. Connected to monitor x3. Pt a/o x4. Stretcher locked in lowest position. Pt aware of plan to await for MD/PA-C/NP assessment, and pt/family verbalizes understanding. Will continue to monitor pt condition.

## 2020-01-30 ENCOUNTER — PATIENT OUTREACH (OUTPATIENT)
Dept: CASE MANAGEMENT | Age: 65
End: 2020-01-30

## 2020-01-30 LAB
ALBUMIN SERPL-MCNC: 3.6 G/DL (ref 3.5–5)
ALBUMIN/GLOB SERPL: 1.1 {RATIO} (ref 1.1–2.2)
ALP SERPL-CCNC: 64 U/L (ref 45–117)
ALT SERPL-CCNC: 18 U/L (ref 12–78)
ANION GAP SERPL CALC-SCNC: 8 MMOL/L (ref 5–15)
AST SERPL-CCNC: 11 U/L (ref 15–37)
ATRIAL RATE: 65 BPM
ATRIAL RATE: 68 BPM
BASOPHILS # BLD: 0 K/UL (ref 0–0.1)
BASOPHILS NFR BLD: 0 % (ref 0–1)
BILIRUB SERPL-MCNC: 0.9 MG/DL (ref 0.2–1)
BUN SERPL-MCNC: 15 MG/DL (ref 6–20)
BUN/CREAT SERPL: 15 (ref 12–20)
CALCIUM SERPL-MCNC: 8.3 MG/DL (ref 8.5–10.1)
CALCULATED P AXIS, ECG09: 54 DEGREES
CALCULATED P AXIS, ECG09: 85 DEGREES
CALCULATED R AXIS, ECG10: 26 DEGREES
CALCULATED R AXIS, ECG10: 34 DEGREES
CALCULATED T AXIS, ECG11: 55 DEGREES
CALCULATED T AXIS, ECG11: 61 DEGREES
CHLORIDE SERPL-SCNC: 106 MMOL/L (ref 97–108)
CK MB CFR SERPL CALC: NORMAL % (ref 0–2.5)
CK MB SERPL-MCNC: <1 NG/ML (ref 5–25)
CK SERPL-CCNC: 41 U/L (ref 39–308)
CO2 SERPL-SCNC: 27 MMOL/L (ref 21–32)
CREAT SERPL-MCNC: 1.03 MG/DL (ref 0.7–1.3)
DIAGNOSIS, 93000: NORMAL
DIAGNOSIS, 93000: NORMAL
DIFFERENTIAL METHOD BLD: ABNORMAL
EOSINOPHIL # BLD: 0.1 K/UL (ref 0–0.4)
EOSINOPHIL NFR BLD: 1 % (ref 0–7)
ERYTHROCYTE [DISTWIDTH] IN BLOOD BY AUTOMATED COUNT: 12.1 % (ref 11.5–14.5)
GLOBULIN SER CALC-MCNC: 3.3 G/DL (ref 2–4)
GLUCOSE BLD STRIP.AUTO-MCNC: 118 MG/DL (ref 65–100)
GLUCOSE SERPL-MCNC: 128 MG/DL (ref 65–100)
HCT VFR BLD AUTO: 37.8 % (ref 36.6–50.3)
HGB BLD-MCNC: 12.7 G/DL (ref 12.1–17)
IMM GRANULOCYTES # BLD AUTO: 0.1 K/UL (ref 0–0.04)
IMM GRANULOCYTES NFR BLD AUTO: 1 % (ref 0–0.5)
LYMPHOCYTES # BLD: 1.4 K/UL (ref 0.8–3.5)
LYMPHOCYTES NFR BLD: 14 % (ref 12–49)
MAGNESIUM SERPL-MCNC: 2.1 MG/DL (ref 1.6–2.4)
MCH RBC QN AUTO: 31.3 PG (ref 26–34)
MCHC RBC AUTO-ENTMCNC: 33.6 G/DL (ref 30–36.5)
MCV RBC AUTO: 93.1 FL (ref 80–99)
MONOCYTES # BLD: 0.7 K/UL (ref 0–1)
MONOCYTES NFR BLD: 7 % (ref 5–13)
NEUTS SEG # BLD: 7.6 K/UL (ref 1.8–8)
NEUTS SEG NFR BLD: 77 % (ref 32–75)
NRBC # BLD: 0 K/UL (ref 0–0.01)
NRBC BLD-RTO: 0 PER 100 WBC
P-R INTERVAL, ECG05: 164 MS
P-R INTERVAL, ECG05: 168 MS
PLATELET # BLD AUTO: 166 K/UL (ref 150–400)
PMV BLD AUTO: 11.8 FL (ref 8.9–12.9)
POTASSIUM SERPL-SCNC: 3 MMOL/L (ref 3.5–5.1)
PROT SERPL-MCNC: 6.9 G/DL (ref 6.4–8.2)
Q-T INTERVAL, ECG07: 446 MS
Q-T INTERVAL, ECG07: 468 MS
QRS DURATION, ECG06: 100 MS
QRS DURATION, ECG06: 100 MS
QTC CALCULATION (BEZET), ECG08: 463 MS
QTC CALCULATION (BEZET), ECG08: 497 MS
RBC # BLD AUTO: 4.06 M/UL (ref 4.1–5.7)
SERVICE CMNT-IMP: ABNORMAL
SODIUM SERPL-SCNC: 141 MMOL/L (ref 136–145)
TROPONIN I SERPL-MCNC: <0.05 NG/ML
TROPONIN I SERPL-MCNC: <0.05 NG/ML
TSH SERPL DL<=0.05 MIU/L-ACNC: 2.57 UIU/ML (ref 0.36–3.74)
VENTRICULAR RATE, ECG03: 65 BPM
VENTRICULAR RATE, ECG03: 68 BPM
WBC # BLD AUTO: 9.9 K/UL (ref 4.1–11.1)

## 2020-01-30 PROCEDURE — 74011250637 HC RX REV CODE- 250/637: Performed by: HOSPITALIST

## 2020-01-30 PROCEDURE — 74011250636 HC RX REV CODE- 250/636: Performed by: INTERNAL MEDICINE

## 2020-01-30 PROCEDURE — 74011000250 HC RX REV CODE- 250: Performed by: INTERNAL MEDICINE

## 2020-01-30 PROCEDURE — 36415 COLL VENOUS BLD VENIPUNCTURE: CPT

## 2020-01-30 PROCEDURE — 84443 ASSAY THYROID STIM HORMONE: CPT

## 2020-01-30 PROCEDURE — 82550 ASSAY OF CK (CPK): CPT

## 2020-01-30 PROCEDURE — 74011250637 HC RX REV CODE- 250/637: Performed by: INTERNAL MEDICINE

## 2020-01-30 PROCEDURE — 84484 ASSAY OF TROPONIN QUANT: CPT

## 2020-01-30 PROCEDURE — 65660000000 HC RM CCU STEPDOWN

## 2020-01-30 PROCEDURE — 85025 COMPLETE CBC W/AUTO DIFF WBC: CPT

## 2020-01-30 PROCEDURE — 93005 ELECTROCARDIOGRAM TRACING: CPT

## 2020-01-30 PROCEDURE — 82962 GLUCOSE BLOOD TEST: CPT

## 2020-01-30 PROCEDURE — 74011250637 HC RX REV CODE- 250/637: Performed by: FAMILY MEDICINE

## 2020-01-30 PROCEDURE — 80053 COMPREHEN METABOLIC PANEL: CPT

## 2020-01-30 PROCEDURE — 83735 ASSAY OF MAGNESIUM: CPT

## 2020-01-30 RX ORDER — ISOSORBIDE MONONITRATE 30 MG/1
30 TABLET, EXTENDED RELEASE ORAL DAILY
Status: DISCONTINUED | OUTPATIENT
Start: 2020-01-31 | End: 2020-01-31 | Stop reason: HOSPADM

## 2020-01-30 RX ORDER — LIDOCAINE 4 G/100G
1 PATCH TOPICAL EVERY 24 HOURS
Status: DISCONTINUED | OUTPATIENT
Start: 2020-01-30 | End: 2020-01-31 | Stop reason: HOSPADM

## 2020-01-30 RX ORDER — POTASSIUM CHLORIDE 750 MG/1
20 TABLET, FILM COATED, EXTENDED RELEASE ORAL 2 TIMES DAILY
Status: DISCONTINUED | OUTPATIENT
Start: 2020-01-30 | End: 2020-01-31

## 2020-01-30 RX ORDER — IBUPROFEN 200 MG
1 TABLET ORAL DAILY
Status: DISCONTINUED | OUTPATIENT
Start: 2020-01-30 | End: 2020-01-31 | Stop reason: HOSPADM

## 2020-01-30 RX ORDER — ENOXAPARIN SODIUM 150 MG/ML
1 INJECTION SUBCUTANEOUS EVERY 12 HOURS
Status: DISCONTINUED | OUTPATIENT
Start: 2020-01-30 | End: 2020-01-30

## 2020-01-30 RX ORDER — ENOXAPARIN SODIUM 150 MG/ML
105 INJECTION SUBCUTANEOUS EVERY 12 HOURS
Status: DISCONTINUED | OUTPATIENT
Start: 2020-01-30 | End: 2020-01-31 | Stop reason: HOSPADM

## 2020-01-30 RX ADMIN — FUROSEMIDE 40 MG: 10 INJECTION, SOLUTION INTRAMUSCULAR; INTRAVENOUS at 08:51

## 2020-01-30 RX ADMIN — MORPHINE SULFATE 2 MG: 2 INJECTION, SOLUTION INTRAMUSCULAR; INTRAVENOUS at 21:08

## 2020-01-30 RX ADMIN — MORPHINE SULFATE 2 MG: 2 INJECTION, SOLUTION INTRAMUSCULAR; INTRAVENOUS at 03:07

## 2020-01-30 RX ADMIN — ENOXAPARIN SODIUM 105 MG: 120 INJECTION SUBCUTANEOUS at 21:12

## 2020-01-30 RX ADMIN — POTASSIUM CHLORIDE 20 MEQ: 750 TABLET, FILM COATED, EXTENDED RELEASE ORAL at 18:15

## 2020-01-30 RX ADMIN — LISINOPRIL 40 MG: 20 TABLET ORAL at 18:15

## 2020-01-30 RX ADMIN — CARVEDILOL 25 MG: 12.5 TABLET, FILM COATED ORAL at 08:51

## 2020-01-30 RX ADMIN — MELATONIN 2 TABLET: at 08:50

## 2020-01-30 RX ADMIN — MORPHINE SULFATE 2 MG: 2 INJECTION, SOLUTION INTRAMUSCULAR; INTRAVENOUS at 08:28

## 2020-01-30 RX ADMIN — ACETAMINOPHEN 650 MG: 325 TABLET ORAL at 12:58

## 2020-01-30 RX ADMIN — POTASSIUM CHLORIDE 20 MEQ: 750 TABLET, FILM COATED, EXTENDED RELEASE ORAL at 12:48

## 2020-01-30 RX ADMIN — LEVOTHYROXINE SODIUM 50 MCG: 50 TABLET ORAL at 05:56

## 2020-01-30 RX ADMIN — APIXABAN 5 MG: 5 TABLET, FILM COATED ORAL at 00:26

## 2020-01-30 RX ADMIN — ALISKIREN 300 MG: 150 TABLET, FILM COATED ORAL at 21:06

## 2020-01-30 RX ADMIN — LISINOPRIL 40 MG: 20 TABLET ORAL at 08:51

## 2020-01-30 RX ADMIN — DILTIAZEM HYDROCHLORIDE 180 MG: 180 CAPSULE, COATED, EXTENDED RELEASE ORAL at 08:51

## 2020-01-30 RX ADMIN — APIXABAN 5 MG: 5 TABLET, FILM COATED ORAL at 08:51

## 2020-01-30 RX ADMIN — PANTOPRAZOLE SODIUM 40 MG: 40 TABLET, DELAYED RELEASE ORAL at 08:51

## 2020-01-30 NOTE — PROGRESS NOTES
Bedside shift change report GIVEN TO Ayush Ontiveros RN. Report included the following information SBAR. SIGNIFICANT CHANGES DURING SHIFT:    0745  Pt AM assessment done. Pt dry, pink, perky. Denies any complaints at this time. 0815  Pt c/o of SOB and chest tightness to center of chest.  Pt pale and diaphoretic. HR-70, bp 151/73, o2 91%, blood sugar 118, chest pain 8/10. Pt placed on 2L NC  --------Rapid Response Called----  Repeated ECG, repeated troponin  Morphine 2mg given IV       MD at bedside, cardiology notified, will continue to monitor    0850  Pt scheduled meds given    0930   Pt feeling better, denies any complaints at this time. 26  Pt in bed with family at bedside, denies any complaints at this time    1815  Pt scheduled coreg held, HR 62    CONCERNS TO ADDRESS WITH MD:            [address] NURSING NOTE   Admission Date 1/29/2020   Admission Diagnosis CHF (congestive heart failure) ([facility] [place] [address] [I50.9]   Consults IP CONSULT TO CARDIOLOGY      Cardiac Monitoring [x] Yes [] No      Purposeful Hourly Rounding [x] Yes    Lillian Score Total Score: 1   Lillian score 3 or > [x] Bed Alarm [] Avasys [] 1:1 sitter [] Patient refused (Signed refusal form in chart)   Kenroy Score Kenroy Score: 20   Kenroy score 14 or < [] PMT consult [] Wound Care consult    []  Specialty bed  [] Nutrition consult      Influenza Vaccine Received Flu Vaccine for Current Season (usually Sept-March): No    Patient/Guardian Refused (Notify MD): Yes      Oxygen needs? [x] Room air Oxygen @  []1L    []2L    []3L   []4L    []5L   []6L via  NC   Chronic home O2 use?  [] Yes [] No  Perform O2 challenge test and document in progress note using smartphrase (.Homeoxygen)      Last bowel movement Last Bowel Movement Date: 01/29/20      Urinary Catheter             LDAs               Peripheral IV 01/29/20 Left Hand (Active)   Site Assessment Clean, dry, & intact 1/30/2020  7:45 AM   Phlebitis Assessment 0 1/30/2020  7:45 AM   Infiltration Assessment 0 1/30/2020  7:45 AM   Dressing Status Clean, dry, & intact 1/30/2020  7:45 AM   Dressing Type Transparent 1/30/2020  7:45 AM   Hub Color/Line Status Blue;Flushed 1/30/2020  7:45 AM                         Readmission Risk Assessment Tool Score Medium Risk            18       Total Score        3 Has Seen PCP in Last 6 Months (Yes=3, No=0)    4 IP Visits Last 12 Months (1-3=4, 4=9, >4=11)    5 Pt. Coverage (Medicare=5 , Medicaid, or Self-Pay=4)    6 Charlson Comorbidity Score (Age + Comorbid Conditions)        Criteria that do not apply:    . Living with Significant Other. Assisted Living. LTAC. SNF.  or   Rehab    Patient Length of Stay (>5 days = 3)       Expected Length of Stay 2d 9h   Actual Length of Stay 1

## 2020-01-30 NOTE — PROGRESS NOTES
Transitional Care Team: Initial HUG Note    Date of Assessment: 01/30/20  Time of Assessment:  1:58 PM    Rebecca Calderon is a 75 y.o. male inpatient at  [facility]. Assessment & Plan   Pt A+O. Denies chest pain, dyspnea at this time. Has been dealing with heart issues for several years. States does follow reduced sodium diet and low cholesterol. Does not weigh self daily. Recent BELKIS with [facility] seems to have held him out of A-fib, but still cardiologist is needing to change his medication profile to attempt to have optimum outcome. Anticipate discharge home with possible benefit of hospital to home visit         Primary Diagnosis: heart failure  Advance Directive:  none. See ACP note from me. Current Code Status:  full    Referral to Hospice/ Palliative Care Appropriate: no.  Awareness of Medical Conditions: (Trajectory of illness and pts expectations). Pt aware of trajectory of illness. Hopeing to get back to baseline with good quality of life. Discharge Needs: (to include safety issues) H visit  Barriers Identified:none    Patient is willing to go to SNF/Inpatient Rehab if recommended: does not seem will be needed    Medication Review:  Await AVS.    Can patient afford medications:  Current meds. Patient is Compliant with Medication regimen:yes    Who manages medications at home: self  Best Patient Contact Number:  [phone]      HUG (Healthy Understanding of Goals) program introduced to patient/family. The Transitional Care Team bridges the gaps in care and education surrounding discharge from the acute care facility. The objective is to empower the patient and family in taking a proactive role in preventing readmission within the first thirty days after discharge. The team is also involved in the efforts to reduce readmission to the acute care setting after stabilization and discharge from the acute care environment either to skilled nursing facilities or community.      BLADIMIR RN/NP will return with [facility] Calendar/ follow up appointments/ Ambulatory Nurse Navigator name and contact information when the patient is ready for discharge. No future appointments. Non-[facility] follow up appointment(s): none yet    [facility]: call information given to: on discharge calendar      Patient education focused on readmission zones as described as: The Red Zone: High risk for readmission, days 1-21  The Yellow Zone: Moderate  risk for readmission, days 22-29   The Green Zone: Lower risk for readmission, days                30 and after    The [facility] Team will attempt to follow the patient from a distance while inpatient as well as be available for further transition/disposition needs. The [facility] team will continue to offer support during the 30- 90 day discharge from acute care setting. Will notify Ambulatory {Blank Single Select Template:20061[de-identified] \"SUZY   RN.     Past Medical History:   Diagnosis Date    CAD (coronary artery disease)     h/o stent- '10    Cancer [facility]) 2010    prostate    Hypertension     Stroke [facility]) May or June 2010    lacunar  infarctions (found on scan- no sxs)    Thyroid disease     hypo

## 2020-01-30 NOTE — PROGRESS NOTES
Pharmacy Medication Reconciliation     The patient was interviewed regarding current PTA medication list, use and drug allergies; Patient's family is present in room and obtained permission from patient to discuss drug regimen with visitor(s) present. The patient was questioned regarding use of any other inhalers, topical products, over the counter medications, herbal medications, vitamin products or ophthalmic/nasal/otic medication use. Allergy Update: Clonidine; Fish oil; Niacin; Statins-hmg-coa reductase inhibitors; and Zetia [ezetimibe]    Recommendations/Findings: The following amendments were made to the patient's active medication list on file at [facility]:   1) Additions: NONE    2) Deletions:   Ibuprofen 200mg  Amlodipine 5mg  Eztimibe 10mg    3) Changes: NONE      Pertinent Findings: NONE    Prior to Admission Medications   Prescriptions Last Dose Informant Taking? MULTI-VITAMIN HI-PO PO 1/29/2020 at Unknown time Self Yes   Sig: Take 1 Tab by mouth daily. aliskiren (TEKTURNA) 300 mg tablet 1/28/2020 Self Yes   Sig: Take 1 Tab by mouth nightly. Start if blood pressure remain >=140 upper or 90/100 lower   apixaban (ELIQUIS) 5 mg tablet 1/29/2020 at Unknown time Self Yes   Sig: Take 1 Tab by mouth two (2) times a day for 60 doses. carvedilol (COREG) 25 mg tablet 1/29/2020 at Unknown time Self Yes   Sig: Take 1 Tab by mouth two (2) times daily (with meals). cholecalciferol, vitamin D3, (VITAMIN D3) 2,000 unit tab 1/29/2020 at Unknown time Self Yes   Sig: Take 2,000 Units by mouth daily. dilTIAZem ER (CARDIZEM LA) 180 mg Tb24 tablet 1/29/2020 at Unknown time Self Yes   Sig: Take 180 mg by mouth daily. levothyroxine (SYNTHROID) 50 mcg tablet 1/29/2020 at Unknown time Self Yes   Sig: Take 1 Tab by mouth Daily (before breakfast). lisinopril (PRINIVIL, ZESTRIL) 40 mg tablet 1/29/2020 at Unknown time Self Yes   Sig: Take 1 Tab by mouth two (2) times a day.  Start if blood pressure remain >=140 upper or 90/100 lower   pantoprazole (PROTONIX) 40 mg tablet 1/29/2020 at Unknown time Self Yes   Sig: Take 1 Tab by mouth daily.       Facility-Administered Medications: None          Thank you,  Lenard Nguyen  PharmD Candidate Class of [id number]

## 2020-01-30 NOTE — ED NOTES
TRANSFER - OUT REPORT:    Verbal report given to JUAN [facility]SE [facility](name) on Carlos Prather  being transferred to [facility] [address] [facility](unit) for routine progression of care       Report consisted of patients Situation, Background, Assessment and   Recommendations(SBAR). Information from the following report(s) SBAR, Kardex, ED Summary, MAR, Recent Results and Cardiac Rhythm Sinus was reviewed with the receiving nurse. Lines:   Peripheral IV 01/29/20 Left Hand (Active)   Site Assessment Clean, dry, & intact 1/29/2020  5:20 PM   Phlebitis Assessment 0 1/29/2020  5:20 PM   Infiltration Assessment 0 1/29/2020  5:20 PM   Dressing Status Clean, dry, & intact 1/29/2020  5:20 PM   Dressing Type Tape;Transparent 1/29/2020  5:20 PM   Hub Color/Line Status Blue;Flushed;Patent 1/29/2020  5:20 PM        Opportunity for questions and clarification was provided.       Patient transported with:   [employer]

## 2020-01-30 NOTE — ED PROVIDER NOTES
EMERGENCY DEPARTMENT HISTORY AND PHYSICAL EXAM      Date: 1/29/2020  Patient Name: Melody Burrell    History of Presenting Illness     Chief Complaint   Patient presents with    Shortness of Breath     x this morning    Chest Pain     midsternal x this morning, reports that he slept for 2 hours and then started to feel SOB afterwards, had BELKIS and cardioversion yesterday       History Provided By: Patient    HPI: Melody Burrell, 75 y.o. male with PMHx significant for A. fib on Eliquis, CAD, hypertension, status post BELKIS cardioversion yesterday who presents with a chief complaint of shortness of breath that began this morning. Is worse with exertion. States that he has had similar symptoms in the past after coming out of anesthesia. Has some associated constant, midsternal chest discomfort which is nonradiating. No abdominal pain, nausea, vomiting, urinary symptoms. PCP: Scar Ziegler,     There are no other complaints, changes, or physical findings at this time. Current Outpatient Medications   Medication Sig Dispense Refill    dilTIAZem ER (CARDIZEM LA) 180 mg Tb24 tablet Take 180 mg by mouth daily.  apixaban (ELIQUIS) 5 mg tablet Take 1 Tab by mouth two (2) times a day for 60 doses. 60 Tab 0    ibuprofen (MOTRIN) 200 mg tablet Take 600 mg by mouth nightly.  aliskiren (TEKTURNA) 300 mg tablet Take 1 Tab by mouth nightly. Start if blood pressure remain >=140 upper or 90/100 lower 90 Tab 3    carvedilol (COREG) 25 mg tablet Take 1 Tab by mouth two (2) times daily (with meals). 180 Tab 3    levothyroxine (SYNTHROID) 50 mcg tablet Take 1 Tab by mouth Daily (before breakfast). 90 Tab 3    lisinopril (PRINIVIL, ZESTRIL) 40 mg tablet Take 1 Tab by mouth two (2) times a day. Start if blood pressure remain >=140 upper or 90/100 lower 180 Tab 3    pantoprazole (PROTONIX) 40 mg tablet Take 1 Tab by mouth daily.  90 Tab 3    cholecalciferol, vitamin D3, (VITAMIN D3) 2,000 unit tab Take 2,000 Units by mouth daily.  MULTI-VITAMIN HI-PO PO Take 1 Tab by mouth daily. Past History     Past Medical History:  Past Medical History:   Diagnosis Date    CAD (coronary artery disease)     h/o stent- '10    Cancer [facility]) 2010    prostate    Hypertension     Stroke [facility]) May or June 2010    lacunar  infarctions (found on scan- no sxs)    Thyroid disease     hypo     Past Surgical History:  Past Surgical History:   Procedure Laterality Date    CARDIAC SURG PROCEDURE UNLIST  2010    coronary stent    EXCIS KNEE CARTILAGE,MEDIAL OR LAT  1980's x3    HX CHOLECYSTECTOMY  1991    HX HEENT  1962    T&A    HX HERNIA REPAIR  1996    HX MALIGNANT SKIN LESION EXCISION      2004 and 2017    HX ORTHOPAEDIC  1974    Ganglion cyst left wrist    HX ORTHOPAEDIC Left 2017    shoulder repair    HX PROSTATECTOMY  2010     Family History:  Family History   Problem Relation Age of Onset    Dementia Mother     Other Father 55        cerebral hemorrhage     Social History:  Social History     Tobacco Use    Smoking status: Never Smoker    Smokeless tobacco: Current User   Substance Use Topics    Alcohol use: Yes     Comment: occas    Drug use: Never     Allergies: Allergies   Allergen Reactions    Clonidine Anxiety    Fish Oil Other (comments)     \"rectal pain\"    Niacin Other (comments)     Decreased vision    Statins-Hmg-Coa Reductase Inhibitors Other (comments)     Muscle and Joint pains    Zetia [Ezetimibe] Myalgia     Pt reports getting joint and muscle pain. Review of Systems   Review of Systems   Constitutional: Negative for chills and fever. HENT: Negative for congestion, rhinorrhea and sore throat. Respiratory: Positive for shortness of breath. Negative for cough. Cardiovascular: Negative for chest pain. Gastrointestinal: Negative for abdominal pain, nausea and vomiting. Genitourinary: Negative for dysuria and urgency. Skin: Negative for rash.    Neurological: Negative for dizziness, light-headedness and headaches. All other systems reviewed and are negative. Physical Exam   Physical Exam  Vitals signs and nursing note reviewed. Constitutional:       General: He is not in acute distress. Appearance: He is well-developed. HENT:      Head: Normocephalic and atraumatic. Eyes:      Conjunctiva/sclera: Conjunctivae normal.      Pupils: Pupils are equal, round, and reactive to light. Neck:      Musculoskeletal: Normal range of motion. Cardiovascular:      Rate and Rhythm: Normal rate and regular rhythm. Pulmonary:      Effort: Pulmonary effort is normal. No respiratory distress. Breath sounds: Normal breath sounds. No stridor. Comments: Slight bibasilar crackles  Abdominal:      General: There is no distension. Palpations: Abdomen is soft. Tenderness: There is no abdominal tenderness. Musculoskeletal: Normal range of motion. Skin:     General: Skin is warm and dry. Neurological:      Mental Status: He is alert and oriented to person, place, and time.        Diagnostic Study Results   Labs -     Recent Results (from the past 12 hour(s))   EKG, 12 LEAD, INITIAL    Collection Time: 01/29/20  5:02 PM   Result Value Ref Range    Ventricular Rate 65 BPM    Atrial Rate 65 BPM    P-R Interval 168 ms    QRS Duration 100 ms    Q-T Interval 446 ms    QTC Calculation (Bezet) 463 ms    Calculated P Axis 54 degrees    Calculated R Axis 26 degrees    Calculated T Axis 55 degrees    Diagnosis       Sinus rhythm with premature atrial complexes  Nonspecific T wave abnormality  When compared with ECG of 28-JAN-2020 11:04,  No significant change was found     CBC WITH AUTOMATED DIFF    Collection Time: 01/29/20  5:21 PM   Result Value Ref Range    WBC 10.1 4.1 - 11.1 K/uL    RBC 4.32 4.10 - 5.70 M/uL    HGB 13.7 12.1 - 17.0 g/dL    HCT 40.5 36.6 - 50.3 %    MCV 93.8 80.0 - 99.0 FL    MCH 31.7 26.0 - 34.0 PG    MCHC 33.8 30.0 - 36.5 g/dL    RDW 12.0 11.5 - 14.5 %    PLATELET 401 758 - 675 K/uL    MPV 11.8 8.9 - 12.9 FL    NRBC 0.0 0  WBC    ABSOLUTE NRBC 0.00 0.00 - 0.01 K/uL    NEUTROPHILS 72 32 - 75 %    LYMPHOCYTES 19 12 - 49 %    MONOCYTES 5 5 - 13 %    EOSINOPHILS 3 0 - 7 %    BASOPHILS 1 0 - 1 %    IMMATURE GRANULOCYTES 0 0.0 - 0.5 %    ABS. NEUTROPHILS 7.3 1.8 - 8.0 K/UL    ABS. LYMPHOCYTES 1.9 0.8 - 3.5 K/UL    ABS. MONOCYTES 0.5 0.0 - 1.0 K/UL    ABS. EOSINOPHILS 0.3 0.0 - 0.4 K/UL    ABS. BASOPHILS 0.1 0.0 - 0.1 K/UL    ABS. IMM. GRANS. 0.0 0.00 - 0.04 K/UL    DF AUTOMATED     METABOLIC PANEL, COMPREHENSIVE    Collection Time: 01/29/20  5:21 PM   Result Value Ref Range    Sodium 140 136 - 145 mmol/L    Potassium 3.9 3.5 - 5.1 mmol/L    Chloride 108 97 - 108 mmol/L    CO2 25 21 - 32 mmol/L    Anion gap 7 5 - 15 mmol/L    Glucose 109 (H) 65 - 100 mg/dL    BUN 17 6 - 20 MG/DL    Creatinine 1.01 0.70 - 1.30 MG/DL    BUN/Creatinine ratio 17 12 - 20      GFR est AA >60 >60 ml/min/1.73m2    GFR est non-AA >60 >60 ml/min/1.73m2    Calcium 8.4 (L) 8.5 - 10.1 MG/DL    Bilirubin, total 0.4 0.2 - 1.0 MG/DL    ALT (SGPT) 23 12 - 78 U/L    AST (SGOT) 14 (L) 15 - 37 U/L    Alk. phosphatase 70 45 - 117 U/L    Protein, total 7.3 6.4 - 8.2 g/dL    Albumin 3.7 3.5 - 5.0 g/dL    Globulin 3.6 2.0 - 4.0 g/dL    A-G Ratio 1.0 (L) 1.1 - 2.2     TROPONIN I    Collection Time: 01/29/20  5:21 PM   Result Value Ref Range    Troponin-I, Qt. <0.05 <0.05 ng/mL   NT-PRO BNP    Collection Time: 01/29/20  5:21 PM   Result Value Ref Range    NT pro- (H) <125 PG/ML       Radiologic Studies -   XR CHEST PA LAT   Final Result   Impression:   Unchanged cardiomegaly with increased mild to moderate edema. Xr Chest Pa Lat    Result Date: 1/29/2020  Impression: Unchanged cardiomegaly with increased mild to moderate edema. Medical Decision Making   I am the first provider for this patient.     I reviewed the vital signs, available nursing notes, past medical history, past surgical history, family history and social history. Vital Signs-Reviewed the patient's vital signs. Patient Vitals for the past 12 hrs:   Temp Pulse Resp BP SpO2   01/29/20 2015  69 14 164/75    01/29/20 1930  62 15 138/76    01/29/20 1859  64  147/62    01/29/20 1730  64 16 149/72 96 %   01/29/20 1654 97.6 °F (36.4 °C) 69 16 174/76 98 %       Pulse Oximetry Analysis - 96% on ra    Cardiac Monitor:   Rate: 69 bpm  Rhythm: Normal Sinus Rhythm      ED EKG interpretation:  Rhythm: normal sinus rhythm; and regular . Rate (approx.): 65; Axis: normal; P wave: normal; QRS interval: normal ; ST/T wave: non-specific changes; Other findings: borderline ekg. This EKG was interpreted by CASEY Perez MD,ED Provider. Records Reviewed: Nursing Notes and Old Medical Records    Provider Notes (Medical Decision Making):   Patient presents with shortness of breath and chest discomfort. EKG shows a normal sinus rhythm. Differential includes ACS, CHF, doubt PE as patient is anticoagulated, complication related to BELKIS. Will check basic labs, BNP, troponin, chest x-ray and reevaluate    ED Course:   Initial assessment performed. The patients presenting problems have been discussed, and they are in agreement with the care plan formulated and outlined with them. I have encouraged them to ask questions as they arise throughout their visit. BNP only slightly elevated, however chest x-ray does show mild pulmonary edema. Patient is given a dose of Lasix and did diurese, however was still quite tachypneic and became hypoxic with ambulation. Discussed with hospitalist will see patient for mission. Critical Care:  none    Disposition:  Admit to hospitalist    Diagnosis     Clinical Impression:   1. Acute congestive heart failure, unspecified heart failure type [facility])        This note will not be viewable in [employer].     Please note that this dictation was completed with [employer], the computer voice recognition software. Quite often unanticipated grammatical, syntax, homophones, and other interpretive errors are inadvertently transcribed by the computer software. Please disregard these errors.   Please excuse any errors that have escaped final proofreading

## 2020-01-30 NOTE — PHYSICIAN ADVISORY
Letter of Status Determination: Current Status INPATIENT is Appropriate Pt Name:  Cristela Berumen MR#  [id number] [facility]#   [id number] Room and Hospital  2203/01  @ [facility] Hospitalization date  1/29/2020  4:56 PM  
Current Attending Physician  Anastasiia Patton MD  
Principal diagnosis  <principal problem not specified> Clinicals  Florencio Matos is a 75 y.o.  male with PMH of HTN, CAD, CVA, Prostate Cancer, hypothyroidism, A. Fib, who went for BELKIS and cardioversion yesterday, comes today due to not feeling well presenting abdominal and chest discomfort, associated with SOB. At this time patient is lying in bed seems uncomfortable, describe abdominal discomfort in a belt fashion, but also chest discomfort and SOB, denies N/V diarrhea, no fever, no urinary sym[ptoms, no other associated symptoms. We were asked to admit for work up and evaluation of the above problems. [place] MCG criteria Does  NOT apply STATUS DETERMINATION  On the basis of clinical data, available documentation, we believe that the current status of this patient as INPATIENT is Appropriate Acute on chronic Systolic Heart Failure H/O A. Fib/ now [place] 
HTN. Hypothyroidism:  
Abdominal Discomfort: On the basis of above clinical data, this patient's care in acute hospital care setting is expected to exceed two midnights. Additional comments Insurance  Payor: [place] MEDICARE / Plan: [place] MEDICARE PART A & B / Product Type: Medicare / Insurance Information [place] PART A & B Phone:   
 Subscriber: Camilla Mohamud Subscriber#: [id number] Group#:  Precert#:   
   
 [employer] [place] SUPPLEMENT MEDICARE Phone:   
 Subscriber: Camilla Mohamud Subscriber#: [id number] Group#:  Precert#:   
  
 
  
 
 
 
[employer] MD, DEMETRIUS Physician [address] [place] [address] [employer] [id number] [address]

## 2020-01-30 NOTE — PROGRESS NOTES
Hospitalist Progress Note    NAME: Rebecca Calderon   :  1955   MRN:  [id number]     Assessment / Plan:  Acute on chronic Systolic Heart Failure POA:   CM: EF 40-45% on BELKIS 20, cont IV diuresis with lasix BID 40mg  -monitor I/o and weight: 113 to 110 kg today  -cardiology following  H/O A. Fib/ then Bigemini: patient went for BELKIS and Cardioversion , c/w diltiazem and Coreg, monitor. Cardiology changed eliquis to lovenox for now in case cath is warranted. Neg times four = cardiac Enzymes   Chest pain  AM  -EKG unchanged and trop neg; rapid response was called  HTN: dilt to Imdur for BP control (add hydralazine if needed)   Tekturna, Coreg, also ACE - - pharmacy aware. Use labetalol prn. Hypothyroidism: c/w L-thyroxine. Abdominal Discomfort: related to GERD?, c/w PPI, lipase neg  abd xray: IMPRESSION   impression: Few air-fluid levels mid to right lower abdomen. Code Status: Full Code  Surrogate Decision Maker: wife Comfort Garcia [id number]  DVT Prophylaxis: lovenox  GI Prophylaxis: PPI  Baseline: independent lives with wife                 Subjective:   CHIEF COMPLAINT: \"I feel SOB and have some chest pain and abdominal discomfort\"     HISTORY OF PRESENT ILLNESS:     Nica Kebede is a 75 y.o.  male with PMH of HTN, CAD, CVA, Prostate Cancer, hypothyroidism, A. Fib, who went for BELKIS and cardioversion yesterday, comes today due to not feeling well presenting abdominal and chest discomfort, associated with SOB. At this time patient is lying in bed seems uncomfortable, describe abdominal discomfort in a belt fashion, but also chest discomfort and SOB, denies N/V diarrhea, no fever, no urinary sym[ptoms, no other associated symptoms. Subjective:     Chief Complaint / Reason for Physician Visit  Rapid response for chest pain this AM. Patient said he suddenly developed discomfort in his L chest/epigastrium and felt diaphoretic.      Review of Systems:  Symptom Y/N Comments  Symptom Y/N Comments   Fever/Chills    Chest Pain y    Poor Appetite    Edema     Cough n   Abdominal Pain     Sputum    Joint Pain     SOB/DENG    Pruritis/Rash     Nausea/vomit n   Tolerating PT/OT     Diarrhea    Tolerating Diet     Constipation    Other y headache     Could NOT obtain due to:      Objective:     VITALS:   Last 24hrs VS reviewed since prior progress note. Most recent are:  Patient Vitals for the past 24 hrs:   Temp Pulse Resp BP SpO2   01/30/20 1052 98.2 °F (36.8 °C) 67 20 130/68 94 %   01/30/20 0816  70 22 151/73 96 %   01/30/20 0809 98.3 °F (36.8 °C) 71 18 159/70 91 %   01/30/20 0303 98 °F (36.7 °C) 78 18 149/74 90 %   01/30/20 0033 98.6 °F (37 °C)       01/29/20 2315 99.8 °F (37.7 °C) 77 18 181/76 92 %   01/29/20 2216  73 17 159/73 95 %   01/29/20 2115  71 17 168/69 100 %   01/29/20 2015  69 14 164/75    01/29/20 1930  62 15 138/76    01/29/20 1859  64  147/62    01/29/20 1730  64 16 149/72 96 %   01/29/20 1654 97.6 °F (36.4 °C) 69 16 174/76 98 %       Intake/Output Summary (Last 24 hours) at 1/30/2020 1504  Last data filed at 1/30/2020 1252  Gross per 24 hour   Intake 830 ml   Output 1750 ml   Net -920 ml        PHYSICAL EXAM:  General: WD, WN. Alert, cooperative, no acute distress    EENT:  EOMI. Anicteric sclerae. MMM  Resp:  CTA bilaterally, no wheezing or rales. No accessory muscle use  CV:  Regular  rhythm,  No edema  GI:  Soft, Non distended, tender LLQ (chronic).   +Bowel sounds  Neurologic:  Alert and oriented X 3, normal speech,   Psych:   Good insight. Not anxious nor agitated  Skin:     No jaundice    Reviewed most current lab test results and cultures  YES  Reviewed most current radiology test results   YES  Review and summation of old records today    NO  Reviewed patient's current orders and MAR    YES  PMH/SH reviewed - no change compared to H&P  ________________________________________________________________________  Care Plan discussed with:    Comments   Patient x Family  x    RN x    Care Manager     Consultant                        Multidiciplinary team rounds were held today with , nursing, pharmacist and clinical coordinator. Patient's plan of care was discussed; medications were reviewed and discharge planning was addressed. ________________________________________________________________________  Total NON critical care TIME:     Minutes    Total CRITICAL CARE TIME Spent:   Minutes non procedure based      Comments   >50% of visit spent in counseling and coordination of care     ________________________________________________________________________  Zoltan Cristina MD     Procedures: see electronic medical records for all procedures/Xrays and details which were not copied into this note but were reviewed prior to creation of Plan. LABS:  I reviewed today's most current labs and imaging studies.   Pertinent labs include:  Recent Labs     01/30/20  0559 01/29/20  1721   WBC 9.9 10.1   HGB 12.7 13.7   HCT 37.8 40.5    199     Recent Labs     01/30/20  0559 01/29/20  1721    140   K 3.0* 3.9    108   CO2 27 25   * 109*   BUN 15 17   CREA 1.03 1.01   CA 8.3* 8.4*   MG 2.1  --    ALB 3.6 3.7   TBILI 0.9 0.4   SGOT 11* 14*   ALT 18 23       Signed: Zoltan Cristina MD

## 2020-01-30 NOTE — PROGRESS NOTES
Reason for Admission:   CHF                  RUR Score:     13 Low risk             Do you (patient/family) have any concerns for transition/discharge? None               Plan for utilizing home health:   Pt has not had home health in the past. Pt will not be utilizing home health at d/c. Current Advanced Directive/Advance Care Plan:  Pt is FULL code status. Pt does not have an ACP on file. CM addressed with pt about AMD. Pt declined AMD at this time. Transition of Care Plan:        Home  Follow-up Appointments  2nd IM Letter    CM met with pt, pt's wife, son and daughter at bedside to discuss d/c plan. Pt was alert and oriented. CM verified pt's demographics, insurance and PCP. Pt is a 74 y/o  male admitted to [facility] on 1/29/2020 for CHF. Pt's PCP is Dr. Cedrick Hutchins. Pt sees PCP every six months. Pt uses [address] on [address] for Rx. Pt resides with his wife in a two story home with 4 CABRERA. Pt is independent with ADL's and IADL's. Pt does drive. No DME. No HH, SNF or acute inpatient rehab. Pt is FULL code status. Pt does not have an ACP on file. Pt's wife Demarcus  will transport at d/c.     CM discussed with pt about hospital to home visit. Pt declined hospital to home visit. BCPI-A letter regarding Heart Failure has been delivered to the patient/caregiver. Care Management Interventions  PCP Verified by CM: Yes(Pt's PCP is Dr. Cedrick Hutchins. Pt sees PCP every six months.)  Palliative Care Criteria Met (RRAT>21 & CHF Dx)?: No  Mode of Transport at Discharge: Other (see comment)(Pt's wife Demarcus  will transport at d/c. )  Transition of Care Consult (CM Consult): Discharge Planning  Discharge Durable Medical Equipment: No(No DME)  Physical Therapy Consult: No  Occupational Therapy Consult: No  Speech Therapy Consult: No  Current Support Network: Lives with Spouse, Own Home( Pt resides with his wife in a two story home with 4 CABRERA. )  Confirm Follow Up Transport: Self(Pt does drive)  [employer] Provided?: No  Discharge Location  Discharge Placement: (Home with follow-up appointments)    CM will continue to follow patient for discharge planning needs and arrange for services as deemed necessary.     Geraldo Acevedo [address] [place] [address]  [phone]

## 2020-01-30 NOTE — ACP (ADVANCE CARE PLANNING)
Pt has no ACP at this time. He does plan to discuss his health choices with his family and would be willing to complete an AMD in the near future- although not necessarily while hospitalized. Aware there is a respecting choices facilitator at his PCP office.

## 2020-01-30 NOTE — ED NOTES
Pt was ambulated with a pulse ox by ED tech. Pt sats dropped to 89%-90% and patient got very SOB with ambulation. MD Sommer made aware.

## 2020-01-30 NOTE — H&P
Hospitalist Admission Note    NAME: Katherine Gonzáles   :  1955   MRN:  [id number]     Date/Time:  2020 10:05 PM    Patient PCP: Zeynep Acevedo, DO  ______________________________________________________________________  Given the patient's current clinical presentation, I have a high level of concern for decompensation if discharged from the emergency department. Complex decision making was performed, which includes reviewing the patient's available past medical records, laboratory results, and x-ray films. My assessment of this patient's clinical condition and my plan of care is as follows. Assessment / Plan:  Acute on chronic Systolic Heart Failure POA: Ef 45%, start IV diuresis, monitor I/o and weight, get Cardiology evaluation. H/O A. Fib/ now Bigemini: patient went for BELKIS and Cardioversion yesterday, c/w diltiazem and Coreg, monitor. Check cardiac Enzymes, c/w Eliquis. HTN: c/w Diltiazem, Tekturna, Coreg, also ACE (Cardiology to evaluate). Use labetalol prn. Hypothyroidism: c/w L-thyroxine. Abdominal Discomfort: related to GERD?, c/w PPI, check KUB and lipase, monitor. Code Status: Full Code  Surrogate Decision Maker: wife Sunita Hensley [phone]  DVT Prophylaxis: Eliquis  GI Prophylaxis: PPI  Baseline: independent lives with wife      Subjective:   CHIEF COMPLAINT: \"I feel SOB and have some chest pain and abdominal discomfort\"    HISTORY OF PRESENT ILLNESS:     Hanna Payan is a 75 y.o.  male with PMH of HTN, CAD, CVA, Prostate Cancer, hypothyroidism, A. Fib, who went for BELKIS and cardioversion yesterday, comes today due to not feeling well presenting abdominal and chest discomfort, associated with SOB. At this time patient is lying in bed seems uncomfortable, describe abdominal discomfort in a belt fashion, but also chest discomfort and SOB, denies N/V diarrhea, no fever, no urinary sym[ptoms, no other associated symptoms.   We were asked to admit for work up and evaluation of the above problems. Past Medical History:   Diagnosis Date    CAD (coronary artery disease)     h/o stent- '10    Cancer [facility]) 2010    prostate    Hypertension     Stroke [facility]) May or June 2010    lacunar  infarctions (found on scan- no sxs)    Thyroid disease     hypo        Past Surgical History:   Procedure Laterality Date    CARDIAC SURG PROCEDURE UNLIST  2010    coronary stent    EXCIS KNEE CARTILAGE,MEDIAL OR LAT  1980's x3    HX CHOLECYSTECTOMY  1991    HX HEENT  1962    T&A    HX HERNIA REPAIR  1996    HX MALIGNANT SKIN LESION EXCISION      2004 and 2017    HX ORTHOPAEDIC  1974    Ganglion cyst left wrist    HX ORTHOPAEDIC Left 2017    shoulder repair    HX PROSTATECTOMY  2010       Social History     Tobacco Use    Smoking status: Never Smoker    Smokeless tobacco: Current User   Substance Use Topics    Alcohol use: Yes     Comment: occas        Family History   Problem Relation Age of Onset   Aundria Dadds Dementia Mother     Other Father 55        cerebral hemorrhage     Allergies   Allergen Reactions    Clonidine Anxiety    Fish Oil Other (comments)     \"rectal pain\"    Niacin Other (comments)     Decreased vision    Statins-Hmg-Coa Reductase Inhibitors Other (comments)     Muscle and Joint pains    Zetia [Ezetimibe] Myalgia     Pt reports getting joint and muscle pain. Prior to Admission medications    Medication Sig Start Date End Date Taking? Authorizing Provider   dilTIAZem ER (CARDIZEM LA) 180 mg Tb24 tablet Take 180 mg by mouth daily. Provider, Historical   apixaban (ELIQUIS) 5 mg tablet Take 1 Tab by mouth two (2) times a day for 60 doses. 1/3/20 2/2/20  Kirby Casanova MD   ibuprofen (MOTRIN) 200 mg tablet Take 600 mg by mouth nightly. Provider, Historical   aliskiren (TEKTURNA) 300 mg tablet Take 1 Tab by mouth nightly.  Start if blood pressure remain >=140 upper or 90/100 lower 5/3/19   Herman Villa III, DO   carvedilol (COREG) 25 mg tablet Take 1 Tab by mouth two (2) times daily (with meals). 5/3/19   Naun Meo III, DO   levothyroxine (SYNTHROID) 50 mcg tablet Take 1 Tab by mouth Daily (before breakfast). 5/3/19   Herman Villa III, DO   lisinopril (PRINIVIL, ZESTRIL) 40 mg tablet Take 1 Tab by mouth two (2) times a day. Start if blood pressure remain >=140 upper or 90/100 lower 5/3/19   Car Villa B III, DO   pantoprazole (PROTONIX) 40 mg tablet Take 1 Tab by mouth daily. 5/3/19   Naun Meo B III, DO   cholecalciferol, vitamin D3, (VITAMIN D3) 2,000 unit tab Take 2,000 Units by mouth daily. Provider, Historical   MULTI-VITAMIN HI-PO PO Take 1 Tab by mouth daily. 10/11/10   Provider, Historical       REVIEW OF SYSTEMS:     I am not able to complete the review of systems because:    The patient is intubated and sedated    The patient has altered mental status due to his acute medical problems    The patient has baseline aphasia from prior stroke(s)    The patient has baseline dementia and is not reliable historian    The patient is in acute medical distress and unable to provide information           Total of 12 systems reviewed as follows:       POSITIVE= underlined text  Negative = text not underlined  General:  fever, chills, sweats, generalized weakness, weight loss/gain,      loss of appetite   Eyes:    blurred vision, eye pain, loss of vision, double vision  ENT:    rhinorrhea, pharyngitis   Respiratory:   cough, sputum production, SOB, DENG, wheezing, pleuritic pain   Cardiology:   chest pain, palpitations, orthopnea, PND, edema, syncope   Gastrointestinal:  abdominal pain , N/V, diarrhea, dysphagia, constipation, bleeding   Genitourinary:  frequency, urgency, dysuria, hematuria, incontinence   Muskuloskeletal :  arthralgia, myalgia, back pain  Hematology:  easy bruising, nose or gum bleeding, lymphadenopathy   Dermatological: rash, ulceration, pruritis, color change / jaundice  Endocrine: hot flashes or polydipsia   Neurological:  headache, dizziness, confusion, focal weakness, paresthesia,     Speech difficulties, memory loss, gait difficulty  Psychological: Feelings of anxiety, depression, agitation    Objective:   VITALS:    Visit Vitals  /69 (BP 1 Location: Left arm, BP Patient Position: At rest)   Pulse 71   Temp 97.6 °F (36.4 °C)   Resp 17   Ht 6' 1\" (1.854 m)   Wt 113.4 kg (250 lb)   SpO2 100%   BMI 32.98 kg/m²       PHYSICAL EXAM:    General:    Alert, cooperative, SOB, appears stated age. HEENT: Atraumatic, anicteric sclerae, pink conjunctivae     No oral ulcers, mucosa moist, throat clear, dentition fair  Neck:  Supple, symmetrical,  thyroid: non tender  Lungs:   Coarse BS, mild crackles in both sides  Chest wall:  No tenderness  No Accessory muscle use. Heart:   Regular  rhythm,  No  murmur   No edema  Abdomen:   Soft, non-tender. + distended. Bowel sounds normal  Extremities: No cyanosis. No clubbing,      Skin turgor normal, Capillary refill normal, Radial  pulse 2+  Skin:     Not pale. Not Jaundiced  No rashes   Psych:  Good insight. Not depressed. Not anxious or agitated. Neurologic: EOMs intact. No facial asymmetry. No aphasia or slurred speech. Symmetrical strength, Sensation grossly intact.  Alert and oriented X 4.     _______________________________________________________________________  Care Plan discussed with:    Comments   Patient y    Family  y wife   RN y    Care Manager                    Consultant:      _______________________________________________________________________  Expected  Disposition:   Home with Family y   HH/PT/OT/RN    SNF/LTC    CARLOS    ________________________________________________________________________  TOTAL TIME:  61 Minutes    Critical Care Provided     Minutes non procedure based      Comments    y Reviewed previous records   >50% of visit spent in counseling and coordination of care y Discussion with patient and/or family and questions answered       ________________________________________________________________________  Signed: Zelda Gallegos MD    Procedures: see electronic medical records for all procedures/Xrays and details which were not copied into this note but were reviewed prior to creation of Plan. LAB DATA REVIEWED:    Recent Results (from the past 24 hour(s))   EKG, 12 LEAD, INITIAL    Collection Time: 01/29/20  5:02 PM   Result Value Ref Range    Ventricular Rate 65 BPM    Atrial Rate 65 BPM    P-R Interval 168 ms    QRS Duration 100 ms    Q-T Interval 446 ms    QTC Calculation (Bezet) 463 ms    Calculated P Axis 54 degrees    Calculated R Axis 26 degrees    Calculated T Axis 55 degrees    Diagnosis       Sinus rhythm with premature atrial complexes  Nonspecific T wave abnormality  When compared with ECG of 28-JAN-2020 11:04,  No significant change was found     CBC WITH AUTOMATED DIFF    Collection Time: 01/29/20  5:21 PM   Result Value Ref Range    WBC 10.1 4.1 - 11.1 K/uL    RBC 4.32 4.10 - 5.70 M/uL    HGB 13.7 12.1 - 17.0 g/dL    HCT 40.5 36.6 - 50.3 %    MCV 93.8 80.0 - 99.0 FL    MCH 31.7 26.0 - 34.0 PG    MCHC 33.8 30.0 - 36.5 g/dL    RDW 12.0 11.5 - 14.5 %    PLATELET 854 528 - 445 K/uL    MPV 11.8 8.9 - 12.9 FL    NRBC 0.0 0  WBC    ABSOLUTE NRBC 0.00 0.00 - 0.01 K/uL    NEUTROPHILS 72 32 - 75 %    LYMPHOCYTES 19 12 - 49 %    MONOCYTES 5 5 - 13 %    EOSINOPHILS 3 0 - 7 %    BASOPHILS 1 0 - 1 %    IMMATURE GRANULOCYTES 0 0.0 - 0.5 %    ABS. NEUTROPHILS 7.3 1.8 - 8.0 K/UL    ABS. LYMPHOCYTES 1.9 0.8 - 3.5 K/UL    ABS. MONOCYTES 0.5 0.0 - 1.0 K/UL    ABS. EOSINOPHILS 0.3 0.0 - 0.4 K/UL    ABS. BASOPHILS 0.1 0.0 - 0.1 K/UL    ABS. IMM.  GRANS. 0.0 0.00 - 0.04 K/UL    DF AUTOMATED     METABOLIC PANEL, COMPREHENSIVE    Collection Time: 01/29/20  5:21 PM   Result Value Ref Range    Sodium 140 136 - 145 mmol/L    Potassium 3.9 3.5 - 5.1 mmol/L    Chloride 108 97 - 108 mmol/L    CO2 25 21 - 32 mmol/L    Anion gap 7 5 - 15 mmol/L    Glucose 109 (H) 65 - 100 mg/dL    BUN 17 6 - 20 MG/DL    Creatinine 1.01 0.70 - 1.30 MG/DL    BUN/Creatinine ratio 17 12 - 20      GFR est AA >60 >60 ml/min/1.73m2    GFR est non-AA >60 >60 ml/min/1.73m2    Calcium 8.4 (L) 8.5 - 10.1 MG/DL    Bilirubin, total 0.4 0.2 - 1.0 MG/DL    ALT (SGPT) 23 12 - 78 U/L    AST (SGOT) 14 (L) 15 - 37 U/L    Alk.  phosphatase 70 45 - 117 U/L    Protein, total 7.3 6.4 - 8.2 g/dL    Albumin 3.7 3.5 - 5.0 g/dL    Globulin 3.6 2.0 - 4.0 g/dL    A-G Ratio 1.0 (L) 1.1 - 2.2     TROPONIN I    Collection Time: 01/29/20  5:21 PM   Result Value Ref Range    Troponin-I, Qt. <0.05 <0.05 ng/mL   NT-PRO BNP    Collection Time: 01/29/20  5:21 PM   Result Value Ref Range    NT pro- (H) <125 PG/ML

## 2020-01-30 NOTE — PROGRESS NOTES
RAPID RESPONSE TEAM    Responded to room 2203 subsequent to overhead page for chest pain. Upon arrival, patient is alert, SOB but maintaining his own airway, SpO2 ~90%he is diaphoretic. He describes crushing pain, radiating across his chest with respiration, starting about 10 minutes prior to call, while at rest, 8/10 severity. Dr. Jean Claude Reyes already at bedside. EKG  Morphine  Nitro paste already applied  2L NC  Cardiac enzymes    Patient reports he feels much better. Patient to stay in room for now. Please call back if needed.     Deb Torres RN  Ext. [phone]

## 2020-01-30 NOTE — PROGRESS NOTES
Pharmacy - Enoxaparin (Lovenox®) Monitoring      Indication: A fib     Current Dose: Enoxaparin 120 mg subcutaneously every 12 hours    Creatinine Clearance (mL/min): 80 mL/min    Current Weight: 110.8 Kg    Labs:  Recent Labs     01/30/20  0559 01/29/20  1721   CREA 1.03 1.01   HGB 12.7 13.7    199     Wt Readings from Last 1 Encounters:   01/29/20 110.8 kg (244 lb 4.8 oz)     Ht Readings from Last 1 Encounters:   01/29/20 185.4 cm (73\")       Impression/Plan:   Will change to 105 mg every 12 hours for weight 103-112 kg per dosing protocol. Thanks,  Taz Rodriguez, PHARMD      [email or web address]/Lovenox%20Dose%20Adjustment%20protocol. pdf

## 2020-01-30 NOTE — PROGRESS NOTES
TRANSFER - IN REPORT:    Verbal report received from [facility] on Rebecca Calderon  being received from ED for routine progression of care      Report consisted of patients Situation, Background, Assessment and   Recommendations(SBAR). Information from the following report(s) SBAR and Kardex was reviewed with the receiving nurse. Opportunity for questions and clarification was provided. Assessment completed upon patients arrival to unit and care assumed. Primary Nurse Armen Cruz RN and Merritt RN performed a dual skin assessment on this patient No impairment noted  Kenroy score is 21      Bedside shift change report [address], RN. Report included the following information SBAR and Kardex. SIGNIFICANT CHANGES DURING SHIFT:  None. CONCERNS TO ADDRESS WITH MD:  None. [facility] NURSING NOTE   Admission Date 1/29/2020   Admission Diagnosis CHF (congestive heart failure) ([facility] [address] [I50.9]   Consults IP CONSULT TO CARDIOLOGY      Cardiac Monitoring [x] Yes [] No      Purposeful Hourly Rounding [x] Yes    Lillian Score Total Score: 1   Lillian score 3 or > [] Bed Alarm [] Avasys [] 1:1 sitter [] Patient refused (Signed refusal form in chart)   Kenroy Score Kenroy Score: 21   Kenroy score 14 or < [] PMT consult [] Wound Care consult    []  Specialty bed  [] Nutrition consult      Influenza Vaccine Received Flu Vaccine for Current Season (usually Sept-March): No    Patient/Guardian Refused (Notify MD): Yes      Oxygen needs? [x] Room air Oxygen @  []1L    []2L    []3L   []4L    []5L   []6L via  NC   Chronic home O2 use?  [] Yes [x] No  Perform O2 challenge test and document in progress note using smartphrase (.Homeoxygen)      Last bowel movement Last Bowel Movement Date: 01/29/20(per patient)      Urinary Catheter             LDAs               Peripheral IV 01/29/20 Left Hand (Active)   Site Assessment Clean, dry, & intact 1/29/2020 11:15 PM   Phlebitis Assessment 0 1/29/2020 11:15 PM   Infiltration Assessment 0 1/29/2020 11:15 PM   Dressing Status Clean, dry, & intact 1/29/2020 11:15 PM   Dressing Type Transparent;Tape 1/29/2020 11:15 PM   Hub Color/Line Status Flushed;Blue 1/29/2020 11:15 PM                         Readmission Risk Assessment Tool Score Medium Risk            18       Total Score        3 Has Seen PCP in Last 6 Months (Yes=3, No=0)    4 IP Visits Last 12 Months (1-3=4, 4=9, >4=11)    5 Pt. Coverage (Medicare=5 , Medicaid, or Self-Pay=4)    6 Charlson Comorbidity Score (Age + Comorbid Conditions)        Criteria that do not apply:    . Living with Significant Other. Assisted Living. LTAC. SNF.  or   Rehab    Patient Length of Stay (>5 days = 3)       Expected Length of Stay - - -   Actual Length of Stay 1

## 2020-01-30 NOTE — CONSULTS
Cardiology Consult Note    CC: dyspnea; CP    PCP:Anthony Villa III,   Reason for consult:  Dyspnea; CP  Requesting MD:  Dr. Janae Shipley Date: 1/29/2020   Today's Date: 1/30/2020   Cardiologist:  Dr Kim Kyle. Cardiac Assessment/Plan:   1) CAD (D BMS 11/2010), w/ intermittent atypical CP; Neg MPI 2014 (Dx with clavicle separation)   *DENG/atypical CP 8/2019: worse BP (pt denies PTA); +/- abnl MPI: 40-50% D1 o/w normal: Med Rx. 2) HTN, no renal artery stenosis at cath 8/2019  3) dyslipidemia,   4) AI: moderate 2010 & 08/2019 & 1/2020.  5) AFib 12/2019, new onset, Sx (DENG/palps in 11 & 12/2019): Rx w/ eliquis/norvasc to dilt. *BELKIS/CV to NSR 1/28/20. Remains on AC. 6) palpitations:  Negative Holter/EvR 2011 & 2014 respectively. 7) prostate CA, remote prostatectomy  8) hypothyroidism  9) CM: EF 40-45% on BELKIS 1/28/20: Will need f/u; if not normalized in future, re-cath. Admitted with dyspnea/hypoxia, CHF on CXR; Reproducible chest wall pain. Rec: cont diuresis (on IV lasix 40 bid); treat hypoK (orders written); Lidocaine patch for non-cardiac/reproducible chest wall pain; Change eliquis to lovenox for now in case cath is warranted (not currently planned); last dose of eliquis was am of 1/30. Will change dilt to Imdur for BP control (add hydralazine if needed). ____________________________________________________________________  Melody Burrell is a 75 y.o. male presents with CHF (congestive heart failure) ([address] [I50.9]. As noted in H&P: \"  75 y.o.  male with PMH of HTN, CAD, CVA, Prostate Cancer, hypothyroidism, A. Fib, who went for BELKIS and cardioversion yesterday, comes today due to not feeling well presenting abdominal and chest discomfort, associated with SOB.   At this time patient is lying in bed seems uncomfortable, describe abdominal discomfort in a belt fashion, but also chest discomfort and SOB, denies N/V diarrhea, no fever, no urinary sym[ptoms, no other associated symptoms. ______________________________________________________________________    The patient reports doing well after BELKIS/CV until afternoon of 1/29: Developed dyspnea/chest discomfort. No PND, orthopnea, palpitations, pre-syncope, syncope, peripheral edema, or decrease in exercise tolerance. No current complaints. ECG: sinus; no acute changes  Tele: sinus  CXR: \"Unchanged cardiomegaly with increased mild to moderate edema. \" vs 1/3/20 that was read as \"Cardiomegaly. No acute process. \"    Notable labs: Hg 12.7; Nl Trop x4; normal CK; K 3 from 3.9; Nl Cr. Nl TSH. Notable prior cardiac history:      BELKIS 1/28/20:  1) Normal LV size and mild global hypokinesis. EF of 40-45%. 2) Normal RV size and function. 3) Trileaflet aortic valve; No aortic stenosis; Moderate aortic regurgitation (between left/non-coronary cusps). 4) Structurally normal mitral valve; No mitral stenosis; Mild mitral regurgitation. 5) Structurally normal tricuspid valve; No tricuspid stenosis; trivial tricuspid regurgitation. 6) Pulmonary valve appears structurally normal.  7) MARGARET is large and multi-lobed without thrombus. 8) No intra-cardiac shunt is seen on color doppler or agitated saline injection. 9) No intra-cardiac thrombus or vegetation is seen. 10) The visualized portions of the ascending aorta, & aortic arch appear normal. The descending aorta has atherosclerosis without mobile atheroma. CV 1/28/20 to NSR.    @ 1/20/20 OV:  Mr Sunita Brown has a h/o:  1) CAD (D BMS 11/2010), w/ intermittent atypical CP; Neg MPI 2014 (Dx with clavicle separation)   *DENG/atypical CP 8/2019: worse BP (pt denies PTA); +/- abnl MPI: 40-50% D1 o/w normal: Med Rx. 2) HTN, no renal artery stenosis at cath 8/2019  3) dyslipidemia,   4) AI: moderate 2010 & 08/2019  5) AFib 12/2019, new onset, Sx (DENG/palps in 11 & 12/2019): Rx w/ eliquis/norvasc to dilt. 6) palpitations:  Negative Holter/EvR 2011 & 2014 respectively.   7) prostate CA, remote prostatectomy  8) hypothyroidism    8/2019:  Since being discharged last week, he has had no further chest discomfort; he states his dyspnea has resolved. He feels well overall & is active. 1/2020 (NP OV): Follow up at request of PCP for new onset atrial fibrillation, Has noted some palpitations and increasing DENG recently. Was taking Muccinex at time of onset. States it was similar to previous symptoms over the summer when taking Muccinex. Seen in the ER and K+ was repleted. 1/2020 MD visit:  Started on Eliquis & Norvasc changed to diltiazem. Less dyspnea. No anginal chest pain. No change in atypical chest pain. Home BP has been as high as 160. IMPRESSION AND PLAN  01. Other persistent atrial fibrillation (I48.19):  New onset 12/2019, but also likely had some in 11/2019. Tolerating Eliquis & diltiazem/Coreg for rate control. I recommended BELKIS & cardioversion & he agrees; all risks & benefits were discussed & he wishes to proceed. If successful cardioversion but recurrent AFib, will need to consider antiarrhythmic therapy versus ablation. ECG done BELKIS / Cardioversion to be done. first available   02. Atherosclerotic heart disease of native coronary artery without angina pectoris (I25.10):  No anginal symptoms. Remote diagonal BMS; mild diagonal disease at cath 8/2019, otherwise unremarkable. We discussed the signs and symptoms of unstable angina, myocardial infarction and malignant arrhythmia. The patient knows to seek immediate medical attention should they occur. 03. Essential (primary) hypertension (I10):  Borderline. Will need to monitor. Will see BP at around the time of cardioversion. 04. Mixed hyperlipidemia (E78.2):  Patient is tolerating lipid lowering therapy well. Lipid labs drawn by PCP.   05. Nonrheumatic aortic (valve) insufficiency (I35.1):  Asymptomatic; consistently moderate. Recheck next year. 06. Nicotine dependence, unspecified, uncomplicated (E70.275):   He continues to use smokeless tobacco.  The patient was instructed on tobacco cessation. 07. Body mass index (BMI) 33.0-33.9, adult (A29.09):  Unchanged from last visit. The patient was instructed on AHA diet and regular exercise. ORDERS:  1 ECG done    2 Dietary management education, guidance, and counseling    3 BELKIS / Cardioversion    4 Return office visit with Brayan Chowdhury MD in 6 Weeks. 5 The patient was instructed on AHA diet and regular exercise. 1/20/20 MEDICATION LIST  ~  Medication Sig Desc   Coreg 25 mg Tab Take 1 tablet by mouth twice a day   diltiazem  mg capsule,24 hr,extended release take 1 capsule by oral route  every day   Eliquis 5 mg tablet take 1 tablet by oral route 2 times every day   lisinopril 40 mg Tab Take 1 tablet by mouth twice a day   multivitamin Tab Take 1 tablet by mouth once a day   Protonix 40 mg Tab Take 1 tablet by mouth once a day   Synthroid 50 mcg Tab Take 1 tablet by mouth once a day   Tekturna 300 mg Tab Take 1 tablet by mouth once a day   Vitamin D3 2,000 unit capsule 1 po qd     ____________________________________________________________________________________________________  CARDIAC HISTORY  CAD:  1 Chest Pain Syndrome, atypical. Pre-op eval. [Normal EF, 50% PLB, 75% Diagonal:  BMS to D.] - 35/4121   2 Chest Pain Syndrome, exertional/Dyspnea: ever since stent. [Non-Ischemic Stress, Marked HTN: Sx improved with BP control.] - 1/2011   3 Chest Pain Syndrome, atypical. [Non-Ischemic Stress] - 7/2014     ARRHYTHMIA:  1 Palpitations [Neg holter.] - 1/2011   2 Palpitations [Neg EVR and MPI.] - 6/2014     RISK FACTORS:  1 Hypertension   2 Dyslipidemia   3 Peripheral Vascular Disease   4 Tobacco Use       CARDIOVASCULAR PROCEDURES  Procedure Date Results   William MPI 08/09/2019 EF 0.45 (45%), Possible Basal-Inferolateral Ischemia, (Early + ETT before Alicia).    William MPI 01/19/2011 EF 0.50 (50%), No Ischemia   Carotid Duplex 01/25/2017 1 - 49% Bilateral ICAs, Vertebral: Bilateral Antegrade Flow   Cath 2019 EF 0.50 (50%), Minimal CAD, 40-50% D1: Med Rx. Cath  Normal EF, 50% PLB, 75% Diagonal:  BMS to D.   Echo 2019 EF 0.55 (55%), NWMA, Mild TR, Mild LVH, Mild MR, Moderate AR, Est RVSP 16 mmHg, Nl RV; @ [facility]. Echo 10/27/2010 EF 0.65 (65%), Moderate AR, Mild MR, No change vs 2010. EKG 2020 Atrial Fib, VR 73, Nonspecific ST Twave changes   EVR  Sinus Rhythm, No Malignant Arrhythmias, No events. Holter 2011 Sinus Rhythm, No Malignant Arrhythmias, No Sx. MPI 2014 EF 0.58 (58%), No Ischemia, 9:30. ACTIVE ALLERGIES:  Ingredient Reaction Comment   FENOFIBRATE,MICRONIZED myalgia    FISH OIL couldn't tolerate    SIMVASTATIN myalgia    NIACIN decreased vision    FENOFIBRATE NANOCRYSTALLIZED myalgia    ATORVASTATIN CALCIUM decreased vision    CLONIDINE HCL anxiety        PROBLEM LIST:  Problem Description Chronic   Chest pain Y   AFIB Y   CAD Y   Benign HTN Y   Mixed hyperlipidaemia Y   CVA Y       PAST MEDICAL/SURGICAL HISTORY  (Detailed)    Disease/disorder Onset Date Surgical History Date Comments     Cholecystectomy       Tonsillectomy     Anxiety       Depression       Hyperlipidemia       Hypertension       Hypothyroidism. knee surgery       prostectomy         Family History  (Detailed)  Relationship Family Member Name  Age at Death Condition Onset Age Cause of Death   Brother  N  Hypertension  N   Brother  [de-identified] and well  N   Father  Y  Hypertension  N   Father  Y  Stroke 55 Y     SOCIAL HISTORY  (Detailed)  Tobacco use reviewed. Preferred language is [place]. EDUCATION/EMPLOYMENT/OCCUPATION  Employment History Status Retired Restrictions            MARITAL STATUS/FAMILY/SOCIAL SUPPORT  Currently . Smoking status: Current every day smoker.       SMOKING STATUS  Use Status Type Smoking Status Usage Per Day Years Used Total Pack Years   yes  Current every day smoker TOBACCO CESSATION INFORMATION  Date Counseled By Order Status Description Code Tobacco Cessation Information   06/30/2014      Smoking cessation education   06/30/2014 Geoff Rollins Tobacco cessation counseling completed   Tobacco Cessation Counseling   01/17/2017 Mera Hitchcock Tobacco cessation counseling completed   Tobacco Cessation Counseling   01/13/2020 Geoff Rollins Tobacco cessation counseling completed   Smoking effects education         ALCOHOL  There is a history of alcohol use. consumed frequently.     CAFFEINE  The patient uses caffeine: coffee.    ______________________________________________________________________  Patient Active Problem List    Diagnosis Date Noted    CHF [facility]congestive heart failure) ([facility] 08/07/2019    Coronary artery disease involving native coronary artery of native heart without angina pectoris 05/03/2019    History of lacunar cerebrovascular accident (CVA) 05/03/2019    Prostate cancer ([facility] 05/03/2019    Essential hypertension 05/03/2019    Acquired hypothyroidism 05/03/2019    Mixed hyperlipidemia 05/03/2019    Right rotator cuff tear 01/26/2017    SBO (small bowel obstruction) ([facility] 11/03/2015        Past Medical History:   Diagnosis Date    CAD (coronary artery disease)     h/o stent- '10    Cancer [facility]) 2010    prostate    Hypertension     Stroke [facility]) May or June 2010    lacunar  infarctions (found on scan- no sxs)    Thyroid disease     hypo      Past Surgical History:   Procedure Laterality Date    CARDIAC SURG PROCEDURE UNLIST  2010    coronary stent    EXCIS KNEE CARTILAGE,MEDIAL OR LAT  1980's x3    HX CHOLECYSTECTOMY  1991    HX HEENT  1962    T&A    HX HERNIA REPAIR  1996    HX MALIGNANT SKIN LESION EXCISION      2004 and 2017    HX ORTHOPAEDIC  1974    Ganglion cyst left wrist    HX ORTHOPAEDIC Left 2017    shoulder repair    HX PROSTATECTOMY  2010     Allergies   Allergen Reactions    Clonidine Anxiety    Fish Oil Other (comments) \"rectal pain\"    Niacin Other (comments)     Decreased vision    Statins-Hmg-Coa Reductase Inhibitors Other (comments)     Muscle and Joint pains    Zetia [Ezetimibe] Myalgia     Pt reports getting joint and muscle pain.        Family History   Problem Relation Age of Onset    Dementia Mother     Other Father 55        cerebral hemorrhage      Social History     Socioeconomic History    Marital status:      Spouse name: Not on file    Number of children: Not on file    Years of education: Not on file    Highest education level: Not on file   Occupational History    Not on file   Social Needs    Financial resource strain: Not on file    Food insecurity:     Worry: Not on file     Inability: Not on file    Transportation needs:     Medical: Not on file     Non-medical: Not on file   Tobacco Use    Smoking status: Never Smoker    Smokeless tobacco: Current User   Substance and Sexual Activity    Alcohol use: Yes     Comment: occas    Drug use: Never    Sexual activity: Not Currently   Lifestyle    Physical activity:     Days per week: Not on file     Minutes per session: Not on file    Stress: Not on file   Relationships    Social connections:     Talks on phone: Not on file     Gets together: Not on file     Attends Taoist service: Not on file     Active member of club or organization: Not on file     Attends meetings of clubs or organizations: Not on file     Relationship status: Not on file    Intimate partner violence:     Fear of current or ex partner: Not on file     Emotionally abused: Not on file     Physically abused: Not on file     Forced sexual activity: Not on file   Other Topics Concern    Not on file   Social History Narrative    Not on file     Current Facility-Administered Medications   Medication Dose Route Frequency    nicotine (NICODERM CQ) 14 mg/24 hr patch 1 Patch  1 Patch TransDERmal DAILY    aliskiren (TEKTURNA) tablet 300 mg  300 mg Oral QHS    carvediloL (COREG) tablet 25 mg  25 mg Oral BID WITH MEALS    cholecalciferol (VITAMIN D3) (1000 Units /25 mcg) tablet 2 Tab  2,000 Units Oral DAILY    dilTIAZem CD (CARDIZEM CD) capsule 180 mg  180 mg Oral DAILY    levothyroxine (SYNTHROID) tablet 50 mcg  50 mcg Oral ACB    pantoprazole (PROTONIX) tablet 40 mg  40 mg Oral DAILY    furosemide (LASIX) injection 40 mg  40 mg IntraVENous Q12H    acetaminophen (TYLENOL) tablet 650 mg  650 mg Oral Q4H PRN    ondansetron (ZOFRAN) injection 4 mg  4 mg IntraVENous Q6H PRN    morphine injection 2 mg  2 mg IntraVENous Q4H PRN    labetaloL (NORMODYNE;TRANDATE) injection 10 mg  10 mg IntraVENous Q6H PRN    polyethylene glycol (MIRALAX) packet 17 g  17 g Oral DAILY    lisinopril (PRINIVIL, ZESTRIL) tablet 40 mg  40 mg Oral BID    apixaban (ELIQUIS) tablet 5 mg  5 mg Oral BID        Prior to Admission Medications:  Prior to Admission medications    Medication Sig Start Date End Date Taking? Authorizing Provider   dilTIAZem ER (CARDIZEM LA) 180 mg Tb24 tablet Take 180 mg by mouth daily. Provider, Historical   apixaban (ELIQUIS) 5 mg tablet Take 1 Tab by mouth two (2) times a day for 60 doses. 1/3/20 2/2/20  Mimi Gaines MD   ibuprofen (MOTRIN) 200 mg tablet Take 600 mg by mouth nightly. Provider, Historical   aliskiren (TEKTURNA) 300 mg tablet Take 1 Tab by mouth nightly. Start if blood pressure remain >=140 upper or 90/100 lower 5/3/19   Poonam Villa III, DO   carvedilol (COREG) 25 mg tablet Take 1 Tab by mouth two (2) times daily (with meals). 5/3/19   Zhen Nguyen III, DO   levothyroxine (SYNTHROID) 50 mcg tablet Take 1 Tab by mouth Daily (before breakfast). 5/3/19   Herman Villa III, DO   lisinopril (PRINIVIL, ZESTRIL) 40 mg tablet Take 1 Tab by mouth two (2) times a day. Start if blood pressure remain >=140 upper or 90/100 lower 5/3/19   Poonam Villa III, DO   pantoprazole (PROTONIX) 40 mg tablet Take 1 Tab by mouth daily.  5/3/19 Herman Villa III, DO   cholecalciferol, vitamin D3, (VITAMIN D3) 2,000 unit tab Take 2,000 Units by mouth daily. Provider, Historical   MULTI-VITAMIN HI-PO PO Take 1 Tab by mouth daily. 10/11/10   Provider, Historical        Review of Symptoms: As noted in H&P:  \"Total of 12 systems reviewed as follows:                                        POSITIVE= underlined text  Negative = text not underlined  General:                     fever, chills, sweats, generalized weakness, weight loss/gain,                                       loss of appetite   Eyes:                           blurred vision, eye pain, loss of vision, double vision  ENT:                            rhinorrhea, pharyngitis   Respiratory:               cough, sputum production, SOB, DENG, wheezing, pleuritic pain   Cardiology:                chest pain, palpitations, orthopnea, PND, edema, syncope   Gastrointestinal:       abdominal pain , N/V, diarrhea, dysphagia, constipation, bleeding   Genitourinary:           frequency, urgency, dysuria, hematuria, incontinence   Muskuloskeletal :      arthralgia, myalgia, back pain  Hematology:              easy bruising, nose or gum bleeding, lymphadenopathy   Dermatological:         rash, ulceration, pruritis, color change / jaundice  Endocrine:                 hot flashes or polydipsia   Neurological:             headache, dizziness, confusion, focal weakness, paresthesia,                                      Speech difficulties, memory loss, gait difficulty  Psychological:          Feelings of anxiety, depression, agitation\".      24 hr VS reviewed, overall VSSAF  Temp (24hrs), Av.5 °F (36.9 °C), Min:97.6 °F (36.4 °C), Max:99.8 °F (37.7 °C)    Patient Vitals for the past 8 hrs:   Pulse   20 0816 70   20 0809 71   20 0303 78     Patient Vitals for the past 8 hrs:   Resp   20 0816 22   20 0809 18   20 0303 18     Patient Vitals for the past 8 hrs:   BP 01/30/20 0816 151/73   01/30/20 0809 159/70   01/30/20 0303 149/74       Intake/Output Summary (Last 24 hours) at 1/30/2020 1025  Last data filed at 1/30/2020 0700  Gross per 24 hour   Intake 350 ml   Output 1150 ml   Net -800 ml         Physical Exam (complete single organ system exam)    Cons: The patient is no distress. Appears stated age. HEENT: Normal conjunctivae and palate. No xanthelasma. Neck: Flat JVP without appreciable HJR. Resp: Normal respiratory effort with clear lungs bilaterally. CV: Regular rate and rhythm. PMI not palpated. Normal S1,S2  No gallop or rubs appreciated. AI murmur appreciated. Intact carotid upstroke bilaterally without appreciated bruits. Abdominal aorta not palpated; no abdominal bruit noted. Normal femoral pulses without bruits. Intact pedal pulses. No peripheral edema. GI: No abd mass noted, soft; no organomegaly noted. Bowel sounds present. Muscular:  No significant kyphosis. Strength WNL for age. Ext: No cyanosis, clubbing, or stigmata of peripheral embolization. Derm: No ulcers or stasis dermatitis of lower extremities. Neuro: Alert and oriented x 3;  Grossly non-focal. Normal mood and affect.      Labs:   Recent Results (from the past 24 hour(s))   EKG, 12 LEAD, INITIAL    Collection Time: 01/29/20  5:02 PM   Result Value Ref Range    Ventricular Rate 65 BPM    Atrial Rate 65 BPM    P-R Interval 168 ms    QRS Duration 100 ms    Q-T Interval 446 ms    QTC Calculation (Bezet) 463 ms    Calculated P Axis 54 degrees    Calculated R Axis 26 degrees    Calculated T Axis 55 degrees    Diagnosis       Sinus rhythm with premature atrial complexes  Nonspecific T wave abnormality  When compared with ECG of 28-JAN-2020 11:04,  No significant change was found  Confirmed by Libby Cordero ([id number]) on 1/30/2020 9:27:58 AM     CBC WITH AUTOMATED DIFF    Collection Time: 01/29/20  5:21 PM   Result Value Ref Range    WBC 10.1 4.1 - 11.1 K/uL    RBC 4.32 4.10 - 5.70 M/uL    HGB 13.7 12.1 - 17.0 g/dL    HCT 40.5 36.6 - 50.3 %    MCV 93.8 80.0 - 99.0 FL    MCH 31.7 26.0 - 34.0 PG    MCHC 33.8 30.0 - 36.5 g/dL    RDW 12.0 11.5 - 14.5 %    PLATELET 449 931 - 871 K/uL    MPV 11.8 8.9 - 12.9 FL    NRBC 0.0 0  WBC    ABSOLUTE NRBC 0.00 0.00 - 0.01 K/uL    NEUTROPHILS 72 32 - 75 %    LYMPHOCYTES 19 12 - 49 %    MONOCYTES 5 5 - 13 %    EOSINOPHILS 3 0 - 7 %    BASOPHILS 1 0 - 1 %    IMMATURE GRANULOCYTES 0 0.0 - 0.5 %    ABS. NEUTROPHILS 7.3 1.8 - 8.0 K/UL    ABS. LYMPHOCYTES 1.9 0.8 - 3.5 K/UL    ABS. MONOCYTES 0.5 0.0 - 1.0 K/UL    ABS. EOSINOPHILS 0.3 0.0 - 0.4 K/UL    ABS. BASOPHILS 0.1 0.0 - 0.1 K/UL    ABS. IMM. GRANS. 0.0 0.00 - 0.04 K/UL    DF AUTOMATED     METABOLIC PANEL, COMPREHENSIVE    Collection Time: 01/29/20  5:21 PM   Result Value Ref Range    Sodium 140 136 - 145 mmol/L    Potassium 3.9 3.5 - 5.1 mmol/L    Chloride 108 97 - 108 mmol/L    CO2 25 21 - 32 mmol/L    Anion gap 7 5 - 15 mmol/L    Glucose 109 (H) 65 - 100 mg/dL    BUN 17 6 - 20 MG/DL    Creatinine 1.01 0.70 - 1.30 MG/DL    BUN/Creatinine ratio 17 12 - 20      GFR est AA >60 >60 ml/min/1.73m2    GFR est non-AA >60 >60 ml/min/1.73m2    Calcium 8.4 (L) 8.5 - 10.1 MG/DL    Bilirubin, total 0.4 0.2 - 1.0 MG/DL    ALT (SGPT) 23 12 - 78 U/L    AST (SGOT) 14 (L) 15 - 37 U/L    Alk.  phosphatase 70 45 - 117 U/L    Protein, total 7.3 6.4 - 8.2 g/dL    Albumin 3.7 3.5 - 5.0 g/dL    Globulin 3.6 2.0 - 4.0 g/dL    A-G Ratio 1.0 (L) 1.1 - 2.2     TROPONIN I    Collection Time: 01/29/20  5:21 PM   Result Value Ref Range    Troponin-I, Qt. <0.05 <0.05 ng/mL   NT-PRO BNP    Collection Time: 01/29/20  5:21 PM   Result Value Ref Range    NT pro- (H) <125 PG/ML   LIPASE    Collection Time: 01/29/20  5:21 PM   Result Value Ref Range    Lipase 78 73 - 393 U/L   TROPONIN I    Collection Time: 01/29/20 10:18 PM   Result Value Ref Range    Troponin-I, Qt. <0.05 <0.05 ng/mL   CBC WITH AUTOMATED DIFF    Collection Time: 01/30/20  5:59 AM   Result Value Ref Range    WBC 9.9 4.1 - 11.1 K/uL    RBC 4.06 (L) 4.10 - 5.70 M/uL    HGB 12.7 12.1 - 17.0 g/dL    HCT 37.8 36.6 - 50.3 %    MCV 93.1 80.0 - 99.0 FL    MCH 31.3 26.0 - 34.0 PG    MCHC 33.6 30.0 - 36.5 g/dL    RDW 12.1 11.5 - 14.5 %    PLATELET 314 536 - 745 K/uL    MPV 11.8 8.9 - 12.9 FL    NRBC 0.0 0  WBC    ABSOLUTE NRBC 0.00 0.00 - 0.01 K/uL    NEUTROPHILS 77 (H) 32 - 75 %    LYMPHOCYTES 14 12 - 49 %    MONOCYTES 7 5 - 13 %    EOSINOPHILS 1 0 - 7 %    BASOPHILS 0 0 - 1 %    IMMATURE GRANULOCYTES 1 (H) 0.0 - 0.5 %    ABS. NEUTROPHILS 7.6 1.8 - 8.0 K/UL    ABS. LYMPHOCYTES 1.4 0.8 - 3.5 K/UL    ABS. MONOCYTES 0.7 0.0 - 1.0 K/UL    ABS. EOSINOPHILS 0.1 0.0 - 0.4 K/UL    ABS. BASOPHILS 0.0 0.0 - 0.1 K/UL    ABS. IMM. GRANS. 0.1 (H) 0.00 - 0.04 K/UL    DF AUTOMATED     MAGNESIUM    Collection Time: 01/30/20  5:59 AM   Result Value Ref Range    Magnesium 2.1 1.6 - 2.4 mg/dL   METABOLIC PANEL, COMPREHENSIVE    Collection Time: 01/30/20  5:59 AM   Result Value Ref Range    Sodium 141 136 - 145 mmol/L    Potassium 3.0 (L) 3.5 - 5.1 mmol/L    Chloride 106 97 - 108 mmol/L    CO2 27 21 - 32 mmol/L    Anion gap 8 5 - 15 mmol/L    Glucose 128 (H) 65 - 100 mg/dL    BUN 15 6 - 20 MG/DL    Creatinine 1.03 0.70 - 1.30 MG/DL    BUN/Creatinine ratio 15 12 - 20      GFR est AA >60 >60 ml/min/1.73m2    GFR est non-AA >60 >60 ml/min/1.73m2    Calcium 8.3 (L) 8.5 - 10.1 MG/DL    Bilirubin, total 0.9 0.2 - 1.0 MG/DL    ALT (SGPT) 18 12 - 78 U/L    AST (SGOT) 11 (L) 15 - 37 U/L    Alk.  phosphatase 64 45 - 117 U/L    Protein, total 6.9 6.4 - 8.2 g/dL    Albumin 3.6 3.5 - 5.0 g/dL    Globulin 3.3 2.0 - 4.0 g/dL    A-G Ratio 1.1 1.1 - 2.2     TSH 3RD GENERATION    Collection Time: 01/30/20  5:59 AM   Result Value Ref Range    TSH 2.57 0.36 - 3.74 uIU/mL   TROPONIN I    Collection Time: 01/30/20  6:03 AM   Result Value Ref Range    Troponin-I, Qt. <0.05 <0.05 ng/mL   GLUCOSE, POC    Collection Time: 01/30/20  8:19 AM   Result Value Ref Range    Glucose (POC) 118 (H) 65 - 100 mg/dL    Performed by Bharath Jackson    EKG, 12 LEAD, SUBSEQUENT    Collection Time: 01/30/20  8:20 AM   Result Value Ref Range    Ventricular Rate 68 BPM    Atrial Rate 68 BPM    P-R Interval 164 ms    QRS Duration 100 ms    Q-T Interval 468 ms    QTC Calculation (Bezet) 497 ms    Calculated P Axis 85 degrees    Calculated R Axis 34 degrees    Calculated T Axis 61 degrees    Diagnosis       Sinus rhythm with premature supraventricular complexes  Nonspecific ST abnormality      Confirmed by Alexander James ([id number]) on 1/30/2020 9:28:08 AM     CK W/ CKMB & INDEX    Collection Time: 01/30/20  8:21 AM   Result Value Ref Range    CK 41 39 - 308 U/L    CK - MB <1.0 <3.6 NG/ML    CK-MB Index Cannot be calculated 0.0 - 2.5     TROPONIN I    Collection Time: 01/30/20  8:21 AM   Result Value Ref Range    Troponin-I, Qt. <0.05 <0.05 ng/mL     Recent Labs     01/30/20  0821 01/30/20  0603 01/29/20  2218   CPK 41  --   --    CKMB <1.0  --   --    CKNDX Cannot be calculated  --   --    TROIQ <0.05 <0.05 <0.05       Recent Labs     01/30/20  0559 01/29/20  1721    140   K 3.0* 3.9    108   CO2 27 25   BUN 15 17   CREA 1.03 1.01   GFRAA >60 >60   * 109*   CA 8.3* 8.4*   ALB 3.6 3.7   WBC 9.9 10.1   HGB 12.7 13.7   HCT 37.8 40.5    199       Abdiel Hart MD

## 2020-01-31 ENCOUNTER — PATIENT OUTREACH (OUTPATIENT)
Dept: CASE MANAGEMENT | Age: 65
End: 2020-01-31

## 2020-01-31 ENCOUNTER — TELEPHONE (OUTPATIENT)
Dept: INTERNAL MEDICINE CLINIC | Age: 65
End: 2020-01-31

## 2020-01-31 VITALS
WEIGHT: 242.3 LBS | SYSTOLIC BLOOD PRESSURE: 108 MMHG | BODY MASS INDEX: 32.11 KG/M2 | HEART RATE: 55 BPM | TEMPERATURE: 98.4 F | HEIGHT: 73 IN | OXYGEN SATURATION: 96 % | DIASTOLIC BLOOD PRESSURE: 54 MMHG | RESPIRATION RATE: 18 BRPM

## 2020-01-31 LAB
ANION GAP SERPL CALC-SCNC: 3 MMOL/L (ref 5–15)
ANION GAP SERPL CALC-SCNC: 4 MMOL/L (ref 5–15)
BASOPHILS # BLD: 0.1 K/UL (ref 0–0.1)
BASOPHILS NFR BLD: 1 % (ref 0–1)
BUN SERPL-MCNC: 17 MG/DL (ref 6–20)
BUN SERPL-MCNC: 21 MG/DL (ref 6–20)
BUN/CREAT SERPL: 16 (ref 12–20)
BUN/CREAT SERPL: 17 (ref 12–20)
CALCIUM SERPL-MCNC: 8.3 MG/DL (ref 8.5–10.1)
CALCIUM SERPL-MCNC: 8.4 MG/DL (ref 8.5–10.1)
CHLORIDE SERPL-SCNC: 107 MMOL/L (ref 97–108)
CHLORIDE SERPL-SCNC: 108 MMOL/L (ref 97–108)
CK SERPL-CCNC: 46 U/L (ref 39–308)
CO2 SERPL-SCNC: 26 MMOL/L (ref 21–32)
CO2 SERPL-SCNC: 29 MMOL/L (ref 21–32)
CREAT SERPL-MCNC: 1.03 MG/DL (ref 0.7–1.3)
CREAT SERPL-MCNC: 1.32 MG/DL (ref 0.7–1.3)
DIFFERENTIAL METHOD BLD: NORMAL
EOSINOPHIL # BLD: 0.3 K/UL (ref 0–0.4)
EOSINOPHIL NFR BLD: 4 % (ref 0–7)
ERYTHROCYTE [DISTWIDTH] IN BLOOD BY AUTOMATED COUNT: 12.2 % (ref 11.5–14.5)
GLUCOSE SERPL-MCNC: 134 MG/DL (ref 65–100)
GLUCOSE SERPL-MCNC: 95 MG/DL (ref 65–100)
HCT VFR BLD AUTO: 39.4 % (ref 36.6–50.3)
HGB BLD-MCNC: 13 G/DL (ref 12.1–17)
IMM GRANULOCYTES # BLD AUTO: 0 K/UL (ref 0–0.04)
IMM GRANULOCYTES NFR BLD AUTO: 0 % (ref 0–0.5)
LYMPHOCYTES # BLD: 2.2 K/UL (ref 0.8–3.5)
LYMPHOCYTES NFR BLD: 30 % (ref 12–49)
MCH RBC QN AUTO: 31.3 PG (ref 26–34)
MCHC RBC AUTO-ENTMCNC: 33 G/DL (ref 30–36.5)
MCV RBC AUTO: 94.9 FL (ref 80–99)
MONOCYTES # BLD: 0.5 K/UL (ref 0–1)
MONOCYTES NFR BLD: 6 % (ref 5–13)
NEUTS SEG # BLD: 4.3 K/UL (ref 1.8–8)
NEUTS SEG NFR BLD: 59 % (ref 32–75)
NRBC # BLD: 0 K/UL (ref 0–0.01)
NRBC BLD-RTO: 0 PER 100 WBC
PLATELET # BLD AUTO: 178 K/UL (ref 150–400)
PMV BLD AUTO: 11.8 FL (ref 8.9–12.9)
POTASSIUM SERPL-SCNC: 3.2 MMOL/L (ref 3.5–5.1)
POTASSIUM SERPL-SCNC: 3.9 MMOL/L (ref 3.5–5.1)
RBC # BLD AUTO: 4.15 M/UL (ref 4.1–5.7)
SODIUM SERPL-SCNC: 137 MMOL/L (ref 136–145)
SODIUM SERPL-SCNC: 140 MMOL/L (ref 136–145)
TROPONIN I SERPL-MCNC: <0.05 NG/ML
WBC # BLD AUTO: 7.3 K/UL (ref 4.1–11.1)

## 2020-01-31 PROCEDURE — 84484 ASSAY OF TROPONIN QUANT: CPT

## 2020-01-31 PROCEDURE — 80048 BASIC METABOLIC PNL TOTAL CA: CPT

## 2020-01-31 PROCEDURE — 74011250637 HC RX REV CODE- 250/637: Performed by: INTERNAL MEDICINE

## 2020-01-31 PROCEDURE — 82550 ASSAY OF CK (CPK): CPT

## 2020-01-31 PROCEDURE — 85025 COMPLETE CBC W/AUTO DIFF WBC: CPT

## 2020-01-31 PROCEDURE — 94760 N-INVAS EAR/PLS OXIMETRY 1: CPT

## 2020-01-31 PROCEDURE — 74011250636 HC RX REV CODE- 250/636: Performed by: INTERNAL MEDICINE

## 2020-01-31 PROCEDURE — 36415 COLL VENOUS BLD VENIPUNCTURE: CPT

## 2020-01-31 PROCEDURE — 74011250637 HC RX REV CODE- 250/637: Performed by: HOSPITALIST

## 2020-01-31 RX ORDER — ISOSORBIDE MONONITRATE 30 MG/1
30 TABLET, EXTENDED RELEASE ORAL DAILY
Qty: 30 TAB | Refills: 12 | Status: SHIPPED | OUTPATIENT
Start: 2020-01-31

## 2020-01-31 RX ORDER — POTASSIUM CHLORIDE 750 MG/1
10 TABLET, FILM COATED, EXTENDED RELEASE ORAL DAILY
Qty: 30 TAB | Refills: 12 | Status: SHIPPED | OUTPATIENT
Start: 2020-01-31 | End: 2020-02-21

## 2020-01-31 RX ORDER — POTASSIUM CHLORIDE 750 MG/1
30 TABLET, FILM COATED, EXTENDED RELEASE ORAL
Status: COMPLETED | OUTPATIENT
Start: 2020-01-31 | End: 2020-01-31

## 2020-01-31 RX ORDER — FUROSEMIDE 20 MG/1
20 TABLET ORAL DAILY
Qty: 30 TAB | Refills: 12 | Status: SHIPPED | OUTPATIENT
Start: 2020-02-01 | End: 2020-02-03 | Stop reason: SINTOL

## 2020-01-31 RX ADMIN — POTASSIUM CHLORIDE 30 MEQ: 750 TABLET, FILM COATED, EXTENDED RELEASE ORAL at 08:11

## 2020-01-31 RX ADMIN — ENOXAPARIN SODIUM 105 MG: 120 INJECTION SUBCUTANEOUS at 08:12

## 2020-01-31 RX ADMIN — ISOSORBIDE MONONITRATE 30 MG: 30 TABLET, EXTENDED RELEASE ORAL at 08:20

## 2020-01-31 RX ADMIN — ACETAMINOPHEN 650 MG: 325 TABLET ORAL at 01:34

## 2020-01-31 RX ADMIN — MELATONIN 2 TABLET: at 08:11

## 2020-01-31 RX ADMIN — POLYETHYLENE GLYCOL 3350 17 G: 17 POWDER, FOR SOLUTION ORAL at 08:10

## 2020-01-31 RX ADMIN — CARVEDILOL 25 MG: 12.5 TABLET, FILM COATED ORAL at 08:20

## 2020-01-31 RX ADMIN — LISINOPRIL 40 MG: 20 TABLET ORAL at 08:11

## 2020-01-31 RX ADMIN — SODIUM CHLORIDE 250 ML: 900 INJECTION, SOLUTION INTRAVENOUS at 08:19

## 2020-01-31 RX ADMIN — LEVOTHYROXINE SODIUM 50 MCG: 50 TABLET ORAL at 06:51

## 2020-01-31 RX ADMIN — PANTOPRAZOLE SODIUM 40 MG: 40 TABLET, DELAYED RELEASE ORAL at 08:11

## 2020-01-31 NOTE — PROGRESS NOTES
Home Oxygen Test  Date of test: 1/31  Time of test: 10:38 AM      Sa02 97 % on room air AT REST. Sa02 93 % on room air DURING AMBULATION. Sa02 97 % on room air AT REST/AFTER AMBULATION.

## 2020-01-31 NOTE — PROGRESS NOTES
Cardiology Progress Note  1/31/2020     Admit Date: 1/29/2020  Admit Diagnosis: CHF (congestive heart failure) ([facility]) [I50.9]  CC: none currently  Cardiologist:  Dr Wendie Bran. Cardiac Assessment/Plan:    1) CAD (D BMS 11/2010), w/ intermittent atypical CP; Neg MPI 2014 (Dx with clavicle separation)   *DENG/atypical CP 8/2019: worse BP (pt denies PTA); +/- abnl MPI: 40-50% D1 o/w normal: Med Rx. 2) HTN, no renal artery stenosis at cath 8/2019  3) dyslipidemia,   4) AI: moderate 2010 & 08/2019 & 1/2020.  5) AFib 12/2019, new onset, Sx (DENG/palps in 11 & 12/2019): Rx w/ eliquis/norvasc to dilt. *BELKIS/CV to NSR 1/28/20. Remains on AC. 6) palpitations:  Negative Holter/EvR 2011 & 2014 respectively. 7) prostate CA, remote prostatectomy  8) hypothyroidism  9) CM: EF 40-45% on BELKIS 1/28/20: Will need f/u; if not normalized in future, re-cath.     Admitted with dyspnea/hypoxia, CHF on CXR; Reproducible chest wall pain.     Rec: cont diuresis (on IV lasix 40 bid); treat hypoK (orders written); Lidocaine patch for non-cardiac/reproducible chest wall pain; Change eliquis to lovenox for now in case cath is warranted (not currently planned); last dose of eliquis was am of 1/30. Will change dilt to Imdur for BP control (add hydralazine if needed).          1/31: No CP/dyspnea; ambulating in brasher w/o Sx; IV lasix stopped due to Cr 1.3; KCL Rx noted. Rec: check sats with ambulation; if unremarkable, ok for d/c from cardiac standpoint (d/w nursing). I think he will need diuretic on d/c: lasix 20 qday (start tomorrow) with KCL 10 qday. To remain off dilt; continue Imdur 30 qam; Continue eliquis 5 bid and other home cardiac meds (coreg/lisinopril/tekturna). Already has NP f/u 2/4 and myself 3/4. BMP next week.  ____________________________________________________________________  Odilia Fuel is a 75 y.o. male presents with CHF (congestive heart failure) ([facility] [I50.9].      As noted in H&P: \"  75 y.o.   male with PMH of HTN, CAD, CVA, Prostate Cancer, hypothyroidism, A. Fib, who went for BELKIS and cardioversion yesterday, comes today due to not feeling well presenting abdominal and chest discomfort, associated with SOB. At this time patient is lying in bed seems uncomfortable, describe abdominal discomfort in a belt fashion, but also chest discomfort and SOB, denies N/V diarrhea, no fever, no urinary sym[ptoms, no other associated symptoms.     ______________________________________________________________________     As noted on admit: \"The patient reports doing well after BELKIS/CV until afternoon of 1/29: Developed dyspnea/chest discomfort.     No PND, orthopnea, palpitations, pre-syncope, syncope, peripheral edema, or decrease in exercise tolerance. No current complaints.     ECG: sinus; no acute changes  Tele: sinus  CXR: \"Unchanged cardiomegaly with increased mild to moderate edema. \" vs 1/3/20 that was read as \"Cardiomegaly. No acute process. \"     Notable labs: Hg 12.7; Nl Trop x4; normal CK; K 3 from 3.9; Nl Cr. Nl TSH. \"   ______________________________________________________________  Notable prior cardiac history:       BELKIS 1/28/20:  1) Normal LV size and mild global hypokinesis. EF of 40-45%. 2) Normal RV size and function. 3) Trileaflet aortic valve; No aortic stenosis; Moderate aortic regurgitation (between left/non-coronary cusps). 4) Structurally normal mitral valve; No mitral stenosis; Mild mitral regurgitation. 5) Structurally normal tricuspid valve; No tricuspid stenosis; trivial tricuspid regurgitation. 6) Pulmonary valve appears structurally normal.  7) MARGARET is large and multi-lobed without thrombus. 8) No intra-cardiac shunt is seen on color doppler or agitated saline injection. 9) No intra-cardiac thrombus or vegetation is seen.   10) The visualized portions of the ascending aorta, & aortic arch appear normal. The descending aorta has atherosclerosis without mobile atheroma.     CV 1/28/20 to NSR.     @ 1/20/20 OV:  Mr Jacquelin Lozada has a h/o:  1) CAD (D BMS 11/2010), w/ intermittent atypical CP; Neg MPI 2014 (Dx with clavicle separation)   *DENG/atypical CP 8/2019: worse BP (pt denies PTA); +/- abnl MPI: 40-50% D1 o/w normal: Med Rx. 2) HTN, no renal artery stenosis at cath 8/2019  3) dyslipidemia,   4) AI: moderate 2010 & 08/2019  5) AFib 12/2019, new onset, Sx (DENG/palps in 11 & 12/2019): Rx w/ eliquis/norvasc to dilt. 6) palpitations:  Negative Holter/EvR 2011 & 2014 respectively. 7) prostate CA, remote prostatectomy  8) hypothyroidism     8/2019:  Since being discharged last week, he has had no further chest discomfort; he states his dyspnea has resolved. He feels well overall & is active.     1/2020 (NP OV): Follow up at request of PCP for new onset atrial fibrillation, Has noted some palpitations and increasing DENG recently. Was taking Muccinex at time of onset. States it was similar to previous symptoms over the summer when taking Muccinex. Seen in the ER and K+ was repleted. 1/2020 MD visit:  Started on Eliquis & Norvasc changed to diltiazem. Less dyspnea. No anginal chest pain. No change in atypical chest pain. Home BP has been as high as 160.     IMPRESSION AND PLAN  01. Other persistent atrial fibrillation (I48.19):  New onset 12/2019, but also likely had some in 11/2019. Tolerating Eliquis & diltiazem/Coreg for rate control. I recommended BELKIS & cardioversion & he agrees; all risks & benefits were discussed & he wishes to proceed.     If successful cardioversion but recurrent AFib, will need to consider antiarrhythmic therapy versus ablation. ECG done BELKIS / Cardioversion to be done. first available   02. Atherosclerotic heart disease of native coronary artery without angina pectoris (I25.10):  No anginal symptoms. Remote diagonal BMS; mild diagonal disease at cath 8/2019, otherwise unremarkable.    We discussed the signs and symptoms of unstable angina, myocardial infarction and malignant arrhythmia. The patient knows to seek immediate medical attention should they occur. 03. Essential (primary) hypertension (I10):  Borderline. Will need to monitor. Will see BP at around the time of cardioversion. 04. Mixed hyperlipidemia (E78.2):  Patient is tolerating lipid lowering therapy well. Lipid labs drawn by PCP.   05. Nonrheumatic aortic (valve) insufficiency (I35.1):  Asymptomatic; consistently moderate. Recheck next year. 06. Nicotine dependence, unspecified, uncomplicated (P65.716):  He continues to use smokeless tobacco.  The patient was instructed on tobacco cessation. 07. Body mass index (BMI) 33.0-33.9, adult (D62.72):  Unchanged from last visit. The patient was instructed on AHA diet and regular exercise.      ORDERS:  1 ECG done   2 Dietary management education, guidance, and counseling   3 BELKIS / Cardioversion   4 Return office visit with Shaye Chowdhury MD in 6 Weeks. 5 The patient was instructed on AHA diet and regular exercise.         1/20/20 MEDICATION LIST  ~  Medication Sig Desc   Coreg 25 mg Tab Take 1 tablet by mouth twice a day   diltiazem  mg capsule,24 hr,extended release take 1 capsule by oral route  every day   Eliquis 5 mg tablet take 1 tablet by oral route 2 times every day   lisinopril 40 mg Tab Take 1 tablet by mouth twice a day   multivitamin Tab Take 1 tablet by mouth once a day   Protonix 40 mg Tab Take 1 tablet by mouth once a day   Synthroid 50 mcg Tab Take 1 tablet by mouth once a day   Tekturna 300 mg Tab Take 1 tablet by mouth once a day   Vitamin D3 2,000 unit capsule 1 po qd      ____________________________________________________________________________________________________  CARDIAC HISTORY  CAD:  1 Chest Pain Syndrome, atypical. Pre-op eval. [Normal EF, 50% PLB, 75% Diagonal:  BMS to D.] - 26/3211   2 Chest Pain Syndrome, exertional/Dyspnea: ever since stent.  [Non-Ischemic Stress, Marked HTN: Sx improved with BP control.] - 1/2011   3 Chest Pain Syndrome, atypical. [Non-Ischemic Stress] - 7/2014      ARRHYTHMIA:  1 Palpitations [Neg holter.] - 1/2011   2 Palpitations [Neg EVR and MPI.] - 6/2014      RISK FACTORS:  1 Hypertension   2 Dyslipidemia   3 Peripheral Vascular Disease   4 Tobacco Use         CARDIOVASCULAR PROCEDURES  Procedure Date Results   [facility] MPI 08/09/2019 EF 0.45 (45%), Possible Basal-Inferolateral Ischemia, (Early + ETT before Alicia). [facility] MPI 01/19/2011 EF 0.50 (50%), No Ischemia   Carotid Duplex 01/25/2017 1 - 49% Bilateral ICAs, Vertebral: Bilateral Antegrade Flow   Cath 08/09/2019 EF 0.50 (50%), Minimal CAD, 40-50% D1: Med Rx. Cath   Normal EF, 50% PLB, 75% Diagonal:  BMS to D.   Echo 08/07/2019 EF 0.55 (55%), NWMA, Mild TR, Mild LVH, Mild MR, Moderate AR, Est RVSP 16 mmHg, Nl RV; @ [facility]. Echo 10/27/2010 EF 0.65 (65%), Moderate AR, Mild MR, No change vs 6/2010. EKG 01/20/2020 Atrial Fib, VR 73, Nonspecific ST Twave changes   EVR   Sinus Rhythm, No Malignant Arrhythmias, No events. Holter 07/13/2011 Sinus Rhythm, No Malignant Arrhythmias, No Sx. MPI 07/09/2014 EF 0.58 (58%), No Ischemia, 9:30.      For other plans, see orders.   Hospital problem list   Active Hospital Problems    Diagnosis Date Noted    CHF (congestive heart failure) ([facility] 08/07/2019        Subjective: Jareth Membreno reports   Chest pain X none  consistent with:  Non-cardiac CP         Atypical CP     None now  On going  Anginal CP     Dyspnea X none  at rest  with exertion         improved  unchanged  worse              PND X none  overnight       Orthopnea X none  improved  unchanged  worse   Presyncope X none  improved  unchanged  worse     Ambulated in hallway without symptoms   Yes   Ambulated in room without symptoms  Yes   Objective:    Physical Exam:  Overall VSSAF;    Visit Vitals  /74 (BP 1 Location: Left arm, BP Patient Position: At rest)   Pulse (!) 58   Temp 97.8 °F (36.6 °C)   Resp 18   Ht 6' 1\" (1.854 m)   Wt 109.9 kg (242 lb 4.8 oz)   SpO2 95%   BMI 31.97 kg/m²     Temp (24hrs), Av °F (36.7 °C), Min:97.7 °F (36.5 °C), Max:98.2 °F (36.8 °C)    Patient Vitals for the past 8 hrs:   Pulse   20 0712 (!) 58   20 0407 (!) 58     Patient Vitals for the past 8 hrs:   Resp   20 0712 18   20 0407 18     Patient Vitals for the past 8 hrs:   BP   20 0712 159/74   20 040 146/78       Intake/Output Summary (Last 24 hours) at 2020 0810  Last data filed at 2020 0136  Gross per 24 hour   Intake 960 ml   Output 1200 ml   Net -240 ml     General Appearance: Well developed, well nourished, no acute distress. Ears/Nose/Mouth/Throat:   Normal MM; anicteric. JVP: WNL   Resp:   clear to auscultation bilaterally. Nl resp effort. Cardiovascular:  RRR, S1, S2 normal, no new murmur. No gallop or rub. Abdomen:   Soft, non-tender, bowel sounds are present. Extremities: No edema bilaterally. Skin:  Neuro: Warm and dry. A/O x3, grossly nonfocal   cath site intact w/o hematoma or new bruit; distal pulse unchanged  Yes   Data Review:     Telemetry independently reviewed x sinus  chronic afib  parox afib  NSVT     ECG independently reviewed  NSR  afib  no significant changes  NSST-Tw chgs   x no new ECG provided for review   Lab results reviewed as noted below. Current medications reviewed as noted below. No results for input(s): PH, PCO2, PO2 in the last 72 hours.   Recent Labs     20  0121 20  0821 20  0603   CPK  --  41  --    CKMB  --  <1.0  --    CKNDX  --  Cannot be calculated  --    TROIQ <0.05 <0.05 <0.05     Recent Labs     20  0121 20  0559 20  1721    141 140   K 3.2* 3.0* 3.9    106 108   CO2 26 27 25   BUN 21* 15 17   CREA 1.32* 1.03 1.01   GFRAA >60 >60 >60   * 128* 109*   CA 8.3* 8.3* 8.4*   ALB  --  3.6 3.7   WBC 7.3 9.9 10.1   HGB 13.0 12.7 13.7   HCT 39.4 37.8 40.5    166 199     Lab Results   Component Value Date/Time    Cholesterol, total 253 (H) 05/13/2019 11:51 AM    HDL Cholesterol 41 05/13/2019 11:51 AM    LDL, calculated 164 (H) 05/13/2019 11:51 AM    Triglyceride 242 (H) 05/13/2019 11:51 AM    CHOL/HDL Ratio 5.5 (H) 11/02/2010 04:40 AM     Recent Labs     01/30/20  0559 01/29/20  1721   SGOT 11* 14*   AP 64 70   TP 6.9 7.3   ALB 3.6 3.7   GLOB 3.3 3.6   LPSE  --  78     No results for input(s): INR, PTP, APTT, INREXT in the last 72 hours.    No components found for: [place]    Current Facility-Administered Medications   Medication Dose Route Frequency    potassium chloride SR (KLOR-CON 10) tablet 30 mEq  30 mEq Oral NOW    sodium chloride 0.9 % bolus infusion 250 mL  250 mL IntraVENous ONCE    nicotine (NICODERM CQ) 14 mg/24 hr patch 1 Patch  1 Patch TransDERmal DAILY    lidocaine 4 % patch 1 Patch  1 Patch TransDERmal Q24H    isosorbide mononitrate ER (IMDUR) tablet 30 mg  30 mg Oral DAILY    enoxaparin (LOVENOX) injection 105 mg ++partial syringe++  105 mg SubCUTAneous Q12H    aliskiren (TEKTURNA) tablet 300 mg  300 mg Oral QHS    carvediloL (COREG) tablet 25 mg  25 mg Oral BID WITH MEALS    cholecalciferol (VITAMIN D3) (1000 Units /25 mcg) tablet 2 Tab  2,000 Units Oral DAILY    levothyroxine (SYNTHROID) tablet 50 mcg  50 mcg Oral ACB    pantoprazole (PROTONIX) tablet 40 mg  40 mg Oral DAILY    acetaminophen (TYLENOL) tablet 650 mg  650 mg Oral Q4H PRN    ondansetron (ZOFRAN) injection 4 mg  4 mg IntraVENous Q6H PRN    morphine injection 2 mg  2 mg IntraVENous Q4H PRN    labetaloL (NORMODYNE;TRANDATE) injection 10 mg  10 mg IntraVENous Q6H PRN    polyethylene glycol (MIRALAX) packet 17 g  17 g Oral DAILY    lisinopril (PRINIVIL, ZESTRIL) tablet 40 mg  40 mg Oral BID        Roberto Acosta MD

## 2020-01-31 NOTE — PROGRESS NOTES
Problem: Falls - Risk of  Goal: *Absence of Falls  Description  Document Louis Lázaro Fall Risk and appropriate interventions in the flowsheet.   Outcome: Progressing Towards Goal  Note: Fall Risk Interventions:            Medication Interventions: Bed/chair exit alarm, Patient to call before getting OOB, Teach patient to arise slowly                   Problem: Patient Education: Go to Patient Education Activity  Goal: Patient/Family Education  Outcome: Progressing Towards Goal     Problem: Patient Education: Go to Patient Education Activity  Goal: Patient/Family Education  Outcome: Progressing Towards Goal

## 2020-01-31 NOTE — DISCHARGE SUMMARY
Hospitalist Discharge Summary     Patient ID:  Greg Lora  [id number]  11 y.o.  1955 1/29/2020    PCP on record: Lesli Silver DO    Admit date: 1/29/2020  Discharge date and time: 1/31/2020    DISCHARGE DIAGNOSIS:  DISCHARGE SUMMARY/HOSPITAL COURSE:  for full details see H&P, daily progress notes, labs, consult notes. Acute on chronic Systolic Heart FailurePOA:   CM: EF 40-45% on BELKIS 1/28/20, cont IV diuresis with lasix BID 40mg  -cardiology followed     1/31: No CP/dyspnea; ambulating in brasher w/o Sx; IV lasix stopped due to Cr 1.3; KCL Rx noted.     Rec: check sats with ambulation; if unremarkable, ok for d/c from cardiac standpoint (d/w nursing). I think he will need diuretic on d/c: lasix 20 qday (start tomorrow) with KCL 10 qday. To remain off dilt; continue Imdur 30 qam; Continue eliquis 5 bid and other home cardiac meds (coreg/lisinopril/tekturna).    Already has NP f/u 2/4 and myself 3/4. BMP next week. H/O A. Fib/ then [place]: patient went for BELKIS and Cardioversion 1/28, c/w diltiazem and Coreg. Cardiology changed eliquis to lovenox during his stay. Neg times four = cardiac Enzymes   Chest pain 1/30 AM  -EKG unchanged and trop neg; rapid response was called  HTN: dilt to Imdur for BP control (add hydralazine if needed)   Tekturna, Coreg, also ACE - - pharmacy aware. Use labetalol prn. Hypothyroidism: c/w L-thyroxine. Abdominal Discomfort: related to GERD?, c/w PPI, lipase neg  abd xray: IMPRESSION   impression: Few air-fluid levels mid to right lower abdomen. Code Status: Full Code  Surrogate Decision Maker: wife Ivy Frances [phone]  DVT Prophylaxis: lovenox  GI Prophylaxis: PPI    CONSULTATIONS:  IP CONSULT TO CARDIOLOGY     Rec: check sats with ambulation; if unremarkable, ok for d/c from cardiac standpoint (d/w nursing). I think he will need diuretic on d/c: lasix 20 qday (start tomorrow) with KCL 10 qday.   To remain off dilt; continue Imdur 30 qam; Continue eliquis 5 bid and other home cardiac meds (coreg/lisinopril/tekturna). Already has NP f/u 2/4 and myself 3/4. BMP next week. Excerpted HPI from H&P of Faith Porras MD:  Linh Perkins is a 75 y.o.  male with PMH of HTN, CAD, CVA, Prostate Cancer, hypothyroidism, A. Fib, who went for BELKIS and cardioversion yesterday, comes today due to not feeling well presenting abdominal and chest discomfort, associated with SOB. At this time patient is lying in bed seems uncomfortable, describe abdominal discomfort in a belt fashion, but also chest discomfort and SOB, denies N/V diarrhea, no fever, no urinary sym[ptoms, no other associated symptoms. We were asked to admit for work up and evaluation of the above problems.    _______________________________________________________________________  Patient seen and examined by me on discharge day. No SOB. Pertinent Findings:  Gen:    Not in distress  Chest: Clear lungs  CVS:   Regular rhythm. No edema     _______________________________________________________________________  DISCHARGE MEDICATIONS:   Current Discharge Medication List      START taking these medications    Details   isosorbide mononitrate ER (IMDUR) 30 mg tablet Take 1 Tab by mouth daily. Qty: 30 Tab, Refills: 12      furosemide (LASIX) 20 mg tablet Take 1 Tab by mouth daily. Qty: 30 Tab, Refills: 12      potassium chloride SR (KLOR-CON 10) 10 mEq tablet Take 1 Tab by mouth daily. Qty: 30 Tab, Refills: 12         CONTINUE these medications which have NOT CHANGED    Details   apixaban (ELIQUIS) 5 mg tablet Take 1 Tab by mouth two (2) times a day for 60 doses. Qty: 60 Tab, Refills: 0      aliskiren (TEKTURNA) 300 mg tablet Take 1 Tab by mouth nightly. Start if blood pressure remain >=140 upper or 90/100 lower  Qty: 90 Tab, Refills: 3      carvedilol (COREG) 25 mg tablet Take 1 Tab by mouth two (2) times daily (with meals).   Qty: 180 Tab, Refills: 3      levothyroxine (SYNTHROID) 50 mcg tablet Take 1 Tab by mouth Daily (before breakfast). Qty: 90 Tab, Refills: 3      lisinopril (PRINIVIL, ZESTRIL) 40 mg tablet Take 1 Tab by mouth two (2) times a day. Start if blood pressure remain >=140 upper or 90/100 lower  Qty: 180 Tab, Refills: 3      pantoprazole (PROTONIX) 40 mg tablet Take 1 Tab by mouth daily. Qty: 90 Tab, Refills: 3      cholecalciferol, vitamin D3, (VITAMIN D3) 2,000 unit tab Take 2,000 Units by mouth daily. MULTI-VITAMIN HI-PO PO Take 1 Tab by mouth daily. STOP taking these medications       dilTIAZem ER (CARDIZEM LA) 180 mg Tb24 tablet Comments:   Reason for Stopping:                 Patient Follow Up Instructions:        Follow-up Information     Follow up With Specialties Details Why Contact Info    Julissa Rollins NP Nurse Practitioner On 2/4/2020 as planned [address] [facility] [address]  [address] [id number] [phone]      Audrey Flowers MD Cardiology On 3/4/2020 as planned [address]  [id number]  [address] [phone]      Lab work to watch kidney function and potassium  On 2/3/2020            Condition at Discharge:  Stable  __________________________________________________________________    Disposition  Home with family, no needs    ___________________________________________________________________      Total time in minutes spent coordinating this discharge (includes going over instructions, follow-up, prescriptions, and preparing report for sign off to her PCP) :  33  minutes    Signed:  Scott Amador MD

## 2020-01-31 NOTE — PROGRESS NOTES
Problem: Falls - Risk of  Goal: *Absence of Falls  Description  Document Lars Pinto Fall Risk and appropriate interventions in the flowsheet.   Outcome: Progressing Towards Goal  Note: Fall Risk Interventions:            Medication Interventions: Bed/chair exit alarm, Patient to call before getting OOB, Teach patient to arise slowly         History of Falls Interventions: Bed/chair exit alarm         Problem: Heart Failure: Day 1  Goal: Consults, if ordered  Outcome: Progressing Towards Goal  Goal: Diagnostic Test/Procedures  Outcome: Progressing Towards Goal

## 2020-01-31 NOTE — PROGRESS NOTES
Problem: Falls - Risk of  Goal: *Absence of Falls  Description  Document Awilda Rouse Fall Risk and appropriate interventions in the flowsheet.   Outcome: Progressing Towards Goal  Note: Fall Risk Interventions:            Medication Interventions: Teach patient to arise slowly                   Problem: Patient Education: Go to Patient Education Activity  Goal: Patient/Family Education  Outcome: Progressing Towards Goal     Problem: Patient Education: Go to Patient Education Activity  Goal: Patient/Family Education  Outcome: Progressing Towards Goal     Problem: Heart Failure: Day 1  Goal: Off Pathway (Use only if patient is Off Pathway)  Outcome: Progressing Towards Goal  Goal: Activity/Safety  Outcome: Progressing Towards Goal  Goal: Consults, if ordered  Outcome: Progressing Towards Goal  Goal: Diagnostic Test/Procedures  Outcome: Progressing Towards Goal  Goal: Nutrition/Diet  Outcome: Progressing Towards Goal  Goal: Discharge Planning  Outcome: Progressing Towards Goal  Goal: Medications  Outcome: Progressing Towards Goal  Goal: Respiratory  Outcome: Progressing Towards Goal  Goal: Treatments/Interventions/Procedures  Outcome: Progressing Towards Goal  Goal: Psychosocial  Outcome: Progressing Towards Goal  Goal: *Oxygen saturation within defined limits  Outcome: Progressing Towards Goal  Goal: *Hemodynamically stable  Outcome: Progressing Towards Goal  Goal: *Optimal pain control at patient's stated goal  Outcome: Progressing Towards Goal  Goal: *Anxiety reduced or absent  Outcome: Progressing Towards Goal

## 2020-01-31 NOTE — PROGRESS NOTES
RAPID RESPONSE TEAM-Follow up    Rounding on patient due to rapid response for chest pain on 1/30. Patient is sitting up in bed, alert, NAD. No RRT interventions are currently indicated. Please call back if needed. Sonali Carter RN  Ext. [phone]    Vitals w/ MEWS Score (last day)     Date/Time MEWS Score Pulse Resp Temp BP Level of Consciousness SpO2    01/31/20 0712    (!) 58  18  97.8 °F (36.6 °C)  159/74    95 %    01/31/20 0407  1  (!) 58  18  97.8 °F (36.6 °C)  146/78  Alert  94 %    01/30/20 2252  1  62  18  98.1 °F (36.7 °C)  133/55  Alert  95 %    01/30/20 1936  1  60  18  98.2 °F (36.8 °C)  122/73  Alert  94 %    01/30/20 1815    (!) 58      117/58  Alert      01/30/20 1527  1  (!) 53  18  97.7 °F (36.5 °C)  108/51  Alert  94 %    01/30/20 1052  1  67  20  98.2 °F (36.8 °C)  130/68  Alert  94 %    01/30/20 0816    70  22    151/73  Alert  96 %    01/30/20 0809  1  71  18  98.3 °F (36.8 °C)  159/70  Alert  91 %    01/30/20 0303  1  78  18  98 °F (36.7 °C)  149/74  Alert  90 %    01/30/20 0033        98.6 °F (37 °C)                Results for Kegley Nett (MRN [id number]) as of 1/31/2020 07:40   Ref. Range 1/30/2020 08:21   CK Latest Ref Range: 39 - 308 U/L 41   Results for Kegley Nett (MRN [id number]) as of 1/31/2020 07:40   Ref. Range 1/31/2020 01:21   CK Latest Ref Range: 39 - 308 U/L 46   Results for Merle Nett (MRN [id number]) as of 1/31/2020 07:40   Ref. Range 1/30/2020 08:21   CK - MB Latest Ref Range: <3.6 NG/ML <1.0   Results for Kegley Nett (MRN [id number]) as of 1/31/2020 07:40   Ref. Range 1/29/2020 17:21 1/29/2020 22:18 1/30/2020 06:03 1/30/2020 08:21 1/31/2020 01:21   Troponin-I, Qt.  Latest Ref Range: <0.05 ng/mL <0.05 <0.05 <0.05 <0.05 <0.05

## 2020-01-31 NOTE — PROGRESS NOTES
Transitional Care Team: [facility] Discharge Note    Date of Assessment: 01/31/20  Time of Assessment:  1:32 PM      Carlos Prather is a 75 y.o. male inpatient at [facility] with heart failure on  1/29. Assessment & Plan   Pt A+O, ambulates in room with steady gait. Has discharge orders. To follow up with cardiologist 2/4. Will call PCP office to schedule with them. Current Code Status:  full    Medication Reconciliation:  Await AVS.     Can patient afford medications:  yes    Who manages medications at home : self    Emergency Contact:spouse barby [phone]     Chart reviewed by me and HUG (Healthy Understanding of Goals) program introduced to patient/family. The Transitional Care Team bridges the gaps in care and education surrounding discharge from the acute care facility. The objective is to empower the patient and family in taking a proactive role in the task of preventing readmission within the first thirty days after discharge from the acute care setting, The team is also involved in the efforts to reduce readmission to the acute care setting after stabilization and discharge from the acute care environment either to skilled nursing facilities or community. Discharge With The Following Arrangements in place:      Non-[facility] follow up appointment(s): cardiology 2/4    [facility] call information given to pt    Patient / Family was instructed on specific signs/symptoms to look for with regards to worsening of their medical conditions. Learning was assessed using teach back. The patient / family have added upcoming appointment dates to their [facility] Calendar prior to discharge. Patient education focused on readmission zones as described as: The Red Zone: High risk for readmission, days 1-21   The Yellow Zone:  Moderate  risk for readmission, days 22-29   The Green Zone: Lower risk for readmission, days 30 and after    The [facility] Team will follow patient from a distance while inpatient as well as be available for further transition disposition as needed. The SUZY TEAM will continue to offer support during the 30- 90 day discharge from acute care setting. Notified Ambulatory {Blank Single Select Template:20061[de-identified] ,SUZY RN.       Signed By: Edwige Hdz RN     January 31, 2020

## 2020-01-31 NOTE — TELEPHONE ENCOUNTER
Pt discharging from [facility] 1-31-20 and needs a five day SUZY appt. Please call pt to schedule. Thanks.

## 2020-01-31 NOTE — PROGRESS NOTES
7:49 AM Received bedside verbal report from [facility]. DISCHARGE SUMMARY FROM NURSE    The patient is stable for discharge. I have reviewed the discharge instructions with the patient and spouse. The patient and spouse verbalized understanding. All questions were fully answered. The  patient and spouse denies any complaints. There were no personal belongings, valuables or home medications left at patients bedside,  or safe. Hard scripts and medication handouts were given and reviewed with the patient and spouse. Appropriate educational materials and medication side effects teaching were provided. Cardiac monitor and IV line(s) were removed.

## 2020-01-31 NOTE — PROGRESS NOTES
Spiritual Care Partner Volunteer visited patient in [facility] on January 31, 2020.     Documented by:     ROMA Brown, [facility], Staff [address] [place] [address] Paging Service  [phone]

## 2020-01-31 NOTE — DISCHARGE INSTRUCTIONS
Discharge Instructions       PATIENT ID: Rebecca Calderon  MRN: [id number]   YOB: 1955    DATE OF ADMISSION: 1/29/2020  4:56 PM    DATE OF DISCHARGE: 1/31/2020    PRIMARY CARE PROVIDER: Cierra Abernathy DO     ATTENDING PHYSICIAN: Rolo Galarza MD  DISCHARGING PROVIDER: Danny Bautista MD    To contact this individual call [phone] and ask the  to page. If unavailable ask to be transferred the Adult Hospitalist Department. DISCHARGE DIAGNOSES Acute on chronic Systolic Heart Failure    CONSULTATIONS: IP CONSULT TO CARDIOLOGY    PROCEDURES/SURGERIES: * No surgery found *      FOLLOW UP APPOINTMENTS:   Follow-up Information     Follow up With Specialties Details Why Contact Info    Hermelindo Stanley NP Nurse Practitioner On 2/4/2020 as planned [address] [facility] [address] [phone]      Grover Yepez MD Cardiology On 3/4/2020 as planned [address]  [id number]  [address] [phone]      Lab work to watch kidney function and potassium  On 2/3/2020      Cierra Abernathy DO Internal Medicine   [address]  [facility] [address] [phone]             ADDITIONAL CARE RECOMMENDATIONS: ***    DIET: Cardiac Diet    ACTIVITY: Activity as tolerated          DISCHARGE MEDICATIONS:   See Medication Reconciliation Form    · It is important that you take the medication exactly as they are prescribed. · Keep your medication in the bottles provided by the pharmacist and keep a list of the medication names, dosages, and times to be taken in your wallet. · Do not take other medications without consulting your doctor. NOTIFY YOUR PHYSICIAN FOR ANY OF THE FOLLOWING:   Fever over 101 degrees for 24 hours. Chest pain, shortness of breath, fever, chills, nausea, vomiting, diarrhea, change in mentation, falling, weakness, bleeding. Severe pain or pain not relieved by medications.   Or, any other signs or symptoms that you may have questions about.       Signed:   Júnior Swift MD  1/31/2020  12:49 PM

## 2020-01-31 NOTE — PROGRESS NOTES
Bedside shift change report GIVEN TO Irasema Loyola RN. Report included the following information SBAR and Kardex. SIGNIFICANT CHANGES DURING SHIFT:  None. CONCERNS TO ADDRESS WITH MD:  None. [facility] NURSING NOTE   Admission Date 1/29/2020   Admission Diagnosis CHF (congestive heart failure) ([facility] [address] [I50.9]   Consults IP CONSULT TO CARDIOLOGY      Cardiac Monitoring [x] Yes [] No      Purposeful Hourly Rounding [x] Yes    Lillian Score Total Score: 1   Lillian score 3 or > [] Bed Alarm [] Avasys [] 1:1 sitter [] Patient refused (Signed refusal form in chart)   Kenroy Score Kenroy Score: 20   Kenroy score 14 or < [] PMT consult [] Wound Care consult    []  Specialty bed  [] Nutrition consult      Influenza Vaccine Received Flu Vaccine for Current Season (usually Sept-March): No    Patient/Guardian Refused (Notify MD): Yes      Oxygen needs? [x] Room air Oxygen @  []1L    []2L    []3L   []4L    []5L   []6L via  NC   Chronic home O2 use? [] Yes [x] No  Perform O2 challenge test and document in progress note using smartphSynCardia Systemse (.Homeoxygen)      Last bowel movement Last Bowel Movement Date: 01/30/20      Urinary Catheter             LDAs               Peripheral IV 01/29/20 Left Hand (Active)   Site Assessment Clean, dry, & intact 1/31/2020  3:00 AM   Phlebitis Assessment 0 1/31/2020  3:00 AM   Infiltration Assessment 0 1/31/2020  3:00 AM   Dressing Status Clean, dry, & intact 1/31/2020  3:00 AM   Dressing Type Transparent;Tape 1/31/2020  3:00 AM   Hub Color/Line Status Flushed;Blue 1/31/2020  3:00 AM                         Readmission Risk Assessment Tool Score Medium Risk            18       Total Score        3 Has Seen PCP in Last 6 Months (Yes=3, No=0)    4 IP Visits Last 12 Months (1-3=4, 4=9, >4=11)    5 Pt. Coverage (Medicare=5 , Medicaid, or Self-Pay=4)    6 Charlson Comorbidity Score (Age + Comorbid Conditions)        Criteria that do not apply:    . Living with Significant Other.  Assisted Living. LTAC. SNF.  or   Rehab    Patient Length of Stay (>5 days = 3)       Expected Length of Stay 2d 9h   Actual Length of Stay 2

## 2020-01-31 NOTE — PROGRESS NOTES
SUZY: Home with Follow-up Appointments    1:57pm-No further CM needs identified. CM notified pt's nurse of d/c.    1:55pm- CM called pt's room and spoke to pt's wife. Pt's wife stated that he will be transporting pt at d/c.     1:50pm- CM called pt's PCP Dr. Fay Pete office via phone to schedule pt's follow-up appointment. Office will call to schedule pt's follow-up appointment. Office is aware that pt needs to be seen in 5 days. Care Management Interventions  PCP Verified by CM: Yes  Palliative Care Criteria Met (RRAT>21 & CHF Dx)?: No  Mode of Transport at Discharge: Other (see comment)(Pt's wife will transpot at d/c. )  Transition of Care Consult (CM Consult):  Other(Home)  Discharge Durable Medical Equipment: No  Physical Therapy Consult: No  Occupational Therapy Consult: No  Speech Therapy Consult: No  Current Support Network: Lives with Spouse, Own Home  Confirm Follow Up Transport: Self   Resource Information Provided?: No  Discharge Location  Discharge Placement: Home(Home with follow-up appointments)    Geraldo Castaneda [address] [place] [address]  [phone]

## 2020-01-31 NOTE — PROGRESS NOTES
RAPID RESPONSE TEAM- Follow Up    Rounded on patient due to recent rapid response chest pain. Discussed with primary RNSigrid. No acute concerns, VSS, MEWS 1. Patient with no further c/o chest pain. Patient Vitals for the past 12 hrs:   Temp Pulse Resp BP SpO2   01/30/20 1936 98.2 °F (36.8 °C) 60 18 122/73 94 %   01/30/20 1815  (!) 58  117/58    01/30/20 1527 97.7 °F (36.5 °C) (!) 53 18 108/51 94 %   01/30/20 1052 98.2 °F (36.8 °C) 67 20 130/68 94 %     Lab Results   Component Value Date/Time    Sodium 141 01/30/2020 05:59 AM    Potassium 3.0 (L) 01/30/2020 05:59 AM    Chloride 106 01/30/2020 05:59 AM    CO2 27 01/30/2020 05:59 AM    Anion gap 8 01/30/2020 05:59 AM    Glucose 128 (H) 01/30/2020 05:59 AM    BUN 15 01/30/2020 05:59 AM    Creatinine 1.03 01/30/2020 05:59 AM    BUN/Creatinine ratio 15 01/30/2020 05:59 AM    GFR est AA >60 01/30/2020 05:59 AM    GFR est non-AA >60 01/30/2020 05:59 AM    Calcium 8.3 (L) 01/30/2020 05:59 AM     Troponin-I, Qt. Date Value Ref Range Status   01/30/2020 <0.05 <0.05 ng/mL Final     No RRT interventions indicated at this time. Please call with any questions or concerns.      Valeri Bhatti  Rapid Response RN  Noe Castellon

## 2020-02-03 ENCOUNTER — PATIENT OUTREACH (OUTPATIENT)
Dept: INTERNAL MEDICINE CLINIC | Age: 65
End: 2020-02-03

## 2020-02-03 ENCOUNTER — OFFICE VISIT (OUTPATIENT)
Dept: INTERNAL MEDICINE CLINIC | Age: 65
End: 2020-02-03

## 2020-02-03 ENCOUNTER — HOSPITAL ENCOUNTER (OUTPATIENT)
Dept: LAB | Age: 65
Discharge: HOME | End: 2020-02-03
Payer: MEDICARE

## 2020-02-03 VITALS
HEIGHT: 73 IN | WEIGHT: 251.4 LBS | TEMPERATURE: 98.1 F | OXYGEN SATURATION: 95 % | DIASTOLIC BLOOD PRESSURE: 73 MMHG | RESPIRATION RATE: 16 BRPM | HEART RATE: 57 BPM | BODY MASS INDEX: 33.32 KG/M2 | SYSTOLIC BLOOD PRESSURE: 127 MMHG

## 2020-02-03 DIAGNOSIS — I10 ESSENTIAL HYPERTENSION: ICD-10-CM

## 2020-02-03 DIAGNOSIS — E03.9 ACQUIRED HYPOTHYROIDISM: ICD-10-CM

## 2020-02-03 DIAGNOSIS — I25.10 CORONARY ARTERY DISEASE INVOLVING NATIVE CORONARY ARTERY OF NATIVE HEART WITHOUT ANGINA PECTORIS: ICD-10-CM

## 2020-02-03 DIAGNOSIS — I48.0 PAROXYSMAL ATRIAL FIBRILLATION (HCC): ICD-10-CM

## 2020-02-03 DIAGNOSIS — E78.2 MIXED HYPERLIPIDEMIA: ICD-10-CM

## 2020-02-03 DIAGNOSIS — I50.22 CHRONIC SYSTOLIC CONGESTIVE HEART FAILURE (HCC): Primary | ICD-10-CM

## 2020-02-03 PROBLEM — C61 PROSTATE CANCER (HCC): Chronic | Status: RESOLVED | Noted: 2019-05-03 | Resolved: 2020-02-03

## 2020-02-03 LAB
BILIRUB UR QL STRIP: NORMAL
GLUCOSE UR-MCNC: NEGATIVE MG/DL
KETONES P FAST UR STRIP-MCNC: NORMAL MG/DL
PH UR STRIP: 6 [PH] (ref 4.6–8)
PROT UR QL STRIP: NORMAL
SP GR UR STRIP: 1.02 (ref 1–1.03)
UA UROBILINOGEN AMB POC: NORMAL (ref 0.2–1)
URINALYSIS CLARITY POC: CLEAR
URINALYSIS COLOR POC: NORMAL
URINE BLOOD POC: NEGATIVE
URINE LEUKOCYTES POC: NEGATIVE
URINE NITRITES POC: NEGATIVE

## 2020-02-03 PROCEDURE — 80048 BASIC METABOLIC PNL TOTAL CA: CPT

## 2020-02-03 PROCEDURE — 83735 ASSAY OF MAGNESIUM: CPT

## 2020-02-03 PROCEDURE — 36415 COLL VENOUS BLD VENIPUNCTURE: CPT

## 2020-02-03 RX ORDER — HYDROCHLOROTHIAZIDE 12.5 MG/1
12.5 TABLET ORAL DAILY
Qty: 90 TAB | Refills: 3 | Status: SHIPPED | OUTPATIENT
Start: 2020-02-03 | End: 2020-02-26

## 2020-02-03 RX ORDER — APIXABAN 5 MG/1
5 TABLET, FILM COATED ORAL 2 TIMES DAILY
COMMUNITY
End: 2020-08-10

## 2020-02-03 NOTE — PATIENT INSTRUCTIONS
Avoiding Triggers With Heart Failure: Care Instructions Your Care Instructions Triggers are anything that make your heart failure flare up. A flare-up is also called \"sudden heart failure\" or \"acute heart failure. \" When you have a flare-up, fluid builds up in your lungs, and you have problems breathing. You might need to go to the hospital. By watching for changes in your condition and avoiding triggers, you can prevent heart failure flare-ups. Follow-up care is a key part of your treatment and safety. Be sure to make and go to all appointments, and call your doctor if you are having problems. It's also a good idea to know your test results and keep a list of the medicines you take. How can you care for yourself at home? Watch for changes in your weight and condition · Weigh yourself without clothing at the same time each day. Record your weight. Call your doctor if you have sudden weight gain, such as more than 2 to 3 pounds in a day or 5 pounds in a week. (Your doctor may suggest a different range of weight gain.) A sudden weight gain may mean that your heart failure is getting worse. · Keep a daily record of your symptoms. Write down any changes in how you feel, such as new shortness of breath, cough, or problems eating. Also record if your ankles are more swollen than usual and if you feel more tired than usual. Note anything that you ate or did that could have triggered these changes. Limit sodium Sodium causes your body to hold on to extra water. This may cause your heart failure symptoms to get worse. People get most of their sodium from processed foods. Fast food and restaurant meals also tend to be very high in sodium. · Your doctor may suggest that you limit sodium to 2,000 milligrams (mg) a day or less. That is less than 1 teaspoon of salt a day, including all the salt you eat in cooking or in packaged foods. · Read food labels on cans and food packages.  They tell you how much sodium you get in one serving. Check the serving size. If you eat more than one serving, you are getting more sodium. · Be aware that sodium can come in forms other than salt, including monosodium glutamate (MSG), sodium citrate, and sodium bicarbonate (baking soda). MSG is often added to Asian food. You can sometimes ask for food without MSG or salt. · Slowly reducing salt will help you adjust to the taste. Take the salt shaker off the table. · Flavor your food with garlic, lemon juice, onion, vinegar, herbs, and spices instead of salt. Do not use soy sauce, steak sauce, onion salt, garlic salt, mustard, or ketchup on your food, unless it is labeled \"low-sodium\" or \"low-salt. \" 
· Make your own salad dressings, sauces, and ketchup without adding salt. · Use fresh or frozen ingredients, instead of canned ones, whenever you can. Choose low-sodium canned goods. · Eat less processed food and food from restaurants, including fast food. Exercise as directed Moderate, regular exercise is very good for your heart. It improves your blood flow and helps control your weight. But too much exercise can stress your heart and cause a heart failure flare-up. · Check with your doctor before you start an exercise program. 
· Walking is an easy way to get exercise. Start out slowly. Gradually increase the length and pace of your walk. Swimming, riding a bike, and using a treadmill are also good forms of exercise. · When you exercise, watch for signs that your heart is working too hard. You are pushing yourself too hard if you cannot talk while you are exercising. If you become short of breath or dizzy or have chest pain, stop, sit down, and rest. 
· Do not exercise when you do not feel well. Take medicines correctly · Take your medicines exactly as prescribed. Call your doctor if you think you are having a problem with your medicine. · Make a list of all the medicines you take.  Include those prescribed to you by other doctors and any over-the-counter medicines, vitamins, or supplements you take. Take this list with you when you go to any doctor. · Take your medicines at the same time every day. It may help you to post a list of all the medicines you take every day and what time of day you take them. · Make taking your medicine as simple as you can. Plan times to take your medicines when you are doing other things, such as eating a meal or getting ready for bed. This will make it easier to remember to take your medicines. · Get organized. Use helpful tools, such as daily or weekly pill containers. When should you call for help? Call 911 if you have symptoms of sudden heart failure such as: 
  · You have severe trouble breathing.  
  · You cough up pink, foamy mucus.  
  · You have a new irregular or rapid heartbeat.  
 Call your doctor now or seek immediate medical care if: 
  · You have new or increased shortness of breath.  
  · You are dizzy or lightheaded, or you feel like you may faint.  
  · You have sudden weight gain, such as more than 2 to 3 pounds in a day or 5 pounds in a week. (Your doctor may suggest a different range of weight gain.)  
  · You have increased swelling in your legs, ankles, or feet.  
  · You are suddenly so tired or weak that you cannot do your usual activities.  
 Watch closely for changes in your health, and be sure to contact your doctor if you develop new symptoms. Where can you learn more? Go to http://compa-lucia.info/. Enter G070 in the search box to learn more about \"Avoiding Triggers With Heart Failure: Care Instructions. \" Current as of: April 9, 2019 Content Version: 12.2 © 9751-1940 [employer]. Care instructions adapted under license by [employer] (which disclaims liability or warranty for this information).  If you have questions about a medical condition or this instruction, always ask your healthcare professional. [place] [address] any warranty or liability for your use of this information.

## 2020-02-03 NOTE — PROGRESS NOTES
Oni Justice is a 75 y.o. male who presents for evaluation of transition of care. Last seen by me aug 21, 2019. Saw dr Ritika Hand here early [de-identified], was found to be in atrial fib. Saw dr Amber Lopez 28, had cardioversion that worked. Unfortunately, returned to hospital next day due to worsening sob/chavarria, that had actually been ongoing since before thanksgiving. Was found to be in chf.  Echo showed ef 40%. He was diuresed with iv lasix, and then d/c to home. Since being home, he has stopped taking the lasix,as he states it is making his urine too concentrated, and has given him some mid abd fullness. He does weigh himself daily, and weight is coming down again. Wife with him today.       ROS:  Constitutional: negative for fevers, chills, anorexia and weight loss  Eyes:   negative for visual disturbance and irritation  ENT:   negative for tinnitus,sore throat,nasal congestion,ear pain,hoarseness  Respiratory:  negative for cough, hemoptysis, dyspnea,wheezing  CV:   negative for chest pain, palpitations, lower extremity edema  GI:   negative for nausea, vomiting, diarrhea, abdominal pain,melena  Genitourinary: negative for frequency, dysuria and hematuria  Musculoskel: negative for myalgias, arthralgias, back pain, muscle weakness, joint pain  Neurological:  negative for headaches, dizziness, focal weakness, numbness  Psychiatric:     Negative for depression or anxiety      Past Medical History:   Diagnosis Date    Arrhythmia 01/03/2020    AFIB     CAD (coronary artery disease)     h/o stent- '10    Cancer [facility]) 2010    prostate    Congestive heart failure ([address]     Hypertension     Stroke [facility]) May or June 2010    lacunar  infarctions (found on scan- no sxs)    Thyroid disease     hypo       Past Surgical History:   Procedure Laterality Date    CARDIAC SURG PROCEDURE UNLIST  2010    coronary stent    EXCIS KNEE CARTILAGE,MEDIAL OR LAT  1980's x3    HX CHOLECYSTECTOMY  1991    HX HEENT  1962    T&A    HX HERNIA REPAIR  1996    HX MALIGNANT SKIN LESION EXCISION      2004 and 2017    HX ORTHOPAEDIC  1974    Ganglion cyst left wrist    HX ORTHOPAEDIC Left 2017    shoulder repair    HX PROSTATECTOMY  2010       Family History   Problem Relation Age of Onset    Dementia Mother     Other Father 55        cerebral hemorrhage       Social History     Socioeconomic History    Marital status:      Spouse name: Not on file    Number of children: Not on file    Years of education: Not on file    Highest education level: Not on file   Occupational History    Not on file   Social Needs    Financial resource strain: Not on file    Food insecurity:     Worry: Not on file     Inability: Not on file    Transportation needs:     Medical: Not on file     Non-medical: Not on file   Tobacco Use    Smoking status: Never Smoker    Smokeless tobacco: Current User   Substance and Sexual Activity    Alcohol use: Yes     Comment: occas    Drug use: Never    Sexual activity: Not Currently   Lifestyle    Physical activity:     Days per week: Not on file     Minutes per session: Not on file    Stress: Not on file   Relationships    Social connections:     Talks on phone: Not on file     Gets together: Not on file     Attends Alevism service: Not on file     Active member of club or organization: Not on file     Attends meetings of clubs or organizations: Not on file     Relationship status: Not on file    Intimate partner violence:     Fear of current or ex partner: Not on file     Emotionally abused: Not on file     Physically abused: Not on file     Forced sexual activity: Not on file   Other Topics Concern    Not on file   Social History Narrative    Not on file            Visit Vitals  /73 (BP 1 Location: Left arm, BP Patient Position: Sitting)   Pulse (!) 57   Temp 98.1 °F (36.7 °C) (Oral)   Resp 16   Ht 6' 1\" (1.854 m)   Wt 251 lb 6.4 oz (114 kg)   SpO2 95%   BMI 33.17 kg/m²       Physical Examination:   General - Well appearing male  HEENT - PERRL, TM no erythema/opacification, normal nasal turbinates, no oropharyngeal erythema or exudate, MMM  Neck - supple, no bruits, no thyroidomegaly, no lymphadenopathy  Pulm - clear to auscultation bilaterally  Cardio - RRR, normal S1 S2, no murmur  Abd - soft, nontender, no masses, no HSM  Extrem - no edema, +2 distal pulses  Neuro-  No focal deficits, CN intact     Assessment/Plan:    1.  pafib--back in nsr, sp cardioversion. On coreg and On eliquis as well  2. Acute systolic chf--did not tolerate lasix, and had also not tolerated it years ago. No peripheral edema, and his pulmonary exam is clear. rx to start hctz. Check bmp, mg now, as well as ua. On coreg, lisinopril. I think his chf was triggered by pafib, and now that he is back in nsr, hopefully his ef will improve. Send lab results to dr Abdi Velarde. 3.  Hx cad with stents--on coreg, lisinopril  4. Hx lacunar cva--on eliquis  5.  hyperlipids--not on any statin  6.  htn--controlled with coreg, imdur, lisinopril, prn tekturna  7.   Hypothyroid--on synthroid    rtc 3 months        Moiz Isaac III, DO

## 2020-02-03 NOTE — PROGRESS NOTES
Hospital Discharge Follow-Up      Date/Time:  2/3/2020 9:24 AM  Blaise Wang RN  Care Transitions Nurse  [facility] Ambulatory Care Coordination Team  [phone]      Patient was admitted to [facility] on 2020 and discharged on 2020 for SOB. The physician discharge summary was not available at the time of outreach. Patient was contacted within 1 business days of discharge. Top Challenges reviewed with the provider   -wife reports patient is not taking lasix and potassium. Patient believes these medications are causing \" back pain, headache and throat choking\"    -reports Left sided and flank pain and esophageal discomfort. ( post BELKIS)    -Confirms taking isosorbide mononitrate ER. BP today is 176/82    -K+ trended down from 3.9 to 3.0 then back up to 3.9.    -Creatinine 1.01, peaked at 1.32, trending back down to 1.03 at discharge    -[address]:   Does patient have an Advance Directive:  not on file        Method of communication with provider :chart routing, phone    Was this a readmission? no   Patient stated reason for the readmission: n/a    Care Transition Nurse (CTN) contacted the patient by telephone to perform post hospital discharge assessment. Verified name and  with patient as identifiers. Provided introduction to self, and explanation of the CTN role. Patient received hospital discharge instructions. CTN reviewed discharge instructions and red flags with patient who verbalized understanding. Patient given an opportunity to ask questions and does not have any further questions or concerns at this time. The patient agrees to contact the PCP office for questions related to their healthcare. CTN provided contact information for future reference.     Disease Specific:   CHF    Patients top risk factors for readmission:  lack of knowledge about disease    Home Health orders at discharge: patient refused  [address] company: n/a  Date of initial visit: [facility] ordered at discharge: none  [address] received: n/a    Medication(s):   New Medications at Discharge:   START taking:  furosemide 20 mg tablet (Lasix)  Start taking on: February 1, 2020  isosorbide mononitrate ER 30 mg tablet (IMDUR)  potassium chloride SR 10 mEq tablet (KLOR-CON 10)    Changed Medications at Discharge: none    Discontinued Medications at Discharge: STOP taking:  dilTIAZem  mg Tb24 tablet (CARDIZEM LA)    Medication reconciliation was startedwith patient, who verbalizes understanding of administration of home medications. There were no barriers to obtaining medications identified at this time. Referral to Pharm D needed: no     Current Outpatient Medications   Medication Sig    isosorbide mononitrate ER (IMDUR) 30 mg tablet Take 1 Tab by mouth daily.  furosemide (LASIX) 20 mg tablet Take 1 Tab by mouth daily.  potassium chloride SR (KLOR-CON 10) 10 mEq tablet Take 1 Tab by mouth daily.  aliskiren (TEKTURNA) 300 mg tablet Take 1 Tab by mouth nightly. Start if blood pressure remain >=140 upper or 90/100 lower    carvedilol (COREG) 25 mg tablet Take 1 Tab by mouth two (2) times daily (with meals).  levothyroxine (SYNTHROID) 50 mcg tablet Take 1 Tab by mouth Daily (before breakfast).  lisinopril (PRINIVIL, ZESTRIL) 40 mg tablet Take 1 Tab by mouth two (2) times a day. Start if blood pressure remain >=140 upper or 90/100 lower    pantoprazole (PROTONIX) 40 mg tablet Take 1 Tab by mouth daily.  cholecalciferol, vitamin D3, (VITAMIN D3) 2,000 unit tab Take 2,000 Units by mouth daily.  MULTI-VITAMIN HI-PO PO Take 1 Tab by mouth daily. No current facility-administered medications for this visit. There are no discontinued medications.     [facility] follow up appointment(s):   Future Appointments   Date Time Provider Josefa Foster   2/3/2020 11:45 AM Allen, [address]   2/10/2020  3:30 PM Neryjuana, [address]      Non-[facility] follow up appointment(s): Tanvir Mendez NP 2/4/2020, Dr. Rosemary Rizzo 3/4/2020  [facility]:  Marylou Stroud Attends follow-up appointments as directed. 2/3/2020  -Wife requesting a appointment with PCP ASAP. She reports that patient \" is feeling awful, back pain, choking\" CTN spoke to PCP  LAWRENCE LANIER [facility]. Appointment scheduled for today 2/3/2020 at 11:45.  -will attend SUZY with PCP on 2/3/2020  -will go to [facility] for BMP today  -will attend follow up with Tanvir Mendez NP 2/4/2020  -will attend follow up with Dr. Rosemary Rizzo on 3/4/2020  -CTN to follow up in 1 week  Farida KING       Knowledge and adherence of prescribed medication (ie. action, side effects, missed dose, etc.). 2/3/2020  -Patient reports he stopped taking potassium and lasix on 2/1/2020 because he believes these medications are causing left sided flank pain, choking and headache. He will discuss with PCP today at appointment  -CTN confirmed patient is taking Imdur and did discontinue diltiazem. -will monitor and record blood pressure daily  -reports BP today is 176/82.  To see PCP today  -CTN to follow up in 1 week  Farida KING

## 2020-02-03 NOTE — PROGRESS NOTES
Identified pt with two pt identifiers(name and ). Reviewed record in preparation for visit and have obtained necessary documentation. Chief Complaint   Patient presents with   [facility] Follow Up     CHF 2020      Visit Vitals  /73 (BP 1 Location: Left arm, BP Patient Position: Sitting)   Pulse (!) 57   Temp 98.1 °F (36.7 °C) (Oral)   Resp 16   Ht 6' 1\" (1.854 m)   Wt 251 lb 6.4 oz (114 kg)   SpO2 95%   BMI 33.17 kg/m²       Pain Scale: 0 - No pain/10  Pain Location:     Health Maintenance Due   Topic    DTaP/Tdap/Td series (1 - Tdap)    Shingrix Vaccine Age 49> (1 of 2)    Influenza Age 5 to Adult        Coordination of Care Questionnaire:  :   1) Have you been to an emergency room, urgent care, or hospitalized since your last visit? If yes, where when, and reason for visit? Yes, [facility] 2020      2. Have seen or consulted any other health care provider since your last visit? If yes, where when, and reason for visit? NO      3) Do you have an Advanced Directive/ Living Will in place? NO  If yes, do we have a copy on file NO  If no, would you like information NO    Patient is accompanied by wife I have received verbal consent from Katie Hager to discuss any/all medical information while they are present in the room.

## 2020-02-04 LAB
BUN SERPL-MCNC: 13 MG/DL (ref 8–27)
BUN/CREAT SERPL: 11 (ref 10–24)
CALCIUM SERPL-MCNC: 9.1 MG/DL (ref 8.6–10.2)
CHLORIDE SERPL-SCNC: 102 MMOL/L (ref 96–106)
CO2 SERPL-SCNC: 24 MMOL/L (ref 20–29)
CREAT SERPL-MCNC: 1.21 MG/DL (ref 0.76–1.27)
GLUCOSE SERPL-MCNC: 98 MG/DL (ref 65–99)
MAGNESIUM SERPL-MCNC: 2.2 MG/DL (ref 1.6–2.3)
POTASSIUM SERPL-SCNC: 4.8 MMOL/L (ref 3.5–5.2)
SODIUM SERPL-SCNC: 141 MMOL/L (ref 134–144)

## 2020-02-04 NOTE — PROGRESS NOTES
Called, spoke to pt. Two identifiers confirmed. Pt notified of results/recommendations per Dr. Tona Guan. Pt verbalized understanding of information discussed w/ no further questions at this time.

## 2020-02-11 ENCOUNTER — PATIENT OUTREACH (OUTPATIENT)
Dept: INTERNAL MEDICINE CLINIC | Age: 65
End: 2020-02-11

## 2020-02-12 ENCOUNTER — PATIENT OUTREACH (OUTPATIENT)
Dept: INTERNAL MEDICINE CLINIC | Age: 65
End: 2020-02-12

## 2020-02-12 RX ORDER — AMIODARONE HYDROCHLORIDE 200 MG/1
TABLET ORAL
Status: ON HOLD | COMMUNITY
End: 2020-10-22 | Stop reason: ALTCHOICE

## 2020-02-12 NOTE — PROGRESS NOTES
Goals      Attends follow-up appointments as directed. 2/12/2020  -attended SUZY, BMP drawn  -attended Cardiology follow up. Patient reports he is back in A.Fib. Dr. Marylen Mediate started amiodarone 200mg BID and to follow up the first week of March  -CTN to follow up in 2 weeks  Rekha Puente  2/3/2020  -Wife requesting a appointment with PCP ASAP. She reports that patient \" is feeling awful, back pain, choking\" CTN spoke to PCP  Leisa Green. Appointment scheduled for today 2/3/2020 at 11:45.  -will attend SUZY with PCP on 2/3/2020  -will go to [facility] for BMP today  -will attend follow up with General Alfredo, DAHIANA 2/4/2020  -will attend follow up with Dr. Marylen Mediate on 3/4/2020  -CTN to follow up in 1 week  Farida KING       Knowledge and adherence of prescribed medication (ie. action, side effects, missed dose, etc.). 2/3/2020  -Patient reports he stopped taking potassium and lasix on 2/1/2020 because he believes these medications are causing left sided flank pain, choking and headache. He will discuss with PCP today at appointment  -CTN confirmed patient is taking Imdur and did discontinue diltiazem. -will monitor and record blood pressure daily  -reports BP today is 176/82.  To see PCP today  -CTN to follow up in 1 week  Farida KING

## 2020-02-21 ENCOUNTER — APPOINTMENT (OUTPATIENT)
Dept: CT IMAGING | Age: 65
DRG: 291 | End: 2020-02-21
Attending: INTERNAL MEDICINE
Payer: MEDICARE

## 2020-02-21 ENCOUNTER — APPOINTMENT (OUTPATIENT)
Dept: NON INVASIVE DIAGNOSTICS | Age: 65
DRG: 291 | End: 2020-02-21
Attending: INTERNAL MEDICINE
Payer: MEDICARE

## 2020-02-21 ENCOUNTER — PATIENT OUTREACH (OUTPATIENT)
Dept: CASE MANAGEMENT | Age: 65
End: 2020-02-21

## 2020-02-21 ENCOUNTER — APPOINTMENT (OUTPATIENT)
Dept: GENERAL RADIOLOGY | Age: 65
DRG: 291 | End: 2020-02-21
Attending: EMERGENCY MEDICINE
Payer: MEDICARE

## 2020-02-21 ENCOUNTER — HOSPITAL ENCOUNTER (INPATIENT)
Age: 65
LOS: 5 days | Discharge: HOME | DRG: 291 | End: 2020-02-26
Attending: EMERGENCY MEDICINE | Admitting: INTERNAL MEDICINE
Payer: MEDICARE

## 2020-02-21 DIAGNOSIS — I50.9 ACUTE CONGESTIVE HEART FAILURE, UNSPECIFIED HEART FAILURE TYPE (HCC): ICD-10-CM

## 2020-02-21 DIAGNOSIS — I63.9 CEREBROVASCULAR ACCIDENT (CVA), UNSPECIFIED MECHANISM (HCC): ICD-10-CM

## 2020-02-21 DIAGNOSIS — J96.01 ACUTE RESPIRATORY FAILURE WITH HYPOXIA (HCC): Primary | ICD-10-CM

## 2020-02-21 DIAGNOSIS — G45.9 TIA (TRANSIENT ISCHEMIC ATTACK): ICD-10-CM

## 2020-02-21 DIAGNOSIS — N17.9 AKI (ACUTE KIDNEY INJURY) (HCC): ICD-10-CM

## 2020-02-21 LAB
ALBUMIN SERPL-MCNC: 3.9 G/DL (ref 3.5–5)
ALBUMIN/GLOB SERPL: 1.1 {RATIO} (ref 1.1–2.2)
ALP SERPL-CCNC: 59 U/L (ref 45–117)
ALT SERPL-CCNC: 41 U/L (ref 12–78)
ANION GAP SERPL CALC-SCNC: 6 MMOL/L (ref 5–15)
APPEARANCE UR: CLEAR
AST SERPL-CCNC: 22 U/L (ref 15–37)
ATRIAL RATE: 69 BPM
BACTERIA URNS QL MICRO: NEGATIVE /HPF
BASOPHILS # BLD: 0.1 K/UL (ref 0–0.1)
BASOPHILS NFR BLD: 1 % (ref 0–1)
BILIRUB SERPL-MCNC: 0.6 MG/DL (ref 0.2–1)
BILIRUB UR QL: NEGATIVE
BNP SERPL-MCNC: 1569 PG/ML
BUN SERPL-MCNC: 28 MG/DL (ref 6–20)
BUN/CREAT SERPL: 12 (ref 12–20)
CALCIUM SERPL-MCNC: 8.5 MG/DL (ref 8.5–10.1)
CALCULATED P AXIS, ECG09: 72 DEGREES
CALCULATED R AXIS, ECG10: 66 DEGREES
CALCULATED T AXIS, ECG11: 97 DEGREES
CHLORIDE SERPL-SCNC: 104 MMOL/L (ref 97–108)
CO2 SERPL-SCNC: 27 MMOL/L (ref 21–32)
COLOR UR: NORMAL
COMMENT, HOLDF: NORMAL
CREAT SERPL-MCNC: 2.28 MG/DL (ref 0.7–1.3)
CREAT UR-MCNC: 43.6 MG/DL
DIAGNOSIS, 93000: NORMAL
DIFFERENTIAL METHOD BLD: ABNORMAL
EOSINOPHIL # BLD: 0.2 K/UL (ref 0–0.4)
EOSINOPHIL NFR BLD: 2 % (ref 0–7)
EPITH CASTS URNS QL MICRO: NORMAL /LPF
ERYTHROCYTE [DISTWIDTH] IN BLOOD BY AUTOMATED COUNT: 12.5 % (ref 11.5–14.5)
GLOBULIN SER CALC-MCNC: 3.6 G/DL (ref 2–4)
GLUCOSE SERPL-MCNC: 134 MG/DL (ref 65–100)
GLUCOSE UR STRIP.AUTO-MCNC: NEGATIVE MG/DL
HCT VFR BLD AUTO: 40 % (ref 36.6–50.3)
HGB BLD-MCNC: 13 G/DL (ref 12.1–17)
HGB UR QL STRIP: NEGATIVE
HYALINE CASTS URNS QL MICRO: NORMAL /LPF (ref 0–5)
IMM GRANULOCYTES # BLD AUTO: 0.1 K/UL (ref 0–0.04)
IMM GRANULOCYTES NFR BLD AUTO: 1 % (ref 0–0.5)
KETONES UR QL STRIP.AUTO: NEGATIVE MG/DL
LEUKOCYTE ESTERASE UR QL STRIP.AUTO: NEGATIVE
LIPASE SERPL-CCNC: 78 U/L (ref 73–393)
LYMPHOCYTES # BLD: 1.9 K/UL (ref 0.8–3.5)
LYMPHOCYTES NFR BLD: 16 % (ref 12–49)
MCH RBC QN AUTO: 31 PG (ref 26–34)
MCHC RBC AUTO-ENTMCNC: 32.5 G/DL (ref 30–36.5)
MCV RBC AUTO: 95.2 FL (ref 80–99)
MONOCYTES # BLD: 0.6 K/UL (ref 0–1)
MONOCYTES NFR BLD: 5 % (ref 5–13)
NEUTS SEG # BLD: 9.2 K/UL (ref 1.8–8)
NEUTS SEG NFR BLD: 75 % (ref 32–75)
NITRITE UR QL STRIP.AUTO: NEGATIVE
NRBC # BLD: 0 K/UL (ref 0–0.01)
NRBC BLD-RTO: 0 PER 100 WBC
P-R INTERVAL, ECG05: 174 MS
PH UR STRIP: 5.5 [PH] (ref 5–8)
PLATELET # BLD AUTO: 250 K/UL (ref 150–400)
PMV BLD AUTO: 12.2 FL (ref 8.9–12.9)
POTASSIUM SERPL-SCNC: 3.6 MMOL/L (ref 3.5–5.1)
PROT SERPL-MCNC: 7.5 G/DL (ref 6.4–8.2)
PROT UR STRIP-MCNC: NEGATIVE MG/DL
Q-T INTERVAL, ECG07: 470 MS
QRS DURATION, ECG06: 112 MS
QTC CALCULATION (BEZET), ECG08: 503 MS
RBC # BLD AUTO: 4.2 M/UL (ref 4.1–5.7)
RBC #/AREA URNS HPF: NORMAL /HPF (ref 0–5)
SAMPLES BEING HELD,HOLD: NORMAL
SODIUM SERPL-SCNC: 137 MMOL/L (ref 136–145)
SODIUM UR-SCNC: 89 MMOL/L
SP GR UR REFRACTOMETRY: 1.01 (ref 1–1.03)
TROPONIN I SERPL-MCNC: 0.06 NG/ML
TROPONIN I SERPL-MCNC: 0.07 NG/ML
TROPONIN I SERPL-MCNC: <0.05 NG/ML
UROBILINOGEN UR QL STRIP.AUTO: 0.2 EU/DL (ref 0.2–1)
UUN UR-MCNC: 236 MG/DL
VENTRICULAR RATE, ECG03: 69 BPM
WBC # BLD AUTO: 12 K/UL (ref 4.1–11.1)
WBC URNS QL MICRO: NORMAL /HPF (ref 0–4)

## 2020-02-21 PROCEDURE — P9047 ALBUMIN (HUMAN), 25%, 50ML: HCPCS | Performed by: INTERNAL MEDICINE

## 2020-02-21 PROCEDURE — 83880 ASSAY OF NATRIURETIC PEPTIDE: CPT

## 2020-02-21 PROCEDURE — 99285 EMERGENCY DEPT VISIT HI MDM: CPT

## 2020-02-21 PROCEDURE — 74011250636 HC RX REV CODE- 250/636: Performed by: INTERNAL MEDICINE

## 2020-02-21 PROCEDURE — 96374 THER/PROPH/DIAG INJ IV PUSH: CPT

## 2020-02-21 PROCEDURE — 74011250636 HC RX REV CODE- 250/636: Performed by: EMERGENCY MEDICINE

## 2020-02-21 PROCEDURE — 84484 ASSAY OF TROPONIN QUANT: CPT

## 2020-02-21 PROCEDURE — 74011250637 HC RX REV CODE- 250/637: Performed by: INTERNAL MEDICINE

## 2020-02-21 PROCEDURE — 93306 TTE W/DOPPLER COMPLETE: CPT

## 2020-02-21 PROCEDURE — 74011250637 HC RX REV CODE- 250/637: Performed by: EMERGENCY MEDICINE

## 2020-02-21 PROCEDURE — 82570 ASSAY OF URINE CREATININE: CPT

## 2020-02-21 PROCEDURE — 36415 COLL VENOUS BLD VENIPUNCTURE: CPT

## 2020-02-21 PROCEDURE — 77010033678 HC OXYGEN DAILY

## 2020-02-21 PROCEDURE — 85025 COMPLETE CBC W/AUTO DIFF WBC: CPT

## 2020-02-21 PROCEDURE — 83690 ASSAY OF LIPASE: CPT

## 2020-02-21 PROCEDURE — 93005 ELECTROCARDIOGRAM TRACING: CPT

## 2020-02-21 PROCEDURE — 74011000250 HC RX REV CODE- 250: Performed by: EMERGENCY MEDICINE

## 2020-02-21 PROCEDURE — 80053 COMPREHEN METABOLIC PANEL: CPT

## 2020-02-21 PROCEDURE — 71045 X-RAY EXAM CHEST 1 VIEW: CPT

## 2020-02-21 PROCEDURE — 65660000000 HC RM CCU STEPDOWN

## 2020-02-21 PROCEDURE — 84300 ASSAY OF URINE SODIUM: CPT

## 2020-02-21 PROCEDURE — 84540 ASSAY OF URINE/UREA-N: CPT

## 2020-02-21 PROCEDURE — 81001 URINALYSIS AUTO W/SCOPE: CPT

## 2020-02-21 PROCEDURE — 74176 CT ABD & PELVIS W/O CONTRAST: CPT

## 2020-02-21 RX ORDER — ISOSORBIDE MONONITRATE 30 MG/1
30 TABLET, EXTENDED RELEASE ORAL DAILY
Status: DISCONTINUED | OUTPATIENT
Start: 2020-02-21 | End: 2020-02-26 | Stop reason: HOSPADM

## 2020-02-21 RX ORDER — ACETAMINOPHEN 325 MG/1
650 TABLET ORAL
Status: DISCONTINUED | OUTPATIENT
Start: 2020-02-21 | End: 2020-02-26 | Stop reason: HOSPADM

## 2020-02-21 RX ORDER — FUROSEMIDE 10 MG/ML
40 INJECTION INTRAMUSCULAR; INTRAVENOUS ONCE
Status: COMPLETED | OUTPATIENT
Start: 2020-02-21 | End: 2020-02-21

## 2020-02-21 RX ORDER — BISACODYL 5 MG
5 TABLET, DELAYED RELEASE (ENTERIC COATED) ORAL DAILY PRN
Status: DISCONTINUED | OUTPATIENT
Start: 2020-02-21 | End: 2020-02-26 | Stop reason: HOSPADM

## 2020-02-21 RX ORDER — LEVOTHYROXINE SODIUM 50 UG/1
50 TABLET ORAL
Status: DISCONTINUED | OUTPATIENT
Start: 2020-02-21 | End: 2020-02-26 | Stop reason: HOSPADM

## 2020-02-21 RX ORDER — AMLODIPINE BESYLATE 2.5 MG/1
2.5 TABLET ORAL
COMMUNITY
End: 2020-02-26

## 2020-02-21 RX ORDER — ALBUMIN HUMAN 250 G/1000ML
25 SOLUTION INTRAVENOUS ONCE
Status: COMPLETED | OUTPATIENT
Start: 2020-02-21 | End: 2020-02-21

## 2020-02-21 RX ORDER — MORPHINE SULFATE 2 MG/ML
1 INJECTION, SOLUTION INTRAMUSCULAR; INTRAVENOUS
Status: DISCONTINUED | OUTPATIENT
Start: 2020-02-21 | End: 2020-02-26 | Stop reason: HOSPADM

## 2020-02-21 RX ORDER — POTASSIUM CHLORIDE 750 MG/1
10 TABLET, FILM COATED, EXTENDED RELEASE ORAL DAILY
Status: DISCONTINUED | OUTPATIENT
Start: 2020-02-21 | End: 2020-02-22

## 2020-02-21 RX ORDER — IBUPROFEN 200 MG
1 TABLET ORAL DAILY
Status: DISCONTINUED | OUTPATIENT
Start: 2020-02-22 | End: 2020-02-21

## 2020-02-21 RX ORDER — METHOCARBAMOL 750 MG/1
750 TABLET, FILM COATED ORAL 4 TIMES DAILY
Status: DISCONTINUED | OUTPATIENT
Start: 2020-02-21 | End: 2020-02-21

## 2020-02-21 RX ORDER — PANTOPRAZOLE SODIUM 40 MG/1
40 TABLET, DELAYED RELEASE ORAL DAILY
Status: DISCONTINUED | OUTPATIENT
Start: 2020-02-21 | End: 2020-02-25

## 2020-02-21 RX ORDER — HYDRALAZINE HYDROCHLORIDE 25 MG/1
25 TABLET, FILM COATED ORAL 3 TIMES DAILY
Status: DISCONTINUED | OUTPATIENT
Start: 2020-02-21 | End: 2020-02-22

## 2020-02-21 RX ORDER — IBUPROFEN 200 MG
1 TABLET ORAL EVERY 24 HOURS
Status: DISCONTINUED | OUTPATIENT
Start: 2020-02-22 | End: 2020-02-26 | Stop reason: HOSPADM

## 2020-02-21 RX ORDER — CARVEDILOL 12.5 MG/1
25 TABLET ORAL 2 TIMES DAILY WITH MEALS
Status: DISCONTINUED | OUTPATIENT
Start: 2020-02-21 | End: 2020-02-26 | Stop reason: HOSPADM

## 2020-02-21 RX ORDER — SODIUM CHLORIDE 0.9 % (FLUSH) 0.9 %
5-40 SYRINGE (ML) INJECTION AS NEEDED
Status: DISCONTINUED | OUTPATIENT
Start: 2020-02-21 | End: 2020-02-26 | Stop reason: HOSPADM

## 2020-02-21 RX ORDER — SODIUM CHLORIDE 0.9 % (FLUSH) 0.9 %
5-40 SYRINGE (ML) INJECTION EVERY 8 HOURS
Status: DISCONTINUED | OUTPATIENT
Start: 2020-02-21 | End: 2020-02-26 | Stop reason: HOSPADM

## 2020-02-21 RX ORDER — BUMETANIDE 0.25 MG/ML
2 INJECTION INTRAMUSCULAR; INTRAVENOUS
Status: COMPLETED | OUTPATIENT
Start: 2020-02-21 | End: 2020-02-21

## 2020-02-21 RX ORDER — ALISKIREN 150 MG/1
300 TABLET, FILM COATED ORAL
Status: DISCONTINUED | OUTPATIENT
Start: 2020-02-21 | End: 2020-02-21

## 2020-02-21 RX ORDER — LABETALOL HYDROCHLORIDE 5 MG/ML
20 INJECTION, SOLUTION INTRAVENOUS
Status: DISCONTINUED | OUTPATIENT
Start: 2020-02-21 | End: 2020-02-26 | Stop reason: HOSPADM

## 2020-02-21 RX ORDER — FENTANYL CITRATE 50 UG/ML
50 INJECTION, SOLUTION INTRAMUSCULAR; INTRAVENOUS
Status: COMPLETED | OUTPATIENT
Start: 2020-02-21 | End: 2020-02-21

## 2020-02-21 RX ORDER — ONDANSETRON 2 MG/ML
4 INJECTION INTRAMUSCULAR; INTRAVENOUS
Status: DISCONTINUED | OUTPATIENT
Start: 2020-02-21 | End: 2020-02-26 | Stop reason: HOSPADM

## 2020-02-21 RX ORDER — METHOCARBAMOL 500 MG/1
750 TABLET, FILM COATED ORAL 2 TIMES DAILY
Status: DISCONTINUED | OUTPATIENT
Start: 2020-02-21 | End: 2020-02-26 | Stop reason: HOSPADM

## 2020-02-21 RX ORDER — AMIODARONE HYDROCHLORIDE 200 MG/1
200 TABLET ORAL 2 TIMES DAILY
Status: DISCONTINUED | OUTPATIENT
Start: 2020-02-21 | End: 2020-02-26 | Stop reason: HOSPADM

## 2020-02-21 RX ADMIN — MORPHINE SULFATE 1 MG: 2 INJECTION, SOLUTION INTRAMUSCULAR; INTRAVENOUS at 11:19

## 2020-02-21 RX ADMIN — LEVOTHYROXINE SODIUM 50 MCG: 50 TABLET ORAL at 07:52

## 2020-02-21 RX ADMIN — AMIODARONE HYDROCHLORIDE 200 MG: 200 TABLET ORAL at 09:06

## 2020-02-21 RX ADMIN — ISOSORBIDE MONONITRATE 30 MG: 30 TABLET, EXTENDED RELEASE ORAL at 09:06

## 2020-02-21 RX ADMIN — BUMETANIDE 2 MG: 0.25 INJECTION INTRAMUSCULAR; INTRAVENOUS at 01:45

## 2020-02-21 RX ADMIN — CARVEDILOL 25 MG: 12.5 TABLET, FILM COATED ORAL at 07:52

## 2020-02-21 RX ADMIN — ACETAMINOPHEN 650 MG: 325 TABLET ORAL at 23:41

## 2020-02-21 RX ADMIN — HYDRALAZINE HYDROCHLORIDE 25 MG: 50 TABLET, FILM COATED ORAL at 15:34

## 2020-02-21 RX ADMIN — APIXABAN 5 MG: 5 TABLET, FILM COATED ORAL at 09:06

## 2020-02-21 RX ADMIN — ACETAMINOPHEN 650 MG: 325 TABLET ORAL at 02:19

## 2020-02-21 RX ADMIN — MORPHINE SULFATE 1 MG: 2 INJECTION, SOLUTION INTRAMUSCULAR; INTRAVENOUS at 05:39

## 2020-02-21 RX ADMIN — APIXABAN 5 MG: 5 TABLET, FILM COATED ORAL at 17:47

## 2020-02-21 RX ADMIN — CARVEDILOL 25 MG: 12.5 TABLET, FILM COATED ORAL at 17:47

## 2020-02-21 RX ADMIN — AMIODARONE HYDROCHLORIDE 200 MG: 200 TABLET ORAL at 17:47

## 2020-02-21 RX ADMIN — ALBUMIN (HUMAN) 25 G: 0.25 INJECTION, SOLUTION INTRAVENOUS at 11:33

## 2020-02-21 RX ADMIN — METHOCARBAMOL TABLETS 750 MG: 750 TABLET, COATED ORAL at 09:06

## 2020-02-21 RX ADMIN — FENTANYL CITRATE 50 MCG: 50 INJECTION, SOLUTION INTRAMUSCULAR; INTRAVENOUS at 03:53

## 2020-02-21 RX ADMIN — Medication 10 ML: at 15:34

## 2020-02-21 RX ADMIN — METHOCARBAMOL TABLETS 750 MG: 500 TABLET, COATED ORAL at 17:46

## 2020-02-21 RX ADMIN — HYDRALAZINE HYDROCHLORIDE 25 MG: 50 TABLET, FILM COATED ORAL at 21:09

## 2020-02-21 RX ADMIN — FUROSEMIDE 40 MG: 10 INJECTION, SOLUTION INTRAMUSCULAR; INTRAVENOUS at 11:19

## 2020-02-21 RX ADMIN — MORPHINE SULFATE 1 MG: 2 INJECTION, SOLUTION INTRAMUSCULAR; INTRAVENOUS at 15:32

## 2020-02-21 RX ADMIN — PANTOPRAZOLE SODIUM 40 MG: 40 TABLET, DELAYED RELEASE ORAL at 09:06

## 2020-02-21 RX ADMIN — HYDRALAZINE HYDROCHLORIDE 25 MG: 50 TABLET, FILM COATED ORAL at 11:19

## 2020-02-21 RX ADMIN — Medication 10 ML: at 21:30

## 2020-02-21 NOTE — PROGRESS NOTES
Transitional Care Team: Initial G Note    Date of Assessment: 02/21/20  Time of Assessment:  4:19 PM    Susi Baldwin is a 77 y.o. male inpatient at  [facility]. Assessment & Plan   Pt had been hospitalized 1/29 to discharge 1/31 with diagnosis of decompensated HF. Was discharged to home with script for 20 MG lasix daily and 10 meq KCL daily. He felt quite well through the 14th, when he started having pain in his kidney area, which he attributes to oncoming renal injury related to lasix use. He also states he was having stomach pain, and states his pharmacist told him that he should have been told to take the KCL with food and to not lay flat for half hour after taking the KCL. He does not state that he quit taking the lasix and KCL, but over the course of several days his abd girth was up and he was dyspneic with exertion to ambulate from the parking lot into a store, forcing him to return to car and wait for his spouse to complete the shopping. Await cardiologist consult, and nephrologist has seen pt, they will need to coordinate to find optimum medication profile to keep him out of decompensated HF without causing renal injury. Pt would like to discharge to home ( wants to stay in hospital to be observed for greater than 2 days to see how the meds will affect him) May benefit with home health visits to monitor how he continues to tolerate his medication profile. Primary Diagnosis: heart failure    Advance Directive:   See ACP note from me. Current Code Status:  full    Referral to Hospice/ Palliative Care Appropriate: not yet. Awareness of Medical Conditions: (Trajectory of illness and pts expectations). hoping to find medication profile to return to his baseline good quality of life.      Discharge Needs: (to include safety issues) home health  Barriers Identified: none    Patient is willing to go to SNF/Inpatient Rehab if recommended: reluctant    Medication Review: Await AVS.    Can patient afford medications:  Current meds. Patient is Compliant with Medication regimen:yes? Who manages medications at home: self  Best Patient Contact Number:  [phone]    HUG (Healthy Understanding of Goals) program introduced to patient/family. The Transitional Care Team bridges the gaps in care and education surrounding discharge from the acute care facility. The objective is to empower the patient and family in taking a proactive role in preventing readmission within the first thirty days after discharge. The team is also involved in the efforts to reduce readmission to the acute care setting after stabilization and discharge from the acute care environment either to skilled nursing facilities or community. HUG RN/NP will return with [facility] Calendar/ follow up appointments/ Ambulatory Nurse Navigator name and contact information when the patient is ready for discharge. Future Appointments   Date Time Provider Josefa Foster   5/4/2020 11:30 AM Krystle English       [facility] follow up appointment(s):none yet    [facility]: call information given to : on discharge calendar      Patient education focused on readmission zones as described as: The Red Zone: High risk for readmission, days 1-21  The Yellow Zone: Moderate  risk for readmission, days 22-29   The Green Zone: Lower risk for readmission, days                30 and after    The [facility] Team will attempt to follow the patient from a distance while inpatient as well as be available for further transition/disposition needs. The [facility] team will continue to offer support during the 30- 90 day discharge from acute care setting. Will notify Ambulatory {Blank Single Select Template:20061[de-identified] \"SUZY   RN.     Past Medical History:   Diagnosis Date    Arrhythmia 01/03/2020    AFIB     CAD (coronary artery disease)     h/o stent- '10    Cancer [facility]) 2010    prostate    Congestive heart failure ([facility] [address]  Hypertension     Stroke [facility]) May or June 2010    lacunar  infarctions (found on scan- no sxs)    Thyroid disease     hypo

## 2020-02-21 NOTE — PROGRESS NOTES
TRANSFER - IN REPORT:    Verbal report received from malathi(name) on Elner Foot  being received from ED(unit) for routine progression of care      Report consisted of patients Situation, Background, Assessment and   Recommendations(SBAR). Information from the following report(s) SBAR, Kardex, ED Summary, Procedure Summary, Intake/Output, MAR and Recent Results was reviewed with the receiving nurse. Opportunity for questions and clarification was provided. Assessment completed upon patients arrival to unit and care assumed.

## 2020-02-21 NOTE — CONSULTS
Amol Rosa         NAME:Bryn Curtis  VID:[id number]   :1955     Pt seen---[id number]  zo   chf  Pl effusion  htn    Plan  Iv diuretics  Follow bmp  Hold lisinopril and radha Schneider MD  [facility]  [facility]  [address], [place], [address]  Phone - [phone]         Fax - [phone] [facility] Office  [address] [place] [address]  Phone - [phone]        Fax - [phone]     www. [facility]

## 2020-02-21 NOTE — CONSULTS
[facility]    Name:  Wiley Herr  MR#:  [id number]  :  1955  ACCOUNT #:  [de-identified]  DATE OF SERVICE:  2020      REFERRING PHYSICIAN:  Dr. Leanna Ribera. REASON FOR CONSULTATION:  Acute kidney injury. HISTORY OF PRESENT ILLNESS:  The patient is a 47-year-old white male with past medical history significant for hypertension, CHF, AFib, who presented to emergency room with complaint of shortness of breath. The patient is found to have acute renal failure with creatinine level of 2.28 with BUN of 28. The patient has a proBNP of 1569. The patient says he has been having shortness of breath for last few days. He was recently discharged from [facility] 3 weeks ago after AFib and which required cardioversion and BELKIS. The patient was discharged home on Lasix, which he took for few days and then he felt that he was getting dehydrated from Lasix, so he went to his PCP. The patient's PCP changed diuretic from Lasix to hydrochlorothiazide 12.5 mg daily. The patient's creatinine at the time of discharge was 1.0 on 2020. The patient reports that he did require some IV fluids few days prior to discharge due to worsening creatinine level. He denies taking any NSAIDs. He is on Tekturna and lisinopril, which he has been taking for long time. The patient had a CT abdomen done in ER, which was negative for any hydronephrosis. The patient's UA in ER was negative for any infection. The patient has history of prostate cancer and skin cancer. The patient has history of CAD requiring stent placement in . PAST MEDICAL HISTORY:  1. Hypertension. 2.  CHF. 3.  Stroke. 4.  Prostate cancer. 5.  Squamous cell skin cancer. 6.  Coronary artery disease. 7.  AFib. 8.  Hypothyroidism. PAST SURGICAL HISTORY:  1. Cardiac cath requiring stent placement. 2.  Cholecystectomy. 3.  Tonsillectomy. 4.  Hernia repair. 5.  Skin cancer resection.   6.  Ganglion cyst removal from left wrist.  7.  Shoulder surgery. 8.  Prostatectomy. SOCIAL HISTORY:  Nonsmoker. Social alcohol use. Denies any drug abuse. FAMILY HISTORY:  Positive for dementia to mother and cerebral hemorrhage to father. He denies any family history of end-stage renal disease. ALLERGIES:  ALLERGIC TO CLONIDINE, NIACIN, STATIN, ZETIA. MEDICATIONS AT HOME:  Eliquis, amiodarone, hydrochlorothiazide, Imdur, KCl, Tekturna, Coreg, Synthroid, Prinivil, Protonix and vitamin D.    REVIEW OF SYSTEMS:  As per HPI. Totally 11-system reviewed, they are essentially negative. The patient denies any episode of vomiting. No complaint of abdominal pain. No complaint of fever or chills. No gross cough with sputum production. Denies any dysuria, hematuria, urgency or frequency. Totally 11-system reviewed, they are essentially negative. PHYSICAL EXAMINATION:  VITAL SIGNS:  Temperature 98.4, pulse 64, blood pressure 168/70, respiratory rate 15 per minute, oxygen saturation 93% on 2 liters. GENERAL:  The patient is lying in the bed, comfortable, not in apparent distress. Alert, awake, oriented x3. NECK:  Supple. No palpable neck mass. LUNGS:  Basilar rales. Decreased air movement. No wheezing. CARDIAC:  Normal S1 and S2, regular rate and rhythm. ABDOMEN:  Soft, nontender. Bowel sounds present. No palpable mass. No hepatosplenomegaly. EXTREMITIES:  Trace edema present. NEURO:  Nonfocal.  Normal speech. JOINT:  No joint effusion or tenderness. BACK:  No spine tenderness. PSYCH:  Normal affect and mood. LABS AND DIAGNOSTIC DATA:  Hemoglobin 13, white cell count 12.0. Sodium 137, potassium 3.6, chloride 104, BUN 28, creatinine 2.28, calcium 8.5. ProBNP 1569. CT abdomen and pelvis without contrast.  No acute abdominal or pelvic process seen. Mild-to-moderate pulmonary edema with small bilateral pleural effusion. No hydronephrosis. No mass or calculus in the kidney.   Chest x-ray, cardiomegaly with mild pulmonary edema. EKG normal sinus rhythm, nonspecific ST-T wave abnormalities seen. IMPRESSION:  1. Acute kidney injury likely due to cardiorenal syndrome or other possible etiologies, use of lisinopril and Tekturna causing drop in glomerular filtration rate. Dehydration from small dose of Lasix or hydrochlorothiazide is less likely as the patient is taking p.o. very well. 2.  History of congestive heart failure. 3.  History of hypertension. 4.  History of coronary artery disease. 5.  Pulmonary edema, pleural effusion, shortness of breath. PLAN:  1. Hold aliskiren. 2.  Hold lisinopril. 3.  Give one dose of Lasix today. 4.  Check urine electrolytes. 5.  Check postvoid bladder scan. 6.  We will watch kidney function on diuretics. 7.  Avoid NSAIDs. 8.  Check BMP in the morning. 9.  No indication. Thanks for consultation. We will follow the patient with you.         Teo Nicholson MD      [email or web address]  D:  02/21/2020 9:42  T:  02/21/2020 11:50  JOB #:  [id number]

## 2020-02-21 NOTE — PROGRESS NOTES
Pt previously seen by Dr. Paris Faulkner for  difficult to control BP. He last saw her in 2013. At that point his BP was under good control. He says that recently his meds have been changed. He was here at the end of January with CHF. He was diuresed and released. He had labs in 2/3 that showed a creatinine of 1.21. He has had more dyspnea and creatinine today was 2.28. Urine sediment bland with no protein and no blood. CT showed no hydro. Agree with holding aliskiren and ACE. Got one dose of diuretic.     Labs in AM.

## 2020-02-21 NOTE — PROGRESS NOTES
Bedside and Verbal shift change report GIVEN TO Mireille Odonnell RN.  Report included the following information SBAR, Kardex, ED Summary, Procedure Summary, Intake/Output, MAR and Recent Results

## 2020-02-21 NOTE — ED NOTES
6244 - assumed care     Pt sent to radiology for imaging    Spouse at bedside for comfort and care     Pt remains on 2 L NC - pt with no resp distress noted at this time    0500 - pt remains in bed pain s/p IVP pain medications - Call Maine Choudhury MD regarding PRN IVP pain medications - will continue to monitor;     Spouse and son remains at bedside for comfort and care     0710 - Bedside shift change report given to Jose Lunsford  (oncoming nurse) by Zofia Rosa RN (offgoing nurse). Report included the following information SBAR, Kardex, ED Summary, Intake/Output and MAR.

## 2020-02-21 NOTE — ED PROVIDER NOTES
EMERGENCY DEPARTMENT HISTORY AND PHYSICAL EXAM      Date: 2/21/2020  Patient Name: Hannah García  Patient Age and Sex: 77 y.o. male     History of Presenting Illness     Chief Complaint   Patient presents with    Shortness of Breath     pt arrives via EMS with c/o SOB x few hours 93% on RA at this time, pt in no acute respiratory distress       History Provided By: Patient    HPI: Hannah García is a 68-year-old male with a history of heart failure, atrial fibrillation, presenting with shortness of breath. Patient states that he was recently admitted for congestive heart failure after recent cardioversion and BELKIS. Today he was at [employer] walking around when he started to have worsening shortness of breath. States that it was associated with a cough, some tightness all around his abdomen into his back, diarrhea earlier this morning, but denies any fevers, nausea, vomiting, chest pain. EMS reported that once they got there, he was saturating in the low 90s, however he was tripoding and was diaphoretic. They gave him a nebulizer in route. Patient denies any history of COPD or asthma. There are no other complaints, changes, or physical findings at this time. PCP: Vanessa Maxwell, DO    No current facility-administered medications on file prior to encounter. Current Outpatient Medications on File Prior to Encounter   Medication Sig Dispense Refill    amiodarone (CORDARONE) 200 mg tablet Take  by mouth two (2) times a day.  apixaban (ELIQUIS) 5 mg tablet Take 5 mg by mouth two (2) times a day.  hydroCHLOROthiazide (HYDRODIURIL) 12.5 mg tablet Take 1 Tab by mouth daily. 90 Tab 3    isosorbide mononitrate ER (IMDUR) 30 mg tablet Take 1 Tab by mouth daily. 30 Tab 12    potassium chloride SR (KLOR-CON 10) 10 mEq tablet Take 1 Tab by mouth daily. 30 Tab 12    aliskiren (TEKTURNA) 300 mg tablet Take 1 Tab by mouth nightly.  Start if blood pressure remain >=140 upper or 90/100 lower 90 Tab 3    carvedilol (COREG) 25 mg tablet Take 1 Tab by mouth two (2) times daily (with meals). 180 Tab 3    levothyroxine (SYNTHROID) 50 mcg tablet Take 1 Tab by mouth Daily (before breakfast). 90 Tab 3    lisinopril (PRINIVIL, ZESTRIL) 40 mg tablet Take 1 Tab by mouth two (2) times a day. Start if blood pressure remain >=140 upper or 90/100 lower 180 Tab 3    pantoprazole (PROTONIX) 40 mg tablet Take 1 Tab by mouth daily. 90 Tab 3    cholecalciferol, vitamin D3, (VITAMIN D3) 2,000 unit tab Take 2,000 Units by mouth daily.  MULTI-VITAMIN HI-PO PO Take 1 Tab by mouth daily. Past History     Past Medical History:  Past Medical History:   Diagnosis Date    Arrhythmia 01/03/2020    AFIB     CAD (coronary artery disease)     h/o stent- '10    Cancer [facility]) 2010    prostate    Congestive heart failure ([facility] [address]     Hypertension     Stroke [facility]) May or June 2010    lacunar  infarctions (found on scan- no sxs)    Thyroid disease     hypo       Past Surgical History:  Past Surgical History:   Procedure Laterality Date    CARDIAC SURG PROCEDURE UNLIST  2010    coronary stent    EXCIS KNEE CARTILAGE,MEDIAL OR LAT  1980's x3    HX CHOLECYSTECTOMY  1991    HX HEENT  1962    T&A    HX HERNIA REPAIR  1996    HX MALIGNANT SKIN LESION EXCISION      2004 and 2017    HX ORTHOPAEDIC  1974    Ganglion cyst left wrist    HX ORTHOPAEDIC Left 2017    shoulder repair    HX PROSTATECTOMY  2010       Family History:  Family History   Problem Relation Age of Onset    Dementia Mother     Other Father 55        cerebral hemorrhage       Social History:  Social History     Tobacco Use    Smoking status: Never Smoker    Smokeless tobacco: Current User   Substance Use Topics    Alcohol use: Yes     Comment: occas    Drug use: Never       Allergies:   Allergies   Allergen Reactions    Clonidine Anxiety    Fish Oil Other (comments)    Niacin Other (comments)     Decreased vision    Statins-Hmg-Coa Reductase Inhibitors Other (comments)     Muscle and Joint pains    Zetia [Ezetimibe] Myalgia     Pt reports getting joint and muscle pain. Review of Systems   Review of Systems   Constitutional: Positive for diaphoresis. Negative for chills and fever. Respiratory: Positive for cough, shortness of breath and wheezing. Cardiovascular: Negative for chest pain. Gastrointestinal: Positive for abdominal pain and diarrhea. Negative for constipation, nausea and vomiting. Musculoskeletal: Positive for back pain. Neurological: Negative for weakness and numbness. All other systems reviewed and are negative. Physical Exam   Physical Exam  Vitals signs and nursing note reviewed. Constitutional:       Appearance: He is well-developed. HENT:      Head: Normocephalic and atraumatic. Eyes:      Conjunctiva/sclera: Conjunctivae normal.   Neck:      Musculoskeletal: Normal range of motion and neck supple. Cardiovascular:      Rate and Rhythm: Normal rate and regular rhythm. Pulmonary:      Effort: Pulmonary effort is normal. No respiratory distress. Breath sounds: Wheezing and rales present. Comments: Patient is tachypneic and slightly using accessory muscles. Tender over the sides of his abdomen and bilateral flank pain  Abdominal:      General: There is no distension. Palpations: Abdomen is soft. Tenderness: There is no abdominal tenderness. Musculoskeletal: Normal range of motion. Skin:     General: Skin is warm and dry. Neurological:      Mental Status: He is alert and oriented to person, place, and time.           Diagnostic Study Results     Labs -     Recent Results (from the past 12 hour(s))   EKG, 12 LEAD, INITIAL    Collection Time: 02/21/20 12:13 AM   Result Value Ref Range    Ventricular Rate 69 BPM    Atrial Rate 69 BPM    P-R Interval 174 ms    QRS Duration 112 ms    Q-T Interval 470 ms    QTC Calculation (Bezet) 503 ms    Calculated P Axis 72 degrees    Calculated R Axis 66 degrees    Calculated T Axis 97 degrees    Diagnosis       ** Poor data quality, interpretation may be adversely affected  Normal sinus rhythm  Nonspecific ST and T wave abnormality  Prolonged QT  When compared with ECG of 30-JAN-2020 08:20,  premature supraventricular complexes are no longer present     CBC WITH AUTOMATED DIFF    Collection Time: 02/21/20 12:20 AM   Result Value Ref Range    WBC 12.0 (H) 4.1 - 11.1 K/uL    RBC 4.20 4. 10 - 5.70 M/uL    HGB 13.0 12.1 - 17.0 g/dL    HCT 40.0 36.6 - 50.3 %    MCV 95.2 80.0 - 99.0 FL    MCH 31.0 26.0 - 34.0 PG    MCHC 32.5 30.0 - 36.5 g/dL    RDW 12.5 11.5 - 14.5 %    PLATELET 758 138 - 151 K/uL    MPV 12.2 8.9 - 12.9 FL    NRBC 0.0 0  WBC    ABSOLUTE NRBC 0.00 0.00 - 0.01 K/uL    NEUTROPHILS 75 32 - 75 %    LYMPHOCYTES 16 12 - 49 %    MONOCYTES 5 5 - 13 %    EOSINOPHILS 2 0 - 7 %    BASOPHILS 1 0 - 1 %    IMMATURE GRANULOCYTES 1 (H) 0.0 - 0.5 %    ABS. NEUTROPHILS 9.2 (H) 1.8 - 8.0 K/UL    ABS. LYMPHOCYTES 1.9 0.8 - 3.5 K/UL    ABS. MONOCYTES 0.6 0.0 - 1.0 K/UL    ABS. EOSINOPHILS 0.2 0.0 - 0.4 K/UL    ABS. BASOPHILS 0.1 0.0 - 0.1 K/UL    ABS. IMM. GRANS. 0.1 (H) 0.00 - 0.04 K/UL    DF AUTOMATED     METABOLIC PANEL, COMPREHENSIVE    Collection Time: 02/21/20 12:20 AM   Result Value Ref Range    Sodium 137 136 - 145 mmol/L    Potassium 3.6 3.5 - 5.1 mmol/L    Chloride 104 97 - 108 mmol/L    CO2 27 21 - 32 mmol/L    Anion gap 6 5 - 15 mmol/L    Glucose 134 (H) 65 - 100 mg/dL    BUN 28 (H) 6 - 20 MG/DL    Creatinine 2.28 (H) 0.70 - 1.30 MG/DL    BUN/Creatinine ratio 12 12 - 20      GFR est AA 35 (L) >60 ml/min/1.73m2    GFR est non-AA 29 (L) >60 ml/min/1.73m2    Calcium 8.5 8.5 - 10.1 MG/DL    Bilirubin, total 0.6 0.2 - 1.0 MG/DL    ALT (SGPT) 41 12 - 78 U/L    AST (SGOT) 22 15 - 37 U/L    Alk.  phosphatase 59 45 - 117 U/L    Protein, total 7.5 6.4 - 8.2 g/dL    Albumin 3.9 3.5 - 5.0 g/dL    Globulin 3.6 2.0 - 4.0 g/dL    A-G Ratio 1.1 1.1 - 2.2 TROPONIN I    Collection Time: 02/21/20 12:20 AM   Result Value Ref Range    Troponin-I, Qt. <0.05 <0.05 ng/mL   NT-PRO BNP    Collection Time: 02/21/20 12:20 AM   Result Value Ref Range    NT pro-BNP 1,569 (H) <125 PG/ML   LIPASE    Collection Time: 02/21/20 12:20 AM   Result Value Ref Range    Lipase 78 73 - 393 U/L   SAMPLES BEING HELD    Collection Time: 02/21/20 12:21 AM   Result Value Ref Range    SAMPLES BEING HELD BL     COMMENT        Add-on orders for these samples will be processed based on acceptable specimen integrity and analyte stability, which may vary by analyte. Radiologic Studies -   XR CHEST PORT   Final Result   IMPRESSION: Cardiomegaly with mild pulmonary edema. CT Results  (Last 48 hours)    None        CXR Results  (Last 48 hours)               02/21/20 0110  XR CHEST PORT Final result    Impression:  IMPRESSION: Cardiomegaly with mild pulmonary edema. Narrative:  INDICATION: Shortness of breath       COMPARISON: January 29, 2020       FINDINGS: AP portable imaging of the chest performed at 12:44 AM demonstrates   unchanged mild cardiomegaly. There is mild pulmonary edema. No focal   consolidation or pleural effusion is evident. No significant osseous   abnormalities are seen. Medical Decision Making   I am the first provider for this patient. I reviewed the vital signs, available nursing notes, past medical history, past surgical history, family history and social history. Vital Signs-Reviewed the patient's vital signs. Patient Vitals for the past 12 hrs:   Temp Pulse Resp BP SpO2   02/21/20 0031     93 %   02/21/20 0030     91 %   02/21/20 0016 97.5 °F (36.4 °C) 70 26 176/90 94 %       Records Reviewed: Nursing Notes and Old Medical Records    Provider Notes (Medical Decision Making):   Patient presenting with worsening shortness of breath today.   Found to be wheezing on exam.  Most likely another CHF exacerbation versus bronchitis versus pneumonia. Will get chest x-ray, labs, EKG    ED Course:   Initial assessment performed. The patients presenting problems have been discussed, and they are in agreement with the care plan formulated and outlined with them. I have encouraged them to ask questions as they arise throughout their visit. ED Course as of Feb 21 0144 Fri Feb 21, 2020   0025 EKG interpreted by me. Shows normal sinus rhythm with a heart rate of 69. No ST elevations or depressions concerning for ischemia. [JS]   0122 Patient's chest x-ray shows mild pulmonary edema and his creatinine is once again significantly elevated, worse than before. Thus will plan to ambulate with pulse ox and give him Bumex and bring him in. [JS]   0140 Patient desaturated to 88% just on standing and became very tachypneic and diaphoretic. Thus will admit him. [JS]      ED Course User Index  [JS] Gisella Elias MD     Critical Care Time:   CRITICAL CARE NOTE :    1:44 AM  IMPENDING DETERIORATION -Airway, Respiratory and Cardiovascular  ASSOCIATED RISK FACTORS - Hypotension, Shock and Hypoxia  MANAGEMENT- Bedside Assessment and Supervision of Care  INTERPRETATION -  Xrays, ECG, Blood Pressure and Cardiac Output Measures   INTERVENTIONS - hemodynamic mngmt and vent mngmt  CASE REVIEW - Hospitalist, Nursing and Family  TREATMENT RESPONSE -Stable  PERFORMED BY - Self    NOTES   :    I have spent 35 minutes of critical care time involved in lab review, consultations with specialist, family decision- making, bedside attention and documentation. During this entire length of time I was immediately available to the patient. Disposition:    Admission Note:  Patient is being admitted to the hospital by Dr. Johana Martinez, Service: Hospitalist.  The results of their tests and reasons for their admission have been discussed with them and available family.  They convey agreement and understanding for the need to be admitted and for their admission diagnosis. Diagnosis     Clinical Impression:   1. Acute respiratory failure with hypoxia ([address]    2. Acute congestive heart failure, unspecified heart failure type ([address]    3. ZONIA (acute kidney injury) ([address]        Attestations:    Heidy Cooper M.D. Please note that this dictation was completed with Not iT, the computer voice recognition software. Quite often unanticipated grammatical, syntax, homophones, and other interpretive errors are inadvertently transcribed by the computer software. Please disregard these errors. Please excuse any errors that have escaped final proofreading. Thank you.

## 2020-02-21 NOTE — ED NOTES
TRANSFER - OUT REPORT:    Verbal report given to Karina(name) on Navdeep Henry  being transferred to [facility](unit) for routine progression of care       Report consisted of patients Situation, Background, Assessment and   Recommendations(SBAR). Information from the following report(s) SBAR, Kardex, ED Summary and Intake/Output was reviewed with the receiving nurse. Lines:   Peripheral IV 02/21/20 Right Forearm (Active)   Site Assessment Clean, dry, & intact 2/21/2020 12:24 AM   Phlebitis Assessment 0 2/21/2020 12:24 AM   Infiltration Assessment 0 2/21/2020 12:24 AM   Dressing Status Clean, dry, & intact 2/21/2020 12:24 AM   Dressing Type Transparent 2/21/2020 12:24 AM   Hub Color/Line Status Pink;Flushed 2/21/2020 12:24 AM   Action Taken Blood drawn 2/21/2020 12:24 AM        Opportunity for questions and clarification was provided.       Patient transported with:   [employer]

## 2020-02-21 NOTE — PROGRESS NOTES
Primary Nurse Barron Padilla RN and JUAN JOSE gaytan performed a dual skin assessment on this patient No impairment noted    Kenroy score is 21

## 2020-02-21 NOTE — PROGRESS NOTES
Reason for Readmission:     Acute respiratory failure with hypoxia, CHF         RUR Score/Risk Level:     18 moderate risk    Is a Care Conference indicated: unit IDR rounds      Did you attend your follow up appointment (s): If not, why not: saw PCP 2/3         Resources/supports as identified by patient/family:   Family support       Top Challenges facing patient (as identified by patient/family and CM): Finances/Medication cost?     No challenges reported  Transportation      Pt can drive, pt's wife can transport at [employer] or lack thereof?     family  Living arrangements? Lives with wife, 2 story house, 4 steps to enter  Self-care/ADLs/Cognition? Independent with all ADLs, currently alert and oriented       Current Advanced Directive/Advance Care Plan:  none           Plan for utilizing home health:   Has not used home health in the past             Transition of Care Plan:    Based on readmission, the patient's previous Plan of Care discharge home with follow up appointments    has been evaluated and/or modified. The current Transition of Care Plan is:           1. Patient in ED bed waiting for inpatient admission  2. Patient will need 2nd IM letter at discharge  3. Patient prefers to discharge home with family assistance and follow up appointments. Patient is a 77year old male admitted 2/21 for respiratory failure with hypoxia and CHF. Patient alert and oriented, resting in bed with son present in room. Demographic information verified and correct. Insurance information verified and correct. Patient lives with his wife, no home oxygen, no DME, has not used home health in the past.  Patient uses [id number] [id number]  Patient is independent with all ADLs and can drive. Patient's wife can transport by car at discharge    Care Management Interventions  PCP Verified by CM: Aria Casey MD)  Mode of Transport at Discharge:  Other (see comment)(pt's wife can transport at [employer])  Transition of Care Consult (CM Consult): Discharge Planning  Discharge Durable Medical Equipment: No  Physical Therapy Consult: No  Occupational Therapy Consult: No  Speech Therapy Consult: No  Current Support Network: Lives with Spouse, Own Home(2 story house, 4 steps to enter)  Confirm Follow Up Transport: Family  Discharge Location  Discharge Placement: [address], RN, [place] [address] Care Management  [phone]

## 2020-02-21 NOTE — H&P
Hospitalist Admission Note    NAME: Laron Haas   :  1955   MRN:  [id number]     Date/Time:  2020 2:23 AM    Patient PCP: Mehrdad Gudino, DO  ______________________________________________________________________  Given the patient's current clinical presentation, I have a high level of concern for decompensation if discharged from the emergency department. Complex decision making was performed, which includes reviewing the patient's available past medical records, laboratory results, and x-ray films. My assessment of this patient's clinical condition and my plan of care is as follows. Assessment / Plan:  Acute hypoxic respiratory failure secondary to acute on chronic systolic CHF exacerbation admit patient to telemetry unit [facility]   Cardiology consultationstart patient on Lasix  Daily weight  Follow-up serial troponin  echocardiogram    Abdomen pain   -follow up CT scan abdomen     Acute renal failure   Avoid nephrotoxic medication  Nephrology consultation    Hypertension  Continue home antihypertensive medication    Hypothyroidismcontinue Synthyroid    History of atrial fibrillation  Continue amiodarone on Eliquis    Code Status: Full   Surrogate Decision Maker:    DVT Prophylaxis: Eliquis   GI Prophylaxis: not indicated          Subjective:   CHIEF COMPLAINT: SOB     HISTORY OF PRESENT ILLNESS:     78years old male from home with past medical history significant for atrial fibrillation, hypertension: CHF, hypothyroidism presented to the hospital for evaluation of shortness of breath, patient stated he was walking at [employer] he started complaining from severe shortness of breath associated with a dry cough and some chest pressure denies any fever chills denies any productive cough, chest x-ray was done and showed cardiomegaly with mild pulmonary edema. We were asked to admit for work up and evaluation of the above problems.      Past Medical History: Diagnosis Date    Arrhythmia 01/03/2020    AFIB     CAD (coronary artery disease)     h/o stent- '10    Cancer [facility]) 2010    prostate    Congestive heart failure ([address]     Hypertension     Stroke [facility]) May or June 2010    lacunar  infarctions (found on scan- no sxs)    Thyroid disease     hypo        Past Surgical History:   Procedure Laterality Date    CARDIAC SURG PROCEDURE UNLIST  2010    coronary stent    EXCIS KNEE CARTILAGE,MEDIAL OR LAT  1980's x3    HX CHOLECYSTECTOMY  1991    HX HEENT  1962    T&A    HX HERNIA REPAIR  1996    HX MALIGNANT SKIN LESION EXCISION      2004 and 2017    HX ORTHOPAEDIC  1974    Ganglion cyst left wrist    HX ORTHOPAEDIC Left 2017    shoulder repair    HX PROSTATECTOMY  2010       Social History     Tobacco Use    Smoking status: Never Smoker    Smokeless tobacco: Current User   Substance Use Topics    Alcohol use: Yes     Comment: occas        Family History   Problem Relation Age of Onset   Cruz Dementia Mother    Cruz Other Father 55        cerebral hemorrhage     Allergies   Allergen Reactions    Clonidine Anxiety    Fish Oil Other (comments)    Niacin Other (comments)     Decreased vision    Statins-Hmg-Coa Reductase Inhibitors Other (comments)     Muscle and Joint pains    Zetia [Ezetimibe] Myalgia     Pt reports getting joint and muscle pain. Prior to Admission medications    Medication Sig Start Date End Date Taking? Authorizing Provider   amiodarone (CORDARONE) 200 mg tablet Take  by mouth two (2) times a day. Yes Provider, Historical   apixaban (ELIQUIS) 5 mg tablet Take 5 mg by mouth two (2) times a day. Yes Provider, Historical   hydroCHLOROthiazide (HYDRODIURIL) 12.5 mg tablet Take 1 Tab by mouth daily. 2/3/20  Yes Herman Villa III, DO   isosorbide mononitrate ER (IMDUR) 30 mg tablet Take 1 Tab by mouth daily. 1/31/20  Yes Magda Moran MD   potassium chloride SR (KLOR-CON 10) 10 mEq tablet Take 1 Tab by mouth daily.  1/31/20 Yes Mine Nguyen MD   aliskiren (TEKTURNA) 300 mg tablet Take 1 Tab by mouth nightly. Start if blood pressure remain >=140 upper or 90/100 lower 5/3/19  Yes Herman Villa III, DO   carvedilol (COREG) 25 mg tablet Take 1 Tab by mouth two (2) times daily (with meals). 5/3/19  Yes Herman Villa III, DO   levothyroxine (SYNTHROID) 50 mcg tablet Take 1 Tab by mouth Daily (before breakfast). 5/3/19  Yes Herman Villa III, DO   lisinopril (PRINIVIL, ZESTRIL) 40 mg tablet Take 1 Tab by mouth two (2) times a day. Start if blood pressure remain >=140 upper or 90/100 lower 5/3/19  Yes Herman Villa III, DO   pantoprazole (PROTONIX) 40 mg tablet Take 1 Tab by mouth daily. 5/3/19  Yes Herman Villa III, DO   cholecalciferol, vitamin D3, (VITAMIN D3) 2,000 unit tab Take 2,000 Units by mouth daily. Yes Provider, Historical   MULTI-VITAMIN HI-PO PO Take 1 Tab by mouth daily. 10/11/10  Yes Provider, Historical       REVIEW OF SYSTEMS:     I am not able to complete the review of systems because:    The patient is intubated and sedated    The patient has altered mental status due to his acute medical problems    The patient has baseline aphasia from prior stroke(s)    The patient has baseline dementia and is not reliable historian    The patient is in acute medical distress and unable to provide information           Total of 12 systems reviewed as follows:       POSITIVE= underlined text  Negative = text not underlined  General:  fever, chills, sweats, generalized weakness, weight loss/gain,      loss of appetite   Eyes:    blurred vision, eye pain, loss of vision, double vision  ENT:    rhinorrhea, pharyngitis   Respiratory:   cough, sputum production, SOB, DENG, wheezing, pleuritic pain   Cardiology:   chest pain, palpitations, orthopnea, PND, edema, syncope   Gastrointestinal:  abdominal pain , N/V, diarrhea, dysphagia, constipation, bleeding   Genitourinary:  frequency, urgency, dysuria, hematuria, incontinence   Muskuloskeletal :  arthralgia, myalgia, back pain  Hematology:  easy bruising, nose or gum bleeding, lymphadenopathy   Dermatological: rash, ulceration, pruritis, color change / jaundice  Endocrine:   hot flashes or polydipsia   Neurological:  headache, dizziness, confusion, focal weakness, paresthesia,     Speech difficulties, memory loss, gait difficulty  Psychological: Feelings of anxiety, depression, agitation    Objective:   VITALS:    Visit Vitals  /70   Pulse 62   Temp 97.5 °F (36.4 °C)   Resp 26   Ht 6' (1.829 m)   Wt 108.9 kg (240 lb)   SpO2 93%   BMI 32.55 kg/m²       PHYSICAL EXAM:    General:    Alert, cooperative, no distress, appears stated age. HEENT: Atraumatic, anicteric sclerae, pink conjunctivae     No oral ulcers, mucosa moist, throat clear, dentition fair  Neck:  Supple, symmetrical,  thyroid: non tender  Lungs:   Clear to auscultation bilaterally. No Wheezing or Rhonchi. No rales. Chest wall:  No tenderness  No Accessory muscle use. Heart:   Regular  rhythm,  No  murmur   No edema  Abdomen:   Soft, non-tender. Not distended. Bowel sounds normal  Extremities: No cyanosis. No clubbing,      Skin turgor normal, Capillary refill normal, Radial dial pulse 2+  Skin:     Not pale. Not Jaundiced  No rashes   Psych:  Good insight. Not depressed. Not anxious or agitated. Neurologic: EOMs intact. No facial asymmetry. No aphasia or slurred speech. Symmetrical strength, Sensation grossly intact.  Alert and oriented X 4.     _______________________________________________________________________  Care Plan discussed with:    Comments   Patient y    Family      RN y    Care Manager                    Consultant:      _______________________________________________________________________  Expected  Disposition:   Home with Family y   HH/PT/OT/RN    SNF/LTC    CARLOS    ________________________________________________________________________  TOTAL TIME:  61 Minutes    Critical Care Provided     Minutes non procedure based      Comments    y Reviewed previous records   >50% of visit spent in counseling and coordination of care y Discussion with patient and/or family and questions answered       ________________________________________________________________________  Signed: Kiki Dewitt MD    Procedures: see electronic medical records for all procedures/Xrays and details which were not copied into this note but were reviewed prior to creation of Plan. LAB DATA REVIEWED:    Recent Results (from the past 24 hour(s))   EKG, 12 LEAD, INITIAL    Collection Time: 02/21/20 12:13 AM   Result Value Ref Range    Ventricular Rate 69 BPM    Atrial Rate 69 BPM    P-R Interval 174 ms    QRS Duration 112 ms    Q-T Interval 470 ms    QTC Calculation (Bezet) 503 ms    Calculated P Axis 72 degrees    Calculated R Axis 66 degrees    Calculated T Axis 97 degrees    Diagnosis       ** Poor data quality, interpretation may be adversely affected  Normal sinus rhythm  Nonspecific ST and T wave abnormality  Prolonged QT  When compared with ECG of 30-JAN-2020 08:20,  premature supraventricular complexes are no longer present     CBC WITH AUTOMATED DIFF    Collection Time: 02/21/20 12:20 AM   Result Value Ref Range    WBC 12.0 (H) 4.1 - 11.1 K/uL    RBC 4.20 4. 10 - 5.70 M/uL    HGB 13.0 12.1 - 17.0 g/dL    HCT 40.0 36.6 - 50.3 %    MCV 95.2 80.0 - 99.0 FL    MCH 31.0 26.0 - 34.0 PG    MCHC 32.5 30.0 - 36.5 g/dL    RDW 12.5 11.5 - 14.5 %    PLATELET 608 572 - 604 K/uL    MPV 12.2 8.9 - 12.9 FL    NRBC 0.0 0  WBC    ABSOLUTE NRBC 0.00 0.00 - 0.01 K/uL    NEUTROPHILS 75 32 - 75 %    LYMPHOCYTES 16 12 - 49 %    MONOCYTES 5 5 - 13 %    EOSINOPHILS 2 0 - 7 %    BASOPHILS 1 0 - 1 %    IMMATURE GRANULOCYTES 1 (H) 0.0 - 0.5 %    ABS. NEUTROPHILS 9.2 (H) 1.8 - 8.0 K/UL    ABS. LYMPHOCYTES 1.9 0.8 - 3.5 K/UL    ABS. MONOCYTES 0.6 0.0 - 1.0 K/UL    ABS. EOSINOPHILS 0.2 0.0 - 0.4 K/UL    ABS. BASOPHILS 0.1 0.0 - 0.1 K/UL    ABS. IMM. GRANS. 0.1 (H) 0.00 - 0.04 K/UL    DF AUTOMATED     METABOLIC PANEL, COMPREHENSIVE    Collection Time: 02/21/20 12:20 AM   Result Value Ref Range    Sodium 137 136 - 145 mmol/L    Potassium 3.6 3.5 - 5.1 mmol/L    Chloride 104 97 - 108 mmol/L    CO2 27 21 - 32 mmol/L    Anion gap 6 5 - 15 mmol/L    Glucose 134 (H) 65 - 100 mg/dL    BUN 28 (H) 6 - 20 MG/DL    Creatinine 2.28 (H) 0.70 - 1.30 MG/DL    BUN/Creatinine ratio 12 12 - 20      GFR est AA 35 (L) >60 ml/min/1.73m2    GFR est non-AA 29 (L) >60 ml/min/1.73m2    Calcium 8.5 8.5 - 10.1 MG/DL    Bilirubin, total 0.6 0.2 - 1.0 MG/DL    ALT (SGPT) 41 12 - 78 U/L    AST (SGOT) 22 15 - 37 U/L    Alk. phosphatase 59 45 - 117 U/L    Protein, total 7.5 6.4 - 8.2 g/dL    Albumin 3.9 3.5 - 5.0 g/dL    Globulin 3.6 2.0 - 4.0 g/dL    A-G Ratio 1.1 1.1 - 2.2     TROPONIN I    Collection Time: 02/21/20 12:20 AM   Result Value Ref Range    Troponin-I, Qt. <0.05 <0.05 ng/mL   NT-PRO BNP    Collection Time: 02/21/20 12:20 AM   Result Value Ref Range    NT pro-BNP 1,569 (H) <125 PG/ML   LIPASE    Collection Time: 02/21/20 12:20 AM   Result Value Ref Range    Lipase 78 73 - 393 U/L   SAMPLES BEING HELD    Collection Time: 02/21/20 12:21 AM   Result Value Ref Range    SAMPLES BEING HELD BL     COMMENT        Add-on orders for these samples will be processed based on acceptable specimen integrity and analyte stability, which may vary by analyte.

## 2020-02-21 NOTE — PROGRESS NOTES
Patient seen in ED. No complaints currently. Abdomen full, round, mildly TTP in epigastric area. Labs/imaging reviewed. - Start cardiac diet.

## 2020-02-21 NOTE — ED TRIAGE NOTES
Pt arrives via EMS from home with c/o SOB since 1730 last night with worsening symptoms the last few hours    Pt states he was walking through [employer] and started feeling SOB, pt states later on when he walked up the stairs in his home and states he was not able to recover. Pt denies CP however states he has epigastric pain across mid, left, and right abdominal pain with radiating pain across his lower back. Pt denies N/V/D, fever, chills, cough    Per EMS pt was in low 90s on RA and tripoding.  Pt arrives in no acute respiratory distress, 92% on RA    Pt placed  on 2L for comfort now 93%    Pt placed x3 on monitor, bed in low position, call bell in reach  Family at bedisde    Pt A/O x4

## 2020-02-21 NOTE — ED NOTES
Pt ambulated in room on RA, pt starting Spo2 94%, during ambulation to end of stretcher spo2 88%, post ambulation 91% on RA    Pt became tachypneic 32 and audible wheezing

## 2020-02-22 ENCOUNTER — APPOINTMENT (OUTPATIENT)
Dept: GENERAL RADIOLOGY | Age: 65
DRG: 291 | End: 2020-02-22
Attending: INTERNAL MEDICINE
Payer: MEDICARE

## 2020-02-22 LAB
ANION GAP SERPL CALC-SCNC: 3 MMOL/L (ref 5–15)
AV PEAK GRADIENT: 64.98 MMHG
AV VELOCITY RATIO: 1.14
BASOPHILS # BLD: 0.1 K/UL (ref 0–0.1)
BASOPHILS NFR BLD: 1 % (ref 0–1)
BUN SERPL-MCNC: 19 MG/DL (ref 6–20)
BUN/CREAT SERPL: 14 (ref 12–20)
CALCIUM SERPL-MCNC: 8.1 MG/DL (ref 8.5–10.1)
CHLORIDE SERPL-SCNC: 106 MMOL/L (ref 97–108)
CO2 SERPL-SCNC: 29 MMOL/L (ref 21–32)
CREAT SERPL-MCNC: 1.35 MG/DL (ref 0.7–1.3)
DIFFERENTIAL METHOD BLD: ABNORMAL
ECHO AV AREA PEAK VELOCITY: 5 CM2
ECHO AV PEAK GRADIENT: 6.6 MMHG
ECHO AV PEAK VELOCITY: 128.35 CM/S
ECHO AV REGURGITANT PHT: 624.2 CM
ECHO EST RA PRESSURE: 10 MMHG
ECHO LA AREA 4C: 27.2 CM2
ECHO LA VOL 4C: 99.83 ML (ref 18–58)
ECHO LA VOLUME INDEX A4C: 43.36 ML/M2 (ref 16–28)
ECHO LV EDV A4C: 285.7 ML
ECHO LV EDV INDEX A4C: 124.1 ML/M2
ECHO LV EJECTION FRACTION A4C: 40 %
ECHO LV ESV A4C: 170.4 ML
ECHO LV ESV INDEX A4C: 74 ML/M2
ECHO LV INTERNAL DIMENSION DIASTOLIC: 6.01 CM (ref 4.2–5.9)
ECHO LV INTERNAL DIMENSION SYSTOLIC: 4.55 CM
ECHO LV IVSD: 1.3 CM (ref 0.6–1)
ECHO LV MASS 2D: 420.8 G (ref 88–224)
ECHO LV MASS INDEX 2D: 182.8 G/M2 (ref 49–115)
ECHO LV POSTERIOR WALL DIASTOLIC: 1.28 CM (ref 0.6–1)
ECHO LVOT DIAM: 2.37 CM
ECHO LVOT PEAK GRADIENT: 8.5 MMHG
ECHO LVOT PEAK VELOCITY: 145.83 CM/S
ECHO MV REGURGITANT PEAK GRADIENT: 55.6 MMHG
ECHO MV REGURGITANT PEAK VELOCITY: 372.7 CM/S
ECHO PULMONARY ARTERY SYSTOLIC PRESSURE (PASP): 47.7 MMHG
ECHO PV MAX VELOCITY: 49.92 CM/S
ECHO PV PEAK GRADIENT: 1 MMHG
ECHO RIGHT VENTRICULAR SYSTOLIC PRESSURE (RVSP): 47.7 MMHG
ECHO RV INTERNAL DIMENSION: 4.07 CM
ECHO RV TAPSE: 2.45 CM (ref 1.5–2)
ECHO TV A WAVE: 43.83 CM/S
ECHO TV E WAVE: 38.06 CM/S
ECHO TV EROA: 1.2
ECHO TV REGURGITANT MAX VELOCITY: 307.2 CM/S
ECHO TV REGURGITANT PEAK GRADIENT: 37.7 MMHG
EOSINOPHIL # BLD: 0.2 K/UL (ref 0–0.4)
EOSINOPHIL NFR BLD: 4 % (ref 0–7)
ERYTHROCYTE [DISTWIDTH] IN BLOOD BY AUTOMATED COUNT: 12.8 % (ref 11.5–14.5)
GLUCOSE SERPL-MCNC: 100 MG/DL (ref 65–100)
HCT VFR BLD AUTO: 36.3 % (ref 36.6–50.3)
HGB BLD-MCNC: 11.7 G/DL (ref 12.1–17)
IMM GRANULOCYTES # BLD AUTO: 0 K/UL (ref 0–0.04)
IMM GRANULOCYTES NFR BLD AUTO: 0 % (ref 0–0.5)
LVFS 2D: 24.32 %
LYMPHOCYTES # BLD: 1.7 K/UL (ref 0.8–3.5)
LYMPHOCYTES NFR BLD: 26 % (ref 12–49)
MAGNESIUM SERPL-MCNC: 2 MG/DL (ref 1.6–2.4)
MCH RBC QN AUTO: 30.8 PG (ref 26–34)
MCHC RBC AUTO-ENTMCNC: 32.2 G/DL (ref 30–36.5)
MCV RBC AUTO: 95.5 FL (ref 80–99)
MONOCYTES # BLD: 0.5 K/UL (ref 0–1)
MONOCYTES NFR BLD: 7 % (ref 5–13)
NEUTS SEG # BLD: 4.3 K/UL (ref 1.8–8)
NEUTS SEG NFR BLD: 62 % (ref 32–75)
NRBC # BLD: 0 K/UL (ref 0–0.01)
NRBC BLD-RTO: 0 PER 100 WBC
PISA AR MAX VEL: 403.04 CM/S
PLATELET # BLD AUTO: 184 K/UL (ref 150–400)
PMV BLD AUTO: 11.9 FL (ref 8.9–12.9)
POTASSIUM SERPL-SCNC: 2.9 MMOL/L (ref 3.5–5.1)
PULMONARY ARTERY END DIASTOLIC PRESSURE: 17.4 MMHG
PULMONARY ARTERY MEAN PRESURE: 27.5 MMHG
PV END DIASTOLIC VELOCITY: 1.4 MMHG
RBC # BLD AUTO: 3.8 M/UL (ref 4.1–5.7)
SODIUM SERPL-SCNC: 138 MMOL/L (ref 136–145)
WBC # BLD AUTO: 6.8 K/UL (ref 4.1–11.1)

## 2020-02-22 PROCEDURE — 71045 X-RAY EXAM CHEST 1 VIEW: CPT

## 2020-02-22 PROCEDURE — 94640 AIRWAY INHALATION TREATMENT: CPT

## 2020-02-22 PROCEDURE — 74011250637 HC RX REV CODE- 250/637: Performed by: NURSE PRACTITIONER

## 2020-02-22 PROCEDURE — 74011000250 HC RX REV CODE- 250: Performed by: NURSE PRACTITIONER

## 2020-02-22 PROCEDURE — 94760 N-INVAS EAR/PLS OXIMETRY 1: CPT

## 2020-02-22 PROCEDURE — 80048 BASIC METABOLIC PNL TOTAL CA: CPT

## 2020-02-22 PROCEDURE — 74011250636 HC RX REV CODE- 250/636: Performed by: NURSE PRACTITIONER

## 2020-02-22 PROCEDURE — 77030029684 HC NEB SM VOL KT MONA -A

## 2020-02-22 PROCEDURE — 65660000000 HC RM CCU STEPDOWN

## 2020-02-22 PROCEDURE — 74011250637 HC RX REV CODE- 250/637: Performed by: INTERNAL MEDICINE

## 2020-02-22 PROCEDURE — 74011250636 HC RX REV CODE- 250/636: Performed by: INTERNAL MEDICINE

## 2020-02-22 PROCEDURE — 74011000250 HC RX REV CODE- 250: Performed by: HOSPITALIST

## 2020-02-22 PROCEDURE — 74011250637 HC RX REV CODE- 250/637: Performed by: EMERGENCY MEDICINE

## 2020-02-22 PROCEDURE — 85025 COMPLETE CBC W/AUTO DIFF WBC: CPT

## 2020-02-22 PROCEDURE — 83735 ASSAY OF MAGNESIUM: CPT

## 2020-02-22 PROCEDURE — 36415 COLL VENOUS BLD VENIPUNCTURE: CPT

## 2020-02-22 RX ORDER — HYDRALAZINE HYDROCHLORIDE 50 MG/1
50 TABLET, FILM COATED ORAL 3 TIMES DAILY
Status: DISCONTINUED | OUTPATIENT
Start: 2020-02-22 | End: 2020-02-24

## 2020-02-22 RX ORDER — POTASSIUM CHLORIDE 7.45 MG/ML
10 INJECTION INTRAVENOUS
Status: COMPLETED | OUTPATIENT
Start: 2020-02-22 | End: 2020-02-22

## 2020-02-22 RX ORDER — IPRATROPIUM BROMIDE AND ALBUTEROL SULFATE 2.5; .5 MG/3ML; MG/3ML
3 SOLUTION RESPIRATORY (INHALATION)
Status: DISCONTINUED | OUTPATIENT
Start: 2020-02-22 | End: 2020-02-22

## 2020-02-22 RX ORDER — IPRATROPIUM BROMIDE AND ALBUTEROL SULFATE 2.5; .5 MG/3ML; MG/3ML
3 SOLUTION RESPIRATORY (INHALATION)
Status: DISCONTINUED | OUTPATIENT
Start: 2020-02-22 | End: 2020-02-26 | Stop reason: HOSPADM

## 2020-02-22 RX ORDER — AMLODIPINE BESYLATE 5 MG/1
5 TABLET ORAL DAILY
Status: DISCONTINUED | OUTPATIENT
Start: 2020-02-22 | End: 2020-02-22

## 2020-02-22 RX ADMIN — ACETAMINOPHEN 650 MG: 325 TABLET ORAL at 18:55

## 2020-02-22 RX ADMIN — MORPHINE SULFATE 1 MG: 2 INJECTION, SOLUTION INTRAMUSCULAR; INTRAVENOUS at 01:05

## 2020-02-22 RX ADMIN — MORPHINE SULFATE 1 MG: 2 INJECTION, SOLUTION INTRAMUSCULAR; INTRAVENOUS at 09:11

## 2020-02-22 RX ADMIN — POTASSIUM CHLORIDE 10 MEQ: 10 INJECTION, SOLUTION INTRAVENOUS at 10:00

## 2020-02-22 RX ADMIN — IPRATROPIUM BROMIDE AND ALBUTEROL SULFATE 3 ML: .5; 3 SOLUTION RESPIRATORY (INHALATION) at 08:17

## 2020-02-22 RX ADMIN — Medication 10 ML: at 22:00

## 2020-02-22 RX ADMIN — POTASSIUM CHLORIDE 10 MEQ: 750 TABLET, FILM COATED, EXTENDED RELEASE ORAL at 09:13

## 2020-02-22 RX ADMIN — CARVEDILOL 25 MG: 12.5 TABLET, FILM COATED ORAL at 16:07

## 2020-02-22 RX ADMIN — POTASSIUM CHLORIDE 10 MEQ: 10 INJECTION, SOLUTION INTRAVENOUS at 11:39

## 2020-02-22 RX ADMIN — CARVEDILOL 25 MG: 12.5 TABLET, FILM COATED ORAL at 09:14

## 2020-02-22 RX ADMIN — IPRATROPIUM BROMIDE AND ALBUTEROL SULFATE 3 ML: .5; 3 SOLUTION RESPIRATORY (INHALATION) at 00:54

## 2020-02-22 RX ADMIN — MORPHINE SULFATE 1 MG: 2 INJECTION, SOLUTION INTRAMUSCULAR; INTRAVENOUS at 16:05

## 2020-02-22 RX ADMIN — APIXABAN 5 MG: 5 TABLET, FILM COATED ORAL at 18:49

## 2020-02-22 RX ADMIN — LEVOTHYROXINE SODIUM 50 MCG: 50 TABLET ORAL at 10:34

## 2020-02-22 RX ADMIN — METHOCARBAMOL TABLETS 750 MG: 500 TABLET, COATED ORAL at 09:13

## 2020-02-22 RX ADMIN — ISOSORBIDE MONONITRATE 30 MG: 30 TABLET, EXTENDED RELEASE ORAL at 09:14

## 2020-02-22 RX ADMIN — HYDRALAZINE HYDROCHLORIDE 50 MG: 50 TABLET, FILM COATED ORAL at 16:07

## 2020-02-22 RX ADMIN — Medication 10 ML: at 05:51

## 2020-02-22 RX ADMIN — HYDRALAZINE HYDROCHLORIDE 50 MG: 50 TABLET, FILM COATED ORAL at 09:14

## 2020-02-22 RX ADMIN — AMIODARONE HYDROCHLORIDE 200 MG: 200 TABLET ORAL at 09:14

## 2020-02-22 RX ADMIN — LIDOCAINE HYDROCHLORIDE 40 ML: 20 SOLUTION ORAL; TOPICAL at 13:56

## 2020-02-22 RX ADMIN — PANTOPRAZOLE SODIUM 40 MG: 40 TABLET, DELAYED RELEASE ORAL at 09:14

## 2020-02-22 RX ADMIN — APIXABAN 5 MG: 5 TABLET, FILM COATED ORAL at 09:14

## 2020-02-22 RX ADMIN — POTASSIUM CHLORIDE 10 MEQ: 10 INJECTION, SOLUTION INTRAVENOUS at 09:28

## 2020-02-22 RX ADMIN — Medication 10 ML: at 16:07

## 2020-02-22 RX ADMIN — HYDRALAZINE HYDROCHLORIDE 50 MG: 50 TABLET, FILM COATED ORAL at 21:38

## 2020-02-22 RX ADMIN — METHOCARBAMOL TABLETS 750 MG: 500 TABLET, COATED ORAL at 18:49

## 2020-02-22 RX ADMIN — POTASSIUM CHLORIDE 10 MEQ: 10 INJECTION, SOLUTION INTRAVENOUS at 10:35

## 2020-02-22 RX ADMIN — AMIODARONE HYDROCHLORIDE 200 MG: 200 TABLET ORAL at 18:49

## 2020-02-22 NOTE — PROGRESS NOTES
0000: Pt c/o SOB and a cough. He states he feels like he cannot get a deep breath in and he is trying to cough something up, but nothing comes up. Pt states he cannot lay down. Placed on O2 2L nc for comfort. O2 sats mid 90's. Rapid response nurse Jhonny Parmar called. Lungs sound clear. Order for STAT CXR. MD paged. New orders for nebs and ABG's.    0030: Pt states the \"episode\" is easing up. 0035: CXR done. Will continue to monitor.

## 2020-02-22 NOTE — PROGRESS NOTES
RAPID RESPONSE TEAM    Called by primary RN Kelly Anderson over concern of patient with increasing shortness of breath. Upon assessment at bedside, patient sitting in tripod position on side of bed, states he feels short of breath and feels like he needs to cough something up. Patient states this is similar to what he experienced when he came to the ED. Lungs clear. O2 sats 100% on 3 lpm nasal canula, RR 20. Patient states he has some chest tightness when he sits up, but does not have any chest pain when in tripod position. States he feels pain is more muscular. STAT chest xray ordered. Primary JUAN JOSE Wallis notified tele-hospitalist.  PRN Morphine given. Patient states he is having some relief. RT Vega at bedside for duo-neb treatment. Please call with any needs or concerns.     Miah Haddadchure  Rapid Response JUAN JOSE Mcclure

## 2020-02-22 NOTE — PROGRESS NOTES
CARDIOLOGY Progress Note    Patient ID:  Patient: Katie Hager  MRN: [id number]  Age: 77 y.o.  : 1955    Date of  Admission: 2020 12:12 AM   PCP:  Tiffanie Marc DO   Usual cardiologist:  Sarah Lemon MD    Assessment: 1. Acute on chronic diastolic CHF. CM: EF 40-45% on BELKIS 2020. TTE this admission with EF that looks a little better preliminarily, final read pending. Last 3 Recorded Weights in this Encounter    20 0016 20 1027 20 0547   Weight: 108.9 kg (240 lb) 108.9 kg (240 lb 1.3 oz) 109.4 kg (241 lb 2.9 oz)     2. CAD (BMS 2010), w/ intermittent atypical CP; Neg MPI  (Dx with clavicle separation). DENG/atypical CP 2019: worse BP (pt denies PTA); +/- abnl MPI: 40-50% D1 o/w normal: Med Rx. 3. AFib 2019, new onset, Sx (DENG/palps in  & 2019): Rx w/ eliquis/norvasc to dilt. BELKIS/CV to NSR 2020. Remains on santicoagulation and amiodarone. 4. Abdominal tightness/pain of unknown etiology. CT Abd/pelvis done without an obvious source. Recent BELKIS did not show aortic aneurysm or dissection for visualized portions. 5. Back pain that he attributes to loop diuretic furosemide. 6. HTN, no renal artery stenosis at cath 2019.  7. Dyslipidemia. 8. AI: moderate  & 2019 & 2020.  9. Prostate CA, remote prostatectomy. 10. Hypothyroidism, on supplement. Plan:     1. If he doesn't tolerate (or desire) a different loop diuretic from furosemide like bumetanide, and didn't tolerate (or desire) HCTZ, then consider trying another class of diuretic like carbonic anhydrase inhibitor or K-sparing. Let's see what K, creatinine is tomorrow. 2. 2g Na diet. 3. Off Tekturna and ACEI at this time. He's on hydralazine and nitrate. 4. Continue amiodarone. He may have some paroxysms of Afib, hoping these are low burden. I spoke with him about other options including Afib ablation.   This is a future option not for this stay. 5. GI consult, he's seen Jani Price in the past.    All questions answered for him and his wife. I think his episodes of epigastric/rib pain with shortness of breath are atypical for angina. Cannot completely exclude, I'll defer to Dr. Katelyn Landa on whether an ischemia evaluation is appropriate in light of this. [x]       High complexity decision making was performed in this patient at high risk for decompensation with multiple organ involvement. Hannah García is a 77 y.o. male with a history of the above. I was asked to consult for CHF. He has been also having these episodes of lower bilateral rib pain. TODAY:  Back in Afib but rate controlled. Headache. No chest pain or dyspnea currently. Still with DENG.       Past Medical History:   Diagnosis Date    Arrhythmia 01/03/2020    AFIB     CAD (coronary artery disease)     h/o stent- '10    Cancer [facility]) 2010    prostate    Congestive heart failure ([address]     Hypertension     Stroke [facility]) May or June 2010    lacunar  infarctions (found on scan- no sxs)    Thyroid disease     hypo        Past Surgical History:   Procedure Laterality Date    CARDIAC SURG PROCEDURE UNLIST  2010    coronary stent    EXCIS KNEE CARTILAGE,MEDIAL OR LAT  1980's x3    HX CHOLECYSTECTOMY  1991    HX HEENT  1962    T&A    HX HERNIA REPAIR  1996    HX MALIGNANT SKIN LESION EXCISION      2004 and 2017    HX ORTHOPAEDIC  1974    Ganglion cyst left wrist    HX ORTHOPAEDIC Left 2017    shoulder repair    HX PROSTATECTOMY  2010       Social History     Tobacco Use    Smoking status: Never Smoker    Smokeless tobacco: Current User   Substance Use Topics    Alcohol use: Yes     Comment: occas        Family History   Problem Relation Age of Onset   [facility] Dementia Mother     Other Father 55        cerebral hemorrhage        Allergies   Allergen Reactions    Clonidine Anxiety    Fish Oil Other (comments)    Niacin Other (comments)     Decreased vision    Statins-Hmg-Coa Reductase Inhibitors Other (comments)     Muscle and Joint pains    Zetia [Ezetimibe] Myalgia     Pt reports getting joint and muscle pain.            Current Facility-Administered Medications   Medication Dose Route Frequency    hydrALAZINE (APRESOLINE) tablet 50 mg  50 mg Oral TID    albuterol-ipratropium (DUO-NEB) 2.5 MG-0.5 MG/3 ML  3 mL Nebulization Q4H PRN    acetaminophen (TYLENOL) tablet 650 mg  650 mg Oral Q6H PRN    morphine injection 1 mg  1 mg IntraVENous Q4H PRN    sodium chloride (NS) flush 5-40 mL  5-40 mL IntraVENous Q8H    sodium chloride (NS) flush 5-40 mL  5-40 mL IntraVENous PRN    ondansetron (ZOFRAN) injection 4 mg  4 mg IntraVENous Q8H PRN    bisacodyL (DULCOLAX) tablet 5 mg  5 mg Oral DAILY PRN    carvediloL (COREG) tablet 25 mg  25 mg Oral BID WITH MEALS    amiodarone (CORDARONE) tablet 200 mg  200 mg Oral BID    apixaban (ELIQUIS) tablet 5 mg  5 mg Oral BID    pantoprazole (PROTONIX) tablet 40 mg  40 mg Oral DAILY    levothyroxine (SYNTHROID) tablet 50 mcg  50 mcg Oral ACB    isosorbide mononitrate ER (IMDUR) tablet 30 mg  30 mg Oral DAILY    labetaloL (NORMODYNE;TRANDATE) injection 20 mg  20 mg IntraVENous Q4H PRN    methocarbamol (ROBAXIN) tablet 750 mg  750 mg Oral BID    nicotine (NICODERM CQ) 21 mg/24 hr patch 1 Patch  1 Patch TransDERmal Q24H       Review of Symptoms:    Ear Nose and Throat: negative for speech or swallowing difficulties   Respiratory: negative for cough, sputum production, wheezing  Cardiology: negative for chest pain, palpitations  Gastrointestinal: +abdominal pain, negative for N/V, dysphagia  Genitourinary: negative for dysuria, hematuria         Objective:      Physical Exam:  Temp (24hrs), Av.9 °F (36.6 °C), Min:97.6 °F (36.4 °C), Max:98.2 °F (36.8 °C)    Patient Vitals for the past 8 hrs:   Pulse   20 1555 (!) 101   20 1447 100   20 1406 (!) 101   20 1059 (!) 109    Patient Vitals for the past 8 hrs:   Resp   02/22/20 1555 18   02/22/20 1447 18   02/22/20 1059 18    Patient Vitals for the past 8 hrs:   BP   02/22/20 1555 (!) 153/91   02/22/20 1447 129/73   02/22/20 1406 133/89   02/22/20 1059 123/66          Intake/Output Summary (Last 24 hours) at 2/22/2020 1837  Last data filed at 2/22/2020 1642  Gross per 24 hour   Intake 400 ml   Output 2950 ml   Net -2550 ml       Nondiaphoretic, not in acute distress. No scleral icterus, mucous membranes moist, conjuctivae pink, no xanthelasma. Unlabored, clear to auscultation bilaterally, symmetric air movement. Regular rate and rhythm, no murmur, pericardial rub, knock, or gallop. No JVD or peripheral edema. Palpable radial pulses bilaterally. Abdomen, soft, nontender, nondistended. Extremities without cyanosis or clubbing. Muscle tone and bulk normal.  Skin warm and dry. No rashes or ulcers. Neuro grossly nonfocal.  No tremor. Awake and appropriate. CARDIOGRAPHICS and STUDIES, I reviewed:    Telemetry:  SR.    Echo:  FINAL READ PENDING. CXR:  Pulmonary edema. Labs:  Recent Labs     02/21/20  1424 02/21/20  0256 02/21/20  0020   TROIQ 0.06* 0.07* <0.05     Lab Results   Component Value Date/Time    Cholesterol, total 253 (H) 05/13/2019 11:51 AM    HDL Cholesterol 41 05/13/2019 11:51 AM    LDL, calculated 164 (H) 05/13/2019 11:51 AM    Triglyceride 242 (H) 05/13/2019 11:51 AM    CHOL/HDL Ratio 5.5 (H) 11/02/2010 04:40 AM     No results for input(s): INR, PTP, APTT, INREXT, INREXT in the last 72 hours. Recent Labs     02/22/20  0544 02/21/20  0020    137   K 2.9* 3.6    104   CO2 29 27   BUN 19 28*   CREA 1.35* 2.28*    134*   CA 8.1* 8.5   ALB  --  3.9   WBC 6.8 12.0*   HGB 11.7* 13.0   HCT 36.3* 40.0    250     Recent Labs     02/21/20  0020   SGOT 22   AP 59   TP 7.5   ALB 3.9   GLOB 3.6   LPSE 78     No components found for: GLPOC  No results for input(s): PH, PCO2, PO2 in the last 72 hours.         Norberto Mock Tina Henriquez MD  2/22/2020

## 2020-02-22 NOTE — PROGRESS NOTES
NAME: Carlos Prather        :  1955        MRN:  [id number]        Assessment :    Plan:  --CHF  ZONIA  HTN --Creatinine much better today. Fairly good UO,       Subjective:     Chief Complaint:  \" I had some shortness of breath last night. \"  Also c/o SSC discomfort. Better now. No N/V. No edema. No dyspnea at present. Review of Systems:    Symptom Y/N Comments  Symptom Y/N Comments   Fever/Chills    Chest Pain     Poor Appetite    Edema     Cough    Abdominal Pain     Sputum    Joint Pain     SOB/DENG    Pruritis/Rash     Nausea/vomit    Tolerating PT/OT     Diarrhea    Tolerating Diet     Constipation    Other       Could not obtain due to:      Objective:     VITALS:   Last 24hrs VS reviewed since prior progress note. Most recent are:  Visit Vitals  /73   Pulse 67   Temp 97.6 °F (36.4 °C)   Resp 20   Ht 6' (1.829 m)   Wt 109.4 kg (241 lb 2.9 oz)   SpO2 92%   BMI 32.71 kg/m²       Intake/Output Summary (Last 24 hours) at 2020 1041  Last data filed at 2020 0547  Gross per 24 hour   Intake 740 ml   Output 2625 ml   Net -1885 ml      Telemetry Reviewed:     PHYSICAL EXAM:  General: NAD  CTA  No edema    Lab Data Reviewed: (see below)    Medications Reviewed: (see below)    PMH/SH reviewed - no change compared to H&P  ________________________________________________________________________  Care Plan discussed with:  Patient     Family      RN     Care Manager                    Consultant:          Comments   >50% of visit spent in counseling and coordination of care       ________________________________________________________________________  Kehinde Guevara MD     Procedures: see electronic medical records for all procedures/Xrays and details which  were not copied into this note but were reviewed prior to creation of Plan.       LABS:  Recent Labs     20  0544 20  0020   WBC 6.8 12.0* HGB 11.7* 13.0   HCT 36.3* 40.0    250     Recent Labs     02/22/20  0544 02/21/20  0020    137   K 2.9* 3.6    104   CO2 29 27   BUN 19 28*   CREA 1.35* 2.28*    134*   CA 8.1* 8.5   MG 2.0  --      Recent Labs     02/21/20  0020   SGOT 22   AP 59   TP 7.5   ALB 3.9   GLOB 3.6   LPSE 78     No results for input(s): INR, PTP, APTT, INREXT in the last 72 hours. No results for input(s): FE, TIBC, PSAT, FERR in the last 72 hours. No results found for: FOL, RBCF   No results for input(s): PH, PCO2, PO2 in the last 72 hours. No results for input(s): CPK, CKMB in the last 72 hours.     No lab exists for component: TROPONINI  No components found for: [place]  Lab Results   Component Value Date/Time    Color YELLOW/STRAW 02/21/2020 03:41 AM    Appearance CLEAR 02/21/2020 03:41 AM    Specific gravity 1.011 02/21/2020 03:41 AM    Specific gravity >1.030 (H) 10/11/2010 10:45 AM    pH (UA) 5.5 02/21/2020 03:41 AM    Protein NEGATIVE  02/21/2020 03:41 AM    Glucose NEGATIVE  02/21/2020 03:41 AM    Ketone NEGATIVE  02/21/2020 03:41 AM    Bilirubin NEGATIVE  02/21/2020 03:41 AM    Urobilinogen 0.2 02/21/2020 03:41 AM    Nitrites NEGATIVE  02/21/2020 03:41 AM    Leukocyte Esterase NEGATIVE  02/21/2020 03:41 AM    Epithelial cells FEW 02/21/2020 03:41 AM    Bacteria NEGATIVE  02/21/2020 03:41 AM    WBC 0-4 02/21/2020 03:41 AM    RBC 0-5 02/21/2020 03:41 AM       MEDICATIONS:  Current Facility-Administered Medications   Medication Dose Route Frequency    albuterol-ipratropium (DUO-NEB) 2.5 MG-0.5 MG/3 ML  3 mL Nebulization Q4H RT    potassium chloride 10 mEq in 100 ml IVPB  10 mEq IntraVENous Q1H    hydrALAZINE (APRESOLINE) tablet 50 mg  50 mg Oral TID    acetaminophen (TYLENOL) tablet 650 mg  650 mg Oral Q6H PRN    morphine injection 1 mg  1 mg IntraVENous Q4H PRN    sodium chloride (NS) flush 5-40 mL  5-40 mL IntraVENous Q8H    sodium chloride (NS) flush 5-40 mL  5-40 mL IntraVENous PRN    ondansetron (ZOFRAN) injection 4 mg  4 mg IntraVENous Q8H PRN    bisacodyL (DULCOLAX) tablet 5 mg  5 mg Oral DAILY PRN    carvediloL (COREG) tablet 25 mg  25 mg Oral BID WITH MEALS    amiodarone (CORDARONE) tablet 200 mg  200 mg Oral BID    apixaban (ELIQUIS) tablet 5 mg  5 mg Oral BID    potassium chloride SR (KLOR-CON 10) tablet 10 mEq  10 mEq Oral DAILY    pantoprazole (PROTONIX) tablet 40 mg  40 mg Oral DAILY    levothyroxine (SYNTHROID) tablet 50 mcg  50 mcg Oral ACB    isosorbide mononitrate ER (IMDUR) tablet 30 mg  30 mg Oral DAILY    labetaloL (NORMODYNE;TRANDATE) injection 20 mg  20 mg IntraVENous Q4H PRN    methocarbamol (ROBAXIN) tablet 750 mg  750 mg Oral BID    nicotine (NICODERM CQ) 21 mg/24 hr patch 1 Patch  1 Patch TransDERmal Q24H

## 2020-02-22 NOTE — PROGRESS NOTES
ADULT PROTOCOL: JET AEROSOL ASSESSMENT    Patient  Susi Baldwin     77 y.o.   male     2/22/2020 1:19 AM    Breath Sounds Pre Procedure: Right Breath Sounds: Diminished, Coarse                               Left Breath Sounds: Diminished, Clear    Breath Sounds Post Procedure: Right Breath Sounds: Diminished, Clear                                 Left Breath Sounds: Diminished, Clear    Breathing pattern: Pre procedure Breathing Pattern: Labored          Post procedure Breathing Pattern: Regular    Heart Rate: Pre procedure Pulse: 54           Post procedure Pulse: 60    Resp Rate: Pre procedure Respirations: 24           Post procedure Respirations: 20    Cough: Pre procedure Cough: Non-productive               Post procedure      Oxygen: O2 Device: Nasal cannula   Flow rate (L/min) 3.5     Changed: YES 3L/min       SpO2: Pre procedure SpO2: 99 %   with oxygen              Post procedure SpO2: 97 %  with oxygen    Nebulizer Therapy: Current medications Aerosolized Medications: DuoNeb      Changed: NO    Smoking History:  Never Smoker    Problem List:   Patient Active Problem List   Diagnosis Code    SBO (small bowel obstruction) ([facility]) K56.609    Right rotator cuff tear M75. 80    Coronary artery disease involving native coronary artery of native heart without angina pectoris I25.10    History of lacunar cerebrovascular accident (CVA) Z86.73    Essential hypertension I10    Acquired hypothyroidism E03.9    Mixed hyperlipidemia E78.2    Chronic systolic congestive heart failure (HCC) I50.22    CHF (congestive heart failure) ([facility] [address] I50.9       Respiratory Therapist: Rosemary Gregory RT

## 2020-02-22 NOTE — PROGRESS NOTES
Hospitalist Progress Note    NAME: Dickson Cowden   :  1955   MRN:  [id number]       Interim Hospital Summary: 77 y.o. male whom presented on 2020 with SOB     Pt ws discharged from [facility] on 2020 after being treated for acute on chronic systolic CHF. Assessment / Plan:    Pt claims that he is unable to take Lasix due to causation of severe back pain and epigastric pain. He last received Lasix on 2020 at 1119. I explained to him that we may try Bumex instead. Pt was also very upset about receiving KCL in empty stomach which caused severe upset stomach during last hospitalization. He was informed from his pharmacist that KCL must take with food and sit up right for 30 minutes after he takes it. I suggested him that he can follow the direction and continue with KCL capsule or I will be happy to prescribe elixir upon discharge. Still c/o severe epigastric pain. Denies ever being treated for GERD. Offered GI cocktail as one time dose which pt agreed to try. Acute hypoxic respiratory failure secondary to acute on chronic systolic CHF POA  Cardiomegaly  Pulmonary edema  Hypertension  Elevated troponin  A-fib with RVR on 2019, s/p BELKIS/CV, maintaining NSR since 2020  Hypercoagulable state secondary to A-fib  - CXR: Cardiomegaly with mild pulmonary edema. NT pro-BNP 1569    Echo (2020): EF 40-45%    Continue with Coreg, imdur, hydralazine  Wt Readings from Last 3 Encounters:   20 109.4 kg (241 lb 2.9 oz)   20 114 kg (251 lb 6.4 oz)   20 109.9 kg (242 lb 4.8 oz)     Wean O2 as long as O2 sat is greater than or equal to 92%    Continue with eliquis    Appreciate cardiology input; Normal systolic function (ejection fraction normal). Mildly dilated left ventricle. Mild concentric hypertrophy. Estimated left ventricular ejection fraction is 50 - 55%. Visually measured ejection fraction. Epigastric pain  - CT of ABD/PEL: No acute abdominal or pelvic process. Mild to moderate pulmonary edema with small bilateral pleural effusions.     GI cocktail x 1, continue with PPI    Acute renal failure  - avoid nephrotoxic agents    Hold aliskiren, ACE, and Lasix     Hypothyroidism  - continue Synthroid    Obese  BMI 32.55 kg/m²        Code Status: Full   Surrogate Decision Maker:     DVT Prophylaxis: Eliquis     Recommended Disposition: Home w/Family     Subjective:     Chief Complaint / Reason for Physician Visit  \"I will not take lasix b/c it caused back pain\". Discussed with RN events overnight. Review of Systems:  Symptom Y/N Comments  Symptom Y/N Comments   Fever/Chills n   Chest Pain n    Poor Appetite    Edema     Cough    Abdominal Pain     Sputum    Joint Pain     SOB/DENG y   Pruritis/Rash     Nausea/vomit n   Tolerating PT/OT     Diarrhea n   Tolerating Diet     Constipation n   Other       Could NOT obtain due to:      Objective:     VITALS:   Last 24hrs VS reviewed since prior progress note. Most recent are:  Patient Vitals for the past 24 hrs:   Temp Pulse Resp BP SpO2   02/22/20 0733 97.6 °F (36.4 °C) 63 20 183/77 97 %   02/22/20 0534 98.2 °F (36.8 °C) 61 20 145/72 96 %   02/22/20 0053     99 %   02/22/20 0020  60 20 149/65 98 %   02/21/20 2312 97.9 °F (36.6 °C) 61 20 146/61 95 %   02/21/20 1913 98.2 °F (36.8 °C) (!) 57 20 141/67 94 %   02/21/20 1635    136/71 93 %   02/21/20 1446 97.9 °F (36.6 °C) (!) 56 20 130/64 97 %   02/21/20 1208  (!) 58 16 117/60 91 %   02/21/20 1027    168/70    02/21/20 0800  64 15 168/70 93 %       Intake/Output Summary (Last 24 hours) at 2/22/2020 0746  Last data filed at 2/22/2020 0547  Gross per 24 hour   Intake 740 ml   Output 2825 ml   Net -2085 ml        PHYSICAL EXAM:  General: WD, WN. Obese, Alert, cooperative, no acute distress    EENT:  EOMI. Anicteric sclerae. MMM  Resp:  CTA bilaterally, no wheezing or rales.   No accessory muscle use  CV:  Regular  rhythm,  No edema  GI:  Soft, epigastric tenderness, Non tender. +Bowel sounds  Neurologic:  Alert and oriented X 3, normal speech,   Psych:   Good insight. Not anxious nor agitated  Skin:  No rashes. No jaundice    Reviewed most current lab test results and cultures  YES  Reviewed most current radiology test results   YES  Review and summation of old records today    NO  Reviewed patient's current orders and MAR    YES  PMH/SH reviewed - no change compared to H&P  ________________________________________________________________________  Care Plan discussed with:    Comments   Patient y    Family  y Wife, son, and granddaughter at bedside   RN y    Care Manager     Consultant                        Multidiciplinary team rounds were held today with , nursing, pharmacist and clinical coordinator. Patient's plan of care was discussed; medications were reviewed and discharge planning was addressed. ________________________________________________________________________  Ron Bautista NP     Procedures: see electronic medical records for all procedures/Xrays and details which were not copied into this note but were reviewed prior to creation of Plan. LABS:  I reviewed today's most current labs and imaging studies.   Pertinent labs include:  Recent Labs     02/22/20  0544 02/21/20  0020   WBC 6.8 12.0*   HGB 11.7* 13.0   HCT 36.3* 40.0    250     Recent Labs     02/22/20  0544 02/21/20  0020    137   K 2.9* 3.6    104   CO2 29 27    134*   BUN 19 28*   CREA 1.35* 2.28*   CA 8.1* 8.5   MG 2.0  --    ALB  --  3.9   TBILI  --  0.6   SGOT  --  22   ALT  --  41       Signed: )Eugene Thomas NP

## 2020-02-22 NOTE — CONSULTS
CARDIOLOGY CONSULT    Patient ID:  Patient: Baltazar Harris  MRN: [id number]  Age: 77 y.o.  : 1955    Date of  Admission: 2020 12:12 AM   PCP:  Justice Allen DO   Usual cardiologist:  Vicente Peck MD    Assessment: 1. Acute on chronic diastolic CHF. CM: EF 40-45% on BELKIS 2020. TTE this admission with EF that looks a little better preliminarily, final read pending. 2. CAD (BMS 2010), w/ intermittent atypical CP; Neg MPI  (Dx with clavicle separation). DENG/atypical CP 2019: worse BP (pt denies PTA); +/- abnl MPI: 40-50% D1 o/w normal: Med Rx. 3. AFib 2019, new onset, Sx (DENG/palps in  & 2019): Rx w/ eliquis/norvasc to dilt. BELKIS/CV to NSR 2020. Remains on anticoagulation and amiodarone. 4. Abdominal tightness/pain of unknown etiology. CT Abd/pelvis done. 5. Back pain that he attributes to loop diuretic furosemide. 6. HTN, no renal artery stenosis at cath 2019.  7. Dyslipidemia. 8. AI: moderate  & 2019 & 2020.  9. Prostate CA, remote prostatectomy. 10. Hypothyroidism. Plan:     1. For now off diuretic. Unsure why he has back pain with furosemide. He's lying flat and comfortable at the time of my interview. 2. 2g Na diet. 3. Continue cardiac meds including amio but except Tekturna and ACEI at this time. I spoke with him about other options including Afib ablation. This is a future option not for this stay. Will see how he looks tomorrow. [x]       High complexity decision making was performed in this patient at high risk for decompensation with multiple organ involvement. Baltazar Harris is a 77 y.o. male with a history of the above. I was asked to consult for CHF.       Past Medical History:   Diagnosis Date    Arrhythmia 2020    AFIB     CAD (coronary artery disease)     h/o stent- '10    Cancer [facility])     prostate    Congestive heart failure ([address]     Hypertension     Stroke [facility]) May or June 2010    lacunar  infarctions (found on scan- no sxs)    Thyroid disease     hypo        Past Surgical History:   Procedure Laterality Date    CARDIAC SURG PROCEDURE UNLIST  2010    coronary stent    EXCIS KNEE CARTILAGE,MEDIAL OR LAT  1980's x3    HX CHOLECYSTECTOMY  1991    HX HEENT  1962    T&A    HX HERNIA REPAIR  1996    HX MALIGNANT SKIN LESION EXCISION      2004 and 2017    HX ORTHOPAEDIC  1974    Ganglion cyst left wrist    HX ORTHOPAEDIC Left 2017    shoulder repair    HX PROSTATECTOMY  2010       Social History     Tobacco Use    Smoking status: Never Smoker    Smokeless tobacco: Current User   Substance Use Topics    Alcohol use: Yes     Comment: occas        Family History   Problem Relation Age of Onset   Cruz Dementia Mother    Cruz Other Father 55        cerebral hemorrhage        Allergies   Allergen Reactions    Clonidine Anxiety    Fish Oil Other (comments)    Niacin Other (comments)     Decreased vision    Statins-Hmg-Coa Reductase Inhibitors Other (comments)     Muscle and Joint pains    Zetia [Ezetimibe] Myalgia     Pt reports getting joint and muscle pain.            Current Facility-Administered Medications   Medication Dose Route Frequency    acetaminophen (TYLENOL) tablet 650 mg  650 mg Oral Q6H PRN    morphine injection 1 mg  1 mg IntraVENous Q4H PRN    sodium chloride (NS) flush 5-40 mL  5-40 mL IntraVENous Q8H    sodium chloride (NS) flush 5-40 mL  5-40 mL IntraVENous PRN    ondansetron (ZOFRAN) injection 4 mg  4 mg IntraVENous Q8H PRN    bisacodyL (DULCOLAX) tablet 5 mg  5 mg Oral DAILY PRN    carvediloL (COREG) tablet 25 mg  25 mg Oral BID WITH MEALS    amiodarone (CORDARONE) tablet 200 mg  200 mg Oral BID    apixaban (ELIQUIS) tablet 5 mg  5 mg Oral BID    potassium chloride SR (KLOR-CON 10) tablet 10 mEq  10 mEq Oral DAILY    pantoprazole (PROTONIX) tablet 40 mg  40 mg Oral DAILY    levothyroxine (SYNTHROID) tablet 50 mcg  50 mcg Oral ACB    isosorbide mononitrate ER (IMDUR) tablet 30 mg  30 mg Oral DAILY    hydrALAZINE (APRESOLINE) tablet 25 mg  25 mg Oral TID    labetaloL (NORMODYNE;TRANDATE) injection 20 mg  20 mg IntraVENous Q4H PRN    methocarbamol (ROBAXIN) tablet 750 mg  750 mg Oral BID       Review of Symptoms:  See HPI as well. General: negative for fever, chills, sweats, weakness, weight loss   Eyes: negative for blurred vision, eye pain, loss of vision, diplopia   Ear Nose and Throat: negative for rhinorrhea, pharyngitis, otalgia, tinnitus, speech or swallowing difficulties   Respiratory: negative for cough, sputum production, wheezing, pleuritic pain   Cardiology: negative for chest pain, palpitations, orthopnea, PND, edema, syncope   Gastrointestinal: +abdominal pain, negative for N/V, dysphagia, change in bowel habits, bleeding   Genitourinary: negative for frequency, urgency, dysuria, hematuria, incontinence   Muskuloskeletal : negative for arthralgia, myalgia   Hematology: negative for easy bruising, bleeding, lymphadenopathy   Dermatological: negative for rash, ulceration, mole change, new lesion   Endocrine: negative for hot flashes or polydipsia   Neurological: negative for headache, dizziness, confusion, focal weakness, paresthesia, memory loss, gait disturbance   Psychological: negative for anxiety, depression, agitation       Objective:      Physical Exam:  Temp (24hrs), Av.9 °F (36.6 °C), Min:97.5 °F (36.4 °C), Max:98.4 °F (36.9 °C)    Patient Vitals for the past 8 hrs:   Pulse   20 1913 (!) 57   20 1446 (!) 56    Patient Vitals for the past 8 hrs:   Resp   20 1913 20   20 1446 20    Patient Vitals for the past 8 hrs:   BP   20 1913 141/67   20 1635 136/71   20 1446 130/64          Intake/Output Summary (Last 24 hours) at 2020 2220  Last data filed at 2020 1751  Gross per 24 hour   Intake 340 ml   Output 2700 ml   Net -2360 ml       Nondiaphoretic, not in acute distress.   Supple, no palpable thyromegaly. No scleral icterus, mucous membranes moist, conjuctivae pink, no xanthelasma. Unlabored, clear to auscultation bilaterally, symmetric air movement. Regular rate and rhythm, no murmur, pericardial rub, knock, or gallop. No JVD or peripheral edema. No carotid bruit. Palpable radial and DP/PT pulses bilaterally. Abdomen, soft, nontender, nondistended. No abdominal bruit or pulstatile masses. Extremities without cyanosis or clubbing. Muscle tone and bulk normal.  Skin warm and dry. No rashes or ulcers. Neuro grossly nonfocal.  No tremor. Awake and appropriate. CARDIOGRAPHICS and STUDIES, I reviewed:    Telemetry:  SR.    Echo:  PENDING. CXR:  Pulmonary edema. Labs:  Recent Labs     02/21/20  1424 02/21/20  0256 02/21/20  0020   TROIQ 0.06* 0.07* <0.05     Lab Results   Component Value Date/Time    Cholesterol, total 253 (H) 05/13/2019 11:51 AM    HDL Cholesterol 41 05/13/2019 11:51 AM    LDL, calculated 164 (H) 05/13/2019 11:51 AM    Triglyceride 242 (H) 05/13/2019 11:51 AM    CHOL/HDL Ratio 5.5 (H) 11/02/2010 04:40 AM     No results for input(s): INR, PTP, APTT, INREXT in the last 72 hours. Recent Labs     02/21/20  0020      K 3.6      CO2 27   BUN 28*   CREA 2.28*   *   CA 8.5   ALB 3.9   WBC 12.0*   HGB 13.0   HCT 40.0        Recent Labs     02/21/20  0020   SGOT 22   AP 59   TP 7.5   ALB 3.9   GLOB 3.6   LPSE 78     No components found for: GLPOC  No results for input(s): PH, PCO2, PO2 in the last 72 hours.         Zaira Mortensen MD  2/21/2020

## 2020-02-23 ENCOUNTER — APPOINTMENT (OUTPATIENT)
Dept: CT IMAGING | Age: 65
DRG: 291 | End: 2020-02-23
Attending: NURSE PRACTITIONER
Payer: MEDICARE

## 2020-02-23 ENCOUNTER — APPOINTMENT (OUTPATIENT)
Dept: MRI IMAGING | Age: 65
DRG: 291 | End: 2020-02-23
Attending: NURSE PRACTITIONER
Payer: MEDICARE

## 2020-02-23 LAB
ANION GAP SERPL CALC-SCNC: 7 MMOL/L (ref 5–15)
ANION GAP SERPL CALC-SCNC: 7 MMOL/L (ref 5–15)
BASOPHILS # BLD: 0.1 K/UL (ref 0–0.1)
BASOPHILS # BLD: 0.1 K/UL (ref 0–0.1)
BASOPHILS NFR BLD: 1 % (ref 0–1)
BASOPHILS NFR BLD: 1 % (ref 0–1)
BNP SERPL-MCNC: 2750 PG/ML
BUN SERPL-MCNC: 15 MG/DL (ref 6–20)
BUN SERPL-MCNC: 15 MG/DL (ref 6–20)
BUN/CREAT SERPL: 13 (ref 12–20)
BUN/CREAT SERPL: 15 (ref 12–20)
CALCIUM SERPL-MCNC: 8.7 MG/DL (ref 8.5–10.1)
CALCIUM SERPL-MCNC: 8.7 MG/DL (ref 8.5–10.1)
CHLORIDE SERPL-SCNC: 106 MMOL/L (ref 97–108)
CHLORIDE SERPL-SCNC: 109 MMOL/L (ref 97–108)
CHOLEST SERPL-MCNC: 227 MG/DL
CO2 SERPL-SCNC: 25 MMOL/L (ref 21–32)
CO2 SERPL-SCNC: 26 MMOL/L (ref 21–32)
CREAT SERPL-MCNC: 1.02 MG/DL (ref 0.7–1.3)
CREAT SERPL-MCNC: 1.17 MG/DL (ref 0.7–1.3)
DIFFERENTIAL METHOD BLD: NORMAL
DIFFERENTIAL METHOD BLD: NORMAL
EOSINOPHIL # BLD: 0.2 K/UL (ref 0–0.4)
EOSINOPHIL # BLD: 0.3 K/UL (ref 0–0.4)
EOSINOPHIL NFR BLD: 3 % (ref 0–7)
EOSINOPHIL NFR BLD: 3 % (ref 0–7)
ERYTHROCYTE [DISTWIDTH] IN BLOOD BY AUTOMATED COUNT: 12.8 % (ref 11.5–14.5)
ERYTHROCYTE [DISTWIDTH] IN BLOOD BY AUTOMATED COUNT: 13 % (ref 11.5–14.5)
EST. AVERAGE GLUCOSE BLD GHB EST-MCNC: 111 MG/DL
FOLATE SERPL-MCNC: 27.2 NG/ML (ref 5–21)
GLUCOSE BLD STRIP.AUTO-MCNC: 117 MG/DL (ref 65–100)
GLUCOSE SERPL-MCNC: 107 MG/DL (ref 65–100)
GLUCOSE SERPL-MCNC: 117 MG/DL (ref 65–100)
HBA1C MFR BLD: 5.5 % (ref 4–5.6)
HCT VFR BLD AUTO: 39.9 % (ref 36.6–50.3)
HCT VFR BLD AUTO: 40.5 % (ref 36.6–50.3)
HDLC SERPL-MCNC: 35 MG/DL
HDLC SERPL: 6.5 {RATIO} (ref 0–5)
HGB BLD-MCNC: 13 G/DL (ref 12.1–17)
HGB BLD-MCNC: 13.2 G/DL (ref 12.1–17)
IMM GRANULOCYTES # BLD AUTO: 0 K/UL (ref 0–0.04)
IMM GRANULOCYTES # BLD AUTO: 0 K/UL (ref 0–0.04)
IMM GRANULOCYTES NFR BLD AUTO: 0 % (ref 0–0.5)
IMM GRANULOCYTES NFR BLD AUTO: 0 % (ref 0–0.5)
INR PPP: 1.1 (ref 0.9–1.1)
LDLC SERPL CALC-MCNC: 157.6 MG/DL (ref 0–100)
LIPID PROFILE,FLP: ABNORMAL
LYMPHOCYTES # BLD: 1.7 K/UL (ref 0.8–3.5)
LYMPHOCYTES # BLD: 2.2 K/UL (ref 0.8–3.5)
LYMPHOCYTES NFR BLD: 21 % (ref 12–49)
LYMPHOCYTES NFR BLD: 26 % (ref 12–49)
MAGNESIUM SERPL-MCNC: 2.2 MG/DL (ref 1.6–2.4)
MAGNESIUM SERPL-MCNC: 2.2 MG/DL (ref 1.6–2.4)
MCH RBC QN AUTO: 30.7 PG (ref 26–34)
MCH RBC QN AUTO: 31.1 PG (ref 26–34)
MCHC RBC AUTO-ENTMCNC: 32.1 G/DL (ref 30–36.5)
MCHC RBC AUTO-ENTMCNC: 33.1 G/DL (ref 30–36.5)
MCV RBC AUTO: 94.1 FL (ref 80–99)
MCV RBC AUTO: 95.5 FL (ref 80–99)
MONOCYTES # BLD: 0.6 K/UL (ref 0–1)
MONOCYTES # BLD: 0.6 K/UL (ref 0–1)
MONOCYTES NFR BLD: 7 % (ref 5–13)
MONOCYTES NFR BLD: 7 % (ref 5–13)
NEUTS SEG # BLD: 5.3 K/UL (ref 1.8–8)
NEUTS SEG # BLD: 5.5 K/UL (ref 1.8–8)
NEUTS SEG NFR BLD: 63 % (ref 32–75)
NEUTS SEG NFR BLD: 68 % (ref 32–75)
NRBC # BLD: 0 K/UL (ref 0–0.01)
NRBC # BLD: 0 K/UL (ref 0–0.01)
NRBC BLD-RTO: 0 PER 100 WBC
NRBC BLD-RTO: 0 PER 100 WBC
PLATELET # BLD AUTO: 238 K/UL (ref 150–400)
PLATELET # BLD AUTO: 247 K/UL (ref 150–400)
PMV BLD AUTO: 12 FL (ref 8.9–12.9)
PMV BLD AUTO: 12.1 FL (ref 8.9–12.9)
POTASSIUM SERPL-SCNC: 3.4 MMOL/L (ref 3.5–5.1)
POTASSIUM SERPL-SCNC: 3.5 MMOL/L (ref 3.5–5.1)
PROTHROMBIN TIME: 11.5 SEC (ref 9–11.1)
RBC # BLD AUTO: 4.24 M/UL (ref 4.1–5.7)
RBC # BLD AUTO: 4.24 M/UL (ref 4.1–5.7)
SERVICE CMNT-IMP: ABNORMAL
SODIUM SERPL-SCNC: 138 MMOL/L (ref 136–145)
SODIUM SERPL-SCNC: 142 MMOL/L (ref 136–145)
T4 FREE SERPL-MCNC: 1.3 NG/DL (ref 0.8–1.5)
TRIGL SERPL-MCNC: 172 MG/DL (ref ?–150)
TROPONIN I SERPL-MCNC: <0.05 NG/ML
TSH SERPL DL<=0.05 MIU/L-ACNC: 7.43 UIU/ML (ref 0.36–3.74)
VIT B12 SERPL-MCNC: 321 PG/ML (ref 193–986)
VLDLC SERPL CALC-MCNC: 34.4 MG/DL
WBC # BLD AUTO: 8.1 K/UL (ref 4.1–11.1)
WBC # BLD AUTO: 8.4 K/UL (ref 4.1–11.1)

## 2020-02-23 PROCEDURE — 84439 ASSAY OF FREE THYROXINE: CPT

## 2020-02-23 PROCEDURE — 83735 ASSAY OF MAGNESIUM: CPT

## 2020-02-23 PROCEDURE — 80048 BASIC METABOLIC PNL TOTAL CA: CPT

## 2020-02-23 PROCEDURE — 74011250637 HC RX REV CODE- 250/637: Performed by: EMERGENCY MEDICINE

## 2020-02-23 PROCEDURE — 74011250637 HC RX REV CODE- 250/637: Performed by: NURSE PRACTITIONER

## 2020-02-23 PROCEDURE — 74011250637 HC RX REV CODE- 250/637: Performed by: INTERNAL MEDICINE

## 2020-02-23 PROCEDURE — 82746 ASSAY OF FOLIC ACID SERUM: CPT

## 2020-02-23 PROCEDURE — 36415 COLL VENOUS BLD VENIPUNCTURE: CPT

## 2020-02-23 PROCEDURE — 94760 N-INVAS EAR/PLS OXIMETRY 1: CPT

## 2020-02-23 PROCEDURE — 65660000000 HC RM CCU STEPDOWN

## 2020-02-23 PROCEDURE — 0042T CT CODE NEURO PERF W CBF: CPT

## 2020-02-23 PROCEDURE — 85610 PROTHROMBIN TIME: CPT

## 2020-02-23 PROCEDURE — 82607 VITAMIN B-12: CPT

## 2020-02-23 PROCEDURE — 74011636320 HC RX REV CODE- 636/320: Performed by: INTERNAL MEDICINE

## 2020-02-23 PROCEDURE — 83880 ASSAY OF NATRIURETIC PEPTIDE: CPT

## 2020-02-23 PROCEDURE — 70496 CT ANGIOGRAPHY HEAD: CPT

## 2020-02-23 PROCEDURE — 84484 ASSAY OF TROPONIN QUANT: CPT

## 2020-02-23 PROCEDURE — 80061 LIPID PANEL: CPT

## 2020-02-23 PROCEDURE — 83036 HEMOGLOBIN GLYCOSYLATED A1C: CPT

## 2020-02-23 PROCEDURE — 84443 ASSAY THYROID STIM HORMONE: CPT

## 2020-02-23 PROCEDURE — 74011250636 HC RX REV CODE- 250/636: Performed by: INTERNAL MEDICINE

## 2020-02-23 PROCEDURE — 70450 CT HEAD/BRAIN W/O DYE: CPT

## 2020-02-23 PROCEDURE — 85025 COMPLETE CBC W/AUTO DIFF WBC: CPT

## 2020-02-23 PROCEDURE — 70551 MRI BRAIN STEM W/O DYE: CPT

## 2020-02-23 PROCEDURE — 82962 GLUCOSE BLOOD TEST: CPT

## 2020-02-23 RX ORDER — COLESEVELAM 180 1/1
1250 TABLET ORAL 2 TIMES DAILY WITH MEALS
Status: DISCONTINUED | OUTPATIENT
Start: 2020-02-23 | End: 2020-02-26 | Stop reason: HOSPADM

## 2020-02-23 RX ORDER — SODIUM CHLORIDE 0.9 % (FLUSH) 0.9 %
10 SYRINGE (ML) INJECTION
Status: COMPLETED | OUTPATIENT
Start: 2020-02-23 | End: 2020-02-23

## 2020-02-23 RX ORDER — LISINOPRIL 20 MG/1
20 TABLET ORAL DAILY
Status: DISCONTINUED | OUTPATIENT
Start: 2020-02-23 | End: 2020-02-26 | Stop reason: HOSPADM

## 2020-02-23 RX ORDER — SUCRALFATE 1 G/10ML
1 SUSPENSION ORAL
Status: DISCONTINUED | OUTPATIENT
Start: 2020-02-23 | End: 2020-02-23

## 2020-02-23 RX ORDER — SUCRALFATE 1 G/1
1 TABLET ORAL
Status: DISCONTINUED | OUTPATIENT
Start: 2020-02-23 | End: 2020-02-26 | Stop reason: HOSPADM

## 2020-02-23 RX ORDER — POTASSIUM CHLORIDE 750 MG/1
10 TABLET, FILM COATED, EXTENDED RELEASE ORAL
Status: COMPLETED | OUTPATIENT
Start: 2020-02-23 | End: 2020-02-23

## 2020-02-23 RX ADMIN — SUCRALFATE 1 G: 1 TABLET ORAL at 21:33

## 2020-02-23 RX ADMIN — APIXABAN 5 MG: 5 TABLET, FILM COATED ORAL at 18:48

## 2020-02-23 RX ADMIN — HYDRALAZINE HYDROCHLORIDE 50 MG: 50 TABLET, FILM COATED ORAL at 10:01

## 2020-02-23 RX ADMIN — AMIODARONE HYDROCHLORIDE 200 MG: 200 TABLET ORAL at 10:00

## 2020-02-23 RX ADMIN — IOPAMIDOL 100 ML: 755 INJECTION, SOLUTION INTRAVENOUS at 12:00

## 2020-02-23 RX ADMIN — METHOCARBAMOL TABLETS 750 MG: 500 TABLET, COATED ORAL at 18:52

## 2020-02-23 RX ADMIN — Medication 10 ML: at 12:00

## 2020-02-23 RX ADMIN — LISINOPRIL 20 MG: 20 TABLET ORAL at 10:04

## 2020-02-23 RX ADMIN — SUCRALFATE 1 G: 1 TABLET ORAL at 18:52

## 2020-02-23 RX ADMIN — ISOSORBIDE MONONITRATE 30 MG: 30 TABLET, EXTENDED RELEASE ORAL at 10:01

## 2020-02-23 RX ADMIN — METHOCARBAMOL TABLETS 750 MG: 500 TABLET, COATED ORAL at 10:01

## 2020-02-23 RX ADMIN — POTASSIUM CHLORIDE 10 MEQ: 750 TABLET, FILM COATED, EXTENDED RELEASE ORAL at 10:00

## 2020-02-23 RX ADMIN — HYDRALAZINE HYDROCHLORIDE 50 MG: 50 TABLET, FILM COATED ORAL at 18:50

## 2020-02-23 RX ADMIN — Medication 10 ML: at 18:59

## 2020-02-23 RX ADMIN — CARVEDILOL 25 MG: 12.5 TABLET, FILM COATED ORAL at 10:11

## 2020-02-23 RX ADMIN — LEVOTHYROXINE SODIUM 50 MCG: 50 TABLET ORAL at 06:34

## 2020-02-23 RX ADMIN — HYDRALAZINE HYDROCHLORIDE 50 MG: 50 TABLET, FILM COATED ORAL at 21:33

## 2020-02-23 RX ADMIN — AMIODARONE HYDROCHLORIDE 200 MG: 200 TABLET ORAL at 18:47

## 2020-02-23 RX ADMIN — MORPHINE SULFATE 1 MG: 2 INJECTION, SOLUTION INTRAMUSCULAR; INTRAVENOUS at 10:12

## 2020-02-23 RX ADMIN — CARVEDILOL 25 MG: 12.5 TABLET, FILM COATED ORAL at 18:48

## 2020-02-23 RX ADMIN — Medication 10 ML: at 06:00

## 2020-02-23 RX ADMIN — APIXABAN 5 MG: 5 TABLET, FILM COATED ORAL at 10:01

## 2020-02-23 RX ADMIN — Medication 10 ML: at 21:40

## 2020-02-23 RX ADMIN — ACETAMINOPHEN 650 MG: 325 TABLET ORAL at 23:34

## 2020-02-23 RX ADMIN — MORPHINE SULFATE 1 MG: 2 INJECTION, SOLUTION INTRAMUSCULAR; INTRAVENOUS at 00:36

## 2020-02-23 RX ADMIN — PANTOPRAZOLE SODIUM 40 MG: 40 TABLET, DELAYED RELEASE ORAL at 10:01

## 2020-02-23 RX ADMIN — COLESEVELAM HYDROCHLORIDE 1250 MG: 625 TABLET, FILM COATED ORAL at 17:00

## 2020-02-23 NOTE — PROGRESS NOTES
According to the nursing staff, Pt declined to try welchol, request to have a RX for [address] so one of the family fill the RX and bring it to the hospital for him to use. Informed the nursing staff that I will be happy to call RX for [address] but will not be able to conduct pre authorization or pt can wait to discuss on decrease cholesterol agent with his usual cardiologist, Dr. Brodie Maki. No further request from nursing staff on pt's decision at this time. Will defer pt's cardiac medication changes to his cardiologist from this point. Will continue to follow tomorrow.  dk

## 2020-02-23 NOTE — CONSULTS
Neurology Note    Patient ID:  Navdeep Henry  [id number]  79 y.o.  1955      Date of Consultation:  February 23, 2020    Referring Physician: Rakesh Leavitt NP    Reason for Consultation:  tia    Subjective: My left side felt different       History of Present Illness:   Navdeep Henry is a 77 y.o. male with a past medical history of atrial fibrillation, hypertension congestive heart failure and hypothyroidism who presented to the emergency department on February 21, 2020 with abrupt onset of severe shortness of breath. He was admitted for an acute on chronic systolic congestive heart failure exacerbation. There was also an acute renal failure. Neurology was consulted today as around 11:30 the patient began to develop left-sided weakness with blurry vision. He became hypotensive with systolic blood pressure around 80/63. A head CT was performed which did not reveal any evidence of an acute infarct but there was notable periventricular ischemic changes. The patient reports that he was having a normal day. He stood up to go to the restroom and he blew his nose. It was red after blowing his nose that he began to feel lightheaded and had to feeling that he may pass out. He immediately sat back down and laid into bed and right the call bell. He stated that his left side of his face initially just felt different. There was numbness and tingling this was not only in the anterior portion of his face but also in the back and neck of his head. They immediately took him down for a head CT. He began to notice when trying to slide across the stretcher that his left arm and leg were not working as well as his right arm and leg. This was a new finding. After getting the head CT he still noticed the difficulty in sliding back across with his left arm and leg being slightly weaker.   That has improved considerably and he does feel that his strength has returned back to normal.  He does notice a patchy area of sensory loss still in his left upper extremity and to a slight bit in his lower extremity. He has never had any symptoms like this before. Past Medical History:   Diagnosis Date    Arrhythmia 01/03/2020    AFIB     CAD (coronary artery disease)     h/o stent- '10    Cancer [facility]) 2010    prostate    Congestive heart failure ([address]     Hypertension     Stroke [facility]) May or June 2010    lacunar  infarctions (found on scan- no sxs)    Thyroid disease     hypo        Past Surgical History:   Procedure Laterality Date    CARDIAC SURG PROCEDURE UNLIST  2010    coronary stent    EXCIS KNEE CARTILAGE,MEDIAL OR LAT  1980's x3    HX CHOLECYSTECTOMY  1991    HX HEENT  1962    T&A    HX HERNIA REPAIR  1996    HX MALIGNANT SKIN LESION EXCISION      2004 and 2017    HX ORTHOPAEDIC  1974    Ganglion cyst left wrist    HX ORTHOPAEDIC Left 2017    shoulder repair    HX PROSTATECTOMY  2010        Family History   Problem Relation Age of Onset    Dementia Mother     Other Father 55        cerebral hemorrhage        Social History     Tobacco Use    Smoking status: Never Smoker    Smokeless tobacco: Current User   Substance Use Topics    Alcohol use: Yes     Comment: occas        Allergies   Allergen Reactions    Clonidine Anxiety    Fish Oil Other (comments)    Niacin Other (comments)     Decreased vision    Statins-Hmg-Coa Reductase Inhibitors Other (comments)     Muscle and Joint pains    Zetia [Ezetimibe] Myalgia     Pt reports getting joint and muscle pain. Prior to Admission medications    Medication Sig Start Date End Date Taking? Authorizing Provider   amLODIPine (NORVASC) 2.5 mg tablet Take 2.5 mg by mouth daily as needed (if SBP > 160 in the evenings). Yes Provider, Historical   amiodarone (CORDARONE) 200 mg tablet Take  by mouth two (2) times a day. Yes Provider, Historical   apixaban (ELIQUIS) 5 mg tablet Take 5 mg by mouth two (2) times a day.    Yes Provider, Historical hydroCHLOROthiazide (HYDRODIURIL) 12.5 mg tablet Take 1 Tab by mouth daily. 2/3/20  Yes Herman Villa III, DO   isosorbide mononitrate ER (IMDUR) 30 mg tablet Take 1 Tab by mouth daily. 1/31/20  Yes Mine Nguyen MD   aliskiren (TEKTURNA) 300 mg tablet Take 1 Tab by mouth nightly. Start if blood pressure remain >=140 upper or 90/100 lower 5/3/19  Yes Herman Villa III, DO   carvedilol (COREG) 25 mg tablet Take 1 Tab by mouth two (2) times daily (with meals). 5/3/19  Yes Herman Villa III, DO   levothyroxine (SYNTHROID) 50 mcg tablet Take 1 Tab by mouth Daily (before breakfast). 5/3/19  Yes Herman Villa III, DO   lisinopril (PRINIVIL, ZESTRIL) 40 mg tablet Take 1 Tab by mouth two (2) times a day. Start if blood pressure remain >=140 upper or 90/100 lower 5/3/19  Yes Herman Villa III, DO   pantoprazole (PROTONIX) 40 mg tablet Take 1 Tab by mouth daily. 5/3/19  Yes Herman Villa III, DO   cholecalciferol, vitamin D3, (VITAMIN D3) 2,000 unit tab Take 2,000 Units by mouth daily. Yes Provider, Historical   MULTI-VITAMIN HI-PO PO Take 1 Tab by mouth daily.  10/11/10  Yes Provider, Historical     Current Facility-Administered Medications   Medication Dose Route Frequency    lisinopril (PRINIVIL, ZESTRIL) tablet 20 mg  20 mg Oral DAILY    hydrALAZINE (APRESOLINE) tablet 50 mg  50 mg Oral TID    albuterol-ipratropium (DUO-NEB) 2.5 MG-0.5 MG/3 ML  3 mL Nebulization Q4H PRN    acetaminophen (TYLENOL) tablet 650 mg  650 mg Oral Q6H PRN    morphine injection 1 mg  1 mg IntraVENous Q4H PRN    sodium chloride (NS) flush 5-40 mL  5-40 mL IntraVENous Q8H    sodium chloride (NS) flush 5-40 mL  5-40 mL IntraVENous PRN    ondansetron (ZOFRAN) injection 4 mg  4 mg IntraVENous Q8H PRN    bisacodyL (DULCOLAX) tablet 5 mg  5 mg Oral DAILY PRN    carvediloL (COREG) tablet 25 mg  25 mg Oral BID WITH MEALS    amiodarone (CORDARONE) tablet 200 mg  200 mg Oral BID    apixaban (ELIQUIS) tablet 5 mg  5 mg Oral BID    pantoprazole (PROTONIX) tablet 40 mg  40 mg Oral DAILY    levothyroxine (SYNTHROID) tablet 50 mcg  50 mcg Oral ACB    isosorbide mononitrate ER (IMDUR) tablet 30 mg  30 mg Oral DAILY    labetaloL (NORMODYNE;TRANDATE) injection 20 mg  20 mg IntraVENous Q4H PRN    methocarbamol (ROBAXIN) tablet 750 mg  750 mg Oral BID    nicotine (NICODERM CQ) 21 mg/24 hr patch 1 Patch  1 Patch TransDERmal Q24H     Review of Systems:    General, constitutional: negative  Eyes, vision: negative  Ears, nose, throat: negative  Cardiovascular, heart: negative  Respiratory: Shortness of breath  Gastrointestinal: negative  Genitourinary: negative  Musculoskeletal: negative  Skin and integumentary: negative  Psychiatric: negative  Endocrine: negative  Neurological: negative, except for HPI  Hematologic/lymphatic: negative  Allergy/immunology: negative    Objective:     Visit Vitals  /70 (BP 1 Location: Right arm, BP Patient Position: At rest)   Pulse (!) 106   Temp 97.5 °F (36.4 °C)   Resp 22   Ht 6' (1.829 m)   Wt 237 lb 14 oz (107.9 kg)   SpO2 97%   BMI 32.26 kg/m²       Physical Exam:    General:  appears well nourished in no acute distress  Neck: no carotid bruits  Lungs: clear to auscultation  Heart:  no murmurs, irregular rate  Lower extremity: peripheral pulses palpable and no edema  Skin: intact    Neurological exam:    Awake, alert, oriented to person, place and time  Recent and remote memory were normal  Attention and concentration were intact  Language was intact.   There was no aphasia  Speech: no dysarthria  Fund of knowledge was preserved    Cranial nerves:   II-XII were tested    Perrrla  Fundoscopic examination revealed venous pulsations and no clear abnormalities  Visual fields were full  Eomi, no evidence of nystagmus  Facial sensation:  normal and symmetric  Facial motor: normal and symmetric  Hearing intact  SCM strength intact  Tongue: midline without fasciculations    Motor: Tone normal    No evidence of fasciculations    Strength testing:   deltoid triceps biceps Wrist ext. Wrist flex. intrinsics Hip flex. Hip ext. Knee ext. Knee flex Dorsi flex Plantar flex   Right 5 5 5 5 5 5 5 5 5 5 5 5   Left 5 5 5 5 5 5 5 5 5 5 5 5     His pronator drift was absent  Sensory:  Upper extremity: He did have mild sensory loss in the left upper extremity. This was decreased by about 20 to 25%  Lower extremity: He did have mild sensory loss in his left lower extremity. Again decreased 20 to 25%. Reflexes:    Right Left  Biceps  2 2  Triceps 2 2  Brachiorad. 2 2  Patella  2 2  Achilles 2 2    Plantar response:  flexor bilaterally      Cerebellar testing:  no tremor apparent, finger/nose and young were intact    I did not ambulate the patient this afternoon. Labs:     Lab Results   Component Value Date/Time    Hemoglobin A1c 5.5 02/23/2020 12:05 PM    Sodium 138 02/23/2020 12:05 PM    Potassium 3.5 02/23/2020 12:05 PM    Chloride 106 02/23/2020 12:05 PM    Glucose 117 (H) 02/23/2020 12:05 PM    BUN 15 02/23/2020 12:05 PM    Creatinine 1.17 02/23/2020 12:05 PM    Calcium 8.7 02/23/2020 12:05 PM    WBC 8.1 02/23/2020 12:05 PM    HCT 39.9 02/23/2020 12:05 PM    HGB 13.2 02/23/2020 12:05 PM    PLATELET 780 23/80/7752 12:05 PM       Imaging:    Results from Hospital Encounter encounter on 05/06/10   MRI BRAIN W AND W/O CONTRAST    Narrative **Final Report*       ICD Codes / Adm. Diagnosis: 434.00   / CEREBRAL THROMBOSIS WITH CEREB    Examination:  BRAIN W AND WO CON  - [id number] - May  6 2010  9:48AM  Accession No:  [id number]  Reason:  CEREBRAL THROMBOSIS WITH CEREBRAL INFARCTION      REPORT:  SEQUENCES:   Sagittal T1, axial T1, T2, GRE and FLAIR; axial and coronal T1   post enhancement; and diffusion imaging of the brain.     Images were obtained before and after IV administration of 20 mL of   Magnevist.    FINDINGS:  T2 hyperintense foci in the right basal ganglia associated with T1 hypointensity are compatible with chronic infarcts. One or 2 punctate   similar foci are present in the left basal ganglia. Diffusion imaging shows   no acute ischemia. The enhancement pattern is normal.  There is no evidence for mass,   hemorrhage, shift, or hydrocephalus. There is no extra-axial fluid   collection. IMPRESSION: Chronic ischemic changes of the basal ganglia, greater on the   right. No acute process seen. Interpreting/Reading Doctor: Marcel Holstein ([id number])  Transcribed: n/a on 05/06/2010  Approved: Marcel Holstein ([id number])  05/06/2010             Distribution:  Attending Doctor: Marelyn Merlin               Results from Hospital Encounter encounter on 02/21/20   CT CODE NEURO PERF W CBF    Narrative **PRELIMINARY REPORT**    There is atherosclerosis of the carotid artery origins, left worse than right,  but no evidence of flow-limiting stenosis. There is intracranial atherosclerosis  but no evidence of large vessel occlusion. The right A1 segment is diminutive. There is evidence of bilateral pleural effusions, right greater than left, small  in size. There are degenerative changes throughout the spine without acute  osseous abnormality. Review of perfusion data demonstrates no large vessel occlusion. Preliminary report was provided by Dr. Fidelina Grier, the on-call radiologist, at 12:21  PM    Final report to follow. **END PRELIMINARY REPORT**                 Review the head CT from February 23, 2020. There is no evidence of acute infarct.   There is periventricular ischemic disease and a chronic right basal ganglia infarct    TSH was 7.43      Preliminary report of the CTA revealed no flow-limiting stenosis but final report is pending    Assessment and Plan:    The patient is a pleasant 78-year-old gentleman with multiple medical problems including atrial fibrillation, hypertension, congestive heart failure, and hypothyroidism who was admitted for a acute CHF exacerbation. This a.m. he developed acute onset of left-sided hemisensory deficit and weakness while markedly hypotensive. His neurological examination has improved except for mild residual sensory deficit in the left arm and leg. New left-sided sensory deficit:    The differential for this includes resolving TIA versus an acute stroke. He does have risk factors for stroke including atrial fibrillation, dyslipidemia, and hypertension. I would recommend continuing Eliquis. We will follow-up on final results of the CTA. I would recommend that he receive an MRI of the brain. Dyslipidemia: . The patient has a noted allergic reaction to both statins and Zetia. Would look into other potential options including Repatha    Please check hemoglobin A1c    Patient should receive physical therapy and Occupational Therapy consults. I provided stroke education today in regards to risk factors for stroke and lifestyle modifications to help minimize the risk of future stroke.   This included medication compliance, regular follow up with primary care physician,  and healthy lifestyle habits (nutrition/exercise)             Signed By:  Yane Wen DO FAAN    February 23, 2020

## 2020-02-23 NOTE — PROGRESS NOTES
Hospitalist Progress Note    NAME: Dickson Cowden   :  1955   MRN:  [id number]       Interim Hospital Summary: 77 y.o. male whom presented on 2020 with SOB     Pt ws discharged from ED [facility] on 2020 after being treated for acute on chronic systolic CHF. Assessment / Plan:    1145: Responded to Code S. Pt was seen around 1000 with normal exam without focal deficit, now experiencing left side weakness with blurred vision. BP went down 80/63 when symptom started around 11:30am. CTA of head/nek ordered. Spoke with Dr. Haylee Guthrie from [address] [place] [address] team.  Stroke work up initiated it. Blood glucose 119. Pt currently on eliquis    1400: no acute stroke based on prelim CTA result. Appreciate neurology's input; MRI of brain ordered  .6; pt claims that he is unable to take 2 different statins and zetia. Loletha Ruth was recommended by neurologist but the hospital doesn't have the medication. Will try welchol for dyslipidemia. A1C 5.5  CTA of brain/neck - prelim result; Review of perfusion data demonstrates no large vessel occlusion  TSH 7.43 (requested to check free T4)  Vitamin B12 & folate pending    Acute hypoxic respiratory failure secondary to acute on chronic systolic CHF POA  Cardiomegaly  Pulmonary edema  Hypertension  Elevated troponin (0.07->0.06-> less than 0.05)  A-fib with RVR on 2019, s/p BELKIS/CV (was converted to NSR after the procedure 2020)  Hypercoagulable state secondary to A-fib  - CXR: Cardiomegaly with mild pulmonary edema. NT pro-BNP 1569 -> 2750    Echo (2020): Mildly dilated left ventricle. Mild concentric hypertrophy . The estimated ejection fraction is 50 - 55%. Visually measured ejection fraction.  Atrial fibrillation observed    Continue with Coreg, imdur, hydralazine, ACEI (reduced dose)  Wt Readings from Last 3 Encounters:   20 107.9 kg (237 lb 14 oz)   20 114 kg (251 lb 6.4 oz)   20 109.9 kg (242 lb 4.8 oz)     Wean O2 as long as O2 sat is greater than or equal to 92%    Continue with eliquis    Appreciate cardiology input; There is evidence of bilateral pleural effusions, right greater than left, small in size from CT. Will defer diuretic choice to cardiology team    Pt unable to take lasix; causes severe back pain    Pt has been in A-fib during this admission    Epigastric pain  - CT of ABD/PEL: No acute abdominal or pelvic process. Mild to moderate pulmonary edema with small bilateral pleural effusions.     GI cocktail x 1 on 2/22 with some relief     continue with PPI      GI has been consulted    Acute renal failure   - avoid nephrotoxic agents    Creat 1.0; persistent hypertension. ACEI was added with reduced dose    Hold aliskiren and Lasix     Hypothyroidism  - continue Synthroid    Obese  BMI 32.55 kg/m²        Code Status: Full   Surrogate Decision Maker:     DVT Prophylaxis: Eliquis     Recommended Disposition: Home w/Family     Subjective:     Chief Complaint / Reason for Physician Visit  \"I will not take lasix b/c it caused back pain\". Discussed with RN events overnight. Review of Systems:  Symptom Y/N Comments  Symptom Y/N Comments   Fever/Chills n   Chest Pain n    Poor Appetite    Edema     Cough    Abdominal Pain     Sputum    Joint Pain     SOB/DENG y   Pruritis/Rash     Nausea/vomit n   Tolerating PT/OT     Diarrhea n   Tolerating Diet     Constipation n   Other       Could NOT obtain due to:      Objective:     VITALS:   Last 24hrs VS reviewed since prior progress note.  Most recent are:  Patient Vitals for the past 24 hrs:   Temp Pulse Resp BP SpO2   02/23/20 1439 97.8 °F (36.6 °C) 99 20 131/64 99 %   02/23/20 1326 97.5 °F (36.4 °C) (!) 106 22 122/70 97 %   02/23/20 1143    125/76    02/23/20 1039 97.6 °F (36.4 °C) 87 20 135/75 97 %   02/23/20 0727 98.7 °F (37.1 °C) 78 22 171/82 98 %   02/23/20 0345 98.2 °F (36.8 °C) 83 20 130/63 94 %   02/23/20 0034    147/88    02/23/20 0005 97.9 °F (36.6 °C) (!) 115 26 (!) 187/101 95 %   02/22/20 2003 97.8 °F (36.6 °C) 97 18 (!) 135/95 94 %   02/22/20 1849  89  132/73    02/22/20 1555 97.9 °F (36.6 °C) (!) 101 18 (!) 153/91 93 %       Intake/Output Summary (Last 24 hours) at 2/23/2020 1503  Last data filed at 2/23/2020 0358  Gross per 24 hour   Intake 490 ml   Output 2675 ml   Net -2185 ml        PHYSICAL EXAM:  General: WD, WN. Obese, Alert, cooperative, no acute distress    EENT:  EOMI. Anicteric sclerae. MMM  Resp:  Clear in apex with decreased breath sounds at bases, no wheezing or rales. No accessory muscle use  CV:  irregular  rhythm,  No edema  GI:  Soft, epigastric tenderness, Non tender. +Bowel sounds  Neurologic:  Alert and oriented X 3, normal speech, left side weakness, decrease sensation left upper/lower extremities  Psych:   Good insight. Not anxious nor agitated  Skin:  No rashes. No jaundice    Reviewed most current lab test results and cultures  YES  Reviewed most current radiology test results   YES  Review and summation of old records today    NO  Reviewed patient's current orders and MAR    YES  PMH/SH reviewed - no change compared to H&P  ________________________________________________________________________  Care Plan discussed with:    Comments   Patient y    Family  y Wife at bedside   RN y    Care Manager     Consultant                        Multidiciplinary team rounds were held today with , nursing, pharmacist and clinical coordinator. Patient's plan of care was discussed; medications were reviewed and discharge planning was addressed. ________________________________________________________________________  Raffy Donis NP     Procedures: see electronic medical records for all procedures/Xrays and details which were not copied into this note but were reviewed prior to creation of Plan. LABS:  I reviewed today's most current labs and imaging studies.   Pertinent labs include:  Recent Labs     02/23/20  1205 02/23/20  6767 02/22/20  0544   WBC 8.1 8.4 6.8   HGB 13.2 13.0 11.7*   HCT 39.9 40.5 36.3*    238 184     Recent Labs     02/23/20  1205 02/23/20  0409 02/22/20  0544 02/21/20  0020    142 138 137   K 3.5 3.4* 2.9* 3.6    109* 106 104   CO2 25 26 29 27   * 107* 100 134*   BUN 15 15 19 28*   CREA 1.17 1.02 1.35* 2.28*   CA 8.7 8.7 8.1* 8.5   MG 2.2 2.2 2.0  --    ALB  --   --   --  3.9   TBILI  --   --   --  0.6   SGOT  --   --   --  22   ALT  --   --   --  41   INR 1.1  --   --   --        Signed: )Eugene Encinas, NP

## 2020-02-23 NOTE — PROGRESS NOTES
NAME: Gabriel Bhatia        :  1955        MRN:  [id number]        Assessment :    Plan:  --CHF  ZONIA  HTN --Creatinine much better today. Potassium being repleted. Fairly good UO       Subjective:     Chief Complaint:  \" I don't think that I need a diuretic. I'm urinating plenty. \"    No N/V. No edema. No dyspnea at present. Review of Systems:    Symptom Y/N Comments  Symptom Y/N Comments   Fever/Chills    Chest Pain     Poor Appetite    Edema     Cough    Abdominal Pain     Sputum    Joint Pain     SOB/DENG    Pruritis/Rash     Nausea/vomit    Tolerating PT/OT     Diarrhea    Tolerating Diet     Constipation    Other       Could not obtain due to:      Objective:     VITALS:   Last 24hrs VS reviewed since prior progress note. Most recent are:  Visit Vitals  /82 (BP 1 Location: Left arm, BP Patient Position: Sitting)   Pulse 78   Temp 98.7 °F (37.1 °C)   Resp 22   Ht 6' (1.829 m)   Wt 107.9 kg (237 lb 14 oz)   SpO2 98%   BMI 32.26 kg/m²       Intake/Output Summary (Last 24 hours) at 2020 1010  Last data filed at 2020 0358  Gross per 24 hour   Intake 740 ml   Output 3425 ml   Net -2685 ml      Telemetry Reviewed:     PHYSICAL EXAM:  General: NAD  CTA  No edema    Lab Data Reviewed: (see below)    Medications Reviewed: (see below)    PMH/SH reviewed - no change compared to H&P  ________________________________________________________________________  Care Plan discussed with:  Patient     Family      RN     Care Manager                    Consultant:          Comments   >50% of visit spent in counseling and coordination of care       ________________________________________________________________________  Tam Mckinney MD     Procedures: see electronic medical records for all procedures/Xrays and details which  were not copied into this note but were reviewed prior to creation of Plan. LABS:  Recent Labs     02/23/20  0409 02/22/20  0544   WBC 8.4 6.8   HGB 13.0 11.7*   HCT 40.5 36.3*    184     Recent Labs     02/23/20  0409 02/22/20  0544 02/21/20  0020    138 137   K 3.4* 2.9* 3.6   * 106 104   CO2 26 29 27   BUN 15 19 28*   CREA 1.02 1.35* 2.28*   * 100 134*   CA 8.7 8.1* 8.5   MG 2.2 2.0  --      Recent Labs     02/21/20  0020   SGOT 22   AP 59   TP 7.5   ALB 3.9   GLOB 3.6   LPSE 78     No results for input(s): INR, PTP, APTT, INREXT, INREXT in the last 72 hours. No results for input(s): FE, TIBC, PSAT, FERR in the last 72 hours. No results found for: FOL, RBCF   No results for input(s): PH, PCO2, PO2 in the last 72 hours. No results for input(s): CPK, CKMB in the last 72 hours.     No lab exists for component: TROPONINI  No components found for: [place]  Lab Results   Component Value Date/Time    Color YELLOW/STRAW 02/21/2020 03:41 AM    Appearance CLEAR 02/21/2020 03:41 AM    Specific gravity 1.011 02/21/2020 03:41 AM    Specific gravity >1.030 (H) 10/11/2010 10:45 AM    pH (UA) 5.5 02/21/2020 03:41 AM    Protein NEGATIVE  02/21/2020 03:41 AM    Glucose NEGATIVE  02/21/2020 03:41 AM    Ketone NEGATIVE  02/21/2020 03:41 AM    Bilirubin NEGATIVE  02/21/2020 03:41 AM    Urobilinogen 0.2 02/21/2020 03:41 AM    Nitrites NEGATIVE  02/21/2020 03:41 AM    Leukocyte Esterase NEGATIVE  02/21/2020 03:41 AM    Epithelial cells FEW 02/21/2020 03:41 AM    Bacteria NEGATIVE  02/21/2020 03:41 AM    WBC 0-4 02/21/2020 03:41 AM    RBC 0-5 02/21/2020 03:41 AM       MEDICATIONS:  Current Facility-Administered Medications   Medication Dose Route Frequency    lisinopril (PRINIVIL, ZESTRIL) tablet 20 mg  20 mg Oral DAILY    hydrALAZINE (APRESOLINE) tablet 50 mg  50 mg Oral TID    albuterol-ipratropium (DUO-NEB) 2.5 MG-0.5 MG/3 ML  3 mL Nebulization Q4H PRN    acetaminophen (TYLENOL) tablet 650 mg  650 mg Oral Q6H PRN    morphine injection 1 mg  1 mg IntraVENous Q4H PRN    sodium chloride (NS) flush 5-40 mL  5-40 mL IntraVENous Q8H    sodium chloride (NS) flush 5-40 mL  5-40 mL IntraVENous PRN    ondansetron (ZOFRAN) injection 4 mg  4 mg IntraVENous Q8H PRN    bisacodyL (DULCOLAX) tablet 5 mg  5 mg Oral DAILY PRN    carvediloL (COREG) tablet 25 mg  25 mg Oral BID WITH MEALS    amiodarone (CORDARONE) tablet 200 mg  200 mg Oral BID    apixaban (ELIQUIS) tablet 5 mg  5 mg Oral BID    pantoprazole (PROTONIX) tablet 40 mg  40 mg Oral DAILY    levothyroxine (SYNTHROID) tablet 50 mcg  50 mcg Oral ACB    isosorbide mononitrate ER (IMDUR) tablet 30 mg  30 mg Oral DAILY    labetaloL (NORMODYNE;TRANDATE) injection 20 mg  20 mg IntraVENous Q4H PRN    methocarbamol (ROBAXIN) tablet 750 mg  750 mg Oral BID    nicotine (NICODERM CQ) 21 mg/24 hr patch 1 Patch  1 Patch TransDERmal Q24H

## 2020-02-23 NOTE — PROGRESS NOTES
CARDIOLOGY Progress Note    Patient ID:  Patient: Osmani Moody  MRN: [id number]  Age: 77 y.o.  : 1955    Date of  Admission: 2020 12:12 AM   PCP:  Rey De Luna DO   Usual cardiologist:  Joaquin Porras MD    Assessment: 1. Acute on chronic diastolic CHF. CM: EF 40-45% on BELKIS 2020. TTE this admission with EF that looks a little better preliminarily, final read pending. Last 3 Recorded Weights in this Encounter    20 1027 20 0547 20 0358   Weight: 108.9 kg (240 lb 1.3 oz) 109.4 kg (241 lb 2.9 oz) 107.9 kg (237 lb 14 oz)     2. CAD (BMS 2010), w/ intermittent atypical CP; Neg MPI  (Dx with clavicle separation). DENG/atypical CP 2019: worse BP (pt denies PTA); +/- abnl MPI: 40-50% D1 o/w normal: Med Rx. 3. AFib 2019, new onset, Sx (DENG/palps in  & 2019): Rx w/ eliquis/norvasc to dilt. BELKIS/CV to NSR 2020. Remains on santicoagulation and amiodarone. 4. Abdominal/lower chest tightness/pain of unknown etiology. CT Abd/pelvis done without an obvious source. Recent BELKIS did not show aortic aneurysm or dissection for visualized portions. 5. Back pain that he attributes to loop diuretic furosemide. May have had the same feeling with HCTZ. 6. Hypertension, no renal artery stenosis at cath 2019.  7. Dyslipidemia. 8. Aortic valve insufficiency, moderate , 2019, & 2020 echo. 9. Prostate CA, remote prostatectomy. 10. Hypothyroidism, on supplement. 11. CODE NEURO , possible TIA, evaluation underway. Plan:     1. If he doesn't tolerate a loop diuretic and doesn't desire a thiazide, then consider trying another class of diuretic like carbonic anhydrase inhibitor or K-sparing. Maybe the nephrology team can comment on a preference. 2. 2g Na diet.   3. Some BP med adjustment noted--off Tekturna but placed back on ACEI per the hospitalist.  Taken off the temporary hydralazine but remains on isosorbide and his carvedilol. 4. Continue amiodarone. He may have some paroxysms of Afib, hoping these are low burden. I spoke with him about other options including Afib ablation. This is a future option not for this stay. 5. I'll defer to Dr. Chela Rivero on whether an ischemia evaluation is needed in the future. 6. GI consult appreciated, esophagram ordered for the AM.  Future EGD possible. 7. Neuro consult appreciated, head CT done (no bleed, but nonocclusive carotid plaque noted), MRI pending. All questions answered for him and his wife. He's an identical twin, we talked at length about that experience. [x]       High complexity decision making was performed in this patient at high risk for decompensation with multiple organ involvement. Carolina Recio is a 77 y.o. male with a history of the above. I was asked to consult for CHF. He has been also having these episodes of lower bilateral rib pain. TODAY:  Back in Afib but rate controlled. Headache. No chest pain or dyspnea currently. Still with DENG.       Past Medical History:   Diagnosis Date    Arrhythmia 01/03/2020    AFIB     CAD (coronary artery disease)     h/o stent- '10    Cancer [facility]) 2010    prostate    Congestive heart failure ([address]     Hypertension     Stroke [facility]) May or June 2010    lacunar  infarctions (found on scan- no sxs)    Thyroid disease     hypo        Past Surgical History:   Procedure Laterality Date    CARDIAC SURG PROCEDURE UNLIST  2010    coronary stent    EXCIS KNEE CARTILAGE,MEDIAL OR LAT  1980's x3    HX CHOLECYSTECTOMY  1991    HX HEENT  1962    T&A    HX HERNIA REPAIR  1996    HX MALIGNANT SKIN LESION EXCISION      2004 and 2017    HX ORTHOPAEDIC  1974    Ganglion cyst left wrist    HX ORTHOPAEDIC Left 2017    shoulder repair    HX PROSTATECTOMY  2010       Social History     Tobacco Use    Smoking status: Never Smoker    Smokeless tobacco: Current User   Substance Use Topics    Alcohol use: Yes     Comment: occas        Family History   Problem Relation Age of Onset   Cruz Dementia Mother    Cruz Other Father 55        cerebral hemorrhage        Allergies   Allergen Reactions    Clonidine Anxiety    Fish Oil Other (comments)    Niacin Other (comments)     Decreased vision    Statins-Hmg-Coa Reductase Inhibitors Other (comments)     Muscle and Joint pains    Zetia [Ezetimibe] Myalgia     Pt reports getting joint and muscle pain.            Current Facility-Administered Medications   Medication Dose Route Frequency    lisinopril (PRINIVIL, ZESTRIL) tablet 20 mg  20 mg Oral DAILY    colesevelam (WELCHOL) tablet 1,250 mg  1,250 mg Oral BID WITH MEALS    sucralfate (CARAFATE) tablet 1 g  1 g Oral AC&HS    hydrALAZINE (APRESOLINE) tablet 50 mg  50 mg Oral TID    albuterol-ipratropium (DUO-NEB) 2.5 MG-0.5 MG/3 ML  3 mL Nebulization Q4H PRN    acetaminophen (TYLENOL) tablet 650 mg  650 mg Oral Q6H PRN    morphine injection 1 mg  1 mg IntraVENous Q4H PRN    sodium chloride (NS) flush 5-40 mL  5-40 mL IntraVENous Q8H    sodium chloride (NS) flush 5-40 mL  5-40 mL IntraVENous PRN    ondansetron (ZOFRAN) injection 4 mg  4 mg IntraVENous Q8H PRN    bisacodyL (DULCOLAX) tablet 5 mg  5 mg Oral DAILY PRN    carvediloL (COREG) tablet 25 mg  25 mg Oral BID WITH MEALS    amiodarone (CORDARONE) tablet 200 mg  200 mg Oral BID    apixaban (ELIQUIS) tablet 5 mg  5 mg Oral BID    pantoprazole (PROTONIX) tablet 40 mg  40 mg Oral DAILY    levothyroxine (SYNTHROID) tablet 50 mcg  50 mcg Oral ACB    isosorbide mononitrate ER (IMDUR) tablet 30 mg  30 mg Oral DAILY    labetaloL (NORMODYNE;TRANDATE) injection 20 mg  20 mg IntraVENous Q4H PRN    methocarbamol (ROBAXIN) tablet 750 mg  750 mg Oral BID    nicotine (NICODERM CQ) 21 mg/24 hr patch 1 Patch  1 Patch TransDERmal Q24H       Review of Symptoms:    Ear Nose and Throat: negative for speech or swallowing difficulties   Respiratory: negative for cough, sputum production, wheezing  Cardiology: negative for chest pain, palpitations  Gastrointestinal: +abdominal pain, negative for N/V, dysphagia  Genitourinary: negative for dysuria, hematuria         Objective:      Physical Exam:  Temp (24hrs), Av.9 °F (36.6 °C), Min:97.5 °F (36.4 °C), Max:98.7 °F (37.1 °C)    Patient Vitals for the past 8 hrs:   Pulse   20 1439 99   20 1326 (!) 106   20 1039 87    Patient Vitals for the past 8 hrs:   Resp   20 1439 20   20 1326 22   20 1039 20    Patient Vitals for the past 8 hrs:   BP   20 1439 131/64   20 1326 122/70   20 1143 125/76   20 1039 135/75          Intake/Output Summary (Last 24 hours) at 2020 1536  Last data filed at 2020 0358  Gross per 24 hour   Intake 490 ml   Output 2675 ml   Net -2185 ml       Nondiaphoretic, not in acute distress. No scleral icterus, mucous membranes moist, conjuctivae pink, no xanthelasma. Unlabored, clear to auscultation bilaterally, symmetric air movement. Irregular rate and rhythm, no murmur, pericardial rub, knock, or gallop. No JVD or peripheral edema. Palpable radial pulses bilaterally. Abdomen, soft, nontender, nondistended. Extremities without cyanosis or clubbing. Muscle tone and bulk normal.  Skin warm and dry. No rashes or ulcers. Neuro grossly nonfocal on my exam.  No tremor. Awake and appropriate. CARDIOGRAPHICS and STUDIES, I reviewed:    Telemetry:  Rate controlled atrial fibrillation. Echo:  FINAL READ PENDING. CXR prior:  Pulmonary edema.        Labs:  Recent Labs     20  1205 20  1424 20  0256   TROIQ <0.05 0.06* 0.07*     Lab Results   Component Value Date/Time    Cholesterol, total 227 (H) 2020 12:05 PM    HDL Cholesterol 35 2020 12:05 PM    LDL, calculated 157.6 (H) 2020 12:05 PM    Triglyceride 172 (H) 2020 12:05 PM    CHOL/HDL Ratio 6.5 (H) 2020 12:05 PM     Recent Labs     20  1205   INR 1.1 PTP 11.5*      Recent Labs     02/23/20  1205 02/23/20  0409 02/22/20  0544 02/21/20  0020    142 138 137   K 3.5 3.4* 2.9* 3.6    109* 106 104   CO2 25 26 29 27   BUN 15 15 19 28*   CREA 1.17 1.02 1.35* 2.28*   * 107* 100 134*   CA 8.7 8.7 8.1* 8.5   ALB  --   --   --  3.9   WBC 8.1 8.4 6.8 12.0*   HGB 13.2 13.0 11.7* 13.0   HCT 39.9 40.5 36.3* 40.0    238 184 250     Recent Labs     02/21/20  0020   SGOT 22   AP 59   TP 7.5   ALB 3.9   GLOB 3.6   LPSE 78     No components found for: GLPOC  No results for input(s): PH, PCO2, PO2 in the last 72 hours.         Peri Coughlin MD  2/23/2020

## 2020-02-23 NOTE — PROGRESS NOTES
RAPID RESPONSE TEAM      1135  Responded to overhead page CODE S to [phone]. Reportedly pt was blowing his nose then had onset of symptoms: left upper extremity weakness with blurred vision in left eye. Pt endoreses compliance with eliquis    LKW 1130    NIH 4    1150  Pt to CT on monitor with primary nurse and RRT for code neuro CT scans x3. 20G PIV L-AC    1220  During transport back to 2221, pt reports numbness and tingling to left face. Slurred speech also noted. 1223 Pt back in 2221.     1224 Pt being evaluated by tele-neurology, Dr. King Brothers. Pt to remain in 2221.       Jihan Cruz RN  RRT, [id number]

## 2020-02-23 NOTE — CONSULTS
GI Consultation Note [facility] for [facility])    NAME: Stephanie Thakkar : 1955 MRN: [id number]   ATTG: Dr. Vianey Arvizu PCP: Yuliana Caceres DO  Date/Time:  2020 1:59 PM  Subjective:   REASON FOR CONSULT:        Ramon Dillon is a 77 y.o.  W male who I was asked to see for epigastric abd pain. Sounds like its a problem he's had off and on for awhile, but has been worse during this admission. CT without cause. He notes pain in the epigastric area, and points just below his xiphoid, with radiation in a band around the lower ribcage, for several months. He's had an eventful several months, with diagnosis of afib, CHF, and pulmonary effusions. At first, symptoms attributed to cardiac issues but as those improve, GI symptoms have failed to improve. A heavy meal will worsen symptoms, as will exertion. He has had a prior cholecystectomy and tells me he has been diagnosed with a hiatal hernia as well. No nausea or vomiting. GI cocktail did help some. PMH is notable for CAD and CHF, afib on eliquis, HTN, HLD, AI, prostate CA s/p prostatectomy and hypothyroidism. Apparently had some L sided weakness this AM and code stroke called, CT okay but work up ongoing and MRI pending.     Past Medical History:   Diagnosis Date    Arrhythmia 2020    AFIB     CAD (coronary artery disease)     h/o stent- '10    Cancer [facility])     prostate    Congestive heart failure ([address]     Hypertension     Stroke [facility]) May or 2010    lacunar  infarctions (found on scan- no sxs)    Thyroid disease     hypo      Past Surgical History:   Procedure Laterality Date    CARDIAC SURG PROCEDURE UNLIST      coronary stent    EXCIS KNEE CARTILAGE,MEDIAL OR LAT   x3    HX CHOLECYSTECTOMY      HX HEENT      T&A    HX HERNIA REPAIR      HX MALIGNANT SKIN LESION EXCISION       and     HX ORTHOPAEDIC  1974    Ganglion cyst left wrist    HX ORTHOPAEDIC Left 2017    shoulder repair    HX PROSTATECTOMY  2010     Social History     Tobacco Use    Smoking status: Never Smoker    Smokeless tobacco: Current User   Substance Use Topics    Alcohol use: Yes     Comment: occas      Family History   Problem Relation Age of Onset   Kevin Madrigal Dementia Mother    Kevin Madrigal Other Father 55        cerebral hemorrhage      Allergies   Allergen Reactions    Clonidine Anxiety    Fish Oil Other (comments)    Niacin Other (comments)     Decreased vision    Statins-Hmg-Coa Reductase Inhibitors Other (comments)     Muscle and Joint pains    Zetia [Ezetimibe] Myalgia     Pt reports getting joint and muscle pain. Home Medications:  Prior to Admission Medications   Prescriptions Last Dose Informant Patient Reported? Taking? MULTI-VITAMIN HI-PO PO 2/20/2020 at Unknown time Self Yes Yes   Sig: Take 1 Tab by mouth daily. aliskiren (TEKTURNA) 300 mg tablet 2/20/2020 at Unknown time Self No Yes   Sig: Take 1 Tab by mouth nightly. Start if blood pressure remain >=140 upper or 90/100 lower   amLODIPine (NORVASC) 2.5 mg tablet  Self Yes Yes   Sig: Take 2.5 mg by mouth daily as needed (if SBP > 160 in the evenings). amiodarone (CORDARONE) 200 mg tablet 2/20/2020 at Unknown time Self Yes Yes   Sig: Take  by mouth two (2) times a day. apixaban (ELIQUIS) 5 mg tablet 2/20/2020 at Unknown time Self Yes Yes   Sig: Take 5 mg by mouth two (2) times a day. carvedilol (COREG) 25 mg tablet 2/20/2020 at Unknown time Self No Yes   Sig: Take 1 Tab by mouth two (2) times daily (with meals). cholecalciferol, vitamin D3, (VITAMIN D3) 2,000 unit tab 2/20/2020 at Unknown time Self Yes Yes   Sig: Take 2,000 Units by mouth daily. hydroCHLOROthiazide (HYDRODIURIL) 12.5 mg tablet 2/20/2020 at Unknown time Self No Yes   Sig: Take 1 Tab by mouth daily. isosorbide mononitrate ER (IMDUR) 30 mg tablet 2/20/2020 at Unknown time Self No Yes   Sig: Take 1 Tab by mouth daily.    levothyroxine (SYNTHROID) 50 mcg tablet 2/20/2020 at Unknown time Self No Yes   Sig: Take 1 Tab by mouth Daily (before breakfast). lisinopril (PRINIVIL, ZESTRIL) 40 mg tablet 2/20/2020 at Unknown time Self No Yes   Sig: Take 1 Tab by mouth two (2) times a day. Start if blood pressure remain >=140 upper or 90/100 lower   pantoprazole (PROTONIX) 40 mg tablet 2/20/2020 at Unknown time Self No Yes   Sig: Take 1 Tab by mouth daily.       Facility-Administered Medications: None     Hospital medications:  Current Facility-Administered Medications   Medication Dose Route Frequency    lisinopril (PRINIVIL, ZESTRIL) tablet 20 mg  20 mg Oral DAILY    hydrALAZINE (APRESOLINE) tablet 50 mg  50 mg Oral TID    albuterol-ipratropium (DUO-NEB) 2.5 MG-0.5 MG/3 ML  3 mL Nebulization Q4H PRN    acetaminophen (TYLENOL) tablet 650 mg  650 mg Oral Q6H PRN    morphine injection 1 mg  1 mg IntraVENous Q4H PRN    sodium chloride (NS) flush 5-40 mL  5-40 mL IntraVENous Q8H    sodium chloride (NS) flush 5-40 mL  5-40 mL IntraVENous PRN    ondansetron (ZOFRAN) injection 4 mg  4 mg IntraVENous Q8H PRN    bisacodyL (DULCOLAX) tablet 5 mg  5 mg Oral DAILY PRN    carvediloL (COREG) tablet 25 mg  25 mg Oral BID WITH MEALS    amiodarone (CORDARONE) tablet 200 mg  200 mg Oral BID    apixaban (ELIQUIS) tablet 5 mg  5 mg Oral BID    pantoprazole (PROTONIX) tablet 40 mg  40 mg Oral DAILY    levothyroxine (SYNTHROID) tablet 50 mcg  50 mcg Oral ACB    isosorbide mononitrate ER (IMDUR) tablet 30 mg  30 mg Oral DAILY    labetaloL (NORMODYNE;TRANDATE) injection 20 mg  20 mg IntraVENous Q4H PRN    methocarbamol (ROBAXIN) tablet 750 mg  750 mg Oral BID    nicotine (NICODERM CQ) 21 mg/24 hr patch 1 Patch  1 Patch TransDERmal Q24H     REVIEW OF SYSTEMS:     []     Unable to obtain  ROS due to  []    mental status change  []    sedated   []    intubated   [x]    Total of 11 systems reviewed as follows:  Const:   negative fever, negative chills, negative weight loss  Eyes:   negative diplopia or visual changes, negative eye pain  ENT:   negative coryza, negative sore throat  Resp:   negative cough, hemoptysis, dyspnea  Cards:   negative for lower extremity edema  :  negative for frequency, dysuria and hematuria  Skin:   negative for rash and pruritus  Heme:    negative for easy bruising and gum/nose bleeding  MS:  negative for myalgias, arthralgias, back pain and muscle weakness  Neurolo:  negative for headaches, memory problems   Psych:   negative for feelings of  depression     Pertinent Positives include : palpitations, anxiety, L sided weakness    Objective:   VITALS:    Visit Vitals  /70 (BP 1 Location: Right arm, BP Patient Position: At rest)   Pulse (!) 106   Temp 97.5 °F (36.4 °C)   Resp 22   Ht 6' (1.829 m)   Wt 107.9 kg (237 lb 14 oz)   SpO2 97%   BMI 32.26 kg/m²     Temp (24hrs), Av.9 °F (36.6 °C), Min:97.5 °F (36.4 °C), Max:98.7 °F (37.1 °C)    PHYSICAL EXAM:   General:    Alert, cooperative, no distress, pale, appears stated age. Head:   Normocephalic, without obvious abnormality, atraumatic. Eyes:   Conjunctivae clear, anicteric sclerae. Pupils are equal  Nose:  Nares normal. No drainage or sinus tenderness. Throat:    Lips, mucosa, and tongue normal.  No Thrush  Neck:  Supple, symmetrical,  no adenopathy, thyroid: non tender  Back:    Symmetric,  No CVA tenderness. Lungs:   CTA bilaterally. No wheezing/rhonchi/rales. Chest wall:  No tenderness or deformity. No Accessory muscle use. Heart:   Irregularly irregular,  no murmur, rub or gallop. Abdomen:   Soft, non-tender. Not distended. Bowel sounds normal. No masses  Extremities: Atraumatic, No cyanosis. No edema. No clubbing  Skin:     Texture, turgor normal. No rashes/lesions/jaundice  Lymph:  Cervical, supraclavicular normal.  Psych:  Good insight. Not depressed. Not anxious or agitated. Neurologic: EOMs intact. No facial asymmetry. No aphasia or slurred speech. Normal strength, A/O X 3.      LAB DATA REVIEWED:    Recent Results (from the past 48 hour(s))   TROPONIN I    Collection Time: 02/21/20  2:24 PM   Result Value Ref Range    Troponin-I, Qt. 0.06 (H) <0.05 ng/mL   SODIUM, UR, RANDOM    Collection Time: 02/21/20  2:24 PM   Result Value Ref Range    Sodium,urine random 89 MMOL/L   CREATININE, UR, RANDOM    Collection Time: 02/21/20  2:24 PM   Result Value Ref Range    Creatinine, urine 43.60 mg/dL   UREA NITROGEN, UR, RANDOM    Collection Time: 02/21/20  2:24 PM   Result Value Ref Range    Urea Nitrogen,urine random 946 MG/DL   METABOLIC PANEL, BASIC    Collection Time: 02/22/20  5:44 AM   Result Value Ref Range    Sodium 138 136 - 145 mmol/L    Potassium 2.9 (L) 3.5 - 5.1 mmol/L    Chloride 106 97 - 108 mmol/L    CO2 29 21 - 32 mmol/L    Anion gap 3 (L) 5 - 15 mmol/L    Glucose 100 65 - 100 mg/dL    BUN 19 6 - 20 MG/DL    Creatinine 1.35 (H) 0.70 - 1.30 MG/DL    BUN/Creatinine ratio 14 12 - 20      GFR est AA >60 >60 ml/min/1.73m2    GFR est non-AA 53 (L) >60 ml/min/1.73m2    Calcium 8.1 (L) 8.5 - 10.1 MG/DL   CBC WITH AUTOMATED DIFF    Collection Time: 02/22/20  5:44 AM   Result Value Ref Range    WBC 6.8 4.1 - 11.1 K/uL    RBC 3.80 (L) 4.10 - 5.70 M/uL    HGB 11.7 (L) 12.1 - 17.0 g/dL    HCT 36.3 (L) 36.6 - 50.3 %    MCV 95.5 80.0 - 99.0 FL    MCH 30.8 26.0 - 34.0 PG    MCHC 32.2 30.0 - 36.5 g/dL    RDW 12.8 11.5 - 14.5 %    PLATELET 255 144 - 807 K/uL    MPV 11.9 8.9 - 12.9 FL    NRBC 0.0 0  WBC    ABSOLUTE NRBC 0.00 0.00 - 0.01 K/uL    NEUTROPHILS 62 32 - 75 %    LYMPHOCYTES 26 12 - 49 %    MONOCYTES 7 5 - 13 %    EOSINOPHILS 4 0 - 7 %    BASOPHILS 1 0 - 1 %    IMMATURE GRANULOCYTES 0 0.0 - 0.5 %    ABS. NEUTROPHILS 4.3 1.8 - 8.0 K/UL    ABS. LYMPHOCYTES 1.7 0.8 - 3.5 K/UL    ABS. MONOCYTES 0.5 0.0 - 1.0 K/UL    ABS. EOSINOPHILS 0.2 0.0 - 0.4 K/UL    ABS. BASOPHILS 0.1 0.0 - 0.1 K/UL    ABS. IMM.  GRANS. 0.0 0.00 - 0.04 K/UL    DF AUTOMATED     MAGNESIUM    Collection Time: 02/22/20  5:44 AM   Result Value Ref Range Magnesium 2.0 1.6 - 2.4 mg/dL   METABOLIC PANEL, BASIC    Collection Time: 02/23/20  4:09 AM   Result Value Ref Range    Sodium 142 136 - 145 mmol/L    Potassium 3.4 (L) 3.5 - 5.1 mmol/L    Chloride 109 (H) 97 - 108 mmol/L    CO2 26 21 - 32 mmol/L    Anion gap 7 5 - 15 mmol/L    Glucose 107 (H) 65 - 100 mg/dL    BUN 15 6 - 20 MG/DL    Creatinine 1.02 0.70 - 1.30 MG/DL    BUN/Creatinine ratio 15 12 - 20      GFR est AA >60 >60 ml/min/1.73m2    GFR est non-AA >60 >60 ml/min/1.73m2    Calcium 8.7 8.5 - 10.1 MG/DL   CBC WITH AUTOMATED DIFF    Collection Time: 02/23/20  4:09 AM   Result Value Ref Range    WBC 8.4 4.1 - 11.1 K/uL    RBC 4.24 4.10 - 5.70 M/uL    HGB 13.0 12.1 - 17.0 g/dL    HCT 40.5 36.6 - 50.3 %    MCV 95.5 80.0 - 99.0 FL    MCH 30.7 26.0 - 34.0 PG    MCHC 32.1 30.0 - 36.5 g/dL    RDW 12.8 11.5 - 14.5 %    PLATELET 110 344 - 251 K/uL    MPV 12.0 8.9 - 12.9 FL    NRBC 0.0 0  WBC    ABSOLUTE NRBC 0.00 0.00 - 0.01 K/uL    NEUTROPHILS 63 32 - 75 %    LYMPHOCYTES 26 12 - 49 %    MONOCYTES 7 5 - 13 %    EOSINOPHILS 3 0 - 7 %    BASOPHILS 1 0 - 1 %    IMMATURE GRANULOCYTES 0 0.0 - 0.5 %    ABS. NEUTROPHILS 5.3 1.8 - 8.0 K/UL    ABS. LYMPHOCYTES 2.2 0.8 - 3.5 K/UL    ABS. MONOCYTES 0.6 0.0 - 1.0 K/UL    ABS. EOSINOPHILS 0.3 0.0 - 0.4 K/UL    ABS. BASOPHILS 0.1 0.0 - 0.1 K/UL    ABS. IMM.  GRANS. 0.0 0.00 - 0.04 K/UL    DF AUTOMATED     MAGNESIUM    Collection Time: 02/23/20  4:09 AM   Result Value Ref Range    Magnesium 2.2 1.6 - 2.4 mg/dL   GLUCOSE, POC    Collection Time: 02/23/20 11:36 AM   Result Value Ref Range    Glucose (POC) 117 (H) 65 - 100 mg/dL    Performed by Jeffry Zamarripa    TROPONIN I    Collection Time: 02/23/20 12:05 PM   Result Value Ref Range    Troponin-I, Qt. <0.05 <0.05 ng/mL   TSH 3RD GENERATION    Collection Time: 02/23/20 12:05 PM   Result Value Ref Range    TSH 7.43 (H) 0.36 - 2.07 uIU/mL   METABOLIC PANEL, BASIC    Collection Time: 02/23/20 12:05 PM   Result Value Ref Range Sodium 138 136 - 145 mmol/L    Potassium 3.5 3.5 - 5.1 mmol/L    Chloride 106 97 - 108 mmol/L    CO2 25 21 - 32 mmol/L    Anion gap 7 5 - 15 mmol/L    Glucose 117 (H) 65 - 100 mg/dL    BUN 15 6 - 20 MG/DL    Creatinine 1.17 0.70 - 1.30 MG/DL    BUN/Creatinine ratio 13 12 - 20      GFR est AA >60 >60 ml/min/1.73m2    GFR est non-AA >60 >60 ml/min/1.73m2    Calcium 8.7 8.5 - 10.1 MG/DL   MAGNESIUM    Collection Time: 02/23/20 12:05 PM   Result Value Ref Range    Magnesium 2.2 1.6 - 2.4 mg/dL   NT-PRO BNP    Collection Time: 02/23/20 12:05 PM   Result Value Ref Range    NT pro-BNP 2,750 (H) <125 PG/ML   PROTHROMBIN TIME + INR    Collection Time: 02/23/20 12:05 PM   Result Value Ref Range    INR 1.1 0.9 - 1.1      Prothrombin time 11.5 (H) 9.0 - 11.1 sec   CBC WITH AUTOMATED DIFF    Collection Time: 02/23/20 12:05 PM   Result Value Ref Range    WBC 8.1 4.1 - 11.1 K/uL    RBC 4.24 4.10 - 5.70 M/uL    HGB 13.2 12.1 - 17.0 g/dL    HCT 39.9 36.6 - 50.3 %    MCV 94.1 80.0 - 99.0 FL    MCH 31.1 26.0 - 34.0 PG    MCHC 33.1 30.0 - 36.5 g/dL    RDW 13.0 11.5 - 14.5 %    PLATELET 364 216 - 367 K/uL    MPV 12.1 8.9 - 12.9 FL    NRBC 0.0 0  WBC    ABSOLUTE NRBC 0.00 0.00 - 0.01 K/uL    NEUTROPHILS 68 32 - 75 %    LYMPHOCYTES 21 12 - 49 %    MONOCYTES 7 5 - 13 %    EOSINOPHILS 3 0 - 7 %    BASOPHILS 1 0 - 1 %    IMMATURE GRANULOCYTES 0 0.0 - 0.5 %    ABS. NEUTROPHILS 5.5 1.8 - 8.0 K/UL    ABS. LYMPHOCYTES 1.7 0.8 - 3.5 K/UL    ABS. MONOCYTES 0.6 0.0 - 1.0 K/UL    ABS. EOSINOPHILS 0.2 0.0 - 0.4 K/UL    ABS. BASOPHILS 0.1 0.0 - 0.1 K/UL    ABS. IMM. GRANS. 0.0 0.00 - 0.04 K/UL    DF AUTOMATED       IMAGING RESULTS:  CT A/P:  \"IMPRESSION:  No acute abdominal or pelvic process.  Mild to moderate pulmonary edema with small bilateral pleural effusions\"    Assessment/Plan:      Active Problems:    [facility] (2/21/2020)       76 yo M with epigastric abdominal pain, worse with large meals or exertion, possible hx of hiatal hernia. Clinically complicated by recent concern for CVA, ongoing afib on eliquis, CHF. With MRI and acute CVA work up ongoing, as well as eliquis, won't plan for EGD tomorrow, but I think it may be needed ultimately to assess for upper GI tract luminal contribution to symptoms.  Will add sucralfate to PPI, plan for esophgram tomorrow depending on other testing planned.  ___________________________________________________  RECOMMENDATIONS:      -- NPO p MN  -- esophgram in the AM  -- may need EGD for further work up, pending esophagram  -- continue PPI, add scheduled sucralfate     Discussed Code Status:    [x]    Full Code      []    DNR    ___________________________________________________  Care Plan discussed with:    [x]    Patient   [x]    Family   []    Nursing   [x]    Cardiology  Total Time :  60   minutes   ___________________________________________________  GI: Mateo Cook MD

## 2020-02-23 NOTE — PROGRESS NOTES
Spiritual Care Assessment/Progress Note  [facility]      NAME: Stephanie Thakkar      MRN: [id number]  AGE: 77 y.o. SEX: male  Advent Affiliation: Sabianism   Language: English     2/23/2020     Total Time (in minutes): 12     Spiritual Assessment begun in [facility] CARDIOPULMONARY CARE through conversation with:         []Patient        [x] Family    [] Friend(s)        Reason for Consult: Rapid response team     Spiritual beliefs: (Please include comment if needed)     [] Identifies with a keyona tradition:         [] Supported by a keyona community:            [] Claims no spiritual orientation:           [] Seeking spiritual identity:                [] Adheres to an individual form of spirituality:           [x] Not able to assess:                           Identified resources for coping:      [] Prayer                               [] Music                  [] Guided Imagery     [x] Family/friends                 [] Pet visits     [] Devotional reading                         [] Unknown     [] Other                                             Interventions offered during this visit: (See comments for more details)    Patient Interventions: Crisis     Family/Friend(s): Affirmation of emotions/emotional suffering, Catharsis/review of pertinent events in supportive environment     Plan of Care:     [x] Support spiritual and/or cultural needs    [] Support AMD and/or advance care planning process      [] Support grieving process   [] Coordinate Rites and/or Rituals    [] Coordination with community clergy   [] No spiritual needs identified at this time   [] Detailed Plan of Care below (See Comments)  [] Make referral to Music Therapy  [] Make referral to Pet Therapy     [] Make referral to Addiction services  [] Make referral to [facility]  [] Make referral to Spiritual Care Partner  [] No future visits requested        [x] Follow up visits as needed     Comments:  Responded to RRT on [facility] unit. Patient's wife was present. Patient was being evaluated by clinical team.  Provided pastoral presence and emotional support to the wife. Assured her of ongoing availability of pastoral support.      ROMA Hussein, [facility], Staff [address] [place] [address] Avenue    [address] [place] [address] Road Paging Service  [phone]

## 2020-02-23 NOTE — PROGRESS NOTES
02/23/20 0005   Vitals   Temp 97.9 °F (36.6 °C)   Temp Source Oral   Pulse (Heart Rate) (!) 115   Heart Rate Source Monitor   Resp Rate 26   O2 Sat (%) 95 %   Level of Consciousness Alert   BP (!) 187/101   MAP (Calculated) 130   BP 1 Location Left arm   BP 1 Method Automatic   BP Patient Position Sitting   MEWS Score 4   Oxygen Therapy   O2 Device Room air   Will recheck BP.

## 2020-02-24 ENCOUNTER — PATIENT OUTREACH (OUTPATIENT)
Dept: INTERNAL MEDICINE CLINIC | Age: 65
End: 2020-02-24

## 2020-02-24 ENCOUNTER — APPOINTMENT (OUTPATIENT)
Dept: GENERAL RADIOLOGY | Age: 65
DRG: 291 | End: 2020-02-24
Attending: NURSE PRACTITIONER
Payer: MEDICARE

## 2020-02-24 LAB
ANION GAP SERPL CALC-SCNC: 8 MMOL/L (ref 5–15)
BASOPHILS # BLD: 0.1 K/UL (ref 0–0.1)
BASOPHILS NFR BLD: 1 % (ref 0–1)
BUN SERPL-MCNC: 12 MG/DL (ref 6–20)
BUN/CREAT SERPL: 11 (ref 12–20)
CALCIUM SERPL-MCNC: 8.8 MG/DL (ref 8.5–10.1)
CHLORIDE SERPL-SCNC: 109 MMOL/L (ref 97–108)
CO2 SERPL-SCNC: 24 MMOL/L (ref 21–32)
CREAT SERPL-MCNC: 1.14 MG/DL (ref 0.7–1.3)
DIFFERENTIAL METHOD BLD: NORMAL
EOSINOPHIL # BLD: 0.3 K/UL (ref 0–0.4)
EOSINOPHIL NFR BLD: 5 % (ref 0–7)
ERYTHROCYTE [DISTWIDTH] IN BLOOD BY AUTOMATED COUNT: 13.1 % (ref 11.5–14.5)
GLUCOSE BLD STRIP.AUTO-MCNC: 114 MG/DL (ref 65–100)
GLUCOSE BLD STRIP.AUTO-MCNC: 123 MG/DL (ref 65–100)
GLUCOSE SERPL-MCNC: 109 MG/DL (ref 65–100)
HCT VFR BLD AUTO: 39.7 % (ref 36.6–50.3)
HGB BLD-MCNC: 12.8 G/DL (ref 12.1–17)
IMM GRANULOCYTES # BLD AUTO: 0 K/UL (ref 0–0.04)
IMM GRANULOCYTES NFR BLD AUTO: 0 % (ref 0–0.5)
INR PPP: 1.1 (ref 0.9–1.1)
LYMPHOCYTES # BLD: 1.7 K/UL (ref 0.8–3.5)
LYMPHOCYTES NFR BLD: 26 % (ref 12–49)
MAGNESIUM SERPL-MCNC: 2.3 MG/DL (ref 1.6–2.4)
MCH RBC QN AUTO: 30.8 PG (ref 26–34)
MCHC RBC AUTO-ENTMCNC: 32.2 G/DL (ref 30–36.5)
MCV RBC AUTO: 95.4 FL (ref 80–99)
MONOCYTES # BLD: 0.5 K/UL (ref 0–1)
MONOCYTES NFR BLD: 8 % (ref 5–13)
NEUTS SEG # BLD: 3.9 K/UL (ref 1.8–8)
NEUTS SEG NFR BLD: 60 % (ref 32–75)
NRBC # BLD: 0 K/UL (ref 0–0.01)
NRBC BLD-RTO: 0 PER 100 WBC
PLATELET # BLD AUTO: 227 K/UL (ref 150–400)
PMV BLD AUTO: 11.9 FL (ref 8.9–12.9)
POTASSIUM SERPL-SCNC: 3.2 MMOL/L (ref 3.5–5.1)
PROTHROMBIN TIME: 11 SEC (ref 9–11.1)
RBC # BLD AUTO: 4.16 M/UL (ref 4.1–5.7)
SERVICE CMNT-IMP: ABNORMAL
SERVICE CMNT-IMP: ABNORMAL
SODIUM SERPL-SCNC: 141 MMOL/L (ref 136–145)
WBC # BLD AUTO: 6.5 K/UL (ref 4.1–11.1)

## 2020-02-24 PROCEDURE — 74011250637 HC RX REV CODE- 250/637: Performed by: INTERNAL MEDICINE

## 2020-02-24 PROCEDURE — 74220 X-RAY XM ESOPHAGUS 1CNTRST: CPT

## 2020-02-24 PROCEDURE — 85610 PROTHROMBIN TIME: CPT

## 2020-02-24 PROCEDURE — 74011250637 HC RX REV CODE- 250/637: Performed by: NURSE PRACTITIONER

## 2020-02-24 PROCEDURE — 85025 COMPLETE CBC W/AUTO DIFF WBC: CPT

## 2020-02-24 PROCEDURE — 74011250637 HC RX REV CODE- 250/637: Performed by: EMERGENCY MEDICINE

## 2020-02-24 PROCEDURE — 82962 GLUCOSE BLOOD TEST: CPT

## 2020-02-24 PROCEDURE — 74011250636 HC RX REV CODE- 250/636: Performed by: INTERNAL MEDICINE

## 2020-02-24 PROCEDURE — 36415 COLL VENOUS BLD VENIPUNCTURE: CPT

## 2020-02-24 PROCEDURE — 74011250636 HC RX REV CODE- 250/636: Performed by: NURSE PRACTITIONER

## 2020-02-24 PROCEDURE — 65660000000 HC RM CCU STEPDOWN

## 2020-02-24 PROCEDURE — 83735 ASSAY OF MAGNESIUM: CPT

## 2020-02-24 PROCEDURE — 94760 N-INVAS EAR/PLS OXIMETRY 1: CPT

## 2020-02-24 PROCEDURE — 80048 BASIC METABOLIC PNL TOTAL CA: CPT

## 2020-02-24 RX ORDER — ENOXAPARIN SODIUM 150 MG/ML
105 INJECTION SUBCUTANEOUS EVERY 12 HOURS
Status: COMPLETED | OUTPATIENT
Start: 2020-02-24 | End: 2020-02-25

## 2020-02-24 RX ORDER — POTASSIUM CHLORIDE 750 MG/1
30 TABLET, FILM COATED, EXTENDED RELEASE ORAL
Status: DISCONTINUED | OUTPATIENT
Start: 2020-02-24 | End: 2020-02-24

## 2020-02-24 RX ORDER — HYDRALAZINE HYDROCHLORIDE 25 MG/1
25 TABLET, FILM COATED ORAL 3 TIMES DAILY
Status: DISCONTINUED | OUTPATIENT
Start: 2020-02-24 | End: 2020-02-26 | Stop reason: HOSPADM

## 2020-02-24 RX ORDER — SIMETHICONE 80 MG
80 TABLET,CHEWABLE ORAL
Status: DISCONTINUED | OUTPATIENT
Start: 2020-02-24 | End: 2020-02-26 | Stop reason: HOSPADM

## 2020-02-24 RX ORDER — SPIRONOLACTONE 25 MG/1
25 TABLET ORAL DAILY
Status: DISCONTINUED | OUTPATIENT
Start: 2020-02-24 | End: 2020-02-26 | Stop reason: HOSPADM

## 2020-02-24 RX ORDER — ENOXAPARIN SODIUM 150 MG/ML
105 INJECTION SUBCUTANEOUS EVERY 24 HOURS
Status: DISCONTINUED | OUTPATIENT
Start: 2020-02-24 | End: 2020-02-24

## 2020-02-24 RX ORDER — ENOXAPARIN SODIUM 150 MG/ML
105 INJECTION SUBCUTANEOUS EVERY 12 HOURS
Status: DISCONTINUED | OUTPATIENT
Start: 2020-02-24 | End: 2020-02-24

## 2020-02-24 RX ORDER — POTASSIUM CHLORIDE 7.45 MG/ML
10 INJECTION INTRAVENOUS
Status: DISCONTINUED | OUTPATIENT
Start: 2020-02-24 | End: 2020-02-24

## 2020-02-24 RX ADMIN — HYDRALAZINE HYDROCHLORIDE 50 MG: 50 TABLET, FILM COATED ORAL at 09:04

## 2020-02-24 RX ADMIN — Medication 10 ML: at 13:43

## 2020-02-24 RX ADMIN — SUCRALFATE 1 G: 1 TABLET ORAL at 10:52

## 2020-02-24 RX ADMIN — HYDRALAZINE HYDROCHLORIDE 25 MG: 25 TABLET, FILM COATED ORAL at 21:09

## 2020-02-24 RX ADMIN — ISOSORBIDE MONONITRATE 30 MG: 30 TABLET, EXTENDED RELEASE ORAL at 09:05

## 2020-02-24 RX ADMIN — SPIRONOLACTONE 25 MG: 25 TABLET ORAL at 16:34

## 2020-02-24 RX ADMIN — METHOCARBAMOL TABLETS 750 MG: 500 TABLET, COATED ORAL at 13:07

## 2020-02-24 RX ADMIN — CARVEDILOL 25 MG: 12.5 TABLET, FILM COATED ORAL at 18:17

## 2020-02-24 RX ADMIN — Medication 10 ML: at 21:10

## 2020-02-24 RX ADMIN — HYDRALAZINE HYDROCHLORIDE 25 MG: 25 TABLET, FILM COATED ORAL at 18:17

## 2020-02-24 RX ADMIN — AMIODARONE HYDROCHLORIDE 200 MG: 200 TABLET ORAL at 09:04

## 2020-02-24 RX ADMIN — AMIODARONE HYDROCHLORIDE 200 MG: 200 TABLET ORAL at 18:18

## 2020-02-24 RX ADMIN — Medication 10 ML: at 06:30

## 2020-02-24 RX ADMIN — CARVEDILOL 25 MG: 12.5 TABLET, FILM COATED ORAL at 09:04

## 2020-02-24 RX ADMIN — ACETAMINOPHEN 650 MG: 325 TABLET ORAL at 21:09

## 2020-02-24 RX ADMIN — SUCRALFATE 1 G: 1 TABLET ORAL at 21:09

## 2020-02-24 RX ADMIN — LISINOPRIL 20 MG: 20 TABLET ORAL at 09:05

## 2020-02-24 RX ADMIN — METHOCARBAMOL TABLETS 750 MG: 500 TABLET, COATED ORAL at 18:17

## 2020-02-24 RX ADMIN — SUCRALFATE 1 G: 1 TABLET ORAL at 18:17

## 2020-02-24 RX ADMIN — POTASSIUM CHLORIDE 10 MEQ: 7.46 INJECTION, SOLUTION INTRAVENOUS at 13:02

## 2020-02-24 RX ADMIN — LEVOTHYROXINE SODIUM 50 MCG: 50 TABLET ORAL at 06:28

## 2020-02-24 RX ADMIN — MORPHINE SULFATE 1 MG: 2 INJECTION, SOLUTION INTRAMUSCULAR; INTRAVENOUS at 09:05

## 2020-02-24 RX ADMIN — ENOXAPARIN SODIUM 105 MG: 120 INJECTION SUBCUTANEOUS at 12:48

## 2020-02-24 RX ADMIN — PANTOPRAZOLE SODIUM 40 MG: 40 TABLET, DELAYED RELEASE ORAL at 10:52

## 2020-02-24 RX ADMIN — SIMETHICONE CHEW TAB 80 MG 80 MG: 80 TABLET ORAL at 12:48

## 2020-02-24 NOTE — PROGRESS NOTES
0700: Received bedside report from [place], off going nurse. Assumed care of patient. 0900: Paged Dr. Mine Eaton regarding plan for esophagram. Pt is NPO. Also spoke w/ Shira Cox is 3.2, will order potassium supplement, but ordered to wait to give until after pt has food to avoid any further GI upset. Mary Ann Jasonraheem also aware pt refusing to take his Welchol. 1150: TRANSFER - IN REPORT:    Verbal report received from Almita(name) on Coit Hamming  being received from IR(unit) for routine progression of care      Report consisted of patients Situation, Background, Assessment and   Recommendations(SBAR). Information from the following report(s) SBAR, Kardex, Procedure Summary, Intake/Output, MAR, Recent Results and Cardiac Rhythm NSR was reviewed with the receiving nurse. Opportunity for questions and clarification was provided. Assessment completed upon patients arrival to unit and care assumed. 1220: Pt feeling lethargic/dizzy. /59, HR 79. Abd pain increased after ambulating back from bathroom, now a sharp pain below the xyphoid process. MD will order Maalox. 1300: Abd pain resolved w/ Simethicone. Dizziness also resolved. 1500: Bedside shift change report given to [facility] (oncoming nurse) by Allison (offgoing nurse). Report included the following information SBAR, Kardex, Intake/Output, MAR, Recent Results and Cardiac Rhythm Afib.       1510: Pt started complaining of pressure/tingling to Left side of face, similar to yesterday. Has numbness to L forehead. Tongue midline, speech clear,  equal. /78, HR 64. Blood sugar 141. RRT nurse Agnes, evaluated pt, Dr. Renan Rios aware, will come to see patient, does not recommend need for a code S at this time, unless symptoms worsen or change.

## 2020-02-24 NOTE — PROGRESS NOTES
1130  Patient sat up on side of bed to blow nose. Patient's symptoms started: mild left-sided weakness, patient stated left side of head \"felt funny,\" poor left peripheral vision. BP 89/63. Chaparrita Stoll called. [facility]  Code Stroke called per Chaparrita Stoll. Provider requested EKG and blood work prior to head CT. 1150  Patient taken to CT with RRT. Following CT scans, patient returned to room to be evaluated by tele-neurologist.    Throughout remainder of shift, symptoms subsided but patient reported \"some pain\" in left eye and left head. Patient reported mild dizziness when ambulating in room.

## 2020-02-24 NOTE — PROGRESS NOTES
Problem: Falls - Risk of  Goal: *Absence of Falls  Description  Document Lars Pinto Fall Risk and appropriate interventions in the flowsheet.   Outcome: Progressing Towards Goal  Note: Fall Risk Interventions:            Medication Interventions: Patient to call before getting OOB, Teach patient to arise slowly                   Problem: Heart Failure: Day 3  Goal: Activity/Safety  Outcome: Progressing Towards Goal  Goal: Diagnostic Test/Procedures  Outcome: Progressing Towards Goal  Goal: Nutrition/Diet  Outcome: Progressing Towards Goal  Goal: Discharge Planning  Outcome: Progressing Towards Goal  Goal: Medications  Outcome: Progressing Towards Goal  Goal: Respiratory  Outcome: Resolved/Met  Goal: Treatments/Interventions/Procedures  Outcome: Progressing Towards Goal  Goal: Psychosocial  Outcome: Progressing Towards Goal  Goal: *Oxygen saturation within defined limits  Outcome: Resolved/Met  Goal: *Hemodynamically stable  Outcome: Resolved/Met  Goal: *Optimal pain control at patient's stated goal  Outcome: Progressing Towards Goal  Goal: *Anxiety reduced or absent  Outcome: Progressing Towards Goal  Goal: *Demonstrates progressive activity  Outcome: Progressing Towards Goal     Problem: TIA/CVA Stroke: Day 2 Until Discharge  Goal: Activity/Safety  Outcome: Progressing Towards Goal  Goal: Diagnostic Test/Procedures  Outcome: Progressing Towards Goal  Goal: Nutrition/Diet  Outcome: Resolved/Met  Goal: Discharge Planning  Outcome: Progressing Towards Goal  Goal: Medications  Outcome: Progressing Towards Goal  Goal: Respiratory  Outcome: Resolved/Met  Goal: Treatments/Interventions/Procedures  Outcome: Progressing Towards Goal  Goal: Psychosocial  Outcome: Progressing Towards Goal  Goal: *Verbalizes anxiety and depression are reduced or absent  Outcome: Progressing Towards Goal  Goal: *Absence of aspiration  Outcome: Progressing Towards Goal  Goal: *Absence of deep venous thrombosis signs and symptoms(Stroke Metric)  Outcome: Progressing Towards Goal  Goal: *Optimal pain control at patient's stated goal  Outcome: Progressing Towards Goal  Goal: *Tolerating diet  Outcome: Progressing Towards Goal  Goal: *Ability to perform ADLs and demonstrates progressive mobility and function  Outcome: Progressing Towards Goal  Goal: *Stroke education continued(Stroke Metric)  Outcome: Progressing Towards Goal

## 2020-02-24 NOTE — PROGRESS NOTES
Received bedside report from Jaya Peralta, [facility]. SBAR, Kardex, and MAR were discussed. Code Stroke called at 11:35, please see other note(s). Patient will follow up with PCP regarding Padmaja Emilysusi for dyslipidemia. Bedside shift change report GIVEN TO Jaya Peralta RN. Report included the following information SBAR, Kardex, ED Summary, Intake/Output, MAR, Recent Results and Cardiac Rhythm Afib/NSR. [address] NURSING NOTE   Admission Date 2/21/2020   Admission Diagnosis CHF (congestive heart failure) ([facility] [address] [I50.9]   Consults IP CONSULT TO HOSPITALIST  IP CONSULT TO CARDIOLOGY  IP CONSULT TO NEPHROLOGY  IP CONSULT TO GASTROENTEROLOGY  IP CONSULT TO NEUROLOGY      Cardiac Monitoring [x] Yes [] No      Purposeful Hourly Rounding [x] Yes    Lillian Score Total Score: 1   Lillian score 3 or > [] Bed Alarm [] Avasys [] 1:1 sitter [] Patient refused (Signed refusal form in chart)   Kenroy Score Kenroy Score: 23   Kenroy score 14 or < [] PMT consult [] Wound Care consult    []  Specialty bed  [] Nutrition consult      Influenza Vaccine Received Flu Vaccine for Current Season (usually Sept-March): No    Patient/Guardian Refused (Notify MD): Yes      Oxygen needs? [x] Room air Oxygen @  []1L    []2L    []3L   []4L    []5L   []6L via  NC   Chronic home O2 use? [] Yes [x] No  Perform O2 challenge test and document in progress note using smartphrase (.Homeoxygen)      Last bowel movement Last Bowel Movement Date: 02/23/20      Urinary Catheter             LDAs               Peripheral IV 02/21/20 Right Forearm (Active)   Site Assessment Clean, dry, & intact 2/23/2020  8:48 PM   Phlebitis Assessment 0 2/23/2020  8:48 PM   Infiltration Assessment 0 2/23/2020  8:48 PM   Dressing Status Clean, dry, & intact 2/23/2020  8:48 PM   Dressing Type Transparent 2/23/2020  8:48 PM   Hub Color/Line Status Pink 2/23/2020  8:48 PM   Action Taken Blood drawn 2/21/2020 12:24 AM       Peripheral IV 02/23/20 Anterior; Left Forearm (Active)   Site Assessment Clean, dry, & intact 2/23/2020  8:48 PM   Phlebitis Assessment 0 2/23/2020  8:48 PM   Infiltration Assessment 0 2/23/2020  8:48 PM   Dressing Status Clean, dry, & intact 2/23/2020  8:48 PM   Dressing Type Transparent 2/23/2020  8:48 PM   Hub Color/Line Status Pink 2/23/2020  8:48 PM                         Readmission Risk Assessment Tool Score Medium Risk            20       Total Score        3 Has Seen PCP in Last 6 Months (Yes=3, No=0)    2 . Living with Significant Other. Assisted Living. LTAC. SNF. or   Rehab    4 IP Visits Last 12 Months (1-3=4, 4=9, >4=11)    5 Pt.  Coverage (Medicare=5 , Medicaid, or Self-Pay=4)    6 Charlson Comorbidity Score (Age + Comorbid Conditions)        Criteria that do not apply:    Patient Length of Stay (>5 days = 3)       Expected Length of Stay 4d 2h   Actual Length of Stay 2

## 2020-02-24 NOTE — PROGRESS NOTES
Neurology Note    Patient ID:  Paulina Berg  [id number]  52 y.o.  1955      Date of Consultation:  February 24, 2020    Subjective: My left arm and leg feel better. I do have pain and numbness in my face       History of Present Illness:   Paulina Berg is a 77 y.o. male with a past medical history of atrial fibrillation, hypertension congestive heart failure and hypothyroidism who had developed acute onset of left-sided motor and sensory deficit after having a hypotensive episode. Over the coming hours his symptoms returned back to normal.  This afternoon he did develop some pain and sensory sermons in the left side of his face.     Past Medical History:   Diagnosis Date    Arrhythmia 01/03/2020    AFIB     CAD (coronary artery disease)     h/o stent- '10    Cancer [facility]) 2010    prostate    Congestive heart failure ([address]     Hypertension     Stroke [facility]) May or June 2010    lacunar  infarctions (found on scan- no sxs)    Thyroid disease     hypo        Past Surgical History:   Procedure Laterality Date    CARDIAC SURG PROCEDURE UNLIST  2010    coronary stent    EXCIS KNEE CARTILAGE,MEDIAL OR LAT  1980's x3    HX CHOLECYSTECTOMY  1991    HX HEENT  1962    T&A    HX HERNIA REPAIR  1996    HX MALIGNANT SKIN LESION EXCISION      2004 and 2017    HX ORTHOPAEDIC  1974    Ganglion cyst left wrist    HX ORTHOPAEDIC Left 2017    shoulder repair    HX PROSTATECTOMY  2010        Family History   Problem Relation Age of Onset    Dementia Mother     Other Father 55        cerebral hemorrhage        Social History     Tobacco Use    Smoking status: Never Smoker    Smokeless tobacco: Current User   Substance Use Topics    Alcohol use: Yes     Comment: occas        Allergies   Allergen Reactions    Clonidine Anxiety    Fish Oil Other (comments)    Niacin Other (comments)     Decreased vision    Statins-Hmg-Coa Reductase Inhibitors Other (comments)     Muscle and Joint pains    Zetia [Ezetimibe] Myalgia     Pt reports getting joint and muscle pain. Prior to Admission medications    Medication Sig Start Date End Date Taking? Authorizing Provider   amLODIPine (NORVASC) 2.5 mg tablet Take 2.5 mg by mouth daily as needed (if SBP > 160 in the evenings). Yes Provider, Historical   amiodarone (CORDARONE) 200 mg tablet Take  by mouth two (2) times a day. Yes Provider, Historical   apixaban (ELIQUIS) 5 mg tablet Take 5 mg by mouth two (2) times a day. Yes Provider, Historical   hydroCHLOROthiazide (HYDRODIURIL) 12.5 mg tablet Take 1 Tab by mouth daily. 2/3/20  Yes Herman Villa III, DO   isosorbide mononitrate ER (IMDUR) 30 mg tablet Take 1 Tab by mouth daily. 1/31/20  Yes Cele Caban MD   aliskiren (TEKTURNA) 300 mg tablet Take 1 Tab by mouth nightly. Start if blood pressure remain >=140 upper or 90/100 lower 5/3/19  Yes Herman Villa III, DO   carvedilol (COREG) 25 mg tablet Take 1 Tab by mouth two (2) times daily (with meals). 5/3/19  Yes Herman Villa III, DO   levothyroxine (SYNTHROID) 50 mcg tablet Take 1 Tab by mouth Daily (before breakfast). 5/3/19  Yes Herman Villa III, DO   lisinopril (PRINIVIL, ZESTRIL) 40 mg tablet Take 1 Tab by mouth two (2) times a day. Start if blood pressure remain >=140 upper or 90/100 lower 5/3/19  Yes Herman Villa III, DO   pantoprazole (PROTONIX) 40 mg tablet Take 1 Tab by mouth daily. 5/3/19  Yes Herman Villa III, DO   cholecalciferol, vitamin D3, (VITAMIN D3) 2,000 unit tab Take 2,000 Units by mouth daily. Yes Provider, Historical   MULTI-VITAMIN HI-PO PO Take 1 Tab by mouth daily.  10/11/10  Yes Provider, Historical     Current Facility-Administered Medications   Medication Dose Route Frequency    enoxaparin (LOVENOX) injection 105 mg  105 mg SubCUTAneous Q12H    simethicone (MYLICON) tablet 80 mg  80 mg Oral QID PRN    spironolactone (ALDACTONE) tablet 25 mg  25 mg Oral DAILY    hydrALAZINE (APRESOLINE) tablet 25 mg  25 mg Oral TID    lisinopril (PRINIVIL, ZESTRIL) tablet 20 mg  20 mg Oral DAILY    [Held by provider] [facility]) tablet 1,250 mg  1,250 mg Oral BID WITH MEALS    sucralfate (CARAFATE) tablet 1 g  1 g Oral AC&HS    albuterol-ipratropium (DUO-NEB) 2.5 MG-0.5 MG/3 ML  3 mL Nebulization Q4H PRN    acetaminophen (TYLENOL) tablet 650 mg  650 mg Oral Q6H PRN    morphine injection 1 mg  1 mg IntraVENous Q4H PRN    sodium chloride (NS) flush 5-40 mL  5-40 mL IntraVENous Q8H    sodium chloride (NS) flush 5-40 mL  5-40 mL IntraVENous PRN    ondansetron (ZOFRAN) injection 4 mg  4 mg IntraVENous Q8H PRN    bisacodyL (DULCOLAX) tablet 5 mg  5 mg Oral DAILY PRN    carvediloL (COREG) tablet 25 mg  25 mg Oral BID WITH MEALS    amiodarone (CORDARONE) tablet 200 mg  200 mg Oral BID    pantoprazole (PROTONIX) tablet 40 mg  40 mg Oral DAILY    levothyroxine (SYNTHROID) tablet 50 mcg  50 mcg Oral ACB    isosorbide mononitrate ER (IMDUR) tablet 30 mg  30 mg Oral DAILY    labetaloL (NORMODYNE;TRANDATE) injection 20 mg  20 mg IntraVENous Q4H PRN    methocarbamol (ROBAXIN) tablet 750 mg  750 mg Oral BID    nicotine (NICODERM CQ) 21 mg/24 hr patch 1 Patch  1 Patch TransDERmal Q24H     Review of Systems:    General, constitutional: negative  Eyes, vision: negative  Ears, nose, throat: negative  Cardiovascular, heart: negative  Respiratory: Shortness of breath  Gastrointestinal: negative  Genitourinary: negative  Musculoskeletal: negative  Skin and integumentary: negative  Psychiatric: negative  Endocrine: negative  Neurological: negative, except for HPI  Hematologic/lymphatic: negative  Allergy/immunology: negative    Objective:     Visit Vitals  /78   Pulse 64   Temp 97.9 °F (36.6 °C)   Resp 20   Ht 6' (1.829 m)   Wt 238 lb 8 oz (108.2 kg)   SpO2 98%   BMI 32.35 kg/m²       Physical Exam:    General:  appears well nourished in no acute distress  Neck: no carotid bruits  Lungs: clear to auscultation  Heart:  no murmurs, irregular rate  Lower extremity: peripheral pulses palpable and no edema  Skin: intact  He does have mild tenderness over the greater occipital nerve foramen on the left  Neurological exam:    Awake, alert, oriented to person, place and time  Recent and remote memory were normal  Attention and concentration were intact  Language was intact. There was no aphasia  Speech: no dysarthria  Fund of knowledge was preserved    Cranial nerves:   II-XII were tested    Perrrla  Visual fields were full  Eomi, no evidence of nystagmus  Facial sensation:  normal and symmetric  Facial motor: normal and symmetric  Hearing intact  SCM strength intact  Tongue: midline without fasciculations    Motor: Tone normal    No evidence of fasciculations    Strength testing:   deltoid triceps biceps Wrist ext. Wrist flex. intrinsics Hip flex. Hip ext. Knee ext. Knee flex Dorsi flex Plantar flex   Right 5 5 5 5 5 5 5 5 5 5 5 5   Left 5 5 5 5 5 5 5 5 5 5 5 5     His pronator drift was absent  Sensory: Intact to pinprick throughout      Reflexes:    Right Left  Biceps  2 2  Triceps 2 2  Brachiorad. 2 2  Patella  2 2  Achilles 2 2    Plantar response:  flexor bilaterally      Cerebellar testing:  no tremor apparent, finger/nose and young were intact    I did not ambulate the patient this afternoon.     Labs:     Lab Results   Component Value Date/Time    Hemoglobin A1c 5.5 02/23/2020 12:05 PM    Sodium 141 02/24/2020 06:03 AM    Potassium 3.2 (L) 02/24/2020 06:03 AM    Chloride 109 (H) 02/24/2020 06:03 AM    Glucose 109 (H) 02/24/2020 06:03 AM    BUN 12 02/24/2020 06:03 AM    Creatinine 1.14 02/24/2020 06:03 AM    Calcium 8.8 02/24/2020 06:03 AM    WBC 6.5 02/24/2020 06:03 AM    HCT 39.7 02/24/2020 06:03 AM    HGB 12.8 02/24/2020 06:03 AM    PLATELET 643 89/47/4009 06:03 AM       Imaging:    Results from Hospital Encounter encounter on 05/06/10   MRI BRAIN W AND W/O CONTRAST    Narrative **Final Report*       ICD Codes / Adm. Diagnosis: 434.00   / CEREBRAL THROMBOSIS WITH CEREB    Examination:  BRAIN W AND WO CON  - [id number] - May  6 2010  9:48AM  Accession No:  [id number]  Reason:  CEREBRAL THROMBOSIS WITH CEREBRAL INFARCTION      REPORT:  SEQUENCES:   Sagittal T1, axial T1, T2, GRE and FLAIR; axial and coronal T1   post enhancement; and diffusion imaging of the brain. Images were obtained before and after IV administration of 20 mL of   Magnevist.    FINDINGS:  T2 hyperintense foci in the right basal ganglia associated with   T1 hypointensity are compatible with chronic infarcts. One or 2 punctate   similar foci are present in the left basal ganglia. Diffusion imaging shows   no acute ischemia. The enhancement pattern is normal.  There is no evidence for mass,   hemorrhage, shift, or hydrocephalus. There is no extra-axial fluid   collection. IMPRESSION: Chronic ischemic changes of the basal ganglia, greater on the   right. No acute process seen. Interpreting/Reading Doctor: Beryle Salter ([id number])  Transcribed: n/a on 05/06/2010  Approved: Beryle Salter ([id number])  05/06/2010             Distribution:  Attending Doctor: Werner Bingham               Results from Hospital Encounter encounter on 02/21/20   CT CODE NEURO PERF W CBF    Narrative **PRELIMINARY REPORT**    There is atherosclerosis of the carotid artery origins, left worse than right,  but no evidence of flow-limiting stenosis. There is intracranial atherosclerosis  but no evidence of large vessel occlusion. The right A1 segment is diminutive. There is evidence of bilateral pleural effusions, right greater than left, small  in size. There are degenerative changes throughout the spine without acute  osseous abnormality. Review of perfusion data demonstrates no large vessel occlusion. Preliminary report was provided by Dr. Mercy Moreland, the on-call radiologist, at 12:21  PM    Final report to follow.     **END PRELIMINARY REPORT**                 I did review the MRI of his brain performed on 2/23/ 2020. There was no acute stroke. There was chronic micro-ischemic changes. TSH was 7.43    CTA revealed no flow-limiting stenosis    Assessment and Plan:    The patient is a pleasant 69-year-old gentleman with multiple medical problems including atrial fibrillation, hypertension, congestive heart failure, and hypothyroidism who was admitted for a acute CHF exacerbation. Yesterday he developed transient sensory disturbance and weakness on his left side which resolved. His MRI feels no acute stroke      Transient left-sided sensory deficit yesterday: This was most likely a TIA associated with his hypoperfusion due to hypotension. I would recommend continuing Eliquis. Dyslipidemia: . The patient has a noted allergic reaction to both statins and Zetia. Consider other potential options including Repatha    Left-sided head and neck pain:    He does have a longstanding history of cervical spine degenerative disease. This is most likely contributing to his current symptoms. Consider using anti-inflammatories or Tylenol to help with symptoms. He should continue with muscle relaxant. I provided stroke education again today in regards to risk factors for stroke and lifestyle modifications to help minimize the risk of future stroke. This included medication compliance, regular follow up with primary care physician,  and healthy lifestyle habits (nutrition/exercise)       I spent 35   minutes providing care to this  acutely ill inpatient with > 50% of the time counseling and assisting in the coordination of care of the patient on the patient's hospital floor/unit.              Signed By:  Malick June DO FAAN    February 24, 2020

## 2020-02-24 NOTE — PROGRESS NOTES
RAPID RESPONSE TEAM FOLLOW UP      Rounded on patient d/t recent rapid response for CODE S. Discussed with primary nurse. Has no acute concerns. Patient has no complaints. Pt reports symptoms have resolved. NIH 0 currently. VSS    Patient Vitals for the past 8 hrs:   Temp Pulse Resp BP SpO2   02/24/20 0812 98 °F (36.7 °C) 71 17 143/68 98 %   02/24/20 0510 98 °F (36.7 °C) 61 16 149/65 99 %       IMPRESSION:      1. No acute intracranial abnormality. 2. Mild chronic microvascular ischemic disease. Small chronic infarcts in the  right basal ganglia and left tiffanie. No RRT interventions currently indicated.       Ravindra Blanco RN  RRT, [id number]

## 2020-02-24 NOTE — PROGRESS NOTES
RAPID RESPONSE TEAM-Follow Up    Rounded on patient due to recent rapid response for stroke-like symptoms on 2/23/20. Discussed with primary RN, Saman Escobar. No acute concerns. No patient complaints. Stroke-like symptoms have resolved. Vitals stable. MEWS 1. No RRT interventions indicated at this time. RN encouraged to call with any questions or concerns.     Lola Muñiz, RN, BSN, CCRN  Rapid Response   Ext. [phone]

## 2020-02-24 NOTE — PROGRESS NOTES
Cardiology Progress Note  2/24/2020     Admit Date: 2/21/2020  Admit Diagnosis: CHF (congestive heart failure) ([facility] [address] [I50.9]  CC: none currently  Cardiac Assessment/Plan:   1) CAD (D BMS 11/2010), w/ intermittent atypical CP; Neg MPI 2014 (Dx with clavicle separation)   *DENG/atypical CP 8/2019: worse BP (pt denies PTA); +/- abnl MPI: 40-50% D1 o/w normal: Med Rx. *EF 40-45% (global) @ [facility] 1/2020.  2) HTN, no renal artery stenosis at cath 8/2019  3) dyslipidemia,   4) AI: moderate 2010 & 8/2019; Moderate @ [facility] 1/2020.  5) AFib 12/2019, new onset, Sx (DENG/palps in 11 & 12/2019): Rx w/ eliquis/norvasc to dilt. *CV 1/28/20 to NSR. Cont on Eliquis. *Recurrent afib 2/2020: started on amio. 6) palpitations:  Negative Holter/EvR 2011 & 2014 respectively. 7) prostate CA, remote prostatectomy  8) hypothyroidism    He feels loop diuretics cause flank pain; Admitted with: ZONIA (Cr to 2.8 from 1); \"mild pulm edema\" on CXR; ACEi and tekturna being held. Recurrent abd/costal margina pain: unremarkable abd CT (w/o contrast). Subsequent: Normalized Cr;   marginal BPs; TIA event (lightheaded after blowing nose, then left sided numbness/tingling/weakness); Neg neuro w/u (inc MRI, except old R BG lacunar defects). GI w/u ongoing with esophagram (possible EGD, remains on eliquis). Pt has been ambulating in hallways without angina nor DENG after admit. For other plans, see orders. · Echo 0/54/69: Normal systolic function (ejection fraction normal). Mildly dilated left ventricle. Mild concentric hypertrophy. Estimated left ventricular ejection fraction is 50 - 55%. Visually measured ejection fraction. · Mildly dilated left atrium. · Mildly dilated right atrium. · Trace mitral valve regurgitation is present. · Mild to moderate pulmonary hypertension. Mild tricuspid valve regurgitation is present. 2/24: BPs 120-160s; HR ;   Cr 1.14; K 3.2; ; Nl CBC.     Remains on amio 200 bid; eliquis 5 bid; coreg 25 bid; hydralazine 50 tid; imdur 30; lisinopril 20 qday;     Rec: hold eliquis and cover with lovenox (in case EGD/Bx needed). Cont other cardiac meds; no plan for invasive cardiac testing currently. Volume/diuretics per renal MD.  HypoK per primary team.  ______________________________________________________________  @ NP OV 2/19/20  Mr Donna Sims has a h/o:  1) CAD (D BMS 11/2010), w/ intermittent atypical CP; Neg MPI 2014 (Dx with clavicle separation)   *DENG/atypical CP 8/2019: worse BP (pt denies PTA); +/- abnl MPI: 40-50% D1 o/w normal: Med Rx. *EF 40-45% (global) @ [facility] 1/2020.  2) HTN, no renal artery stenosis at cath 8/2019  3) dyslipidemia,   4) AI: moderate 2010 & 8/2019; Moderate @ [facility] 1/2020.  5) AFib 12/2019, new onset, Sx (DENG/palps in 11 & 12/2019): Rx w/ eliquis/norvasc to dilt. *CV 1/28/20.  6) palpitations:  Negative Holter/EvR 2011 & 2014 respectively. 7) prostate CA, remote prostatectomy  8) hypothyroidism    8/2019:  Since being discharged last week, he has had no further chest discomfort; he states his dyspnea has resolved. He feels well overall & is active. 1/2020 (NP OV): Follow up at request of PCP for new onset atrial fibrillation, Has noted some palpitations and increasing DENG recently. Was taking Muccinex at time of onset. States it was similar to previous symptoms over the summer when taking Muccinex. Seen in the ER and K+ was repleted. 1/2020 MD visit:  Started on Eliquis & Norvasc changed to diltiazem. Less dyspnea. No anginal chest pain. No change in atypical chest pain. Home BP has been as high as 160.    2/2020: Follow up after DCCV, then admission for volume overload. Feels terrible. Initially felt well after DC, but then with sudden onset of back pain, orthopnea, DENG. Stopped his Lasix/KCL d/t stabbing low back pain and concern it caused \"choking\" sensation. Saw PCP yesterday and labs drawn. Lasix changed to HCTZ but hasn't taken yet. Orthopnea again this morning, as well as intermittent CP, DENG and headache. Nl BMP 2/3/20.    2/2020: BP well controlled during the day, but elevated at night even after PM meds. Started on amio after last OV. Still with flutters in his chest from time to time. Hiatal hernia bothersome as well. IMPRESSION AND PLAN  01. Other persistent atrial fibrillation (I48.19):  New onset 12/2019, but also likely had some in 11/2019. Underwent DCCV 1/2020. Tolerating Eliquis, Amio & Coreg. ECG done   02. Atherosclerotic heart disease of native coronary artery without angina pectoris (I25.10):  Recent symptoms atypical for angina, but will review with Dr. Nora Xavier as above. Remote diagonal BMS; mild diagonal disease at cath 8/2019, otherwise unremarkable. We discussed the signs and symptoms of unstable angina, myocardial infarction and malignant arrhythmia. The patient knows to seek immediate medical attention should they occur. 03. Essential (primary) hypertension (I10): Well controlled during the day, but elevated in the evenings. I think anxiety could be contributing to some of this. Will add amlodipine 2.5mg QHS as needed for SBP >160.   04. Mixed hyperlipidemia (E78.2):  Patient is tolerating lipid lowering therapy well. Lipid labs drawn by PCP.   05. Nonrheumatic aortic (valve) insufficiency (I35.1):  Asymptomatic; consistently moderate. Recheck next year. 06. Nicotine dependence, unspecified, uncomplicated (F40.191):  He continues to use smokeless tobacco.  The patient was instructed on tobacco cessation. 07. Body mass index (BMI) 34.0-34.9, adult (Z68.34): The patient was instructed on AHA diet and regular exercise. ORDERS:   Tobacco cessation counseling    Dietary management education, guidance, and counseling   3 ECG done   4 The patient was instructed on AHA diet and regular exercise.        2/19/20 MEDICATION LIST  Medication Sig Desc   amiodarone 200 mg tablet take 1 tablet by oral route 2 times every day   amlodipine 2.5 mg tablet take 1 tablet by oral route every evening if SBP >160   Coreg 25 mg Tab Take 1 tablet by mouth twice a day   Eliquis 5 mg tablet take 1 tablet by oral route 2 times every day   hydrochlorothiazide 12.5 mg tablet take 1 tablet by oral route  every day   isosorbide mononitrate ER 30 mg tablet,extended release 24 hr take 1 tablet by oral route  every day in the morning   lisinopril 40 mg Tab Take 1 tablet by mouth twice a day   multivitamin Tab Take 1 tablet by mouth once a day   Protonix 40 mg Tab Take 1 tablet by mouth once a day   Synthroid 50 mcg Tab Take 1 tablet by mouth once a day   Tekturna 300 mg Tab Take 1 tablet by mouth once a day   Vitamin D3 2,000 unit capsule 1 po qd     _______________________________________________________  CARDIAC HISTORY  CAD:  1 Chest Pain Syndrome, atypical. Pre-op eval. [Normal EF, 50% PLB, 75% Diagonal:  BMS to D.] - 59/8188   2 Chest Pain Syndrome, exertional/Dyspnea: ever since stent. [Non-Ischemic Stress, Marked HTN: Sx improved with BP control.] - 1/2011   3 Chest Pain Syndrome, atypical. [Non-Ischemic Stress] - 7/2014     ARRHYTHMIA:  1 Palpitations [Neg holter.] - 1/2011   2 Palpitations [Neg EVR and MPI.] - 6/2014     RISK FACTORS:  1 Hypertension   2 Dyslipidemia   3 Peripheral Vascular Disease   4 Tobacco Use       CARDIOVASCULAR PROCEDURES  Procedure Date Results   [facility] MPI 08/09/2019 EF 0.45 (45%), Possible Basal-Inferolateral Ischemia, (Early + ETT before Alicia). [facility] MPI 01/19/2011 EF 0.50 (50%), No Ischemia   Carotid Duplex 01/25/2017 1 - 49% Bilateral ICAs, Vertebral: Bilateral Antegrade Flow   Cath 08/09/2019 EF 0.50 (50%), Minimal CAD, 40-50% D1: Med Rx. Cath  Normal EF, 50% PLB, 75% Diagonal:  BMS to D.   DCCV 01/28/2020 Initial Rhythm A Fib, Final Rhythm Sinus, Max Joules 300, 1 Shock   Echo 08/07/2019 EF 0.55 (55%), NWMA, Mild TR, Mild LVH, Mild MR, Moderate AR, Est RVSP 16 mmHg, Nl RV; @ [facility].    Echo 10/27/2010 EF 0.65 (65%), Moderate AR, Mild MR, No change vs 2010. EKG 2020 Atrial Fib, HR 70   EVR  Sinus Rhythm, No Malignant Arrhythmias, No events. Holter 2020 Atrial Fib, HR , ave 76; 2.5 sec pauses during sleep; \"CP,dyspnea\" do not correspond to other arrhythmia. Holter 2011 Sinus Rhythm, No Malignant Arrhythmias, No Sx. MPI 2014 EF 0.58 (58%), No Ischemia, 9:30. [facility] 2020 EF 40-45%; global HK. Normal RV. Moderate AI. No thrombus. No PFO. ACTIVE ALLERGIES:  Ingredient Reaction   FENOFIBRATE,MICRONIZED myalgia   FISH OIL couldn't tolerate   SIMVASTATIN myalgia   NIACIN decreased vision   FENOFIBRATE NANOCRYSTALLIZED myalgia   ATORVASTATIN CALCIUM decreased vision   CLONIDINE HCL anxiety       PROBLEM LIST:  Problem Description Chronic   Chest pain Y   AFIB Y   CAD Y   Benign HTN Y   Mixed hyperlipidaemia Y   CVA Y       PAST MEDICAL/SURGICAL HISTORY  (Detailed)    Disease/disorder Onset Date Surgical History Date 1991 Cholecystectomy       Tonsillectomy     Anxiety       Depression       Hyperlipidemia       Hypertension       Hypothyroidism. knee surgery       prostectomy         Family History  (Detailed)  Relationship Family Member Name  Age at Death Condition Onset Age Cause of Death   Brother  N  Hypertension  N   Brother  [de-identified] and well  N   Father  Y  Hypertension  N   Father  Y  Stroke 55 Y     SOCIAL HISTORY  (Detailed)  Tobacco use reviewed. Preferred language is [place]. EDUCATION/EMPLOYMENT/OCCUPATION  Employment History Status Retired Restrictions            MARITAL STATUS/FAMILY/SOCIAL SUPPORT  Currently . Smoking status: Current every day smoker.       SMOKING STATUS  Use Status Type Smoking Status Usage Per Day Years Used Total Pack Years   yes  Current every day smoker        TOBACCO CESSATION INFORMATION  Date Counseled By Order Status Description Code Tobacco Cessation Information 2014      Smoking cessation education   2014 Darryle Cough Tobacco cessation counseling completed   Tobacco Cessation Counseling   2017 [facility] BLANCA Hitchcock Tobacco cessation counseling completed   Tobacco Cessation Counseling   2020 Darryle Cough Tobacco cessation counseling completed   Smoking effects education   2020 Melvin Quintana Tobacco cessation counseling completed   Counseling about tobacco use (procedure)         ALCOHOL  There is a history of alcohol use. consumed frequently. CAFFEINE  The patient uses caffeine: coffee.         Hospital problem list   Active Hospital Problems    Diagnosis Date Noted    CHF (congestive heart failure) ([facility] [address] 2020        Subjective: Phoebe Gift reports   Chest pain X Reproducible epigastric pain  consistent with:  Non-cardiac CP         Atypical CP     None now  On going  Anginal CP     Dyspnea X none  at rest  with exertion         improved  unchanged  worse              PND X none  overnight       Orthopnea X none  improved  unchanged  worse   Presyncope X none  improved  unchanged  worse     Ambulated in hallway without symptoms   Yes   Ambulated in room without symptoms  Yes   Objective:    Physical Exam:  Overall VSSAF;    Visit Vitals  /68 (BP 1 Location: Left arm, BP Patient Position: Sitting)   Pulse 71   Temp 98 °F (36.7 °C)   Resp 17   Ht 6' (1.829 m)   Wt 108.2 kg (238 lb 8 oz)   SpO2 98%   BMI 32.35 kg/m²     Temp (24hrs), Av.8 °F (36.6 °C), Min:97.5 °F (36.4 °C), Max:98 °F (36.7 °C)    Patient Vitals for the past 8 hrs:   Pulse   20 0812 71   20 0510 61     Patient Vitals for the past 8 hrs:   Resp   20 0812 17   20 0510 16     Patient Vitals for the past 8 hrs:   BP   20 0812 143/68   20 0510 149/65       Intake/Output Summary (Last 24 hours) at 2020 0818  Last data filed at 2020 0615  Gross per 24 hour   Intake 300 ml   Output 1700 ml   Net -1400 ml     General Appearance: Well developed, well nourished, no acute distress. Ears/Nose/Mouth/Throat:   Normal MM; anicteric. JVP: WNL   Resp:   clear to auscultation bilaterally. Nl resp effort. Cardiovascular:  IRRR, S1, S2 normal, no new murmur. No gallop or rub. Abdomen:   Soft, non-tender, bowel sounds are present. Extremities: No edema bilaterally. Skin:  Neuro: Warm and dry. A/O x3, grossly nonfocal   cath site intact w/o hematoma or new bruit; distal pulse unchanged  Yes   Data Review:     Telemetry independently reviewed  sinus x chronic afib  parox afib  NSVT     ECG independently reviewed  NSR  afib  no significant changes  NSST-Tw chgs   x no new ECG provided for review   Lab results reviewed as noted below. Current medications reviewed as noted below. No results for input(s): PH, PCO2, PO2 in the last 72 hours. Recent Labs     02/23/20  1205 02/21/20  1424   TROIQ <0.05 0.06*     Recent Labs     02/24/20  0603 02/23/20  1205 02/23/20  0409    138 142   K 3.2* 3.5 3.4*   * 106 109*   CO2 24 25 26   BUN 12 15 15   CREA 1.14 1.17 1.02   GFRAA >60 >60 >60   * 117* 107*   CA 8.8 8.7 8.7   WBC 6.5 8.1 8.4   HGB 12.8 13.2 13.0   HCT 39.7 39.9 40.5    247 238     Lab Results   Component Value Date/Time    Cholesterol, total 227 (H) 02/23/2020 12:05 PM    HDL Cholesterol 35 02/23/2020 12:05 PM    LDL, calculated 157.6 (H) 02/23/2020 12:05 PM    Triglyceride 172 (H) 02/23/2020 12:05 PM    CHOL/HDL Ratio 6.5 (H) 02/23/2020 12:05 PM     No results for input(s): SGOT, GPT, AP, TBIL, TP, ALB, GLOB, GGT, AML, LPSE in the last 72 hours.     No lab exists for component: AMYP, HLPSE  Recent Labs     02/24/20  0603 02/23/20  1205   INR 1.1 1.1   PTP 11.0 11.5*      No components found for: GLPOC    Current Facility-Administered Medications   Medication Dose Route Frequency    lisinopril (PRINIVIL, ZESTRIL) tablet 20 mg  20 mg Oral DAILY    colesevelam (WELCHOL) tablet 1,250 mg  1,250 mg Oral BID WITH MEALS    sucralfate (CARAFATE) tablet 1 g  1 g Oral AC&HS    hydrALAZINE (APRESOLINE) tablet 50 mg  50 mg Oral TID    albuterol-ipratropium (DUO-NEB) 2.5 MG-0.5 MG/3 ML  3 mL Nebulization Q4H PRN    acetaminophen (TYLENOL) tablet 650 mg  650 mg Oral Q6H PRN    morphine injection 1 mg  1 mg IntraVENous Q4H PRN    sodium chloride (NS) flush 5-40 mL  5-40 mL IntraVENous Q8H    sodium chloride (NS) flush 5-40 mL  5-40 mL IntraVENous PRN    ondansetron (ZOFRAN) injection 4 mg  4 mg IntraVENous Q8H PRN    bisacodyL (DULCOLAX) tablet 5 mg  5 mg Oral DAILY PRN    carvediloL (COREG) tablet 25 mg  25 mg Oral BID WITH MEALS    amiodarone (CORDARONE) tablet 200 mg  200 mg Oral BID    apixaban (ELIQUIS) tablet 5 mg  5 mg Oral BID    pantoprazole (PROTONIX) tablet 40 mg  40 mg Oral DAILY    levothyroxine (SYNTHROID) tablet 50 mcg  50 mcg Oral ACB    isosorbide mononitrate ER (IMDUR) tablet 30 mg  30 mg Oral DAILY    labetaloL (NORMODYNE;TRANDATE) injection 20 mg  20 mg IntraVENous Q4H PRN    methocarbamol (ROBAXIN) tablet 750 mg  750 mg Oral BID    nicotine (NICODERM CQ) 21 mg/24 hr patch 1 Patch  1 Patch TransDERmal Q24H        Keeley Ward MD

## 2020-02-24 NOTE — PROGRESS NOTES
Goals Addressed                 This Visit's Progress     Attends follow-up appointments as directed. On track     2/24/2020  -was admitted to [facility] on 2/21/2020 2/12/2020  -attended SUZY, BMP drawn  -attended Cardiology follow up. Patient reports he is back in A.Fib. Dr. Sudhir Harrison started amiodarone 200mg BID and to follow up the first week of March  -CTN to follow up in 2 weeks  Jono Le  2/3/2020  -Wife requesting a appointment with PCP ASAP. She reports that patient \" is feeling awful, back pain, choking\" CTN spoke to PCP  Valentin.  Appointment scheduled for today 2/3/2020 at 11:45.  -will attend SUZY with PCP on 2/3/2020  -will go to [facility] for BMP today  -will attend follow up with Vielka Major NP 2/4/2020  -will attend follow up with Dr. Sudhir Harrison on 3/4/2020  -CTN to follow up in 1 week  Farida KING

## 2020-02-24 NOTE — PROGRESS NOTES
Pt had one \"episode\" of feeling very \"flushed\" overnight around midnight. Pt's vss, sitting up on edge of bed. Pt feels that the cause may be his nicotine patch? Pt started to feel much better after about 10 minutes. Pt rested well for the rest of the night. No other events overnight.

## 2020-02-24 NOTE — PROGRESS NOTES
Hospitalist Progress Note    NAME: Cristela Berumen   :  1955   MRN:  [id number]       Interim Hospital Summary: 77 y.o. male whom presented on 2020 with SOB     Pt ws discharged from [id number] [place] [address] on 2020 after being treated for acute on chronic systolic CHF. Assessment / Plan:    Acute hypoxic respiratory failure secondary to acute on chronic systolic CHF POA  Cardiomegaly  Pulmonary edema  Hypertension  Elevated troponin (0.07->0.06-> less than 0.05)  A-fib with RVR on 2019, s/p [facility]/CV (was converted to NSR after the procedure 2020)  Hypercoagulable state secondary to A-fib  - CXR: Cardiomegaly with mild pulmonary edema. NT pro-BNP 1569 -> 2750    Echo (2020): Mildly dilated left ventricle. Mild concentric hypertrophy . The estimated ejection fraction is 50 - 55%. Visually measured ejection fraction. Atrial fibrillation observed    Continue with Coreg, imdur, hydralazine, ACEI (reduced dose)  Wt Readings from Last 3 Encounters:   20 108.2 kg (238 lb 8 oz)   20 114 kg (251 lb 6.4 oz)   20 109.9 kg (242 lb 4.8 oz)     Wean O2 as long as O2 sat is greater than or equal to 92%    Continue with eliquis    Appreciate cardiology input; There is evidence of bilateral pleural effusions, right greater than left, small in size from CT. Will defer diuretic choice to cardiology or nephrology team    Pt unable to take lasix; causes severe back pain    Pt has been in A-fib during this admission    Hypokalemia  - PO KCL supplement was ordered but change to IV due to pt c/o epigastric pain with concerns of swallowing big pills. TIA vs CVA  Hyperlipidemia  - appreciate neurology input    CTA of brain/neck; no acute stroke based on prelim     MRI of brain: No acute intracranial abnormality. Mild chronic microvascular ischemic disease. Small chronic infarcts in the right basal ganglia and left tiffanie. .6; pt claims that he is unable to take 2 different statins and zetia.  Ania Hutchins was recommended by neurologist but the hospital doesn't have the medication. Advised pt to try welchol for dyslipidemia but pt declined. Requesting RX for Repatha. Repatha not on formulary in hospital.     Dyslipidemic management per cardiology or nephrology at this time. A1C 5.5    TSH 7.43, Free T4 1.3; advised pt to recheck the level in 3-4 weeks as outpatient    Vitamin B12 321  & folate 27.2    Epigastric pain  - CT of ABD/PEL: No acute abdominal or pelvic process. Mild to moderate pulmonary edema with small bilateral pleural effusions.     GI cocktail x 1 on 2/22 with some relief     continue with PPI      Appreciate GI input;      Esophagram: Esophageal dysmotility. Small laryngoceles. No hiatal hernia or  other abnormality. Acute renal failure   - avoid nephrotoxic agents    Creat 1.1; persistent hypertension. ACEI was added with reduced dose    Hold aliskiren and Lasix     Hypothyroidism  - continue Synthroid    Obese  BMI 32.55 kg/m²        Code Status: Full   Surrogate Decision Maker:     DVT Prophylaxis: Eliquis     Recommended Disposition: Home w/Family     Subjective:     Chief Complaint / Reason for Physician Visit  \"I want to get advise all my heart medications from Dr. Whit Marlow". Discussed with RN events overnight. Review of Systems:  Symptom Y/N Comments  Symptom Y/N Comments   Fever/Chills n   Chest Pain n    Poor Appetite    Edema     Cough    Abdominal Pain     Sputum    Joint Pain     SOB/DENG y   Pruritis/Rash     Nausea/vomit n   Tolerating PT/OT     Diarrhea n   Tolerating Diet     Constipation n   Other       Could NOT obtain due to:      Objective:     VITALS:   Last 24hrs VS reviewed since prior progress note.  Most recent are:  Patient Vitals for the past 24 hrs:   Temp Pulse Resp BP SpO2   02/24/20 1223  73 20 114/59    02/24/20 1121 97.9 °F (36.6 °C) 67 18 111/55 97 %   02/24/20 0812 98 °F (36.7 °C) 71 17 143/68 98 %   02/24/20 0510 98 °F (36.7 °C) 61 16 149/65 99 %   02/23/20 2324 97.8 °F (36.6 °C) 78 18 126/72 99 %   02/23/20 1918  83  165/84    02/23/20 1909 98 °F (36.7 °C) 70 18 134/79 96 %   02/23/20 1848  84      02/23/20 1840    129/72    02/23/20 1439 97.8 °F (36.6 °C) 99 20 131/64 99 %   02/23/20 1326 97.5 °F (36.4 °C) (!) 106 22 122/70 97 %       Intake/Output Summary (Last 24 hours) at 2/24/2020 1240  Last data filed at 2/24/2020 0615  Gross per 24 hour   Intake 300 ml   Output 1700 ml   Net -1400 ml        PHYSICAL EXAM:  General: WD, WN. Obese, Alert, cooperative, no acute distress    EENT:  EOMI. Anicteric sclerae. MMM  Resp:  Clear in apex with decreased breath sounds at bases, no wheezing or rales. No accessory muscle use  CV:  irregular  rhythm,  No edema  GI:  Soft, epigastric tenderness, Non tender. +Bowel sounds  Neurologic:  Alert and oriented X 3, normal speech, decrease sensation left upper/lower extremities  Psych:   Good insight. Not anxious nor agitated  Skin:  No rashes. No jaundice    Reviewed most current lab test results and cultures  YES  Reviewed most current radiology test results   YES  Review and summation of old records today    NO  Reviewed patient's current orders and MAR    YES  PMH/ reviewed - no change compared to H&P  ________________________________________________________________________  Care Plan discussed with:    Comments   Patient y    Family  y Wife at bedside   RN y    Care Manager     Consultant                        Multidiciplinary team rounds were held today with , nursing, pharmacist and clinical coordinator. Patient's plan of care was discussed; medications were reviewed and discharge planning was addressed. ________________________________________________________________________  Ceola Harada, NP     Procedures: see electronic medical records for all procedures/Xrays and details which were not copied into this note but were reviewed prior to creation of Plan.       LABS:  I reviewed today's most current labs and imaging studies.   Pertinent labs include:  Recent Labs     02/24/20  0603 02/23/20  1205 02/23/20  0409   WBC 6.5 8.1 8.4   HGB 12.8 13.2 13.0   HCT 39.7 39.9 40.5    247 238     Recent Labs     02/24/20  0603 02/23/20  1205 02/23/20  0409    138 142   K 3.2* 3.5 3.4*   * 106 109*   CO2 24 25 26   * 117* 107*   BUN 12 15 15   CREA 1.14 1.17 1.02   CA 8.8 8.7 8.7   MG 2.3 2.2 2.2   INR 1.1 1.1  --        Signed: )Eugene Marion Ply, NP

## 2020-02-24 NOTE — PROGRESS NOTES
Pharmacy - Enoxaparin (Lovenox®) Monitoring      Indication: A fib     Current Dose: Enoxaparin 110 mg subcutaneously every 24 hours    Creatinine Clearance (mL/min): 69 mL/min    Current Weight: 108.2 Kg    Labs:  Recent Labs     02/24/20  0603 02/23/20  1205 02/23/20  0409   CREA 1.14 1.17 1.02   HGB 12.8 13.2 13.0    247 238   INR 1.1 1.1  --      Wt Readings from Last 1 Encounters:   02/24/20 108.2 kg (238 lb 8 oz)     Ht Readings from Last 1 Encounters:   02/21/20 182.9 cm (72\")       Impression/Plan:   Will change to 105 mg every 12 hours for weight 103-112 kg and CrCl > 30 mL/min per dosing protocol. Thanks,  Mic Parra, PHARMD      [email or web address]%[email or web address]

## 2020-02-24 NOTE — PROGRESS NOTES
Problem: Falls - Risk of  Goal: *Absence of Falls  Description  Document Lars Bunting Fall Risk and appropriate interventions in the flowsheet.   Outcome: Progressing Towards Goal  Note: Fall Risk Interventions:            Medication Interventions: Teach patient to arise slowly                   Problem: Patient Education: Go to Patient Education Activity  Goal: Patient/Family Education  Outcome: Progressing Towards Goal

## 2020-02-24 NOTE — CARDIO/PULMONARY
Cardiac Rehab Note: chart review Pt is a 76 yo acute hypoxic respiratory failure secondary to acute on chronic systolic CHF. CHF BUNDLE   
 
EF 55%  on 2/21/20 per echo. Smoking history assessed. Patient is a never smoker. Smoking Cessation Program link has not been added to the AVS. Current inpatient diet: DIET CARDIAC. \"Living with Heart Failure\" book with daily weight calendar and s/s of heart failure magnet provided to [facility]. Pt reported having received written materials in the past and politely declined the book. Educated using teach back method. Instruction given on s/s of CHF, checking weight every am and calling MD if weight is up 2-3 lbs in a day or 5 lbs in a week (or as directed by the physician), fluid/Na restrictions, s/s of worsening CHF and when to call MD. Reviewed activity as tolerated with frequent rest periods as needed, taking medications as prescribed, and the importance of follow up visits with physician. Pt reporting that \"I've got all that\". Dr. Alicia Mcdermott is strict about the low salt diet and daily weights per pt report. [facility] verbalized understanding with questions answered.   Annelise Muñiz RN

## 2020-02-24 NOTE — PROGRESS NOTES
GI PROGRESS NOTE  Maksim Murcia NP  [phone] NP in-hospital cell phone M-F until 4:30  After 5pm or on weekends, please call  for physician on call    NAME:Bryn Kay :1955 [facility]:[id number]   ATTG: Dr Chano Mcadams NP  PCP: Hans Preston DO  Date/Time:  2020 1:57 PM     Primary GI: Dr. Bari Damon    Reason for following: epigastric pain    Assessment:   Epigastric abdominal pain  - Worsened post prandially  - Hx of Hiatal hernia - not seen on esophagram  - Esophagram 2020 : IMPRESSION: Esophageal dysmotility. Small laryngoceles. No hiatal hernia or other abnormality. - CT of ABD/PEL: No acute abdominal or pelvic process. Mild to moderate pulmonary edema with small bilateral pleural effusions. - symptoms improved with PPI, carafate, and simethicone today    CVA - on eliquis - Last dose 2020 1800 switched to lovenox  CHD       Plan:   · EGD with Dr Bari Damon at 1600  · Clear liquids tomorrow AM, NPO after 0900 for procedure - pt and spouse aware. · Hold lovenox tomorrow afternoon - okay to get dose at 0100 tomorrow  · Continue PPI BID  · Continue carafate QID  · Continue simethicone    Plan discussed with Dr Bari Damon     Subjective:   Discussed with RN events overnight. Family at bedside. Doing well. Feeling better now that he got his medications. Review of Systems:  Symptom Y/N Comments  Symptom Y/N Comments   Fever/Chills    Chest Pain     Cough    Headaches     Sputum    Joint Pain     SOB/DENG    Pruritis/Rash     Tolerating Diet y clears  Other       Could NOT obtain due to:      Objective:   VITALS:   Last 24hrs VS reviewed since prior progress note.  Most recent are:  Visit Vitals  /59   Pulse 73   Temp 97.9 °F (36.6 °C)   Resp 20   Ht 6' (1.829 m)   Wt 108.2 kg (238 lb 8 oz)   SpO2 97%   BMI 32.35 kg/m²       Intake/Output Summary (Last 24 hours) at 2020 1357  Last data filed at 2020 0615  Gross per 24 hour   Intake 300 ml Output 1700 ml   Net -1400 ml     PHYSICAL EXAM:  General: WD, WN. Alert, cooperative, no acute distress    HEENT: NC, Atraumatic. Anicteric sclerae. Lungs:  CTA Bilaterally. No Wheezing/Rhonchi/Rales. Heart:  Regular  rhythm,  No murmur (-), No Rubs, No Gallops  Abdomen: Soft, Non distended, Non tender.  +Bowel sounds, no HSM  Extremities: BLE edema +1  Neurologic:  Alert and oriented X 3. No acute neurological distress   Psych:   Good insight. Not anxious nor agitated. Lab and Radiology Data Reviewed: (see below)    Medications Reviewed: (see below)  PMH/SH reviewed - no change compared to H&P  ________________________________________________________________________  Total time spent with patient: 20 minutes ________________________________________________________________________  Care Plan discussed with:  Patient y   Family  y-spouse, brother and sister-in-law   RN y              Consultant:  elda La NP     Procedures: see electronic medical records for all procedures/Xrays and details which were not copied into this note but were reviewed prior to creation of Plan. LABS:  Recent Labs     02/24/20  0603 02/23/20  1205   WBC 6.5 8.1   HGB 12.8 13.2   HCT 39.7 39.9    247     Recent Labs     02/24/20  0603 02/23/20  1205 02/23/20  0409    138 142   K 3.2* 3.5 3.4*   * 106 109*   CO2 24 25 26   BUN 12 15 15   CREA 1.14 1.17 1.02   * 117* 107*   CA 8.8 8.7 8.7   MG 2.3 2.2 2.2     No results for input(s): SGOT, GPT, AP, TBIL, TP, ALB, GLOB, GGT, AML, LPSE in the last 72 hours. No lab exists for component: AMYP, HLPSE  Recent Labs     02/24/20  0603 02/23/20  1205   INR 1.1 1.1   PTP 11.0 11.5*      No results for input(s): FE, TIBC, PSAT, FERR in the last 72 hours. Lab Results   Component Value Date/Time    Folate 27.2 (H) 02/23/2020 12:05 PM     No results for input(s): PH, PCO2, PO2 in the last 72 hours.   No results for input(s): CPK, CKMB in the last 72 hours.     No lab exists for component: TROPONINI  Lab Results   Component Value Date/Time    Color YELLOW/STRAW 02/21/2020 03:41 AM    Appearance CLEAR 02/21/2020 03:41 AM    Specific gravity 1.011 02/21/2020 03:41 AM    Specific gravity >1.030 (H) 10/11/2010 10:45 AM    pH (UA) 5.5 02/21/2020 03:41 AM    Protein NEGATIVE  02/21/2020 03:41 AM    Glucose NEGATIVE  02/21/2020 03:41 AM    Ketone NEGATIVE  02/21/2020 03:41 AM    Bilirubin NEGATIVE  02/21/2020 03:41 AM    Urobilinogen 0.2 02/21/2020 03:41 AM    Nitrites NEGATIVE  02/21/2020 03:41 AM    Leukocyte Esterase NEGATIVE  02/21/2020 03:41 AM    Epithelial cells FEW 02/21/2020 03:41 AM    Bacteria NEGATIVE  02/21/2020 03:41 AM    WBC 0-4 02/21/2020 03:41 AM    RBC 0-5 02/21/2020 03:41 AM       MEDICATIONS:  Current Facility-Administered Medications   Medication Dose Route Frequency    enoxaparin (LOVENOX) injection 105 mg  105 mg SubCUTAneous Q12H    simethicone (MYLICON) tablet 80 mg  80 mg Oral QID PRN    potassium chloride 10 mEq in 100 ml IVPB  10 mEq IntraVENous Q2H    lisinopril (PRINIVIL, ZESTRIL) tablet 20 mg  20 mg Oral DAILY    [Held by provider] colesevelam (WELCHOL) tablet 1,250 mg  1,250 mg Oral BID WITH MEALS    sucralfate (CARAFATE) tablet 1 g  1 g Oral AC&HS    hydrALAZINE (APRESOLINE) tablet 50 mg  50 mg Oral TID    albuterol-ipratropium (DUO-NEB) 2.5 MG-0.5 MG/3 ML  3 mL Nebulization Q4H PRN    acetaminophen (TYLENOL) tablet 650 mg  650 mg Oral Q6H PRN    morphine injection 1 mg  1 mg IntraVENous Q4H PRN    sodium chloride (NS) flush 5-40 mL  5-40 mL IntraVENous Q8H    sodium chloride (NS) flush 5-40 mL  5-40 mL IntraVENous PRN    ondansetron (ZOFRAN) injection 4 mg  4 mg IntraVENous Q8H PRN    bisacodyL (DULCOLAX) tablet 5 mg  5 mg Oral DAILY PRN    carvediloL (COREG) tablet 25 mg  25 mg Oral BID WITH MEALS    amiodarone (CORDARONE) tablet 200 mg  200 mg Oral BID    pantoprazole (PROTONIX) tablet 40 mg  40 mg Oral DAILY  levothyroxine (SYNTHROID) tablet 50 mcg  50 mcg Oral ACB    isosorbide mononitrate ER (IMDUR) tablet 30 mg  30 mg Oral DAILY    labetaloL (NORMODYNE;TRANDATE) injection 20 mg  20 mg IntraVENous Q4H PRN    methocarbamol (ROBAXIN) tablet 750 mg  750 mg Oral BID    nicotine (NICODERM CQ) 21 mg/24 hr patch 1 Patch  1 Patch TransDERmal Q24H

## 2020-02-24 NOTE — PROGRESS NOTES
NAME: Celi Lin        :  1955        MRN:  [id number]        Assessment :    Plan:  ZONIA, resolved  HTN  Hypokalemia  Systolic HF (EF 61%)  Moderate AI --Creatinine peaked at 2.3, now 1.1    On Coreg 25 bid, Decrease hydralazine 50 to 25 tid/imdur 30, Lisinopril 20 mg daily (stay off Tekturna for now)    Poorly tolerates Lasix. A thiazide diuretic is likely not potent enough. Will d/c remaining IV K and start Aldactone 25 mg PO daily (titrate up as tolerated). Hold on other Loop diuretic for now. Down 7 liters of I&O. Fluid restrict/low salt diet/daily weight/strict I&O       Subjective:     Chief Complaint:  I used to see Mr. Brice Kurtz in the office ( to ) for resistant HTN. We found a regimen that worked for him (Tekturna, lisinopril, amlodipine and coreg) until about 6 months ago when he developed afib. Now admitted with SOB/Pulmonary edema. He is now on room air with no ALEXIS. No N/V. No dyspnea at present. We discussed trying Aldactone (start at a low dose and titrate up as needed). Review of Systems:    Symptom Y/N Comments  Symptom Y/N Comments   Fever/Chills    Chest Pain     Poor Appetite    Edema n    Cough    Abdominal Pain     Sputum    Joint Pain     SOB/DENG n   Pruritis/Rash     Nausea/vomit n   Tolerating PT/OT     Diarrhea    Tolerating Diet     Constipation    Other       Could not obtain due to:      Objective:     VITALS:   Last 24hrs VS reviewed since prior progress note.  Most recent are:  Visit Vitals  /59   Pulse 73   Temp 97.9 °F (36.6 °C)   Resp 20   Ht 6' (1.829 m)   Wt 108.2 kg (238 lb 8 oz)   SpO2 97%   BMI 32.35 kg/m²       Intake/Output Summary (Last 24 hours) at 2020 1331  Last data filed at 2020 0615  Gross per 24 hour   Intake 300 ml   Output 1700 ml   Net -1400 ml      Telemetry Reviewed:     PHYSICAL EXAM:  General: NAD  CTA  No edema    Lab Data Reviewed: (see below)    Medications Reviewed: (see below)    PMH/SH reviewed - no change compared to H&P  ________________________________________________________________________  Care Plan discussed with:  Patient y    Family  y    RN     Care Manager                    Consultant:          Comments   >50% of visit spent in counseling and coordination of care       ________________________________________________________________________  Kathy Berrios MD     Procedures: see electronic medical records for all procedures/Xrays and details which  were not copied into this note but were reviewed prior to creation of Plan. LABS:  Recent Labs     02/24/20  0603 02/23/20  1205   WBC 6.5 8.1   HGB 12.8 13.2   HCT 39.7 39.9    247     Recent Labs     02/24/20  0603 02/23/20  1205 02/23/20  0409    138 142   K 3.2* 3.5 3.4*   * 106 109*   CO2 24 25 26   BUN 12 15 15   CREA 1.14 1.17 1.02   * 117* 107*   CA 8.8 8.7 8.7   MG 2.3 2.2 2.2     No results for input(s): SGOT, GPT, AP, TBIL, TP, ALB, GLOB, GGT, AML, LPSE in the last 72 hours. No lab exists for component: AMYP, HLPSE  Recent Labs     02/24/20  0603 02/23/20  1205   INR 1.1 1.1   PTP 11.0 11.5*      No results for input(s): FE, TIBC, PSAT, FERR in the last 72 hours. Lab Results   Component Value Date/Time    Folate 27.2 (H) 02/23/2020 12:05 PM      No results for input(s): PH, PCO2, PO2 in the last 72 hours. No results for input(s): CPK, CKMB in the last 72 hours.     No lab exists for component: TROPONINI  No components found for: [place]  Lab Results   Component Value Date/Time    Color YELLOW/STRAW 02/21/2020 03:41 AM    Appearance CLEAR 02/21/2020 03:41 AM    Specific gravity 1.011 02/21/2020 03:41 AM    Specific gravity >1.030 (H) 10/11/2010 10:45 AM    pH (UA) 5.5 02/21/2020 03:41 AM    Protein NEGATIVE  02/21/2020 03:41 AM    Glucose NEGATIVE  02/21/2020 03:41 AM    Ketone NEGATIVE  02/21/2020 03:41 AM    Bilirubin NEGATIVE  02/21/2020 03:41 AM    Urobilinogen 0.2 02/21/2020 03:41 AM    Nitrites NEGATIVE  02/21/2020 03:41 AM    Leukocyte Esterase NEGATIVE  02/21/2020 03:41 AM    Epithelial cells FEW 02/21/2020 03:41 AM    Bacteria NEGATIVE  02/21/2020 03:41 AM    WBC 0-4 02/21/2020 03:41 AM    RBC 0-5 02/21/2020 03:41 AM       MEDICATIONS:  Current Facility-Administered Medications   Medication Dose Route Frequency    enoxaparin (LOVENOX) injection 105 mg  105 mg SubCUTAneous Q12H    simethicone (MYLICON) tablet 80 mg  80 mg Oral QID PRN    potassium chloride 10 mEq in 100 ml IVPB  10 mEq IntraVENous Q2H    lisinopril (PRINIVIL, ZESTRIL) tablet 20 mg  20 mg Oral DAILY    [Held by provider] colesevelam (WELCHOL) tablet 1,250 mg  1,250 mg Oral BID WITH MEALS    sucralfate (CARAFATE) tablet 1 g  1 g Oral AC&HS    hydrALAZINE (APRESOLINE) tablet 50 mg  50 mg Oral TID    albuterol-ipratropium (DUO-NEB) 2.5 MG-0.5 MG/3 ML  3 mL Nebulization Q4H PRN    acetaminophen (TYLENOL) tablet 650 mg  650 mg Oral Q6H PRN    morphine injection 1 mg  1 mg IntraVENous Q4H PRN    sodium chloride (NS) flush 5-40 mL  5-40 mL IntraVENous Q8H    sodium chloride (NS) flush 5-40 mL  5-40 mL IntraVENous PRN    ondansetron (ZOFRAN) injection 4 mg  4 mg IntraVENous Q8H PRN    bisacodyL (DULCOLAX) tablet 5 mg  5 mg Oral DAILY PRN    carvediloL (COREG) tablet 25 mg  25 mg Oral BID WITH MEALS    amiodarone (CORDARONE) tablet 200 mg  200 mg Oral BID    pantoprazole (PROTONIX) tablet 40 mg  40 mg Oral DAILY    levothyroxine (SYNTHROID) tablet 50 mcg  50 mcg Oral ACB    isosorbide mononitrate ER (IMDUR) tablet 30 mg  30 mg Oral DAILY    labetaloL (NORMODYNE;TRANDATE) injection 20 mg  20 mg IntraVENous Q4H PRN    methocarbamol (ROBAXIN) tablet 750 mg  750 mg Oral BID    nicotine (NICODERM CQ) 21 mg/24 hr patch 1 Patch  1 Patch TransDERmal Q24H

## 2020-02-25 ENCOUNTER — ANESTHESIA (OUTPATIENT)
Dept: ENDOSCOPY | Age: 65
DRG: 291 | End: 2020-02-25
Payer: MEDICARE

## 2020-02-25 ENCOUNTER — ANESTHESIA EVENT (OUTPATIENT)
Dept: ENDOSCOPY | Age: 65
DRG: 291 | End: 2020-02-25
Payer: MEDICARE

## 2020-02-25 LAB
ANION GAP SERPL CALC-SCNC: 4 MMOL/L (ref 5–15)
BUN SERPL-MCNC: 17 MG/DL (ref 6–20)
BUN/CREAT SERPL: 12 (ref 12–20)
CALCIUM SERPL-MCNC: 8.4 MG/DL (ref 8.5–10.1)
CHLORIDE SERPL-SCNC: 108 MMOL/L (ref 97–108)
CO2 SERPL-SCNC: 25 MMOL/L (ref 21–32)
CREAT SERPL-MCNC: 1.38 MG/DL (ref 0.7–1.3)
GLUCOSE SERPL-MCNC: 96 MG/DL (ref 65–100)
INR PPP: 1.1 (ref 0.9–1.1)
MAGNESIUM SERPL-MCNC: 2.1 MG/DL (ref 1.6–2.4)
POTASSIUM SERPL-SCNC: 3.3 MMOL/L (ref 3.5–5.1)
PROTHROMBIN TIME: 11 SEC (ref 9–11.1)
SODIUM SERPL-SCNC: 137 MMOL/L (ref 136–145)

## 2020-02-25 PROCEDURE — 74011250637 HC RX REV CODE- 250/637: Performed by: INTERNAL MEDICINE

## 2020-02-25 PROCEDURE — 65660000000 HC RM CCU STEPDOWN

## 2020-02-25 PROCEDURE — 74011250636 HC RX REV CODE- 250/636: Performed by: INTERNAL MEDICINE

## 2020-02-25 PROCEDURE — 0DB78ZX EXCISION OF STOMACH, PYLORUS, VIA NATURAL OR ARTIFICIAL OPENING ENDOSCOPIC, DIAGNOSTIC: ICD-10-PCS | Performed by: INTERNAL MEDICINE

## 2020-02-25 PROCEDURE — 85610 PROTHROMBIN TIME: CPT

## 2020-02-25 PROCEDURE — 36415 COLL VENOUS BLD VENIPUNCTURE: CPT

## 2020-02-25 PROCEDURE — 76040000019: Performed by: INTERNAL MEDICINE

## 2020-02-25 PROCEDURE — 94760 N-INVAS EAR/PLS OXIMETRY 1: CPT

## 2020-02-25 PROCEDURE — 76060000031 HC ANESTHESIA FIRST 0.5 HR: Performed by: INTERNAL MEDICINE

## 2020-02-25 PROCEDURE — 0DB68ZX EXCISION OF STOMACH, VIA NATURAL OR ARTIFICIAL OPENING ENDOSCOPIC, DIAGNOSTIC: ICD-10-PCS | Performed by: INTERNAL MEDICINE

## 2020-02-25 PROCEDURE — 74011250637 HC RX REV CODE- 250/637: Performed by: NURSE PRACTITIONER

## 2020-02-25 PROCEDURE — 74011250636 HC RX REV CODE- 250/636: Performed by: NURSE PRACTITIONER

## 2020-02-25 PROCEDURE — 88342 IMHCHEM/IMCYTCHM 1ST ANTB: CPT

## 2020-02-25 PROCEDURE — 74011250636 HC RX REV CODE- 250/636: Performed by: ANESTHESIOLOGY

## 2020-02-25 PROCEDURE — 80048 BASIC METABOLIC PNL TOTAL CA: CPT

## 2020-02-25 PROCEDURE — 88305 TISSUE EXAM BY PATHOLOGIST: CPT

## 2020-02-25 PROCEDURE — 77030039825 HC MSK NSL PAP SUPERNO2VA VYRM -B: Performed by: ANESTHESIOLOGY

## 2020-02-25 PROCEDURE — 77030019988 HC FCPS ENDOSC DISP BSC -B: Performed by: INTERNAL MEDICINE

## 2020-02-25 PROCEDURE — 0DB48ZX EXCISION OF ESOPHAGOGASTRIC JUNCTION, VIA NATURAL OR ARTIFICIAL OPENING ENDOSCOPIC, DIAGNOSTIC: ICD-10-PCS | Performed by: INTERNAL MEDICINE

## 2020-02-25 PROCEDURE — 83735 ASSAY OF MAGNESIUM: CPT

## 2020-02-25 PROCEDURE — 74011000250 HC RX REV CODE- 250: Performed by: ANESTHESIOLOGY

## 2020-02-25 RX ORDER — PANTOPRAZOLE SODIUM 40 MG/1
40 TABLET, DELAYED RELEASE ORAL
Status: DISCONTINUED | OUTPATIENT
Start: 2020-02-25 | End: 2020-02-26 | Stop reason: HOSPADM

## 2020-02-25 RX ORDER — PROPOFOL 10 MG/ML
INJECTION, EMULSION INTRAVENOUS AS NEEDED
Status: DISCONTINUED | OUTPATIENT
Start: 2020-02-25 | End: 2020-02-25 | Stop reason: HOSPADM

## 2020-02-25 RX ORDER — FLUMAZENIL 0.1 MG/ML
0.2 INJECTION INTRAVENOUS
Status: DISCONTINUED | OUTPATIENT
Start: 2020-02-25 | End: 2020-02-25 | Stop reason: HOSPADM

## 2020-02-25 RX ORDER — NALOXONE HYDROCHLORIDE 0.4 MG/ML
0.4 INJECTION, SOLUTION INTRAMUSCULAR; INTRAVENOUS; SUBCUTANEOUS
Status: DISCONTINUED | OUTPATIENT
Start: 2020-02-25 | End: 2020-02-25 | Stop reason: HOSPADM

## 2020-02-25 RX ORDER — SODIUM CHLORIDE 0.9 % (FLUSH) 0.9 %
5-40 SYRINGE (ML) INJECTION AS NEEDED
Status: DISCONTINUED | OUTPATIENT
Start: 2020-02-25 | End: 2020-02-26 | Stop reason: HOSPADM

## 2020-02-25 RX ORDER — SODIUM CHLORIDE 0.9 % (FLUSH) 0.9 %
5-40 SYRINGE (ML) INJECTION EVERY 8 HOURS
Status: DISCONTINUED | OUTPATIENT
Start: 2020-02-25 | End: 2020-02-26 | Stop reason: HOSPADM

## 2020-02-25 RX ORDER — MIDAZOLAM HYDROCHLORIDE 1 MG/ML
.25-5 INJECTION, SOLUTION INTRAMUSCULAR; INTRAVENOUS
Status: DISCONTINUED | OUTPATIENT
Start: 2020-02-25 | End: 2020-02-25 | Stop reason: HOSPADM

## 2020-02-25 RX ORDER — LIDOCAINE HYDROCHLORIDE 20 MG/ML
INJECTION, SOLUTION EPIDURAL; INFILTRATION; INTRACAUDAL; PERINEURAL AS NEEDED
Status: DISCONTINUED | OUTPATIENT
Start: 2020-02-25 | End: 2020-02-25 | Stop reason: HOSPADM

## 2020-02-25 RX ORDER — POTASSIUM CHLORIDE 750 MG/1
40 TABLET, FILM COATED, EXTENDED RELEASE ORAL
Status: DISCONTINUED | OUTPATIENT
Start: 2020-02-25 | End: 2020-02-25

## 2020-02-25 RX ORDER — POTASSIUM CHLORIDE 7.45 MG/ML
10 INJECTION INTRAVENOUS
Status: COMPLETED | OUTPATIENT
Start: 2020-02-25 | End: 2020-02-25

## 2020-02-25 RX ORDER — SODIUM CHLORIDE 9 MG/ML
100 INJECTION, SOLUTION INTRAVENOUS CONTINUOUS
Status: DISPENSED | OUTPATIENT
Start: 2020-02-25 | End: 2020-02-25

## 2020-02-25 RX ADMIN — HYDRALAZINE HYDROCHLORIDE 25 MG: 25 TABLET, FILM COATED ORAL at 21:14

## 2020-02-25 RX ADMIN — HYDRALAZINE HYDROCHLORIDE 25 MG: 25 TABLET, FILM COATED ORAL at 18:45

## 2020-02-25 RX ADMIN — SUCRALFATE 1 G: 1 TABLET ORAL at 18:45

## 2020-02-25 RX ADMIN — LIDOCAINE HYDROCHLORIDE 100 MG: 20 INJECTION, SOLUTION EPIDURAL; INFILTRATION; INTRACAUDAL; PERINEURAL at 15:51

## 2020-02-25 RX ADMIN — MORPHINE SULFATE 1 MG: 2 INJECTION, SOLUTION INTRAMUSCULAR; INTRAVENOUS at 00:19

## 2020-02-25 RX ADMIN — Medication 10 ML: at 21:15

## 2020-02-25 RX ADMIN — SPIRONOLACTONE 25 MG: 25 TABLET ORAL at 08:24

## 2020-02-25 RX ADMIN — POTASSIUM CHLORIDE 10 MEQ: 10 INJECTION, SOLUTION INTRAVENOUS at 09:53

## 2020-02-25 RX ADMIN — CARVEDILOL 25 MG: 12.5 TABLET, FILM COATED ORAL at 08:24

## 2020-02-25 RX ADMIN — Medication 10 ML: at 05:07

## 2020-02-25 RX ADMIN — LISINOPRIL 20 MG: 20 TABLET ORAL at 08:24

## 2020-02-25 RX ADMIN — METHOCARBAMOL TABLETS 750 MG: 500 TABLET, COATED ORAL at 08:24

## 2020-02-25 RX ADMIN — CARVEDILOL 25 MG: 12.5 TABLET, FILM COATED ORAL at 18:45

## 2020-02-25 RX ADMIN — SUCRALFATE 1 G: 1 TABLET ORAL at 08:24

## 2020-02-25 RX ADMIN — AMIODARONE HYDROCHLORIDE 200 MG: 200 TABLET ORAL at 08:24

## 2020-02-25 RX ADMIN — Medication 10 ML: at 14:31

## 2020-02-25 RX ADMIN — HYDRALAZINE HYDROCHLORIDE 25 MG: 25 TABLET, FILM COATED ORAL at 07:25

## 2020-02-25 RX ADMIN — AMIODARONE HYDROCHLORIDE 200 MG: 200 TABLET ORAL at 18:45

## 2020-02-25 RX ADMIN — POTASSIUM CHLORIDE 10 MEQ: 10 INJECTION, SOLUTION INTRAVENOUS at 11:22

## 2020-02-25 RX ADMIN — SUCRALFATE 1 G: 1 TABLET ORAL at 21:14

## 2020-02-25 RX ADMIN — PANTOPRAZOLE SODIUM 40 MG: 40 TABLET, DELAYED RELEASE ORAL at 18:45

## 2020-02-25 RX ADMIN — ISOSORBIDE MONONITRATE 30 MG: 30 TABLET, EXTENDED RELEASE ORAL at 08:23

## 2020-02-25 RX ADMIN — PANTOPRAZOLE SODIUM 40 MG: 40 TABLET, DELAYED RELEASE ORAL at 08:24

## 2020-02-25 RX ADMIN — PROPOFOL 250 MG: 10 INJECTION, EMULSION INTRAVENOUS at 16:04

## 2020-02-25 RX ADMIN — SODIUM CHLORIDE 100 ML/HR: 900 INJECTION, SOLUTION INTRAVENOUS at 15:31

## 2020-02-25 RX ADMIN — ENOXAPARIN SODIUM 105 MG: 120 INJECTION SUBCUTANEOUS at 00:06

## 2020-02-25 RX ADMIN — LEVOTHYROXINE SODIUM 50 MCG: 50 TABLET ORAL at 08:24

## 2020-02-25 RX ADMIN — METHOCARBAMOL TABLETS 750 MG: 500 TABLET, COATED ORAL at 18:45

## 2020-02-25 NOTE — PROGRESS NOTES
Problem: Falls - Risk of  Goal: *Absence of Falls  Description  Document Anna Quick Fall Risk and appropriate interventions in the flowsheet.   Outcome: Progressing Towards Goal  Note: Fall Risk Interventions:            Medication Interventions: Bed/chair exit alarm, Teach patient to arise slowly, Patient to call before getting OOB                   Problem: Breathing Pattern - Ineffective  Goal: *Absence of hypoxia  Outcome: Progressing Towards Goal

## 2020-02-25 NOTE — ANESTHESIA PREPROCEDURE EVALUATION
Anesthetic History   No history of anesthetic complications            Review of Systems / Medical History  Patient summary reviewed, nursing notes reviewed and pertinent labs reviewed    Pulmonary  Within defined limits                 Neuro/Psych       CVA (no symptoms, seen on scan): no residual symptoms  TIA     Cardiovascular    Hypertension  Valvular problems/murmurs: tricuspid insufficiency    CHF  Dysrhythmias : atrial fibrillation  CAD (s/p stent 2010), cardiac stents and hyperlipidemia    Exercise tolerance: >4 METS  Comments: TTE (2/21/20):  ·Normal systolic function (ejection fraction normal). Mildly dilated left ventricle. Mild concentric hypertrophy. Estimated left ventricular ejection fraction is 50 - 55%. Visually measured ejection fraction. ·Mildly dilated left atrium. ·Mildly dilated right atrium. ·Trace mitral valve regurgitation is present. ·Mild to moderate pulmonary hypertension. ·Mild tricuspid valve regurgitation is present.    GI/Hepatic/Renal               Comments: Epigastric Pain Endo/Other      Hypothyroidism: well controlled  Obesity  Pertinent negatives: No cancer (prostate)  Comments: H/O Prostate Cancer s/p Prostatectomy (2010) Other Findings            Physical Exam    Airway  Mallampati: I  TM Distance: > 6 cm  Neck ROM: normal range of motion   Mouth opening: Normal     Cardiovascular    Rhythm: regular  Rate: normal      Pertinent negatives: No murmur   Dental      Comments: Multiple missing teeth   Pulmonary  Breath sounds clear to auscultation               Abdominal  GI exam deferred       Other Findings            Anesthetic Plan    ASA: 3  Anesthesia type: general and total IV anesthesia          Induction: Intravenous  Anesthetic plan and risks discussed with: Patient      Propofol MAC

## 2020-02-25 NOTE — PROGRESS NOTES
Anesthesia reports 250mg Propofol, 100mg Lidocaine and 200mL NS given during procedure. Received report from anesthesia staff on vital signs and status of patient.     Endoscope was pre-cleaned at the bedside immediately following procedure by SUZETTE Meyers

## 2020-02-25 NOTE — PROGRESS NOTES
NAME: Baltazar Harris        :  1955        MRN:  [id number]        Assessment :    Plan:  ZONIA, resolved  HTN  Hypokalemia  Systolic HF (EF 55%)  Moderate AI --Creatinine peaked at 2.3, now 1.1 to 1.4     On Coreg 25 bid, hydralazine 25 tid/imdur 30, Lisinopril 20 mg daily (stay off Tekturna for now)    Poorly tolerates Lasix. A thiazide diuretic is likely not potent enough. Aldactone 25 mg PO daily for now (titrate up as tolerated). Hold on other Loop diuretic for now. Down 9 liters of I&O, but weight is up? ? Fluid restrict/low salt diet/daily weight/strict I&O    Stable for discharge from my standpoint. I will see him in the office in a week or two. Subjective:     Chief Complaint:  Feeling well. No new issues. We discussed the above. Review of Systems:    Symptom Y/N Comments  Symptom Y/N Comments   Fever/Chills    Chest Pain     Poor Appetite    Edema n    Cough    Abdominal Pain     Sputum    Joint Pain     SOB/DENG n   Pruritis/Rash     Nausea/vomit n   Tolerating PT/OT     Diarrhea    Tolerating Diet     Constipation    Other       Could not obtain due to:      Objective:     VITALS:   Last 24hrs VS reviewed since prior progress note.  Most recent are:  Visit Vitals  /60   Pulse 67   Temp 97.9 °F (36.6 °C)   Resp 18   Ht 6' (1.829 m)   Wt 111 kg (244 lb 12.8 oz)   SpO2 98%   BMI 33.20 kg/m²       Intake/Output Summary (Last 24 hours) at 2020 2334  Last data filed at 2020 6206  Gross per 24 hour   Intake    Output 1925 ml   Net -1925 ml      Telemetry Reviewed:     PHYSICAL EXAM:  General: NAD  CTA  No edema    Lab Data Reviewed: (see below)    Medications Reviewed: (see below)    PMH/SH reviewed - no change compared to H&P  ________________________________________________________________________  Care Plan discussed with:  Patient y    Family  y    RN     Care Manager Consultant:          Comments   >50% of visit spent in counseling and coordination of care       ________________________________________________________________________  Ritika Morales MD     Procedures: see electronic medical records for all procedures/Xrays and details which  were not copied into this note but were reviewed prior to creation of Plan. LABS:  Recent Labs     02/24/20  0603 02/23/20  1205   WBC 6.5 8.1   HGB 12.8 13.2   HCT 39.7 39.9    247     Recent Labs     02/25/20  0009 02/24/20  0603 02/23/20  1205    141 138   K 3.3* 3.2* 3.5    109* 106   CO2 25 24 25   BUN 17 12 15   CREA 1.38* 1.14 1.17   GLU 96 109* 117*   CA 8.4* 8.8 8.7   MG 2.1 2.3 2.2     No results for input(s): SGOT, GPT, AP, TBIL, TP, ALB, GLOB, GGT, AML, LPSE in the last 72 hours. No lab exists for component: AMYP, HLPSE  Recent Labs     02/25/20  0009 02/24/20  0603 02/23/20  1205   INR 1.1 1.1 1.1   PTP 11.0 11.0 11.5*      No results for input(s): FE, TIBC, PSAT, FERR in the last 72 hours. Lab Results   Component Value Date/Time    Folate 27.2 (H) 02/23/2020 12:05 PM      No results for input(s): PH, PCO2, PO2 in the last 72 hours. No results for input(s): CPK, CKMB in the last 72 hours.     No lab exists for component: TROPONINI  No components found for: [place]  Lab Results   Component Value Date/Time    Color YELLOW/STRAW 02/21/2020 03:41 AM    Appearance CLEAR 02/21/2020 03:41 AM    Specific gravity 1.011 02/21/2020 03:41 AM    Specific gravity >1.030 (H) 10/11/2010 10:45 AM    pH (UA) 5.5 02/21/2020 03:41 AM    Protein NEGATIVE  02/21/2020 03:41 AM    Glucose NEGATIVE  02/21/2020 03:41 AM    Ketone NEGATIVE  02/21/2020 03:41 AM    Bilirubin NEGATIVE  02/21/2020 03:41 AM    Urobilinogen 0.2 02/21/2020 03:41 AM    Nitrites NEGATIVE  02/21/2020 03:41 AM    Leukocyte Esterase NEGATIVE  02/21/2020 03:41 AM    Epithelial cells FEW 02/21/2020 03:41 AM    Bacteria NEGATIVE  02/21/2020 03:41 AM    WBC 0-4 02/21/2020 03:41 AM    RBC 0-5 02/21/2020 03:41 AM       MEDICATIONS:  Current Facility-Administered Medications   Medication Dose Route Frequency    simethicone (MYLICON) tablet 80 mg  80 mg Oral QID PRN    spironolactone (ALDACTONE) tablet 25 mg  25 mg Oral DAILY    hydrALAZINE (APRESOLINE) tablet 25 mg  25 mg Oral TID    lisinopril (PRINIVIL, ZESTRIL) tablet 20 mg  20 mg Oral DAILY    [Held by provider] [facility]) tablet 1,250 mg  1,250 mg Oral BID WITH MEALS    sucralfate (CARAFATE) tablet 1 g  1 g Oral AC&HS    albuterol-ipratropium (DUO-NEB) 2.5 MG-0.5 MG/3 ML  3 mL Nebulization Q4H PRN    acetaminophen (TYLENOL) tablet 650 mg  650 mg Oral Q6H PRN    morphine injection 1 mg  1 mg IntraVENous Q4H PRN    sodium chloride (NS) flush 5-40 mL  5-40 mL IntraVENous Q8H    sodium chloride (NS) flush 5-40 mL  5-40 mL IntraVENous PRN    ondansetron (ZOFRAN) injection 4 mg  4 mg IntraVENous Q8H PRN    bisacodyL (DULCOLAX) tablet 5 mg  5 mg Oral DAILY PRN    carvediloL (COREG) tablet 25 mg  25 mg Oral BID WITH MEALS    amiodarone (CORDARONE) tablet 200 mg  200 mg Oral BID    pantoprazole (PROTONIX) tablet 40 mg  40 mg Oral DAILY    levothyroxine (SYNTHROID) tablet 50 mcg  50 mcg Oral ACB    isosorbide mononitrate ER (IMDUR) tablet 30 mg  30 mg Oral DAILY    labetaloL (NORMODYNE;TRANDATE) injection 20 mg  20 mg IntraVENous Q4H PRN    methocarbamol (ROBAXIN) tablet 750 mg  750 mg Oral BID    nicotine (NICODERM CQ) 21 mg/24 hr patch 1 Patch  1 Patch TransDERmal Q24H

## 2020-02-25 NOTE — PROGRESS NOTES
Neurology Note    Patient ID:  Hannah García  [id number]  37 y.o.  1955      Date of Consultation:  February 25, 2020    Subjective: My neurological symptoms are better. History of Present Illness:   Hannah García is a 77 y.o. male with a past medical history of atrial fibrillation, hypertension congestive heart failure and hypothyroidism who had developed acute onset of left-sided motor and sensory deficit after having a hypotensive episode. Over the coming hours his symptoms returned back to normal.      Yesterday, he did develop some neck and posterior head pain and sensory disturbance which resolved. He denies any numbness, tingling, or motor weakness today.       Past Medical History:   Diagnosis Date    Arrhythmia 01/03/2020    AFIB     CAD (coronary artery disease)     h/o stent- '10    Cancer [facility]) 2010    prostate    Congestive heart failure ([facility] [address]     Hypertension     Stroke [facility]) May or June 2010    lacunar  infarctions (found on scan- no sxs)    Thyroid disease     hypo        Past Surgical History:   Procedure Laterality Date    CARDIAC SURG PROCEDURE UNLIST  2010    coronary stent    EXCIS KNEE CARTILAGE,MEDIAL OR LAT  1980's x3    HX CHOLECYSTECTOMY  1991    HX HEENT  1962    T&A    HX HERNIA REPAIR  1996    HX MALIGNANT SKIN LESION EXCISION      2004 and 2017    HX ORTHOPAEDIC  1974    Ganglion cyst left wrist    HX ORTHOPAEDIC Left 2017    shoulder repair    HX PROSTATECTOMY  2010        Family History   Problem Relation Age of Onset    Dementia Mother     Other Father 55        cerebral hemorrhage        Social History     Tobacco Use    Smoking status: Never Smoker    Smokeless tobacco: Current User   Substance Use Topics    Alcohol use: Yes     Comment: occas        Allergies   Allergen Reactions    Clonidine Anxiety    Fish Oil Other (comments)    Niacin Other (comments)     Decreased vision    Statins-Hmg-Coa Reductase Inhibitors Other (comments) Muscle and Joint pains    Zetia [Ezetimibe] Myalgia     Pt reports getting joint and muscle pain. Prior to Admission medications    Medication Sig Start Date End Date Taking? Authorizing Provider   amLODIPine (NORVASC) 2.5 mg tablet Take 2.5 mg by mouth daily as needed (if SBP > 160 in the evenings). Yes Provider, Historical   amiodarone (CORDARONE) 200 mg tablet Take  by mouth two (2) times a day. Yes Provider, Historical   apixaban (ELIQUIS) 5 mg tablet Take 5 mg by mouth two (2) times a day. Yes Provider, Historical   hydroCHLOROthiazide (HYDRODIURIL) 12.5 mg tablet Take 1 Tab by mouth daily. 2/3/20  Yes Herman Villa III, DO   isosorbide mononitrate ER (IMDUR) 30 mg tablet Take 1 Tab by mouth daily. 1/31/20  Yes Cedric Resendez MD   aliskiren (TEKTURNA) 300 mg tablet Take 1 Tab by mouth nightly. Start if blood pressure remain >=140 upper or 90/100 lower 5/3/19  Yes Herman Villa III, DO   carvedilol (COREG) 25 mg tablet Take 1 Tab by mouth two (2) times daily (with meals). 5/3/19  Yes Herman Villa III, DO   levothyroxine (SYNTHROID) 50 mcg tablet Take 1 Tab by mouth Daily (before breakfast). 5/3/19  Yes Herman Villa III, DO   lisinopril (PRINIVIL, ZESTRIL) 40 mg tablet Take 1 Tab by mouth two (2) times a day. Start if blood pressure remain >=140 upper or 90/100 lower 5/3/19  Yes Herman Villa III, DO   pantoprazole (PROTONIX) 40 mg tablet Take 1 Tab by mouth daily. 5/3/19  Yes Herman Villa III, DO   cholecalciferol, vitamin D3, (VITAMIN D3) 2,000 unit tab Take 2,000 Units by mouth daily. Yes Provider, Historical   MULTI-VITAMIN HI-PO PO Take 1 Tab by mouth daily.  10/11/10  Yes Provider, Historical     Current Facility-Administered Medications   Medication Dose Route Frequency    simethicone (MYLICON) tablet 80 mg  80 mg Oral QID PRN    spironolactone (ALDACTONE) tablet 25 mg  25 mg Oral DAILY    hydrALAZINE (APRESOLINE) tablet 25 mg  25 mg Oral TID    lisinopril (PRINIVIL, ZESTRIL) tablet 20 mg  20 mg Oral DAILY    [Held by provider] [facility]) tablet 1,250 mg  1,250 mg Oral BID WITH MEALS    sucralfate (CARAFATE) tablet 1 g  1 g Oral AC&HS    albuterol-ipratropium (DUO-NEB) 2.5 MG-0.5 MG/3 ML  3 mL Nebulization Q4H PRN    acetaminophen (TYLENOL) tablet 650 mg  650 mg Oral Q6H PRN    morphine injection 1 mg  1 mg IntraVENous Q4H PRN    sodium chloride (NS) flush 5-40 mL  5-40 mL IntraVENous Q8H    sodium chloride (NS) flush 5-40 mL  5-40 mL IntraVENous PRN    ondansetron (ZOFRAN) injection 4 mg  4 mg IntraVENous Q8H PRN    bisacodyL (DULCOLAX) tablet 5 mg  5 mg Oral DAILY PRN    carvediloL (COREG) tablet 25 mg  25 mg Oral BID WITH MEALS    amiodarone (CORDARONE) tablet 200 mg  200 mg Oral BID    pantoprazole (PROTONIX) tablet 40 mg  40 mg Oral DAILY    levothyroxine (SYNTHROID) tablet 50 mcg  50 mcg Oral ACB    isosorbide mononitrate ER (IMDUR) tablet 30 mg  30 mg Oral DAILY    labetaloL (NORMODYNE;TRANDATE) injection 20 mg  20 mg IntraVENous Q4H PRN    methocarbamol (ROBAXIN) tablet 750 mg  750 mg Oral BID    nicotine (NICODERM CQ) 21 mg/24 hr patch 1 Patch  1 Patch TransDERmal Q24H     Review of Systems:    General, constitutional: negative  Eyes, vision: negative  Ears, nose, throat: negative  Cardiovascular, heart: negative  Respiratory: Shortness of breath  Gastrointestinal: negative  Genitourinary: negative  Musculoskeletal: negative  Skin and integumentary: negative  Psychiatric: negative  Endocrine: negative  Neurological: negative, except for HPI  Hematologic/lymphatic: negative  Allergy/immunology: negative    Objective:     Visit Vitals  /65 (BP 1 Location: Left arm, BP Patient Position: At rest)   Pulse 68   Temp 98.3 °F (36.8 °C)   Resp 24   Ht 6' (1.829 m)   Wt 244 lb 12.8 oz (111 kg)   SpO2 98%   BMI 33.20 kg/m²       Physical Exam:    General:  appears well nourished in no acute distress  Lower extremity: peripheral pulses palpable and no edema  Skin: intact      Neurological exam:    Awake, alert, oriented to person, place and time  Recent and remote memory were normal  Attention and concentration were intact  Language was intact. There was no aphasia  Speech: no dysarthria  Fund of knowledge was preserved    Cranial nerves:   II-XII were tested    Perrrla  Visual fields were full  Eomi, no evidence of nystagmus  Facial sensation:  normal and symmetric  Facial motor: normal and symmetric  Hearing intact    Motor: Tone normal    No evidence of fasciculations    Strength testing:   deltoid triceps biceps Wrist ext. Wrist flex. intrinsics Hip flex. Hip ext. Knee ext. Knee flex Dorsi flex Plantar flex   Right 5 5 5 5 5 5 5 5 5 5 5 5   Left 5 5 5 5 5 5 5 5 5 5 5 5     His pronator drift was absent  Sensory: Intact to pinprick throughout      Cerebellar testing:  no tremor apparent, finger/nose and young were intact  Labs:     Lab Results   Component Value Date/Time    Hemoglobin A1c 5.5 02/23/2020 12:05 PM    Sodium 137 02/25/2020 12:09 AM    Potassium 3.3 (L) 02/25/2020 12:09 AM    Chloride 108 02/25/2020 12:09 AM    Glucose 96 02/25/2020 12:09 AM    BUN 17 02/25/2020 12:09 AM    Creatinine 1.38 (H) 02/25/2020 12:09 AM    Calcium 8.4 (L) 02/25/2020 12:09 AM    WBC 6.5 02/24/2020 06:03 AM    HCT 39.7 02/24/2020 06:03 AM    HGB 12.8 02/24/2020 06:03 AM    PLATELET 975 20/89/1633 06:03 AM       Imaging:    Results from Hospital Encounter encounter on 05/06/10   MRI BRAIN W AND W/O CONTRAST    Narrative **Final Report*       ICD Codes / Adm. Diagnosis: 434.00   / CEREBRAL THROMBOSIS WITH CEREB    Examination:  BRAIN W AND WO CON  - [id number] - May  6 2010  9:48AM  Accession No:  [id number]  Reason:  CEREBRAL THROMBOSIS WITH CEREBRAL INFARCTION      REPORT:  SEQUENCES:   Sagittal T1, axial T1, T2, GRE and FLAIR; axial and coronal T1   post enhancement; and diffusion imaging of the brain.     Images were obtained before and after IV administration of 20 mL of   Magnevist.    FINDINGS:  T2 hyperintense foci in the right basal ganglia associated with   T1 hypointensity are compatible with chronic infarcts. One or 2 punctate   similar foci are present in the left basal ganglia. Diffusion imaging shows   no acute ischemia. The enhancement pattern is normal.  There is no evidence for mass,   hemorrhage, shift, or hydrocephalus. There is no extra-axial fluid   collection. IMPRESSION: Chronic ischemic changes of the basal ganglia, greater on the   right. No acute process seen. Interpreting/Reading Doctor: Silke Means ([id number])  Transcribed: n/a on 05/06/2010  Approved: Silke Means ([id number])  05/06/2010             Distribution:  Attending Doctor: Wesley Aguilera               Results from Hospital Encounter encounter on 02/21/20   CT CODE NEURO PERF W CBF    Narrative **PRELIMINARY REPORT**    There is atherosclerosis of the carotid artery origins, left worse than right,  but no evidence of flow-limiting stenosis. There is intracranial atherosclerosis  but no evidence of large vessel occlusion. The right A1 segment is diminutive. There is evidence of bilateral pleural effusions, right greater than left, small  in size. There are degenerative changes throughout the spine without acute  osseous abnormality. Review of perfusion data demonstrates no large vessel occlusion. Preliminary report was provided by Dr. Montse You, the on-call radiologist, at 12:21  PM    Final report to follow. **END PRELIMINARY REPORT**                 I did review the MRI of his brain performed on 2/23/ 2020. There was no acute stroke. There was chronic micro-ischemic changes.     TSH was 7.43    CTA revealed no flow-limiting stenosis    Assessment and Plan:    The patient is a pleasant 69-year-old gentleman with multiple medical problems including atrial fibrillation, hypertension, congestive heart failure, and hypothyroidism who was admitted for a acute CHF exacerbation. During the hospitalization, he developed transient sensory disturbance and weakness on his left side which had resolved. His MRI revealed no acute stroke. Transient left-sided sensory deficit :    TIA associated with his hypoperfusion due to hypotension. I would recommend continuing Eliquis. Dyslipidemia: . I would defer to the primary team in regards to treatment. Left-sided head and neck pain has resolved:    He does have a longstanding history of cervical spine degenerative disease. This most likely contributed to those symptoms. He did take a Tylenol last night and symptoms have improved. Given the TIA, he should follow-up with a neurologist approximately 4 to 6 weeks after discharge. If there are any other additional questions or concerns, do not hesitate to contact me and I will come back by.                Signed By:  Ulises Solis DO FAAN    February 25, 2020

## 2020-02-25 NOTE — PROGRESS NOTES
I reviewed pertinent labs and imaging, and discussed /agreed on the plan of care with Dr. Thu Loving. Hospitalist Progress Note    NAME: Paulina Berg   :  1955   MRN:  [id number]     History of Present Illness:  77years old male from home with past medical history significant for atrial fibrillation, hypertension: CHF, hypothyroidism presented to the hospital for evaluation of shortness of breath, patient stated he was walking at [address] he started complaining from severe shortness of breath associated with a dry cough and some chest pressure denies any fever chills denies any productive cough, chest x-ray was done and showed cardiomegaly with mild pulmonary edema. Assessment / Plan:  EGD today. Discharge planning    Acute hypoxic respiratory failure secondary to acute on chronic systolic heart failure, POA  Secondary hyperaldosteronism r/t HF  Cardiomegaly  Pulmonary edema   HTN  Elevated troponin   Paroxsymal atrial fibrillation, s/p BELKIS/CV (was converted to NSR after the procedure   Hypercoagulable state secondary to afib   · CXR with cardiomegaly and mild pulmonary edema   · NT pro-BNP 1569; down from 2750  · ECHO:   Mildly dilated left ventricle. Mild concentric hypertrophy . The estimated ejection fraction is 50 - 55%. Visually measured ejection fraction. Atrial fibrillation observed  · Continue with Coreg, Imdur, hydralazine and ACEi (lower dose)  · Weights appear inaccurate in connect care   · Wean O2 for sats >92% on RA  · Continue Eliquis   · Appreciate cardiology input; no new cardiac recommendations   · Patient poorly tolerates Lasix. Start Aldactone 25 mg PO   · Afib on cardiac monitor     Hypokalemia   · K 3.3; replete and monitor     TIA vs CVA  Hyperlipidemia   · CT head:  No evidence of acute infarct or intracranial hemorrhage. 2. Periventricular white matter disease is likely secondary to chronic small vessel ischemic changes. 3. Chronic right basal ganglia infarct.   · CTA head:  No evidence for acute large vessel arterial occlusion or significant stenosis. · MRI brain with no acute stroke  · .6; reports intolerance to statins and zetia. Loletha Ruth has been recommended by neurology but not on formulary at [facility]. Patient refused welchol for dyslipidemia. Requesting rx for Repatha. · Hgb A1c 5.5  · TSH 7.43  · Appreciate neurology input; TIA associated with his hypoperfusion d/t hypotension   · Continue Eliquis   · Follow-up OP with neurology in 4-6 weeks     Epigastric pain   · CT abd/pel:  No acute abdominal or pelvic process. Mild to moderate pulmonary edema with small bilateral pleural effusions. · GI cocktail with mild relief   · Continue PPI  · Esophagram: Esophageal dysmotility. Small laryngoceles. No hiatal hernia or other abnormality. · Appreciate GI input; EGD today     Acute renal failure   · Cr 1.38; improved from 2.28 on admission  · Avoid nephrotoxins  · Monitor   · Appreciate nephrology input; stable from renal standpoint, follow-up OP in 1-2 weeks     Hypothyroidism   · Continue Synthroid     30.0 - 39.9 Obese / Body mass index is 33.2 kg/m². Code status: Full  Prophylaxis: Eliquis   Recommended Disposition: Home w/Family     Subjective:     Chief Complaint / Reason for Physician Visit  \"Im feeling better today. I just get anxious about everything. \"  Discussed with RN events overnight. Review of Systems:  Symptom Y/N Comments  Symptom Y/N Comments   Fever/Chills n   Chest Pain n    Poor Appetite n   Edema     Cough    Abdominal Pain     Sputum    Joint Pain     SOB/DENG y   Pruritis/Rash     Nausea/vomit    Tolerating PT/OT y    Diarrhea    Tolerating Diet y    Constipation    Other       Could NOT obtain due to:      Objective:     VITALS:   Last 24hrs VS reviewed since prior progress note.  Most recent are:  Patient Vitals for the past 24 hrs:   Temp Pulse Resp BP SpO2   02/25/20 1440 98.1 °F (36.7 °C) 67 24 134/73 97 %   02/25/20 1057 98.3 °F (36.8 °C) 68 24 118/65 98 %   02/25/20 0726 97.4 °F (36.3 °C) 80 24 (!) 133/96 100 %   02/25/20 0724 97.4 °F (36.3 °C) 80 24 (!) 133/96 100 %   02/25/20 0657    180/90 99 %   02/25/20 0508    130/60    02/25/20 0358 97.9 °F (36.6 °C) 67 18 160/77 98 %   02/24/20 2320 98.1 °F (36.7 °C) 73 18 127/71 98 %   02/24/20 1950 97.9 °F (36.6 °C) 88 18 126/61 97 %   02/24/20 1550 97.9 °F (36.6 °C) 64 20 148/78 98 %       Intake/Output Summary (Last 24 hours) at 2/25/2020 1527  Last data filed at 2/25/2020 0846  Gross per 24 hour   Intake 1160 ml   Output 2375 ml   Net -1215 ml        PHYSICAL EXAM:  General: Pleasant elderly  male. NAD    EENT:  EOMI. Anicteric sclerae. MMM  Resp:  Lung sounds diminished in bases. No accessory muscle use  CV:  Irregular rhythm,  No edema  GI:  Soft, Non distended, Non tender.  +Bowel sounds  Neurologic:  Alert and oriented X 3, normal speech,   Psych:   Good insight. Not anxious nor agitated  Skin:  No rashes. No jaundice    Reviewed most current lab test results and cultures  YES  Reviewed most current radiology test results   YES  Review and summation of old records today    NO  Reviewed patient's current orders and MAR    YES  PMH/ reviewed - no change compared to H&P  ________________________________________________________________________  Care Plan discussed with:    Comments   Patient x    Family      RN x    Care Manager x    Consultant                        Multidiciplinary team rounds were held today with , nursing, pharmacist and clinical coordinator. Patient's plan of care was discussed; medications were reviewed and discharge planning was addressed.      ________________________________________________________________________  Total NON critical care TIME:  25   Minutes    Total CRITICAL CARE TIME Spent:   Minutes non procedure based      Comments   >50% of visit spent in counseling and coordination of care ________________________________________________________________________  Brissa Jones NP     Procedures: see electronic medical records for all procedures/Xrays and details which were not copied into this note but were reviewed prior to creation of Plan. LABS:  I reviewed today's most current labs and imaging studies.   Pertinent labs include:  Recent Labs     02/24/20  0603 02/23/20  1205 02/23/20  0409   WBC 6.5 8.1 8.4   HGB 12.8 13.2 13.0   HCT 39.7 39.9 40.5    247 238     Recent Labs     02/25/20  0009 02/24/20  0603 02/23/20  1205    141 138   K 3.3* 3.2* 3.5    109* 106   CO2 25 24 25   GLU 96 109* 117*   BUN 17 12 15   CREA 1.38* 1.14 1.17   CA 8.4* 8.8 8.7   MG 2.1 2.3 2.2   INR 1.1 1.1 1.1       Signed: Brissa Jones NP

## 2020-02-25 NOTE — PROCEDURES
[facility]                   Endoscopic Gastroduodenoscopy Procedure Note      2/25/2020  [facility]  1955  [id number]    Procedure: Endoscopic Gastroduodenoscopy with biopsy    Indication:  Abdominal pain, epigastric     Pre-operative Diagnosis: see indication above    Post-operative Diagnosis: see findings below    : SHAWNA Adkins MD    Referring Provider:  Jose D Thomas DO      Anesthesia/Sedation:  MAC anesthesia Propofol    Airway assessment: No airway problems anticipated    Pre-Procedural Exam:      Airway: clear, no airway problems anticipated  Heart: RRR, without gallops or rubs  Lungs: clear bilaterally without wheezes, crackles, or rhonchi  Abdomen: soft, nontender, nondistended, bowel sounds present  Mental Status: awake, alert and oriented to person, place and time       Procedure Details     After infomed consent was obtained for the procedure, with all risks and benefits of procedure explained the patient was taken to the endoscopy suite and placed in the left lateral decubitus position. Following sequential administration of sedation as per above, the endoscope was inserted into the mouth and advanced under direct vision to second portion of the duodenum. A careful inspection was made as the gastroscope was withdrawn, including a retroflexed view of the proximal stomach; findings and interventions are described below. Findings:   Esophagus:  Chronic appearing esophagitis at the GE junction, biopsied  Stomach: erosive gastritis in cardia, biopsied. Mild gastritis in antrum, biopsied  Duodenum: normal    Therapies:  biopsy of esophagus  biopsy of stomach cardia, antrum    Specimens: 1. Biopsies of gastric antrum 2. Biopsies of gastric cardia  3 biopsies of GE junction           Complications:   None; patient tolerated the procedure well. EBL:  None. Impression:      1. Chronic appearing esophagitis  2. Erosive gastritis  3. Normal duodenum    Recommendations:  -Continue acid suppression but increase pantoprazole to 40 mg BID. Continue carafate 1 gm qid., -Await pathology. , -Resume cardiac diet    Arvind Valles., [id number]

## 2020-02-25 NOTE — PROGRESS NOTES
Problem: Falls - Risk of  Goal: *Absence of Falls  Description  Document Annalisa Tello Fall Risk and appropriate interventions in the flowsheet.   Outcome: Progressing Towards Goal  Note: Fall Risk Interventions:            Medication Interventions: Bed/chair exit alarm         History of Falls Interventions: Bed/chair exit alarm

## 2020-02-25 NOTE — PROGRESS NOTES
Cardiology Progress Note  2/25/2020     Admit Date: 2/21/2020  Admit Diagnosis: CHF (congestive heart failure) ([facility] [I50.9]  CC: none currently  Cardiac Assessment/Plan:   1) CAD (D BMS 11/2010), w/ intermittent atypical CP; Neg MPI 2014 (Dx with clavicle separation)   *DENG/atypical CP 8/2019: worse BP (pt denies PTA); +/- abnl MPI: 40-50% D1 o/w normal: Med Rx. *EF 40-45% (global) @ [facility] 1/2020.  2) HTN, no renal artery stenosis at cath 8/2019  3) dyslipidemia, (intolerant of lipitor, crestor, zocor, and pravachol per pt). 4) AI: moderate 2010 & 8/2019; Moderate @ [facility] 1/2020.  5) AFib 12/2019, new onset, Sx (DENG/palps in 11 & 12/2019): Rx w/ eliquis/norvasc to dilt. *CV 1/28/20 to NSR. Cont on Eliquis. *Recurrent afib 2/2020: started on amio. 6) palpitations:  Negative Holter/EvR 2011 & 2014 respectively. 7) prostate CA, remote prostatectomy  8) hypothyroidism    He feels loop diuretics cause flank pain; Admitted with: ZONIA (Cr to 2.8 from 1); \"mild pulm edema\" on CXR; ACEi and tekturna being held. Recurrent abd/costal margina pain: unremarkable abd CT (w/o contrast). Subsequent: Normalized Cr;   marginal BPs; TIA event (lightheaded after blowing nose, then left sided numbness/tingling/weakness); Neg neuro w/u (inc MRI, except old R BG lacunar defects). GI w/u ongoing with esophagram (possible EGD, remains on eliquis). Pt has been ambulating in hallways without angina nor DENG after admit. For other plans, see orders. · Echo 5/46/94: Normal systolic function (ejection fraction normal). Mildly dilated left ventricle. Mild concentric hypertrophy. Estimated left ventricular ejection fraction is 50 - 55%. Visually measured ejection fraction. · Mildly dilated left atrium. · Mildly dilated right atrium. · Trace mitral valve regurgitation is present. · Mild to moderate pulmonary hypertension. Mild tricuspid valve regurgitation is present.     2/24: BPs 120-160s; HR ; Cr 1.14; K 3.2; ; Nl CBC. Remains on amio 200 bid; eliquis 5 bid; coreg 25 bid; hydralazine 50 tid; imdur 30; lisinopril 20 qday;     Rec: hold eliquis and cover with lovenox (in case EGD/Bx needed). Cont other cardiac meds; no plan for invasive cardiac testing currently. Volume/diuretics per renal MD.  HypoK per primary team.    2/25: VSSAF; cont Afib (HR ok); GI w/u ongoing. Agree with aldactone per Dr Isabelle Martel (will need k/cr watched). No new cardiac recs. Will start livalo as oupt (not on formulary); if not tolerated/affordable, consider injectable. Restart eliquis when able after GI w/u done.  ______________________________________________________________  @ NP OV 2/19/20  Mr Vincent Underwood has a h/o:  1) CAD (D BMS 11/2010), w/ intermittent atypical CP; Neg MPI 2014 (Dx with clavicle separation)   *DENG/atypical CP 8/2019: worse BP (pt denies PTA); +/- abnl MPI: 40-50% D1 o/w normal: Med Rx. *EF 40-45% (global) @ [facility] 1/2020.  2) HTN, no renal artery stenosis at cath 8/2019  3) dyslipidemia,   4) AI: moderate 2010 & 8/2019; Moderate @ [facility] 1/2020.  5) AFib 12/2019, new onset, Sx (DENG/palps in 11 & 12/2019): Rx w/ eliquis/norvasc to dilt. *CV 1/28/20.  6) palpitations:  Negative Holter/EvR 2011 & 2014 respectively. 7) prostate CA, remote prostatectomy  8) hypothyroidism    8/2019:  Since being discharged last week, he has had no further chest discomfort; he states his dyspnea has resolved. He feels well overall & is active. 1/2020 (NP OV): Follow up at request of PCP for new onset atrial fibrillation, Has noted some palpitations and increasing DENG recently. Was taking Muccinex at time of onset. States it was similar to previous symptoms over the summer when taking Muccinex. Seen in the ER and K+ was repleted. 1/2020 MD visit:  Started on Eliquis & Norvasc changed to diltiazem. Less dyspnea. No anginal chest pain. No change in atypical chest pain.   Home BP has been as high as 160.    2/2020: Follow up after DCCV, then admission for volume overload. Feels terrible. Initially felt well after DC, but then with sudden onset of back pain, orthopnea, DENG. Stopped his Lasix/KCL d/t stabbing low back pain and concern it caused \"choking\" sensation. Saw PCP yesterday and labs drawn. Lasix changed to HCTZ but hasn't taken yet. Orthopnea again this morning, as well as intermittent CP, DENG and headache. Nl BMP 2/3/20.    2/2020: BP well controlled during the day, but elevated at night even after PM meds. Started on amio after last OV. Still with flutters in his chest from time to time. Hiatal hernia bothersome as well. IMPRESSION AND PLAN  01. Other persistent atrial fibrillation (I48.19):  New onset 12/2019, but also likely had some in 11/2019. Underwent DCCV 1/2020. Tolerating Eliquis, Amio & Coreg. ECG done   02. Atherosclerotic heart disease of native coronary artery without angina pectoris (I25.10):  Recent symptoms atypical for angina, but will review with Dr. Kyaw Lovell as above. Remote diagonal BMS; mild diagonal disease at cath 8/2019, otherwise unremarkable. We discussed the signs and symptoms of unstable angina, myocardial infarction and malignant arrhythmia. The patient knows to seek immediate medical attention should they occur. 03. Essential (primary) hypertension (I10): Well controlled during the day, but elevated in the evenings. I think anxiety could be contributing to some of this. Will add amlodipine 2.5mg QHS as needed for SBP >160.   04. Mixed hyperlipidemia (E78.2):   Lipid labs drawn by PCP.   05. Nonrheumatic aortic (valve) insufficiency (I35.1):  Asymptomatic; consistently moderate. Recheck next year. 06. Nicotine dependence, unspecified, uncomplicated (R51.097):  He continues to use smokeless tobacco.  The patient was instructed on tobacco cessation. 07. Body mass index (BMI) 34.0-34.9, adult (Z68.34):   The patient was instructed on AHA diet and regular exercise. ORDERS:   Tobacco cessation counseling    Dietary management education, guidance, and counseling   3 ECG done   4 The patient was instructed on AHA diet and regular exercise. 2/19/20 MEDICATION LIST  Medication Sig Desc   amiodarone 200 mg tablet take 1 tablet by oral route 2 times every day   amlodipine 2.5 mg tablet take 1 tablet by oral route every evening if SBP >160   Coreg 25 mg Tab Take 1 tablet by mouth twice a day   Eliquis 5 mg tablet take 1 tablet by oral route 2 times every day   hydrochlorothiazide 12.5 mg tablet take 1 tablet by oral route  every day   isosorbide mononitrate ER 30 mg tablet,extended release 24 hr take 1 tablet by oral route  every day in the morning   lisinopril 40 mg Tab Take 1 tablet by mouth twice a day   multivitamin Tab Take 1 tablet by mouth once a day   Protonix 40 mg Tab Take 1 tablet by mouth once a day   Synthroid 50 mcg Tab Take 1 tablet by mouth once a day   Tekturna 300 mg Tab Take 1 tablet by mouth once a day   Vitamin D3 2,000 unit capsule 1 po qd     _______________________________________________________  CARDIAC HISTORY  CAD:  1 Chest Pain Syndrome, atypical. Pre-op eval. [Normal EF, 50% PLB, 75% Diagonal:  BMS to D.] - 18/4577   2 Chest Pain Syndrome, exertional/Dyspnea: ever since stent. [Non-Ischemic Stress, Marked HTN: Sx improved with BP control.] - 1/2011   3 Chest Pain Syndrome, atypical. [Non-Ischemic Stress] - 7/2014     ARRHYTHMIA:  1 Palpitations [Neg holter.] - 1/2011   2 Palpitations [Neg EVR and MPI.] - 6/2014     RISK FACTORS:  1 Hypertension   2 Dyslipidemia   3 Peripheral Vascular Disease   4 Tobacco Use       CARDIOVASCULAR PROCEDURES  Procedure Date Results   [facility] MPI 08/09/2019 EF 0.45 (45%), Possible Basal-Inferolateral Ischemia, (Early + ETT before Alicia).    [facility] MPI 01/19/2011 EF 0.50 (50%), No Ischemia   Carotid Duplex 01/25/2017 1 - 49% Bilateral ICAs, Vertebral: Bilateral Antegrade Flow   Cath 08/09/2019 EF 0.50 (50%), Minimal CAD, 40-50% D1: Med Rx. Cath  Normal EF, 50% PLB, 75% Diagonal:  BMS to D.   DCCV 2020 Initial Rhythm A Fib, Final Rhythm Sinus, Max Joules 300, 1 Shock   Echo 2019 EF 0.55 (55%), NWMA, Mild TR, Mild LVH, Mild MR, Moderate AR, Est RVSP 16 mmHg, Nl RV; @ [facility]. Echo 10/27/2010 EF 0.65 (65%), Moderate AR, Mild MR, No change vs 2010. EKG 2020 Atrial Fib, HR 70   EVR  Sinus Rhythm, No Malignant Arrhythmias, No events. Holter 2020 Atrial Fib, HR , ave 76; 2.5 sec pauses during sleep; \"CP,dyspnea\" do not correspond to other arrhythmia. Holter 2011 Sinus Rhythm, No Malignant Arrhythmias, No Sx. MPI 2014 EF 0.58 (58%), No Ischemia, 9:30. [facility] 2020 EF 40-45%; global HK. Normal RV. Moderate AI. No thrombus. No PFO. ACTIVE ALLERGIES:  Ingredient Reaction   FENOFIBRATE,MICRONIZED myalgia   FISH OIL couldn't tolerate   SIMVASTATIN myalgia   NIACIN decreased vision   FENOFIBRATE NANOCRYSTALLIZED myalgia   ATORVASTATIN CALCIUM decreased vision   CLONIDINE HCL anxiety       PROBLEM LIST:  Problem Description Chronic   Chest pain Y   AFIB Y   CAD Y   Benign HTN Y   Mixed hyperlipidaemia Y   CVA Y       PAST MEDICAL/SURGICAL HISTORY  (Detailed)    Disease/disorder Onset Date Surgical History Date 1991 Cholecystectomy       Tonsillectomy     Anxiety       Depression       Hyperlipidemia       Hypertension       Hypothyroidism. knee surgery       prostectomy         Family History  (Detailed)  Relationship Family Member Name  Age at Death Condition Onset Age Cause of Death   Brother  N  Hypertension  N   Brother  [de-identified] and well  N   Father  Y  Hypertension  N   Father  Y  Stroke 55 Y     SOCIAL HISTORY  (Detailed)  Tobacco use reviewed. Preferred language is [place].       EDUCATION/EMPLOYMENT/OCCUPATION  Employment History Status Retired Restrictions            MARITAL STATUS/FAMILY/SOCIAL SUPPORT  Currently . Smoking status: Current every day smoker. SMOKING STATUS  Use Status Type Smoking Status Usage Per Day Years Used Total Pack Years   yes  Current every day smoker        TOBACCO CESSATION INFORMATION  Date Counseled By Order Status Description Code Tobacco Cessation Information   2014      Smoking cessation education   2014 Krystle Christiansen Tobacco cessation counseling completed   Tobacco Cessation Counseling   2017 Atifgagan BLANCA Hitchcock Tobacco cessation counseling completed   Tobacco Cessation Counseling   2020 Krystle Christiansen Tobacco cessation counseling completed   Smoking effects education   2020 [place] Phlegm. Lilian Tobacco cessation counseling completed   Counseling about tobacco use (procedure)         ALCOHOL  There is a history of alcohol use. consumed frequently. CAFFEINE  The patient uses caffeine: coffee.         Hospital problem list   Active Hospital Problems    Diagnosis Date Noted    [facility]congest[facility] ([facility] 2020        Subjective: Ryder Fuentes reports   Chest pain X Reproducible epigastric pain  consistent with:  Non-cardiac CP         Atypical CP     None now  On going  Anginal CP     Dyspnea X none  at rest  with exertion         improved  unchanged  worse              PND X none  overnight       Orthopnea X none  improved  unchanged  worse   Presyncope X none  improved  unchanged  worse     Ambulated in hallway without symptoms   Yes   Ambulated in room without symptoms  Yes   Objective:    Physical Exam:  Overall VSSAF;    Visit Vitals  /65 (BP 1 Location: Left arm, BP Patient Position: At rest)   Pulse 68   Temp 98.3 °F (36.8 °C)   Resp 24   Ht 6' (1.829 m)   Wt 111 kg (244 lb 12.8 oz)   SpO2 98%   BMI 33.20 kg/m²     Temp (24hrs), Av.9 °F (36.6 °C), Min:97.4 °F (36.3 °C), Max:98.3 °F (36.8 °C)    Patient Vitals for the past 8 hrs:   Pulse   20 1057 68   20 0726 80   20 0724 80   20 0358 67     Patient Vitals for the past 8 hrs:   Resp   02/25/20 1057 24   02/25/20 0726 24   02/25/20 0724 24   02/25/20 0358 18     Patient Vitals for the past 8 hrs:   BP   02/25/20 1057 118/65   02/25/20 0726 (!) 133/96   02/25/20 0724 (!) 133/96   02/25/20 0657 180/90   02/25/20 0508 130/60   02/25/20 0358 160/77       Intake/Output Summary (Last 24 hours) at 2/25/2020 1109  Last data filed at 2/25/2020 8150  Gross per 24 hour   Intake 1160 ml   Output 2375 ml   Net -1215 ml     General Appearance: Well developed, well nourished, no acute distress. Ears/Nose/Mouth/Throat:   Normal MM; anicteric. JVP: WNL   Resp:   clear to auscultation bilaterally. Nl resp effort. Cardiovascular:  IRRR, S1, S2 normal, no new murmur. No gallop or rub. Abdomen:   Soft, non-tender, bowel sounds are present. Extremities: No edema bilaterally. Skin:  Neuro: Warm and dry. A/O x3, grossly nonfocal   cath site intact w/o hematoma or new bruit; distal pulse unchanged  Yes   Data Review:     Telemetry independently reviewed  sinus x chronic afib  parox afib  NSVT     ECG independently reviewed  NSR  afib  no significant changes  NSST-Tw chgs   x no new ECG provided for review   Lab results reviewed as noted below. Current medications reviewed as noted below. No results for input(s): PH, PCO2, PO2 in the last 72 hours.   Recent Labs     02/23/20  1205   TROIQ <0.05     Recent Labs     02/25/20  0009 02/24/20  0603 02/23/20  1205 02/23/20  0409    141 138 142   K 3.3* 3.2* 3.5 3.4*    109* 106 109*   CO2 25 24 25 26   BUN 17 12 15 15   CREA 1.38* 1.14 1.17 1.02   GFRAA >60 >60 >60 >60   GLU 96 109* 117* 107*   CA 8.4* 8.8 8.7 8.7   WBC  --  6.5 8.1 8.4   HGB  --  12.8 13.2 13.0   HCT  --  39.7 39.9 40.5   PLT  --  227 247 238     Lab Results   Component Value Date/Time    Cholesterol, total 227 (H) 02/23/2020 12:05 PM    HDL Cholesterol 35 02/23/2020 12:05 PM    LDL, calculated 157.6 (H) 02/23/2020 12:05 PM    Triglyceride 172 (H) 02/23/2020 12:05 PM    CHOL/HDL Ratio 6.5 (H) 02/23/2020 12:05 PM     No results for input(s): SGOT, GPT, AP, TBIL, TP, ALB, GLOB, GGT, AML, LPSE in the last 72 hours.     No lab exists for component: AMYP, HLPSE  Recent Labs     02/25/20  0009 02/24/20  0603 02/23/20  1205   INR 1.1 1.1 1.1   PTP 11.0 11.0 11.5*      No components found for: GLPOC    Current Facility-Administered Medications   Medication Dose Route Frequency    simethicone (MYLICON) tablet 80 mg  80 mg Oral QID PRN    spironolactone (ALDACTONE) tablet 25 mg  25 mg Oral DAILY    hydrALAZINE (APRESOLINE) tablet 25 mg  25 mg Oral TID    lisinopril (PRINIVIL, ZESTRIL) tablet 20 mg  20 mg Oral DAILY    [Held by provider] colesevelam (WELCHOL) tablet 1,250 mg  1,250 mg Oral BID WITH MEALS    sucralfate (CARAFATE) tablet 1 g  1 g Oral AC&HS    albuterol-ipratropium (DUO-NEB) 2.5 MG-0.5 MG/3 ML  3 mL Nebulization Q4H PRN    acetaminophen (TYLENOL) tablet 650 mg  650 mg Oral Q6H PRN    morphine injection 1 mg  1 mg IntraVENous Q4H PRN    sodium chloride (NS) flush 5-40 mL  5-40 mL IntraVENous Q8H    sodium chloride (NS) flush 5-40 mL  5-40 mL IntraVENous PRN    ondansetron (ZOFRAN) injection 4 mg  4 mg IntraVENous Q8H PRN    bisacodyL (DULCOLAX) tablet 5 mg  5 mg Oral DAILY PRN    carvediloL (COREG) tablet 25 mg  25 mg Oral BID WITH MEALS    amiodarone (CORDARONE) tablet 200 mg  200 mg Oral BID    pantoprazole (PROTONIX) tablet 40 mg  40 mg Oral DAILY    levothyroxine (SYNTHROID) tablet 50 mcg  50 mcg Oral ACB    isosorbide mononitrate ER (IMDUR) tablet 30 mg  30 mg Oral DAILY    labetaloL (NORMODYNE;TRANDATE) injection 20 mg  20 mg IntraVENous Q4H PRN    methocarbamol (ROBAXIN) tablet 750 mg  750 mg Oral BID    nicotine (NICODERM CQ) 21 mg/24 hr patch 1 Patch  1 Patch TransDERmal Q24H        Elida Jay MD

## 2020-02-25 NOTE — PROGRESS NOTES
TRANSFER - OUT REPORT:    Verbal report given to JUA NJOSE Sheppard(name) on Melody Burrell  being transferred to [facility](unit) for routine post - op       Report consisted of patients Situation, Background, Assessment and   Recommendations(SBAR). Information from the following report(s) SBAR, Procedure Summary, MAR, Accordion and Cardiac Rhythm Afib was reviewed with the receiving nurse. Lines:   Peripheral IV 02/21/20 Right Forearm (Active)   Site Assessment Clean, dry, & intact 2/25/2020  3:00 PM   Phlebitis Assessment 0 2/25/2020  3:00 PM   Infiltration Assessment 0 2/25/2020  3:00 PM   Dressing Status Clean, dry, & intact 2/25/2020  3:00 PM   Dressing Type Transparent;Tape 2/25/2020  3:00 PM   Hub Color/Line Status Flushed;Pink 2/25/2020  3:00 PM   Action Taken Blood drawn 2/21/2020 12:24 AM       Peripheral IV 02/23/20 Anterior; Left Forearm (Active)   Site Assessment Clean, dry, & intact 2/25/2020  3:00 PM   Phlebitis Assessment 0 2/25/2020  3:00 PM   Infiltration Assessment 0 2/25/2020  3:00 PM   Dressing Status Clean, dry, & intact 2/25/2020  3:00 PM   Dressing Type Transparent;Tape 2/25/2020  3:00 PM   Hub Color/Line Status Flushed;Pink 2/25/2020  3:00 PM        Opportunity for questions and clarification was provided.

## 2020-02-25 NOTE — PROGRESS NOTES
SUZY Plan:    1- D/C Home with HH vs. HTH  2- 2nd IM Letter at D/C  3- F/U appoint  with PCP and Cardiology  4- Followed by Nurse Leon  5- Wife to provide transportation at D/C    [address] [place] [address] Dr Aretha Thakur  Phone: [phone]

## 2020-02-25 NOTE — PROGRESS NOTES
RAPID RESPONSE TEAM- Follow Up     Rounded on patient due to recent rapid response for Code S. Patient is off the unit for EGD     Nursing staff reports left face pressure and tingling yesterday. No RRT interventions indicated at this time. Please call back if needed.      Jay Hilario, RN  Ext. [phone]    Vitals w/ MEWS Score (last day)     Date/Time MEWS Score Pulse Resp Temp BP Level of Consciousness SpO2    02/25/20 1528    88  16    174/87  Alert      02/25/20 1440  2  67  24  98.1 °F (36.7 °C)  134/73  Alert  97 %    02/25/20 1057  2  68  24  98.3 °F (36.8 °C)  118/65  Alert  98 %    02/25/20 0726  2  80  24  97.4 °F (36.3 °C)  (!) 133/96  Alert  100 %    02/25/20 0724  2  80  24  97.4 °F (36.3 °C)  (!) 133/96  Alert  100 %    02/25/20 0657          180/90    99 %    02/25/20 0508          130/60        02/25/20 0358  1  67  18  97.9 °F (36.6 °C)  160/77  Alert  98 %    02/24/20 2320  1  73  18  98.1 °F (36.7 °C)  127/71  Alert  98 %    02/24/20 1950  1  88  18  97.9 °F (36.6 °C)  126/61  Alert  97 %    02/24/20 1550    64  20  97.9 °F (36.6 °C)  148/78    98 %    02/24/20 1223    73  20    114/59        02/24/20 1121  1  67  18  97.9 °F (36.6 °C)  111/55  Alert  97 %    02/24/20 0812  1  71  17  98 °F (36.7 °C)  143/68  Alert  98 %    02/24/20 0510  1  61  16  98 °F (36.7 °C)  149/65  Alert  99 %

## 2020-02-25 NOTE — PROGRESS NOTES
Bedside shift change report GIVEN TO Gemini Henson RN. Report included the following information SBAR and Kardex. SIGNIFICANT CHANGES DURING SHIFT:      7628: Patient became short of breath and diaphoretic. Blood pressure elevated 180/90. Gave 0900 dose of Hydralazine early per oncoming nurse and charge nurse. Patient feeling much better. CONCERNS TO ADDRESS WITH MD:  None. [facility] NURSING NOTE   Admission Date 2/21/2020   Admission Diagnosis CHF (congestive heart failure) ([facility] [I50.9]   Consults IP CONSULT TO HOSPITALIST  IP CONSULT TO CARDIOLOGY  IP CONSULT TO NEPHROLOGY  IP CONSULT TO GASTROENTEROLOGY  IP CONSULT TO NEUROLOGY      Cardiac Monitoring [x] Yes [] No      Purposeful Hourly Rounding [x] Yes    Lillian Score Total Score: 1   Lillian score 3 or > [x] Bed Alarm [] Avasys [] 1:1 sitter [] Patient refused (Signed refusal form in chart)   Kenroy Score Kenroy Score: 22   Kenroy score 14 or < [] PMT consult [] Wound Care consult    []  Specialty bed  [] Nutrition consult      Influenza Vaccine Received Flu Vaccine for Current Season (usually Sept-March): No    Patient/Guardian Refused (Notify MD): Yes      Oxygen needs? [x] Room air Oxygen @  []1L    []2L    []3L   []4L    []5L   []6L via  NC   Chronic home O2 use? [] Yes [x] No  Perform O2 challenge test and document in progress note using smartphrase (.Homeoxygen)      Last bowel movement Last Bowel Movement Date: 02/23/20      Urinary Catheter             LDAs               Peripheral IV 02/21/20 Right Forearm (Active)   Site Assessment Clean, dry, & intact 2/25/2020  3:00 AM   Phlebitis Assessment 0 2/25/2020  3:00 AM   Infiltration Assessment 0 2/25/2020  3:00 AM   Dressing Status Clean, dry, & intact 2/25/2020  3:00 AM   Dressing Type Transparent;Tape 2/25/2020  3:00 AM   Hub Color/Line Status Flushed;Pink 2/25/2020  3:00 AM   Action Taken Blood drawn 2/21/2020 12:24 AM       Peripheral IV 02/23/20 Anterior; Left Forearm (Active)   Site Assessment Clean, dry, & intact 2/25/2020  3:00 AM   Phlebitis Assessment 0 2/25/2020  3:00 AM   Infiltration Assessment 0 2/25/2020  3:00 AM   Dressing Status Clean, dry, & intact 2/25/2020  3:00 AM   Dressing Type Transparent;Tape 2/25/2020  3:00 AM   Hub Color/Line Status Flushed;Pink 2/25/2020  3:00 AM                         Readmission Risk Assessment Tool Score Medium Risk            20       Total Score        3 Has Seen PCP in Last 6 Months (Yes=3, No=0)    2 . Living with Significant Other. Assisted Living. LTAC. SNF. or   Rehab    4 IP Visits Last 12 Months (1-3=4, 4=9, >4=11)    5 Pt.  Coverage (Medicare=5 , Medicaid, or Self-Pay=4)    6 Charlson Comorbidity Score (Age + Comorbid Conditions)        Criteria that do not apply:    Patient Length of Stay (>5 days = 3)       Expected Length of Stay 4d 2h   Actual Length of Stay 4

## 2020-02-25 NOTE — ANESTHESIA POSTPROCEDURE EVALUATION
Procedure(s):  ESOPHAGOGASTRODUODENOSCOPY (EGD)  ESOPHAGOGASTRODUODENAL (EGD) BIOPSY. total IV anesthesia    Anesthesia Post Evaluation        Patient location during evaluation: PACU  Note status: Adequate. Level of consciousness: responsive to verbal stimuli and sleepy but conscious  Pain management: satisfactory to patient  Airway patency: patent  Anesthetic complications: no  Cardiovascular status: acceptable  Respiratory status: acceptable  Hydration status: acceptable  Comments: +Post-Anesthesia Evaluation and Assessment    Patient: Lashawn Velasquez MRN: [id number]  SSN: [id number]   YOB: 1955  Age: 77 y.o. Sex: male      Cardiovascular Function/Vital Signs    /77 (BP 1 Location: Left arm, BP Patient Position: At rest)   Pulse 71   Temp 36.4 °C (97.6 °F)   Resp 18   Ht 6' (1.829 m)   Wt 111 kg (244 lb 12.8 oz)   SpO2 98%   BMI 33.20 kg/m²     Patient is status post Procedure(s):  ESOPHAGOGASTRODUODENOSCOPY (EGD)  ESOPHAGOGASTRODUODENAL (EGD) BIOPSY. Nausea/Vomiting: Controlled. Postoperative hydration reviewed and adequate. Pain:  Pain Scale 1: Numeric (0 - 10) (02/25/20 1625)  Pain Intensity 1: 0 (02/25/20 1625)   Managed. Neurological Status: At baseline. Mental Status and Level of Consciousness: Arousable. Pulmonary Status:   O2 Device: Room air (02/25/20 1625)   Adequate oxygenation and airway patent. Complications related to anesthesia: None    Post-anesthesia assessment completed. No concerns.     Signed By: Tamela Meter, DO    2/25/2020  Post anesthesia nausea and vomiting:  controlled      Vitals Value Taken Time   /77 2/25/2020  4:44 PM   Temp 36.4 °C (97.6 °F) 2/25/2020  4:44 PM   Pulse 71 2/25/2020  4:44 PM   Resp 18 2/25/2020  4:44 PM   SpO2 98 % 2/25/2020  4:44 PM

## 2020-02-25 NOTE — ROUTINE PROCESS
Carolina Recio 1955 
[id number] Situation: 
Verbal report received from: Demetrius Lee RN Procedure: Procedure(s): ESOPHAGOGASTRODUODENOSCOPY (EGD) ESOPHAGOGASTRODUODENAL (EGD) BIOPSY Background: 
 
Preoperative diagnosis: epigastric pain Postoperative diagnosis: Gastritis, Esophagitis :  Dr. Bari Damon Assistant(s): Endoscopy Technician-1: Zoila Rabago Endoscopy RN-1: Jean Goodwin Specimens:  
ID Type Source Tests Collected by Time Destination 1 : Gastric Antrum biopsy Preservative   Esme Zaragoza MD 2/25/2020 1559 Pathology 2 : Gastric Cardia biopsy Preservative   Esme Zaragoza MD 2/25/2020 5063 Pathology 3 : [place] biopsy Preservative   Esme Zaragoza MD 2/25/2020 6327 Pathology H. Pylori  no Assessment: 
Intra-procedure medications Anesthesia gave intra-procedure sedation and medications, see anesthesia flow sheet yes Intravenous fluids: NS@ [place] Vital signs stable Abdominal assessment: round and soft Recommendation: 
Return to floor- 61 51 81 Family - wifePermission to share finding with family or friend yes

## 2020-02-26 ENCOUNTER — PATIENT OUTREACH (OUTPATIENT)
Dept: CASE MANAGEMENT | Age: 65
End: 2020-02-26

## 2020-02-26 VITALS
WEIGHT: 241.9 LBS | HEART RATE: 70 BPM | DIASTOLIC BLOOD PRESSURE: 72 MMHG | BODY MASS INDEX: 32.76 KG/M2 | OXYGEN SATURATION: 95 % | TEMPERATURE: 97.4 F | HEIGHT: 72 IN | SYSTOLIC BLOOD PRESSURE: 141 MMHG | RESPIRATION RATE: 20 BRPM

## 2020-02-26 PROBLEM — J81.1 PULMONARY EDEMA: Status: ACTIVE | Noted: 2020-02-26

## 2020-02-26 PROBLEM — J96.01 ACUTE RESPIRATORY FAILURE WITH HYPOXIA (HCC): Status: ACTIVE | Noted: 2020-02-26

## 2020-02-26 PROBLEM — E87.6 HYPOKALEMIA: Status: ACTIVE | Noted: 2020-02-26

## 2020-02-26 PROBLEM — N17.9 ACUTE RENAL FAILURE (ARF) (HCC): Status: ACTIVE | Noted: 2020-02-26

## 2020-02-26 PROBLEM — D68.69 HYPERCOAGULABILITY DUE TO ATRIAL FIBRILLATION (HCC): Status: ACTIVE | Noted: 2020-02-26

## 2020-02-26 PROBLEM — I48.0 PAF (PAROXYSMAL ATRIAL FIBRILLATION) (HCC): Status: ACTIVE | Noted: 2020-02-26

## 2020-02-26 PROBLEM — I51.7 CARDIOMEGALY: Status: ACTIVE | Noted: 2020-02-26

## 2020-02-26 PROBLEM — Z86.73 HISTORY OF LACUNAR CEREBROVASCULAR ACCIDENT (CVA): Chronic | Status: RESOLVED | Noted: 2019-05-03 | Resolved: 2020-02-26

## 2020-02-26 PROBLEM — I48.91 HYPERCOAGULABILITY DUE TO ATRIAL FIBRILLATION (HCC): Status: ACTIVE | Noted: 2020-02-26

## 2020-02-26 PROBLEM — K29.60 EROSIVE GASTRITIS: Status: ACTIVE | Noted: 2020-02-26

## 2020-02-26 PROBLEM — E26.1 SECONDARY HYPERALDOSTERONISM (HCC): Status: ACTIVE | Noted: 2020-02-26

## 2020-02-26 PROBLEM — G45.9 TIA (TRANSIENT ISCHEMIC ATTACK): Status: ACTIVE | Noted: 2020-02-26

## 2020-02-26 PROBLEM — M75.101 RIGHT ROTATOR CUFF TEAR: Status: RESOLVED | Noted: 2017-01-26 | Resolved: 2020-02-26

## 2020-02-26 LAB
ANION GAP SERPL CALC-SCNC: 5 MMOL/L (ref 5–15)
BUN SERPL-MCNC: 14 MG/DL (ref 6–20)
BUN/CREAT SERPL: 11 (ref 12–20)
CALCIUM SERPL-MCNC: 8.6 MG/DL (ref 8.5–10.1)
CHLORIDE SERPL-SCNC: 108 MMOL/L (ref 97–108)
CO2 SERPL-SCNC: 25 MMOL/L (ref 21–32)
CREAT SERPL-MCNC: 1.25 MG/DL (ref 0.7–1.3)
ERYTHROCYTE [DISTWIDTH] IN BLOOD BY AUTOMATED COUNT: 13 % (ref 11.5–14.5)
GLUCOSE SERPL-MCNC: 95 MG/DL (ref 65–100)
HCT VFR BLD AUTO: 36.6 % (ref 36.6–50.3)
HGB BLD-MCNC: 11.8 G/DL (ref 12.1–17)
INR PPP: 1.1 (ref 0.9–1.1)
MAGNESIUM SERPL-MCNC: 2.3 MG/DL (ref 1.6–2.4)
MCH RBC QN AUTO: 30.9 PG (ref 26–34)
MCHC RBC AUTO-ENTMCNC: 32.2 G/DL (ref 30–36.5)
MCV RBC AUTO: 95.8 FL (ref 80–99)
NRBC # BLD: 0 K/UL (ref 0–0.01)
NRBC BLD-RTO: 0 PER 100 WBC
PLATELET # BLD AUTO: 216 K/UL (ref 150–400)
PMV BLD AUTO: 11.8 FL (ref 8.9–12.9)
POTASSIUM SERPL-SCNC: 3.6 MMOL/L (ref 3.5–5.1)
PROTHROMBIN TIME: 11.1 SEC (ref 9–11.1)
RBC # BLD AUTO: 3.82 M/UL (ref 4.1–5.7)
SODIUM SERPL-SCNC: 138 MMOL/L (ref 136–145)
WBC # BLD AUTO: 6 K/UL (ref 4.1–11.1)

## 2020-02-26 PROCEDURE — 83735 ASSAY OF MAGNESIUM: CPT

## 2020-02-26 PROCEDURE — 74011250637 HC RX REV CODE- 250/637: Performed by: NURSE PRACTITIONER

## 2020-02-26 PROCEDURE — 74011250636 HC RX REV CODE- 250/636: Performed by: INTERNAL MEDICINE

## 2020-02-26 PROCEDURE — 85610 PROTHROMBIN TIME: CPT

## 2020-02-26 PROCEDURE — 74011250637 HC RX REV CODE- 250/637: Performed by: INTERNAL MEDICINE

## 2020-02-26 PROCEDURE — 80048 BASIC METABOLIC PNL TOTAL CA: CPT

## 2020-02-26 PROCEDURE — 94760 N-INVAS EAR/PLS OXIMETRY 1: CPT

## 2020-02-26 PROCEDURE — 85027 COMPLETE CBC AUTOMATED: CPT

## 2020-02-26 PROCEDURE — 36415 COLL VENOUS BLD VENIPUNCTURE: CPT

## 2020-02-26 RX ORDER — PANTOPRAZOLE SODIUM 40 MG/1
40 TABLET, DELAYED RELEASE ORAL
Qty: 60 TAB | Refills: 0 | Status: SHIPPED | OUTPATIENT
Start: 2020-02-26 | End: 2020-04-02 | Stop reason: SDUPTHER

## 2020-02-26 RX ORDER — IBUPROFEN 200 MG
1 TABLET ORAL EVERY 24 HOURS
Qty: 30 PATCH | Refills: 0 | Status: SHIPPED | OUTPATIENT
Start: 2020-02-26 | End: 2020-03-19

## 2020-02-26 RX ORDER — SPIRONOLACTONE 25 MG/1
25 TABLET ORAL DAILY
Qty: 30 TAB | Refills: 0 | Status: ON HOLD | OUTPATIENT
Start: 2020-02-26 | End: 2020-08-10

## 2020-02-26 RX ORDER — HYDRALAZINE HYDROCHLORIDE 25 MG/1
25 TABLET, FILM COATED ORAL 3 TIMES DAILY
Qty: 90 TAB | Refills: 0 | Status: SHIPPED | OUTPATIENT
Start: 2020-02-26 | End: 2020-08-11

## 2020-02-26 RX ORDER — SUCRALFATE 1 G/1
1 TABLET ORAL
Qty: 120 TAB | Refills: 0 | Status: SHIPPED | OUTPATIENT
Start: 2020-02-26 | End: 2020-04-13 | Stop reason: SDUPTHER

## 2020-02-26 RX ORDER — LISINOPRIL 20 MG/1
20 TABLET ORAL DAILY
Qty: 30 TAB | Refills: 0 | Status: SHIPPED | OUTPATIENT
Start: 2020-02-26 | End: 2020-03-21

## 2020-02-26 RX ADMIN — LISINOPRIL 20 MG: 20 TABLET ORAL at 10:07

## 2020-02-26 RX ADMIN — AMIODARONE HYDROCHLORIDE 200 MG: 200 TABLET ORAL at 10:08

## 2020-02-26 RX ADMIN — METHOCARBAMOL TABLETS 750 MG: 500 TABLET, COATED ORAL at 10:09

## 2020-02-26 RX ADMIN — Medication 10 ML: at 03:17

## 2020-02-26 RX ADMIN — LEVOTHYROXINE SODIUM 50 MCG: 50 TABLET ORAL at 05:10

## 2020-02-26 RX ADMIN — SPIRONOLACTONE 25 MG: 25 TABLET ORAL at 10:08

## 2020-02-26 RX ADMIN — MORPHINE SULFATE 1 MG: 2 INJECTION, SOLUTION INTRAMUSCULAR; INTRAVENOUS at 03:17

## 2020-02-26 RX ADMIN — HYDRALAZINE HYDROCHLORIDE 25 MG: 25 TABLET, FILM COATED ORAL at 10:08

## 2020-02-26 RX ADMIN — PANTOPRAZOLE SODIUM 40 MG: 40 TABLET, DELAYED RELEASE ORAL at 10:08

## 2020-02-26 RX ADMIN — ISOSORBIDE MONONITRATE 30 MG: 30 TABLET, EXTENDED RELEASE ORAL at 10:07

## 2020-02-26 RX ADMIN — SUCRALFATE 1 G: 1 TABLET ORAL at 10:08

## 2020-02-26 RX ADMIN — CARVEDILOL 25 MG: 12.5 TABLET, FILM COATED ORAL at 10:07

## 2020-02-26 NOTE — PROGRESS NOTES
Pharmacist Discharge Medication Reconciliation    Significant PMH:   Past Medical History:   Diagnosis Date    Arrhythmia 01/03/2020    AFIB     CAD (coronary artery disease)     h/o stent- '10    Cancer [facility]) 2010    prostate    Congestive heart failure ([facility] [address]     Hypertension     Ill-defined condition     small bowel obstruction    Right rotator cuff tear 1/26/2017    SBO (small bowel obstruction) ([facility] [address] 11/3/2015    Stroke ([facility] [address] May or June 2010    lacunar  infarctions (found on scan- no sxs)    Thyroid disease     hypo     Chief Complaint for this Admission:   Chief Complaint   Patient presents with    Shortness of Breath     pt arrives via EMS with c/o SOB x few hours 93% on RA at this time, pt in no acute respiratory distress     Allergies: Clonidine; Fish oil; Niacin; Statins-hmg-coa reductase inhibitors; and Zetia [ezetimibe]    Discharge Medications:   Current Discharge Medication List        START taking these medications    Details   hydrALAZINE (APRESOLINE) 25 mg tablet Take 1 Tab by mouth three (3) times daily. Qty: 90 Tab, Refills: 0      nicotine (NICODERM CQ) 21 mg/24 hr 1 Patch by TransDERmal route every twenty-four (24) hours for 30 days. Qty: 30 Patch, Refills: 0      spironolactone (ALDACTONE) 25 mg tablet Take 1 Tab by mouth daily. Qty: 30 Tab, Refills: 0      sucralfate (CARAFATE) 1 gram tablet Take 1 Tab by mouth Before breakfast, lunch, dinner and at bedtime. Qty: 120 Tab, Refills: 0           CONTINUE these medications which have CHANGED    Details   lisinopril (PRINIVIL, ZESTRIL) 20 mg tablet Take 1 Tab by mouth daily. Qty: 30 Tab, Refills: 0           CONTINUE these medications which have NOT CHANGED    Details   amiodarone (CORDARONE) 200 mg tablet Take  by mouth two (2) times a day. apixaban (ELIQUIS) 5 mg tablet Take 5 mg by mouth two (2) times a day. isosorbide mononitrate ER (IMDUR) 30 mg tablet Take 1 Tab by mouth daily.   Qty: 30 Tab, Refills: 12      carvedilol (COREG) 25 mg tablet Take 1 Tab by mouth two (2) times daily (with meals). Qty: 180 Tab, Refills: 3      levothyroxine (SYNTHROID) 50 mcg tablet Take 1 Tab by mouth Daily (before breakfast). Qty: 90 Tab, Refills: 3      pantoprazole (PROTONIX) 40 mg tablet Take 1 Tab by mouth daily. Qty: 90 Tab, Refills: 3      cholecalciferol, vitamin D3, (VITAMIN D3) 2,000 unit tab Take 2,000 Units by mouth daily. MULTI-VITAMIN HI-PO PO Take 1 Tab by mouth daily.            STOP taking these medications       amLODIPine (NORVASC) 2.5 mg tablet Comments:   Reason for Stopping:         hydroCHLOROthiazide (HYDRODIURIL) 12.5 mg tablet Comments:   Reason for Stopping:         aliskiren (TEKTURNA) 300 mg tablet Comments:   Reason for Stopping:               The patient's chart, MAR and AVS were reviewed by Zachary Linares, PHARMD.    Discharging Provider: Thea Thompson       Thank You,     Zachary Linares, PHARMD

## 2020-02-26 NOTE — PROGRESS NOTES
NAME: Carolina Recio        :  1955        MRN:  [id number]        Assessment :    Plan:  ZONIA, resolved  HTN  Hypokalemia  Systolic HF (EF 11%)  Moderate AI  esophagitis --Creatinine peaked at 2.3, now 1.2     Home on Coreg 25 bid, hydralazine 25 tid/imdur 30, Lisinopril 20 mg daily and Aldactone 25    stay off Tekturna for now; I will titrate up on Aldactone as needed/tolerated as an outpatient    Poorly tolerates Lasix. A thiazide diuretic is likely not potent enough. Weight stable    Fluid restrict/low salt diet/daily weight/strict I&O    Stable for discharge from my standpoint. I will see him in the office in a week or two. Subjective:     Chief Complaint:  Feeling well. No new issues. We discussed the above. Review of Systems:    Symptom Y/N Comments  Symptom Y/N Comments   Fever/Chills    Chest Pain     Poor Appetite    Edema n    Cough    Abdominal Pain     Sputum    Joint Pain     SOB/DENG n   Pruritis/Rash     Nausea/vomit n   Tolerating PT/OT     Diarrhea    Tolerating Diet     Constipation    Other       Could not obtain due to:      Objective:     VITALS:   Last 24hrs VS reviewed since prior progress note.  Most recent are:  Visit Vitals  /68   Pulse 79   Temp 98.1 °F (36.7 °C)   Resp 20   Ht 6' (1.829 m)   Wt 109.7 kg (241 lb 14.4 oz)   SpO2 98%   BMI 32.81 kg/m²       Intake/Output Summary (Last 24 hours) at 2020 4209  Last data filed at 2020 8190  Gross per 24 hour   Intake 1520 ml   Output 2150 ml   Net -630 ml      Telemetry Reviewed:     PHYSICAL EXAM:  General: NAD  CTA  No edema    Lab Data Reviewed: (see below)    Medications Reviewed: (see below)    PMH/SH reviewed - no change compared to H&P  ________________________________________________________________________  Care Plan discussed with:  Patient y    Family  y    RN     Care Manager                    Consultant: Comments   >50% of visit spent in counseling and coordination of care       ________________________________________________________________________  Rebeka Delgado MD     Procedures: see electronic medical records for all procedures/Xrays and details which  were not copied into this note but were reviewed prior to creation of Plan. LABS:  Recent Labs     02/26/20 0121 02/24/20  0603   WBC 6.0 6.5   HGB 11.8* 12.8   HCT 36.6 39.7    227     Recent Labs     02/26/20 0121 02/25/20  0009 02/24/20  0603    137 141   K 3.6 3.3* 3.2*    108 109*   CO2 25 25 24   BUN 14 17 12   CREA 1.25 1.38* 1.14   GLU 95 96 109*   CA 8.6 8.4* 8.8   MG 2.3 2.1 2.3     No results for input(s): SGOT, GPT, AP, TBIL, TP, ALB, GLOB, GGT, AML, LPSE in the last 72 hours. No lab exists for component: AMYP, HLPSE  Recent Labs     02/26/20 0121 02/25/20  0009 02/24/20  0603   INR 1.1 1.1 1.1   PTP 11.1 11.0 11.0      No results for input(s): FE, TIBC, PSAT, FERR in the last 72 hours. Lab Results   Component Value Date/Time    Folate 27.2 (H) 02/23/2020 12:05 PM      No results for input(s): PH, PCO2, PO2 in the last 72 hours. No results for input(s): CPK, CKMB in the last 72 hours.     No lab exists for component: TROPONINI  No components found for: [place]  Lab Results   Component Value Date/Time    Color YELLOW/STRAW 02/21/2020 03:41 AM    Appearance CLEAR 02/21/2020 03:41 AM    Specific gravity 1.011 02/21/2020 03:41 AM    Specific gravity >1.030 (H) 10/11/2010 10:45 AM    pH (UA) 5.5 02/21/2020 03:41 AM    Protein NEGATIVE  02/21/2020 03:41 AM    Glucose NEGATIVE  02/21/2020 03:41 AM    Ketone NEGATIVE  02/21/2020 03:41 AM    Bilirubin NEGATIVE  02/21/2020 03:41 AM    Urobilinogen 0.2 02/21/2020 03:41 AM    Nitrites NEGATIVE  02/21/2020 03:41 AM    Leukocyte Esterase NEGATIVE  02/21/2020 03:41 AM    Epithelial cells FEW 02/21/2020 03:41 AM    Bacteria NEGATIVE  02/21/2020 03:41 AM    WBC 0-4 02/21/2020 03:41 AM RBC 0-5 02/21/2020 03:41 AM       MEDICATIONS:  Current Facility-Administered Medications   Medication Dose Route Frequency    sodium chloride (NS) flush 5-40 mL  5-40 mL IntraVENous Q8H    sodium chloride (NS) flush 5-40 mL  5-40 mL IntraVENous PRN    pantoprazole (PROTONIX) tablet 40 mg  40 mg Oral ACB&D    simethicone (MYLICON) tablet 80 mg  80 mg Oral QID PRN    spironolactone (ALDACTONE) tablet 25 mg  25 mg Oral DAILY    hydrALAZINE (APRESOLINE) tablet 25 mg  25 mg Oral TID    lisinopril (PRINIVIL, ZESTRIL) tablet 20 mg  20 mg Oral DAILY    [Held by provider] colesevelam (WELCHOL) tablet 1,250 mg  1,250 mg Oral BID WITH MEALS    sucralfate (CARAFATE) tablet 1 g  1 g Oral AC&HS    albuterol-ipratropium (DUO-NEB) 2.5 MG-0.5 MG/3 ML  3 mL Nebulization Q4H PRN    acetaminophen (TYLENOL) tablet 650 mg  650 mg Oral Q6H PRN    morphine injection 1 mg  1 mg IntraVENous Q4H PRN    sodium chloride (NS) flush 5-40 mL  5-40 mL IntraVENous Q8H    sodium chloride (NS) flush 5-40 mL  5-40 mL IntraVENous PRN    ondansetron (ZOFRAN) injection 4 mg  4 mg IntraVENous Q8H PRN    bisacodyL (DULCOLAX) tablet 5 mg  5 mg Oral DAILY PRN    carvediloL (COREG) tablet 25 mg  25 mg Oral BID WITH MEALS    amiodarone (CORDARONE) tablet 200 mg  200 mg Oral BID    levothyroxine (SYNTHROID) tablet 50 mcg  50 mcg Oral ACB    isosorbide mononitrate ER (IMDUR) tablet 30 mg  30 mg Oral DAILY    labetaloL (NORMODYNE;TRANDATE) injection 20 mg  20 mg IntraVENous Q4H PRN    methocarbamol (ROBAXIN) tablet 750 mg  750 mg Oral BID    nicotine (NICODERM CQ) 21 mg/24 hr patch 1 Patch  1 Patch TransDERmal Q24H

## 2020-02-26 NOTE — PROGRESS NOTES
Bedside shift change report GIVEN TO JUAN JOSE Smith. Report included the following information SBAR and Kardex. SIGNIFICANT CHANGES DURING SHIFT:  None. CONCERNS TO ADDRESS WITH MD:  None. [facility] NURSING NOTE   Admission Date 2/21/2020   Admission Diagnosis CHF (congestive heart failure) ([facility] [address] [I50.9]   Consults IP CONSULT TO HOSPITALIST  IP CONSULT TO CARDIOLOGY  IP CONSULT TO NEPHROLOGY  IP CONSULT TO GASTROENTEROLOGY  IP CONSULT TO NEUROLOGY      Cardiac Monitoring [x] Yes [] No      Purposeful Hourly Rounding [x] Yes    Lillian Score Total Score: 1   Lillian score 3 or > [x] Bed Alarm [] Avasys [] 1:1 sitter [] Patient refused (Signed refusal form in chart)   Kenroy Score Kenroy Score: 22   Kenroy score 14 or < [] PMT consult [] Wound Care consult    []  Specialty bed  [] Nutrition consult      Influenza Vaccine Received Flu Vaccine for Current Season (usually Sept-March): No    Patient/Guardian Refused (Notify MD): Yes      Oxygen needs? [x] Room air Oxygen @  []1L    []2L    []3L   []4L    []5L   []6L via  NC   Chronic home O2 use? [] Yes [x] No  Perform O2 challenge test and document in progress note using smartphsimfye (.Homeoxygen)      Last bowel movement Last Bowel Movement Date: 02/25/20      Urinary Catheter             LDAs               Peripheral IV 02/21/20 Right Forearm (Active)   Site Assessment Clean, dry, & intact 2/26/2020  2:52 AM   Phlebitis Assessment 0 2/26/2020  2:52 AM   Infiltration Assessment 0 2/26/2020  2:52 AM   Dressing Status Clean, dry, & intact 2/26/2020  2:52 AM   Dressing Type Transparent;Tape 2/26/2020  2:52 AM   Hub Color/Line Status Flushed;Pink 2/26/2020  2:52 AM   Action Taken Blood drawn 2/21/2020 12:24 AM       Peripheral IV 02/23/20 Anterior; Left Forearm (Active)   Site Assessment Clean, dry, & intact 2/26/2020  2:52 AM   Phlebitis Assessment 0 2/26/2020  2:52 AM   Infiltration Assessment 0 2/26/2020  2:52 AM   Dressing Status Clean, dry, & intact 2/26/2020  2:52 AM   Dressing Type Transparent;Tape 2/26/2020  2:52 AM   Hub Color/Line Status Flushed;Pink 2/26/2020  2:52 AM                         Readmission Risk Assessment Tool Score High Risk            23       Total Score        3 Has Seen PCP in Last 6 Months (Yes=3, No=0)    2 . Living with Significant Other. Assisted Living. LTAC. SNF. or   Rehab    3 Patient Length of Stay (>5 days = 3)    4 IP Visits Last 12 Months (1-3=4, 4=9, >4=11)    5 Pt.  Coverage (Medicare=5 , Medicaid, or Self-Pay=4)    6 Charlson Comorbidity Score (Age + Comorbid Conditions)       Expected Length of Stay 4d 2h   Actual Length of Stay 5

## 2020-02-26 NOTE — PROGRESS NOTES
Cardiology Progress Note  2/26/2020     Admit Date: 2/21/2020  Admit Diagnosis: CH[facility] (congest[facility]) [facility] [I50.9]  CC: none currently  Cardiac Assessment/Plan:   1) CAD (D BMS 11/2010), w/ intermittent atypical CP; Neg MPI 2014 (Dx with clavicle separation)   *DENG/atypical CP 8/2019: worse BP (pt denies PTA); +/- abnl MPI: 40-50% D1 o/w normal: Med Rx. *EF 40-45% (global) @ [facility] 1/2020.  2) HTN, no renal artery stenosis at cath 8/2019  3) dyslipidemia, (intolerant of lipitor, crestor, zocor, and pravachol per pt). 4) AI: moderate 2010 & 8/2019; Moderate @ [facility] 1/2020.  5) AFib 12/2019, new onset, Sx (DENG/palps in 11 & 12/2019): Rx w/ eliquis/norvasc to dilt. *CV 1/28/20 to NSR. Cont on Eliquis. *Recurrent afib 2/2020: started on amio. 6) palpitations:  Negative Holter/EvR 2011 & 2014 respectively. 7) prostate CA, remote prostatectomy  8) hypothyroidism    He feels loop diuretics cause flank pain; Admitted with: ZONIA (Cr to 2.8 from 1); \"mild pulm edema\" on CXR; ACEi and tekturna being held. Recurrent abd/costal margina pain: unremarkable abd CT (w/o contrast). Subsequent: Normalized Cr;   marginal BPs; TIA event (lightheaded after blowing nose, then left sided numbness/tingling/weakness); Neg neuro w/u (inc MRI, except old R BG lacunar defects). GI w/u with esophagram/EGD (esphageal dysmotility; then chronic appearing esophagitis  2. Erosive gastritis; Normal duodenum). Pt has been ambulating in hallways without angina nor DENG after admit. For other plans, see orders. · Echo 2/10/67: Normal systolic function (ejection fraction normal). Mildly dilated left ventricle. Mild concentric hypertrophy. Estimated left ventricular ejection fraction is 50 - 55%. Visually measured ejection fraction. · Mildly dilated left atrium. · Mildly dilated right atrium. · Trace mitral valve regurgitation is present. · Mild to moderate pulmonary hypertension.   Mild tricuspid valve regurgitation is present. 2/24: BPs 120-160s; HR ;   Cr 1.14; K 3.2; ; Nl CBC. Remains on amio 200 bid; eliquis 5 bid; coreg 25 bid; hydralazine 50 tid; imdur 30; lisinopril 20 qday;     Rec: hold eliquis and cover with lovenox (in case EGD/Bx needed). Cont other cardiac meds; no plan for invasive cardiac testing currently. Volume/diuretics per renal MD.  HypoK per primary team.    2/25: VSSAF; cont Afib (HR ok); GI w/u ongoing. Agree with aldactone per Dr Nettie Guevara (will need k/cr watched). No new cardiac recs. Will start livalo as oupt (not on formulary); if not tolerated/affordable, consider injectable. Restart eliquis when able after GI w/u done. 2/26: VSSAF; cont afib with stable HR. Hg 11.8; Cr 1.25/GFR 58;   Remains on amio 200 bid; coreg 25 bid; hydralazine 25 tid; imdur 30; lisinopril 20; aldactone 25 qday;   Will start livalo as oupt (not on formulary); if not tolerated/affordable, consider injectable  NEEDS TO RESTART ELIQUIS 5 BID when ok with GI. OK for d/c from cardiac standpoint. He will keep the appt with me 3/4.  ______________________________________________________________  @ NP OV 2/19/20  Mr Jordan Villagomez has a h/o:  1) CAD (D BMS 11/2010), w/ intermittent atypical CP; Neg MPI 2014 (Dx with clavicle separation)   *DENG/atypical CP 8/2019: worse BP (pt denies PTA); +/- abnl MPI: 40-50% D1 o/w normal: Med Rx. *EF 40-45% (global) @ [facility] 1/2020.  2) HTN, no renal artery stenosis at cath 8/2019  3) dyslipidemia,   4) AI: moderate 2010 & 8/2019; Moderate @ [facility] 1/2020.  5) AFib 12/2019, new onset, Sx (DENG/palps in 11 & 12/2019): Rx w/ eliquis/norvasc to dilt. *CV 1/28/20.  6) palpitations:  Negative Holter/EvR 2011 & 2014 respectively. 7) prostate CA, remote prostatectomy  8) hypothyroidism    8/2019:  Since being discharged last week, he has had no further chest discomfort; he states his dyspnea has resolved. He feels well overall & is active.     1/2020 (NP OV): Follow up at request of PCP for new onset atrial fibrillation, Has noted some palpitations and increasing DENG recently. Was taking Muccinex at time of onset. States it was similar to previous symptoms over the summer when taking Muccinex. Seen in the ER and K+ was repleted. 1/2020 MD visit:  Started on Eliquis & Norvasc changed to diltiazem. Less dyspnea. No anginal chest pain. No change in atypical chest pain. Home BP has been as high as 160.    2/2020: Follow up after DCCV, then admission for volume overload. Feels terrible. Initially felt well after DC, but then with sudden onset of back pain, orthopnea, DENG. Stopped his Lasix/KCL d/t stabbing low back pain and concern it caused \"choking\" sensation. Saw PCP yesterday and labs drawn. Lasix changed to HCTZ but hasn't taken yet. Orthopnea again this morning, as well as intermittent CP, DENG and headache. Nl BMP 2/3/20.    2/2020: BP well controlled during the day, but elevated at night even after PM meds. Started on amio after last OV. Still with flutters in his chest from time to time. Hiatal hernia bothersome as well. IMPRESSION AND PLAN  01. Other persistent atrial fibrillation (I48.19):  New onset 12/2019, but also likely had some in 11/2019. Underwent DCCV 1/2020. Tolerating Eliquis, Amio & Coreg. ECG done   02. Atherosclerotic heart disease of native coronary artery without angina pectoris (I25.10):  Recent symptoms atypical for angina, but will review with Dr. Thompson Herrmann as above. Remote diagonal BMS; mild diagonal disease at cath 8/2019, otherwise unremarkable. We discussed the signs and symptoms of unstable angina, myocardial infarction and malignant arrhythmia. The patient knows to seek immediate medical attention should they occur. 03. Essential (primary) hypertension (I10): Well controlled during the day, but elevated in the evenings. I think anxiety could be contributing to some of this. Will add amlodipine 2.5mg QHS as needed for SBP >160.   04. Mixed hyperlipidemia (E78.2):   Lipid labs drawn by PCP.   05. Nonrheumatic aortic (valve) insufficiency (I35.1):  Asymptomatic; consistently moderate. Recheck next year. 06. Nicotine dependence, unspecified, uncomplicated (L27.876):  He continues to use smokeless tobacco.  The patient was instructed on tobacco cessation. 07. Body mass index (BMI) 34.0-34.9, adult (Z68.34): The patient was instructed on AHA diet and regular exercise. ORDERS:   Tobacco cessation counseling    Dietary management education, guidance, and counseling   3 ECG done   4 The patient was instructed on AHA diet and regular exercise. 2/19/20 MEDICATION LIST  Medication Sig Desc   amiodarone 200 mg tablet take 1 tablet by oral route 2 times every day   amlodipine 2.5 mg tablet take 1 tablet by oral route every evening if SBP >160   Coreg 25 mg Tab Take 1 tablet by mouth twice a day   Eliquis 5 mg tablet take 1 tablet by oral route 2 times every day   hydrochlorothiazide 12.5 mg tablet take 1 tablet by oral route  every day   isosorbide mononitrate ER 30 mg tablet,extended release 24 hr take 1 tablet by oral route  every day in the morning   lisinopril 40 mg Tab Take 1 tablet by mouth twice a day   multivitamin Tab Take 1 tablet by mouth once a day   Protonix 40 mg Tab Take 1 tablet by mouth once a day   Synthroid 50 mcg Tab Take 1 tablet by mouth once a day   Tekturna 300 mg Tab Take 1 tablet by mouth once a day   Vitamin D3 2,000 unit capsule 1 po qd     _______________________________________________________  CARDIAC HISTORY  CAD:  1 Chest Pain Syndrome, atypical. Pre-op eval. [Normal EF, 50% PLB, 75% Diagonal:  BMS to D.] - 98/2765   2 Chest Pain Syndrome, exertional/Dyspnea: ever since stent.  [Non-Ischemic Stress, Marked HTN: Sx improved with BP control.] - 1/2011   3 Chest Pain Syndrome, atypical. [Non-Ischemic Stress] - 7/2014     ARRHYTHMIA:  1 Palpitations [Neg holter.] - 1/2011   2 Palpitations [Neg EVR and MPI.] - 6/2014     RISK FACTORS:  1 Hypertension   2 Dyslipidemia   3 Peripheral Vascular Disease   4 Tobacco Use       CARDIOVASCULAR PROCEDURES  Procedure Date Results   [facility] MPI 08/09/2019 EF 0.45 (45%), Possible Basal-Inferolateral Ischemia, (Early + ETT before Alicia). [facility] MPI 01/19/2011 EF 0.50 (50%), No Ischemia   Carotid Duplex 01/25/2017 1 - 49% Bilateral ICAs, Vertebral: Bilateral Antegrade Flow   Cath 08/09/2019 EF 0.50 (50%), Minimal CAD, 40-50% D1: Med Rx. Cath  Normal EF, 50% PLB, 75% Diagonal:  BMS to D.   DCCV 01/28/2020 Initial Rhythm A Fib, Final Rhythm Sinus, Max Joules 300, 1 Shock   Echo 08/07/2019 EF 0.55 (55%), NWMA, Mild TR, Mild LVH, Mild MR, Moderate AR, Est RVSP 16 mmHg, Nl RV; @ [facility]. Echo 10/27/2010 EF 0.65 (65%), Moderate AR, Mild MR, No change vs 6/2010. EKG 02/19/2020 Atrial Fib, HR 70   EVR  Sinus Rhythm, No Malignant Arrhythmias, No events. Holter 02/04/2020 Atrial Fib, HR , ave 76; 2.5 sec pauses during sleep; \"CP,dyspnea\" do not correspond to other arrhythmia. Holter 07/13/2011 Sinus Rhythm, No Malignant Arrhythmias, No Sx. MPI 07/09/2014 EF 0.58 (58%), No Ischemia, 9:30. [facility] 01/28/2020 EF 40-45%; global HK. Normal RV. Moderate AI. No thrombus. No PFO. ACTIVE ALLERGIES:  Ingredient Reaction   FENOFIBRATE,MICRONIZED myalgia   FISH OIL couldn't tolerate   SIMVASTATIN myalgia   NIACIN decreased vision   FENOFIBRATE NANOCRYSTALLIZED myalgia   ATORVASTATIN CALCIUM decreased vision   CLONIDINE HCL anxiety       PROBLEM LIST:  Problem Description Chronic   Chest pain Y   AFIB Y   CAD Y   Benign HTN Y   Mixed hyperlipidaemia Y   CVA Y       PAST MEDICAL/SURGICAL HISTORY  (Detailed)    Disease/disorder Onset Date Surgical History Date Comments    1991 Cholecystectomy       Tonsillectomy     Anxiety       Depression       Hyperlipidemia       Hypertension       Hypothyroidism.        knee surgery       prostectomy 2010        Family History (Detailed)  Relationship Family Member Name  Age at Death Condition Onset Age Cause of Death   Brother  N  Hypertension  N   Brother  N  Alive and well  N   Father  Y  Hypertension  N   Father  Y  Stroke 55 Y     SOCIAL HISTORY  (Detailed)  Tobacco use reviewed. Preferred language is [place]. EDUCATION/EMPLOYMENT/OCCUPATION  Employment History Status Retired Restrictions            MARITAL STATUS/FAMILY/SOCIAL SUPPORT  Currently . Smoking status: Current every day smoker. SMOKING STATUS  Use Status Type Smoking Status Usage Per Day Years Used Total Pack Years   yes  Current every day smoker        TOBACCO CESSATION INFORMATION  Date Counseled By Order Status Description Code Tobacco Cessation Information   2014      Smoking cessation education   2014 Gamaliel Gonzalez Tobacco cessation counseling completed   Tobacco Cessation Counseling   2017 Mera Hitchcock Tobacco cessation counseling completed   Tobacco Cessation Counseling   2020 Gamaliel Gonzalez Tobacco cessation counseling completed   Smoking effects education   2020 Tika Quintana Tobacco cessation counseling completed   Counseling about tobacco use (procedure)         ALCOHOL  There is a history of alcohol use. consumed frequently. CAFFEINE  The patient uses caffeine: coffee.         Hospital problem list   Active Hospital Problems    Diagnosis Date Noted    CHF (congestive heart failure) ([facility] [address] [facility] 2020        Subjective: Yaa Paiz reports   Chest pain X Reproducible epigastric pain  consistent with:  Non-cardiac CP         Atypical CP     None now  On going  Anginal CP     Dyspnea X none  at rest  with exertion         improved  unchanged  worse              PND X none  overnight       Orthopnea X none  improved  unchanged  worse   Presyncope X none  improved  unchanged  worse     Ambulated in hallway without symptoms   Yes   Ambulated in room without symptoms  Yes   Objective:    Physical Exam:  Overall VSSAF;    Visit Vitals  /81 (BP 1 Location: Right arm, BP Patient Position: Sitting)   Pulse 90   Temp 98.2 °F (36.8 °C)   Resp 20   Ht 6' (1.829 m)   Wt 109.7 kg (241 lb 14.4 oz)   SpO2 96%   BMI 32.81 kg/m²     Temp (24hrs), Av.1 °F (36.7 °C), Min:97.6 °F (36.4 °C), Max:98.3 °F (36.8 °C)    Patient Vitals for the past 8 hrs:   Pulse   20 0757 90   20 0303 79     Patient Vitals for the past 8 hrs:   Resp   20 0757 20   20 0303 20     Patient Vitals for the past 8 hrs:   BP   20 0757 165/81   20 0303 136/68       Intake/Output Summary (Last 24 hours) at 2020 0835  Last data filed at 2020 0252  Gross per 24 hour   Intake 1520 ml   Output 2150 ml   Net -630 ml     General Appearance: Well developed, well nourished, no acute distress. Ears/Nose/Mouth/Throat:   Normal MM; anicteric. JVP: WNL   Resp:   clear to auscultation bilaterally. Nl resp effort. Cardiovascular:  IRRR, S1, S2 normal, no new murmur. No gallop or rub. Abdomen:   Soft, non-tender, bowel sounds are present. Extremities: No edema bilaterally. Skin:  Neuro: Warm and dry. A/O x3, grossly nonfocal   cath site intact w/o hematoma or new bruit; distal pulse unchanged  Yes   Data Review:     Telemetry independently reviewed  sinus x chronic afib  parox afib  NSVT     ECG independently reviewed  NSR  afib  no significant changes  NSST-Tw chgs   x no new ECG provided for review   Lab results reviewed as noted below. Current medications reviewed as noted below. No results for input(s): PH, PCO2, PO2 in the last 72 hours.   Recent Labs     20  1205   TROIQ <0.05     Recent Labs     20  0121 20  0009 20  0603 20  1205    137 141 138   K 3.6 3.3* 3.2* 3.5    108 109* 106   CO2 25 25 24 25   BUN 14 17 12 15   CREA 1.25 1.38* 1.14 1.17   GFRAA >60 >60 >60 >60   GLU 95 96 109* 117*   CA 8.6 8.4* 8.8 8.7   WBC 6.0  --  6.5 8.1   HGB 11.8* --  12.8 13.2   HCT 36.6  --  39.7 39.9     --  227 247     Lab Results   Component Value Date/Time    Cholesterol, total 227 (H) 02/23/2020 12:05 PM    HDL Cholesterol 35 02/23/2020 12:05 PM    LDL, calculated 157.6 (H) 02/23/2020 12:05 PM    Triglyceride 172 (H) 02/23/2020 12:05 PM    CHOL/HDL Ratio 6.5 (H) 02/23/2020 12:05 PM     No results for input(s): SGOT, GPT, AP, TBIL, TP, ALB, GLOB, GGT, AML, LPSE in the last 72 hours.     No lab exists for component: AMYP, HLPSE  Recent Labs     02/26/20  0121 02/25/20  0009 02/24/20  0603   INR 1.1 1.1 1.1   PTP 11.1 11.0 11.0      No components found for: GLPOC    Current Facility-Administered Medications   Medication Dose Route Frequency    sodium chloride (NS) flush 5-40 mL  5-40 mL IntraVENous Q8H    sodium chloride (NS) flush 5-40 mL  5-40 mL IntraVENous PRN    pantoprazole (PROTONIX) tablet 40 mg  40 mg Oral ACB&D    simethicone (MYLICON) tablet 80 mg  80 mg Oral QID PRN    spironolactone (ALDACTONE) tablet 25 mg  25 mg Oral DAILY    hydrALAZINE (APRESOLINE) tablet 25 mg  25 mg Oral TID    lisinopril (PRINIVIL, ZESTRIL) tablet 20 mg  20 mg Oral DAILY    [Held by provider] colesevelam (WELCHOL) tablet 1,250 mg  1,250 mg Oral BID WITH MEALS    sucralfate (CARAFATE) tablet 1 g  1 g Oral AC&HS    albuterol-ipratropium (DUO-NEB) 2.5 MG-0.5 MG/3 ML  3 mL Nebulization Q4H PRN    acetaminophen (TYLENOL) tablet 650 mg  650 mg Oral Q6H PRN    morphine injection 1 mg  1 mg IntraVENous Q4H PRN    sodium chloride (NS) flush 5-40 mL  5-40 mL IntraVENous Q8H    sodium chloride (NS) flush 5-40 mL  5-40 mL IntraVENous PRN    ondansetron (ZOFRAN) injection 4 mg  4 mg IntraVENous Q8H PRN    bisacodyL (DULCOLAX) tablet 5 mg  5 mg Oral DAILY PRN    carvediloL (COREG) tablet 25 mg  25 mg Oral BID WITH MEALS    amiodarone (CORDARONE) tablet 200 mg  200 mg Oral BID    levothyroxine (SYNTHROID) tablet 50 mcg  50 mcg Oral ACB    isosorbide mononitrate ER (IMDUR) tablet 30 mg  30 mg Oral DAILY    labetaloL (NORMODYNE;TRANDATE) injection 20 mg  20 mg IntraVENous Q4H PRN    methocarbamol (ROBAXIN) tablet 750 mg  750 mg Oral BID    nicotine (NICODERM CQ) 21 mg/24 hr patch 1 Patch  1 Patch TransDERmal Q24H        Cassidy Harper MD

## 2020-02-26 NOTE — PROGRESS NOTES
GI PROGRESS NOTE  Shawnee Kirk NP  [phone] NP in-hospital cell phone M-F until 4:30  After 5pm or on weekends, please call  for physician on call    NAME:Bryn Gutierrez :1955 NSD:[id number]   ATTG: Dr Rachelle Pritchett NP  PCP: Jerrell Helms DO  Date/Time:  2020 0930 AM     Primary GI: Dr. Maximiliano Victoria    Reason for following: epigastric pain    Assessment:   Epigastric abdominal pain secondary to erosive gastritis  - Worsened post prandially  - Hx of Hiatal hernia - not seen on esophagram  - Esophagram 2020 : IMPRESSION: Esophageal dysmotility. Small laryngoceles. No hiatal hernia or other abnormality. - CT of ABD/PEL: No acute abdominal or pelvic process. Mild to moderate pulmonary edema with small bilateral pleural effusions.  - EGD 2020:  1. Chronic appearing esophagitis  2. Erosive gastritis  3. Normal duodenum    CVA - on eliquis - Last dose 2020 1800 switched to lovenox  CHD       Plan:   · Continue BID pantoprazole 40mg x 4 weeks, then decrease to daily dosing  · Continue carafate 1gm tablet QID x 14 days then stop  · Follow up outpatient in  8 weeks  · Continue diet as tolerated - low acid  · Okay to resume anticoagulation at this time. Plan discussed with Dr Maximiliano Victoria. Agreeable to discharged. Will sign off. Subjective:   Discussed with RN events overnight. Family at bedside. Doing better this AM and relieved to find out that there is something wrong as he was really hurting. Now he feels empowered that he will get better. Review of Systems:  Symptom Y/N Comments  Symptom Y/N Comments   Fever/Chills    Chest Pain     Cough    Headaches     Sputum    Joint Pain     SOB/DENG    Pruritis/Rash     Tolerating Diet y   Other       Could NOT obtain due to:      Objective:   VITALS:   Last 24hrs VS reviewed since prior progress note.  Most recent are:  Visit Vitals  /81 (BP 1 Location: Right arm, BP Patient Position: Sitting)   Pulse 90 Temp 98.2 °F (36.8 °C)   Resp 20   Ht 6' (1.829 m)   Wt 109.7 kg (241 lb 14.4 oz)   SpO2 96%   BMI 32.81 kg/m²       Intake/Output Summary (Last 24 hours) at 2/26/2020 0930  Last data filed at 2/26/2020 0252  Gross per 24 hour   Intake 880 ml   Output 1700 ml   Net -820 ml     PHYSICAL EXAM:  General: WD, WN. Alert, cooperative, no acute distress    HEENT: NC, Atraumatic. Anicteric sclerae. Lungs:  CTA Bilaterally. No Wheezing/Rhonchi/Rales. Heart:  Regular  rhythm,  No murmur (-), No Rubs, No Gallops  Abdomen: Soft, Non distended, Mild epigastric tenderness.  +Bowel sounds, no HSM  Extremities: BLE edema +1  Neurologic:  Alert and oriented X 3. No acute neurological distress   Psych:   Good insight. Not anxious nor agitated. Lab and Radiology Data Reviewed: (see below)    Medications Reviewed: (see below)  PMH/SH reviewed - no change compared to H&P  ________________________________________________________________________  Total time spent with patient: 20 minutes ________________________________________________________________________  Care Plan discussed with:  Patient y   Family  y-spouse   RN               Consultant:  elda Strickland NP     Procedures: see electronic medical records for all procedures/Xrays and details which were not copied into this note but were reviewed prior to creation of Plan. LABS:  Recent Labs     02/26/20  0121 02/24/20  0603   WBC 6.0 6.5   HGB 11.8* 12.8   HCT 36.6 39.7    227     Recent Labs     02/26/20  0121 02/25/20  0009 02/24/20  0603    137 141   K 3.6 3.3* 3.2*    108 109*   CO2 25 25 24   BUN 14 17 12   CREA 1.25 1.38* 1.14   GLU 95 96 109*   CA 8.6 8.4* 8.8   MG 2.3 2.1 2.3     No results for input(s): SGOT, GPT, AP, TBIL, TP, ALB, GLOB, GGT, AML, LPSE in the last 72 hours.     No lab exists for component: AMYP, HLPSE  Recent Labs     02/26/20  0121 02/25/20  0009 02/24/20  0603   INR 1.1 1.1 1.1   PTP 11.1 11.0 11.0      No results for input(s): FE, TIBC, PSAT, FERR in the last 72 hours. Lab Results   Component Value Date/Time    Folate 27.2 (H) 02/23/2020 12:05 PM     No results for input(s): PH, PCO2, PO2 in the last 72 hours. No results for input(s): CPK, CKMB in the last 72 hours.     No lab exists for component: TROPONINI  Lab Results   Component Value Date/Time    Color YELLOW/STRAW 02/21/2020 03:41 AM    Appearance CLEAR 02/21/2020 03:41 AM    Specific gravity 1.011 02/21/2020 03:41 AM    Specific gravity >1.030 (H) 10/11/2010 10:45 AM    pH (UA) 5.5 02/21/2020 03:41 AM    Protein NEGATIVE  02/21/2020 03:41 AM    Glucose NEGATIVE  02/21/2020 03:41 AM    Ketone NEGATIVE  02/21/2020 03:41 AM    Bilirubin NEGATIVE  02/21/2020 03:41 AM    Urobilinogen 0.2 02/21/2020 03:41 AM    Nitrites NEGATIVE  02/21/2020 03:41 AM    Leukocyte Esterase NEGATIVE  02/21/2020 03:41 AM    Epithelial cells FEW 02/21/2020 03:41 AM    Bacteria NEGATIVE  02/21/2020 03:41 AM    WBC 0-4 02/21/2020 03:41 AM    RBC 0-5 02/21/2020 03:41 AM       MEDICATIONS:  Current Facility-Administered Medications   Medication Dose Route Frequency    sodium chloride (NS) flush 5-40 mL  5-40 mL IntraVENous Q8H    sodium chloride (NS) flush 5-40 mL  5-40 mL IntraVENous PRN    pantoprazole (PROTONIX) tablet 40 mg  40 mg Oral ACB&D    simethicone (MYLICON) tablet 80 mg  80 mg Oral QID PRN    spironolactone (ALDACTONE) tablet 25 mg  25 mg Oral DAILY    hydrALAZINE (APRESOLINE) tablet 25 mg  25 mg Oral TID    lisinopril (PRINIVIL, ZESTRIL) tablet 20 mg  20 mg Oral DAILY    [Held by provider] colesevelam (WELCHOL) tablet 1,250 mg  1,250 mg Oral BID WITH MEALS    sucralfate (CARAFATE) tablet 1 g  1 g Oral AC&HS    albuterol-ipratropium (DUO-NEB) 2.5 MG-0.5 MG/3 ML  3 mL Nebulization Q4H PRN    acetaminophen (TYLENOL) tablet 650 mg  650 mg Oral Q6H PRN    morphine injection 1 mg  1 mg IntraVENous Q4H PRN    sodium chloride (NS) flush 5-40 mL  5-40 mL IntraVENous Q8H  sodium chloride (NS) flush 5-40 mL  5-40 mL IntraVENous PRN    ondansetron (ZOFRAN) injection 4 mg  4 mg IntraVENous Q8H PRN    bisacodyL (DULCOLAX) tablet 5 mg  5 mg Oral DAILY PRN    carvediloL (COREG) tablet 25 mg  25 mg Oral BID WITH MEALS    amiodarone (CORDARONE) tablet 200 mg  200 mg Oral BID    levothyroxine (SYNTHROID) tablet 50 mcg  50 mcg Oral ACB    isosorbide mononitrate ER (IMDUR) tablet 30 mg  30 mg Oral DAILY    labetaloL (NORMODYNE;TRANDATE) injection 20 mg  20 mg IntraVENous Q4H PRN    methocarbamol (ROBAXIN) tablet 750 mg  750 mg Oral BID    nicotine (NICODERM CQ) 21 mg/24 hr patch 1 Patch  1 Patch TransDERmal Q24H

## 2020-02-26 NOTE — PROGRESS NOTES
Transitional Care Team: BLADIMIR Discharge Note    Date of Assessment: 02/26/20  Time of Assessment:  1:13 PM      Delvin Ingram is a 77 y.o. male inpatient at [facility] with heart failure on  2/21. Assessment & Plan   Pt A+O. Denies chest pian, dyspnea OOB with steady gait. Has discharge orders, declines Home Health visits. Aware of availability for [facility] to come to home to assess any health care changes- but needs to call EMS for any severely concerning symptoms such as chest pain, sudden weakness or other stroke symptoms. Has discharge appointment with cardiologist. PCP office to call to schedule appointment. Pt aware of multiple medication changes, including no longer has lasix or other diuretic scheduled. Now has Aldosterone listed . Will benefit to have K+ levels checked for potential hyperkalemia although historically he has been running low. He is to continue reduced sodium diet , and fluid restriction per nephrology recommendations. She also wants him to weigh daily. Would benefit to self monitor B/P with home machine as he has declined HH. He was quite hypertensive in ED n arrival 2/21. He is normotensive on discharge            Current Code Status:  full    Medication Reconciliation:  was performed. Can patient afford medications:  yes    Who manages medications at home self  Emergency Contact: spouse Jonathan Rich [phone]    Chart reviewed by me and BLADIMIR (Healthy Understanding of Goals) program introduced to patient/family. The Transitional Care Team bridges the gaps in care and education surrounding discharge from the acute care facility.  The objective is to empower the patient and family in taking a proactive role in the task of preventing readmission within the first thirty days after discharge from the acute care setting, The team is also involved in the efforts to reduce readmission to the acute care setting after stabilization and discharge from the acute care environment either to skilled nursing facilities or community. Discharge With The Following Arrangements in place:    Future Appointments   Date Time Provider Josefa Kristin   3/25/2020 11:00 AM Jeana Anderson  [facility]   5/4/2020 11:30 AM Margarita Hammond       Non-[facility] follow up appointment(s):[facility] [place] call information given to: pt    Patient / Family was instructed on specific signs/symptoms to look for with regards to worsening of their medical conditions. Learning was assessed using teach back. The patient / family have added upcoming appointment dates to their [facility] Calendar prior to discharge. Patient education focused on readmission zones as described as: The Red Zone: High risk for readmission, days 1-21   The Yellow Zone: Moderate  risk for readmission, days 22-29   The Green Zone: Lower risk for readmission, days 30 and after    [facility] Team will follow patient from a distance while inpatient as well as be available for further transition disposition as needed. The [facility] TEAM will continue to offer support during the 30- 90 day discharge from acute care setting. Notified Ambulatory {Blank Single Select Template:20061[de-identified] ,SUZY RN.       Signed By: Carly Aguiar RN     February 26, 2020

## 2020-02-26 NOTE — ROUTINE PROCESS
Attempted to schedule PCP [facility] apt with Dr. Sandy Baker, due to lack of apt availability, the nurse will contact the patient with apt date and time. Attempted to schedule Cardiology f/u with Dr. Nora Xavier, due to lack of apt avail the nurse will contact the patient with apt information. [facility] visit scheduled for 2/28/20. [facility] will contact the patient for additional information and to confirm the visit.   Apt added to AVS

## 2020-02-26 NOTE — PROGRESS NOTES
Problem: Falls - Risk of  Goal: *Absence of Falls  Description  Document Clide Failing Fall Risk and appropriate interventions in the flowsheet.   Outcome: Progressing Towards Goal  Note: Fall Risk Interventions:            Medication Interventions: Bed/chair exit alarm, Patient to call before getting OOB, Teach patient to arise slowly         History of Falls Interventions: Bed/chair exit alarm         Problem: Breathing Pattern - Ineffective  Goal: *Absence of hypoxia  Outcome: Progressing Towards Goal

## 2020-02-26 NOTE — DISCHARGE SUMMARY
Hospitalist Discharge Summary     Patient ID:  Katie Hager  [id number]  89 y.o.  1955 2/21/2020    PCP on record: Tiffanie Marc DO    Admit date: 2/21/2020  Discharge date and time: 2/26/2020    DISCHARGE DIAGNOSIS:    Active Hospital Problems    Diagnosis Date Noted    Acute respiratory failure with hypoxia ([facility] 02/26/2020    Secondary hyperaldosteronism ([facility] 02/26/2020    Cardiomegaly 02/26/2020    Pulmonary edema 02/26/2020    PAF (paroxysmal atrial fibrillation) ([facility] 02/26/2020    [facility] to [facility] 02/26/2020    Hypokalemia 02/26/2020    TIA (transient ischemic attack) 02/26/2020    Erosive gastritis 02/26/2020    Acute renal failure (ARF) ([facility] 02/26/2020    CHF (congestive heart failure) ([facility] 02/21/2020    Essential hypertension 05/03/2019    Mixed hyperlipidemia 05/03/2019    Acquired hypothyroidism 05/03/2019     CONSULTATIONS:  IP CONSULT TO HOSPITALIST  IP CONSULT TO CARDIOLOGY  IP CONSULT TO NEPHROLOGY  IP CONSULT TO GASTROENTEROLOGY  IP CONSULT TO NEUROLOGY    Excerpted HPI from H&P of Divina Iglesias MD:  \"78 years old male from home with past medical history significant for atrial fibrillation, hypertension: CHF, hypothyroidism presented to the hospital for evaluation of shortness of breath, patient stated he was walking at [employer] he started complaining from severe shortness of breath associated with a dry cough and some chest pressure denies any fever chills denies any productive cough, chest x-ray was done and showed cardiomegaly with mild pulmonary edema. We were asked to admit for work up and evaluation of the above problems. \"  ______________________________________________________________________  DISCHARGE SUMMARY/HOSPITAL COURSE:  for full details see H&P, daily progress notes, labs, consult notes.    Bonnita Blazer y.o. name was admitted to [facility] on 2/21/2020 and treated for the following medical complaints:     Acute hypoxic respiratory failure secondary to acute on chronic systolic heart failure, POA  Secondary hyperaldosteronism r/t HF  Cardiomegaly  Pulmonary edema   HTN  Elevated troponin   Paroxsymal atrial fibrillation, s/p BELKIS/CV (was converted to NSR after the procedure   Hypercoagulable state secondary to afib   · CXR with cardiomegaly and mild pulmonary edema   · NT pro-BNP 1569; down from 2750  · ECHO:   Mildly dilated left ventricle. Mild concentric hypertrophy . The estimated ejection fraction is 50 - 55%. Visually measured ejection fraction. Atrial fibrillation observed  · Continue with Coreg, Imdur, hydralazine and ACEi (lower dose)  · Weights appear inaccurate in connect care   · Wean O2 for sats >92% on RA  · Continue Eliquis   · Appreciate cardiology input; no new cardiac recommendations   · Patient poorly tolerates Lasix. Start Aldactone 25 mg PO   · Afib on cardiac monitor      Hypokalemia- resolved      TIA vs CVA  CVA ruled-out   Hyperlipidemia   · CT head:  No evidence of acute infarct or intracranial hemorrhage. 2. Periventricular white matter disease is likely secondary to chronic small vessel ischemic changes. 3. Chronic right basal ganglia infarct. · CTA head:  No evidence for acute large vessel arterial occlusion or significant stenosis. · MRI brain with no acute stroke  · .6; reports intolerance to statins and zetia. Radha Clore has been recommended by neurology but not on formulary at [facility]. Patient refused welchol for dyslipidemia. · Has follow-up with cardiology scheduled to discuss further cholesterol management   · Hgb A1c 5.5  · TSH 7.43  · Appreciate neurology input; TIA associated with his hypoperfusion d/t hypotension   · Continue Eliquis   · Follow-up OP with neurology in 4-6 weeks      Epigastric pain   · CT abd/pel:  No acute abdominal or pelvic process. Mild to moderate pulmonary edema with small bilateral pleural effusions.   · GI cocktail with mild relief · Continue PPI  · Esophagram: Esophageal dysmotility. Small laryngoceles. No hiatal hernia or other abnormality. · Appreciate GI input  · EGD showed chronic appearing esophagitis, erosive gastritis, and normal duodenum      Acute renal failure- resolved   · Cr 1.25; improved from 2.28 on admission  · Avoid nephrotoxins  · Monitor   · Appreciate nephrology input; stable from renal standpoint, follow-up OP in 1-2 weeks      Hypothyroidism   · Continue Synthroid     Patient's plan of care has been reviewed with them. Patient and/or family have verbally conveyed their understanding and agreement of the patient's signs, symptoms, diagnosis, treatment and prognosis and additionally agree to follow up as recommended or return to [facility] should their condition change prior to follow-up. Discharge instructions have also been provided to the patient with some educational information regarding their diagnosis as well a list of reasons why they would want to return to the office prior to their follow-up appointment should their condition change. [facility] to avoid frequent ED visits. [employer] can treat; pains, sprains, cuts, wounds, high fevers, upper respiratory infections and much more. There medical team is equipped with all the tools necessary to provide advanced medical care in the comfort of you home, workplace, or location of need. The medical team consists of doctors, nurse practitioners, and EMTs. [employer] is available 7 days per week 9 am to 9 pm.   Request care by calling [phone] or by going online at [address] [address] [employer] [address]  _______________________________________________________________________  Patient seen and examined by me on discharge day. Pertinent Findings:  Gen:    Not in distress  Chest: Clear lungs  CVS:   Regular rhythm.   No edema  Abd:  Soft, not distended, not tender  Neuro:  Alert, oriented _______________________________________________________________________  DISCHARGE MEDICATIONS:   Current Discharge Medication List      START taking these medications    Details   hydrALAZINE (APRESOLINE) 25 mg tablet Take 1 Tab by mouth three (3) times daily. Qty: 90 Tab, Refills: 0      nicotine (NICODERM CQ) 21 mg/24 hr 1 Patch by TransDERmal route every twenty-four (24) hours for 30 days. Qty: 30 Patch, Refills: 0      spironolactone (ALDACTONE) 25 mg tablet Take 1 Tab by mouth daily. Qty: 30 Tab, Refills: 0      sucralfate (CARAFATE) 1 gram tablet Take 1 Tab by mouth Before breakfast, lunch, dinner and at bedtime. Qty: 120 Tab, Refills: 0         CONTINUE these medications which have CHANGED    Details   lisinopril (PRINIVIL, ZESTRIL) 20 mg tablet Take 1 Tab by mouth daily. Qty: 30 Tab, Refills: 0         CONTINUE these medications which have NOT CHANGED    Details   amiodarone (CORDARONE) 200 mg tablet Take  by mouth two (2) times a day. apixaban (ELIQUIS) 5 mg tablet Take 5 mg by mouth two (2) times a day. isosorbide mononitrate ER (IMDUR) 30 mg tablet Take 1 Tab by mouth daily. Qty: 30 Tab, Refills: 12      carvedilol (COREG) 25 mg tablet Take 1 Tab by mouth two (2) times daily (with meals). Qty: 180 Tab, Refills: 3      levothyroxine (SYNTHROID) 50 mcg tablet Take 1 Tab by mouth Daily (before breakfast). Qty: 90 Tab, Refills: 3      pantoprazole (PROTONIX) 40 mg tablet Take 1 Tab by mouth daily. Qty: 90 Tab, Refills: 3      cholecalciferol, vitamin D3, (VITAMIN D3) 2,000 unit tab Take 2,000 Units by mouth daily. MULTI-VITAMIN HI-PO PO Take 1 Tab by mouth daily.          STOP taking these medications       amLODIPine (NORVASC) 2.5 mg tablet Comments:   Reason for Stopping:         hydroCHLOROthiazide (HYDRODIURIL) 12.5 mg tablet Comments:   Reason for Stopping:         aliskiren (TEKTURNA) 300 mg tablet Comments:   Reason for Stopping:                 Patient Follow Up Instructions: Activity: Activity as tolerated  Diet: Cardiac Diet  Wound Care: None needed    Follow-up with PCP in 1 week. Follow-up Information     Follow up With Specialties Details Why Contact Info    Catarina Anderson DO Neurology Schedule an appointment as soon as possible for a visit in 4 weeks  [address]  [address]  [address]      Esme Valdez MD Nephrology Schedule an appointment as soon as possible for a visit in 1 week  [address] [facility]  [address] [facility]      Mine Nguyen MD Cardiology Schedule an appointment as soon as possible for a visit on 3/4/2020 at 10:45 AM for Cardiology follow up apt. .  [address]  [address]  [address] DO Arnie Internal Medicine Schedule an appointment as soon as possible for a visit in 1 week The nurse will contact you with appointment date and time [address]  [facility] [address] [place] [address]  [phone]      [facility] Clinical Assessments On 2/28/2020 Expect a phone call from [place] to schedule a follow up visit with you in 24-48 hours. If you have questions please Contact #[phone] Visit at [place] [address] Urgent Care That Comes To [address] [facility]  [phone]        ________________________________________________________________    Risk of deterioration: Moderate    Condition at Discharge:  Stable  __________________________________________________________________    Disposition  Home with family, no needs    ____________________________________________________________________    Code Status: Full Code  ___________________________________________________________________      Total time in minutes spent coordinating this discharge (includes going over instructions, follow-up, prescriptions, and preparing report for sign off to her PCP) :  31 minutes    Signed:  Rohan Lowry NP

## 2020-02-26 NOTE — DISCHARGE INSTRUCTIONS
HOSPITALIST DISCHARGE INSTRUCTIONS    NAME: Elvi Snyder   :  1955   MRN:  [id number]     Date/Time:  2020 9:30 AM    ADMIT DATE: 2020   DISCHARGE DATE: 2020     Attending Physician: Naz Moore NP    DISCHARGE DIAGNOSIS:  JENNIFER/Mariah S[address] Problems    Diagnosis Date Noted    Acute [facility] failure with hypoxia ([facility] 2020    Secondary hyperaldosteronism ([address] 2020    Cardiomegaly 2020    Pulmonary edema 2020    PAF (paroxysmal atrial fibrillation) ([facility] 2020    Hypercoagulability due to atrial fibrillation ([facility] 2020    Hypokalemia 2020    TIA (transient ischemic attack) 2020    Erosive gastritis 2020    Acute renal failure (ARF) ([facility] 2020    CHF (congestive heart failure) ([facility] 2020    Essential hypertension 2019    Mixed hyperlipidemia 2019    Acquired hypothyroidism 2019       Medications: Per above medication reconciliation. Pain Management: per above medications    Recommended diet: Cardiac Diet    Recommended activity: Activity as tolerated    Wound care: None    Indwelling devices:  None    Supplemental Oxygen: None    Code status: Full        Outside physician follow up: Follow-up Information     Follow up With Specialties Details Why Contact Info    Balbina Anderson DO Neurology Schedule an appointment as soon as possible for a visit in 4 weeks  [address] [place] [address]      Veronica Mora MD Nephrology Go on 3/10/2020 at 2:15 PM for Nephrology follow up appt. Please bring insurance cards. [address]  [phone]      Ava Marquez MD Cardiology Go on 3/4/2020 at 10:45 AM for Cardiology follow up apt. .  [address]  [address]  [address], Jeanna Brunson DO Internal Medicine Schedule an appointment as soon as possible for a visit in 1 week The nurse will contact you with appointment date and time [address] [facility]  [address] [facility]. [phone]      [facility] Clinical Assessments On 2/28/2020 Expect a phone call from [place] to schedule a follow up visit with you in 24-48 hours. If you have questions please Contact #[phone] Visit at [place] [address] Urgent Care That Comes To [facility] [facility]  [phone]            Information obtained by :  I understand that if any problems occur once I am at home I am to contact my physician. I understand and acknowledge receipt of the instructions indicated above.                                                                                                                                            Physician's or R.N.'s Signature                                                                  Date/Time                                                                                                                                              Patient or Repres

## 2020-02-27 ENCOUNTER — PATIENT OUTREACH (OUTPATIENT)
Dept: INTERNAL MEDICINE CLINIC | Age: 65
End: 2020-02-27

## 2020-02-27 ENCOUNTER — TELEPHONE (OUTPATIENT)
Dept: INTERNAL MEDICINE CLINIC | Age: 65
End: 2020-02-27

## 2020-02-27 NOTE — PROGRESS NOTES
Hospital Discharge Follow-Up      Date/Time:  2020 10:26 AM  Neil Woods, JUAN JOSE  Care Transitions Nurse  [facility] Ambulatory Care Coordination Team  [phone]      Patient was admitted to [facility] on 2020 and discharged on 2020 for SOB. The physician discharge summary was available at the time of outreach. Patient was contacted within 1 business days of discharge. Top Challenges reviewed with the provider   -declines [facility]  -[facility]  -Pending pathology on GI biopsies. -TSH 7.43   -K+ 3.6, trended down to 2.9, back up to 3.6 with IV repletion.   -creatinine trending down from 2.28 to 1.25. Lisinopril restarted  Advance Care Planning:   Does patient have an Advance Directive:  not on file        Method of communication with provider :chart routing    Was this a readmission? yes   Patient stated reason for the readmission: \" can't breathe\"    Care Transition Nurse (CTN) contacted the family by telephone to perform post hospital discharge assessment. Verified name and  with family as identifiers. Provided introduction to self, and explanation of the CTN role. Family received hospital discharge instructions. CTN reviewed discharge instructions and red flags with patient who verbalized understanding. Patient given an opportunity to ask questions and does not have any further questions or concerns at this time. The patient agrees to contact the PCP office for questions related to their healthcare. CTN provided contact information for future reference.     Disease Specific:   N/A    Patients top risk factors for readmission:  lack of knowledge about disease, polypharmacy    Home Health orders at discharge: patient refused  [address]: n/a  Date of initial visit: [facility] ordered at discharge: none  [address] received: n/a    Medication(s):   New Medications at Discharge:             START taking these medications     Details   hydrALAZINE (APRESOLINE) 25 mg tablet Take 1 Tab by mouth three (3) times daily. Qty: 90 Tab, Refills: 0       nicotine (NICODERM CQ) 21 mg/24 hr 1 Patch by TransDERmal route every twenty-four (24) hours for 30 days. Qty: 30 Patch, Refills: 0       spironolactone (ALDACTONE) 25 mg tablet Take 1 Tab by mouth daily. Qty: 30 Tab, Refills: 0       sucralfate (CARAFATE) 1 gram tablet Take 1 Tab by mouth Before breakfast, lunch, dinner and at bedtime. Qty: 120 Tab, Refills: 0           Changed Medications at Discharge:   CONTINUE these medications which have CHANGED     Details   lisinopril (PRINIVIL, ZESTRIL) 20 mg tablet Take 1 Tab by mouth daily. Qty: 30 Tab, Refills: 0           Discontinued Medications at Discharge:            STOP taking these medications         amLODIPine (NORVASC) 2.5 mg tablet Comments:   Reason for Stopping:            hydroCHLOROthiazide (HYDRODIURIL) 12.5 mg tablet Comments:   Reason for Stopping:            aliskiren (TEKTURNA) 300 mg tablet Comments:   Reason for Stopping         Medication reconciliation was performed with family, who verbalizes understanding of administration of home medications. There were no barriers to obtaining medications identified at this time. Referral to Pharm D needed: yes     Current Outpatient Medications   Medication Sig    lisinopril (PRINIVIL, ZESTRIL) 20 mg tablet Take 1 Tab by mouth daily.  hydrALAZINE (APRESOLINE) 25 mg tablet Take 1 Tab by mouth three (3) times daily.  nicotine (NICODERM CQ) 21 mg/24 hr 1 Patch by TransDERmal route every twenty-four (24) hours for 30 days.  spironolactone (ALDACTONE) 25 mg tablet Take 1 Tab by mouth daily.  sucralfate (CARAFATE) 1 gram tablet Take 1 Tab by mouth Before breakfast, lunch, dinner and at bedtime.  pantoprazole (PROTONIX) 40 mg tablet Take 1 Tab by mouth Before breakfast and dinner.     amiodarone (CORDARONE) 200 mg tablet Take  by mouth two (2) times a day.  apixaban (ELIQUIS) 5 mg tablet Take 5 mg by mouth two (2) times a day.  isosorbide mononitrate ER (IMDUR) 30 mg tablet Take 1 Tab by mouth daily.  carvedilol (COREG) 25 mg tablet Take 1 Tab by mouth two (2) times daily (with meals).  levothyroxine (SYNTHROID) 50 mcg tablet Take 1 Tab by mouth Daily (before breakfast).  cholecalciferol, vitamin D3, (VITAMIN D3) 2,000 unit tab Take 2,000 Units by mouth daily.  MULTI-VITAMIN HI-PO PO Take 1 Tab by mouth daily. No current facility-administered medications for this visit. There are no discontinued medications. [facility] follow up appointment(s):   Future Appointments   Date Time Provider Josefa Foster   3/2/2020  3:00 PM [facility] [place] [id number]   3/2/2020  3:30 PM Krystle Villa [place]    3/25/2020 11:00 AM Marsha Anderson DO NEUM [facility]   5/4/2020 11:30 AM [place], [address]       Non-[facility] follow up appointment(s): Nephrology 3/10/2020, Cardiology 3/4/2020  [facility]:  Initially agreed to see [facility] at discharge. Patient reports that [facility] called today to verify appointment. He states he was angry that they had the wrong address listed. He cancelled his appointment and does not wish to use their services due to mistake. Goals      Attends follow-up appointments as directed. 2/27/2020  -declined [facility] health. -will attend SUZY with PCP on 3/3  -will attend cardiology follow up on 3/4  -will attend Nephrology follow up on 3/10  -will attend Neurology follow up on 3/25  -CTN to follow up in 4 days  Rossy Vickers    2/24/2020  -was admitted to [facility] on 2/21/2020 2/12/2020  -attended SUZY, BMP drawn  -attended Cardiology follow up. Patient reports he is back in A.Fib.  Dr. Tyler Moreno started amiodarone 200mg BID and to follow up the first week of March  -CTN to follow up in 2 weeks  Ender Harrison P  2/3/2020  -Wife requesting a appointment with PCP ASAP. She reports that patient \" is feeling awful, back pain, choking\" CTN spoke to PCP  Claudia Sheppard. Appointment scheduled for today 2/3/2020 at 11:45.  -will attend [facility] with [facility]CP on 2/3/2020  [facility]  -will attend follow up with Wyatt Pete NP 2/4/2020  -will attend follow up with Dr. Daisy Marques on 3/4/2020  -CTN to follow up in 1 week  [place]       Knowledge and adherence of prescribed medication (ie. action, side effects, missed dose, etc.).      2/27/2020  -Patient concerned that medications are causing abdominal pain and SOB  multiple medication changes in the past 2 weeks. -CTN contacted pharmacist Iesha. She will see patient on 3/3/2020 at 3:00pm. Confirmed with wife. -CTN verified medications with wife. -CTN will follow up in 4 days  Pepper Ace    2/3/2020  -Patient reports he stopped taking potassium and lasix on 2/1/2020 because he believes these medications are causing left sided flank pain, choking and headache. He will discuss with PCP today at appointment  -CTN confirmed patient is taking Imdur and did discontinue diltiazem. -will monitor and record blood pressure daily  -reports BP today is 176/82.  To see PCP today  -CTN to follow up in 1 week  Farida KING

## 2020-02-27 NOTE — TELEPHONE ENCOUNTER
#[phone] wife, Alirio Manzo states pt was discharged from [facility] on 2-26-20 and had a really bad night. Pt needs to be seen today. Shannon  call to schedule right away Alirio Manzo ask.

## 2020-02-27 NOTE — TELEPHONE ENCOUNTER
Spoke with Tuan Breen. SUZY scheduled for 3/3 @ 330 with Dr. Marysol Coats verbalized understanding of information discussed w/ no further questions at this time.

## 2020-03-01 NOTE — PROGRESS NOTES
Encounter closed. Patient was not seen today. Recommendations discussed with PCP during his SUZY visit today. Will continue to follow progress.        Elayne Angulo, WernerD, BCACP, CDE

## 2020-03-02 ENCOUNTER — OFFICE VISIT (OUTPATIENT)
Dept: INTERNAL MEDICINE CLINIC | Age: 65
End: 2020-03-02

## 2020-03-02 VITALS
SYSTOLIC BLOOD PRESSURE: 188 MMHG | BODY MASS INDEX: 33.97 KG/M2 | HEART RATE: 78 BPM | DIASTOLIC BLOOD PRESSURE: 83 MMHG | RESPIRATION RATE: 16 BRPM | TEMPERATURE: 98.2 F | HEIGHT: 72 IN | OXYGEN SATURATION: 95 % | WEIGHT: 250.8 LBS

## 2020-03-02 DIAGNOSIS — I10 ESSENTIAL HYPERTENSION: ICD-10-CM

## 2020-03-02 DIAGNOSIS — I48.0 PAF (PAROXYSMAL ATRIAL FIBRILLATION) (HCC): Primary | ICD-10-CM

## 2020-03-02 DIAGNOSIS — I50.22 CHRONIC SYSTOLIC CONGESTIVE HEART FAILURE (HCC): ICD-10-CM

## 2020-03-02 DIAGNOSIS — I25.10 CORONARY ARTERY DISEASE INVOLVING NATIVE CORONARY ARTERY OF NATIVE HEART WITHOUT ANGINA PECTORIS: ICD-10-CM

## 2020-03-02 DIAGNOSIS — E78.2 MIXED HYPERLIPIDEMIA: ICD-10-CM

## 2020-03-02 DIAGNOSIS — D68.69 HYPERCOAGULABILITY DUE TO ATRIAL FIBRILLATION (HCC): ICD-10-CM

## 2020-03-02 DIAGNOSIS — E03.9 ACQUIRED HYPOTHYROIDISM: ICD-10-CM

## 2020-03-02 DIAGNOSIS — I48.91 HYPERCOAGULABILITY DUE TO ATRIAL FIBRILLATION (HCC): ICD-10-CM

## 2020-03-02 NOTE — PROGRESS NOTES
Reviewed record in preparation for visit and have obtained necessary documentation. Identified pt with two pt identifiers(name and ). Chief Complaint   Patient presents with   [facility] Follow Up       Health Maintenance Due   Topic Date Due    DTaP/Tdap/Td series (1 - Tdap) 1964    Shingrix Vaccine Age 50> (1 of 2) 2003    Influenza Age 5 to Adult  2019       Mr. Donna Sims has a reminder for a \"due or due soon\" health maintenance. I have asked that he discuss health maintenance topic(s) due with His  primary care provider. Coordination of Care Questionnaire:  :     1) Have you been to an emergency room, urgent care clinic since your last visit? yes   Hospitalized since your last visit? no             2) Have you seen or consulted any other health care providers outside of [address] [place] [address] since your last visit? no  (Include any pap smears or colon screenings in this section.)    3) Do you have an Advance Directive on file? no    4) Are you interested in receiving information on Advance Directives? NO    Patient is accompanied by self I have received verbal consent from Mady Maurer to discuss any/all medical information while they are present in the room.

## 2020-03-02 NOTE — PROGRESS NOTES
Hannah García is a 77 y.o. male who presents for evaluation of transition of care. Last seen by me feb 3, 2020 in another lilia. Once again, flipped back into atrial fib, with resultant chf and resp failure due to pulmonary edema. Was inpt again feb 21-26. This time also had egd that showed esophagitis and gastritis, for which he now takes carafate and is doing much better. He also had a TIA, with left sided weakness and vision loss. That workup was negative. Was d/c to home on aldactone, hydralazine and carafate.       ROS:  Constitutional: negative for fevers, chills, anorexia and weight loss  Eyes:   negative for visual disturbance and irritation  ENT:   negative for tinnitus,sore throat,nasal congestion,ear pain,hoarseness  Respiratory:  negative for cough, hemoptysis, dyspnea,wheezing  CV:   negative for chest pain, palpitations, lower extremity edema  GI:   negative for nausea, vomiting, diarrhea, abdominal pain,melena  Genitourinary: negative for frequency, dysuria and hematuria  Musculoskel: negative for myalgias, arthralgias, back pain, muscle weakness, joint pain  Neurological:  negative for headaches, dizziness, focal weakness, numbness  Psychiatric:     Negative for depression or anxiety      Past Medical History:   Diagnosis Date    Arrhythmia 01/03/2020    AFIB     CAD (coronary artery disease)     h/o stent- '10    Cancer [facility]) 2010    prostate    Congestive heart failure ([address]     Hypertension     Ill-defined condition     small bowel obstruction    Right rotator cuff tear 1/26/2017    SBO (small bowel obstruction) ([address] 11/3/2015    Stroke ([address] May or June 2010    lacunar  infarctions (found on scan- no sxs)    Thyroid disease     hypo       Past Surgical History:   Procedure Laterality Date    CARDIAC SURG PROCEDURE UNLIST  2010    coronary stent    EXCIS KNEE CARTILAGE,MEDIAL OR LAT  1980's x3    HX CHOLECYSTECTOMY  1991    HX HEENT  1962    T&A    HX HERNIA REPAIR 1996    HX MALIGNANT SKIN LESION EXCISION      2004 and 2017    HX ORTHOPAEDIC  1974    Ganglion cyst left wrist    HX ORTHOPAEDIC Left 2017    shoulder repair    HX PROSTATECTOMY  2010    UPPER GI ENDOSCOPY,BIOPSY  2/25/2020            Family History   Problem Relation Age of Onset    Dementia Mother     Other Father 55        cerebral hemorrhage       Social History     Socioeconomic History    Marital status:      Spouse name: Not on file    Number of children: Not on file    Years of education: Not on file    Highest education level: Not on file   Occupational History    Not on file   Social Needs    Financial resource strain: Not on file    Food insecurity:     Worry: Not on file     Inability: Not on file    Transportation needs:     Medical: Not on file     Non-medical: Not on file   Tobacco Use    Smoking status: Never Smoker    Smokeless tobacco: Current User   Substance and Sexual Activity    Alcohol use: Yes     Comment: occas    Drug use: Never    Sexual activity: Not Currently   Lifestyle    Physical activity:     Days per week: Not on file     Minutes per session: Not on file    Stress: Not on file   Relationships    Social connections:     Talks on phone: Not on file     Gets together: Not on file     Attends Baptism service: Not on file     Active member of club or organization: Not on file     Attends meetings of clubs or organizations: Not on file     Relationship status: Not on file    Intimate partner violence:     Fear of current or ex partner: Not on file     Emotionally abused: Not on file     Physically abused: Not on file     Forced sexual activity: Not on file   Other Topics Concern    Not on file   Social History Narrative    Not on file            Visit Vitals  /83 (BP 1 Location: Left arm, BP Patient Position: Sitting)   Pulse 78   Temp 98.2 °F (36.8 °C) (Oral)   Resp 16   Ht 6' (1.829 m)   Wt 250 lb 12.8 oz (113.8 kg)   SpO2 95%   BMI 34.01 kg/m² Physical Examination:   General - Well appearing male  HEENT - PERRL, TM no erythema/opacification, normal nasal turbinates, no oropharyngeal erythema or exudate, MMM  Neck - supple, no bruits, no thyroidomegaly, no lymphadenopathy  Pulm - clear to auscultation bilaterally  Cardio - irregularly, irregular, normal S1 S2, no murmur  Abd - soft, nontender, no masses, no HSM  Extrem - no edema, +2 distal pulses  Neuro-  No focal deficits, CN intact     Assessment/Plan:    1. Atrial fib--on amiodarone, coreg, eliquis. Has appt on wed with cardio, dr Irene Hebert  2. htn--remains elevated, though improving. On aldactone, hydralazine, coreg, lisinopril. Has follow up with renal in next few weeks as well  3.  hyperlipids--intolerant to statins. Would favor starting repatha, last . Don't think lovala will be strong enough to get his LDL <70  4. Hypothyroid--on synthroid. Suspect last tsh done during hospital stay was falsely elevated due to illness  5. Cad with stents--on coreg, eliquis  6. Esophagitis with gastritis--on carafte. Has gi follow up in April 7.   Hx lacunar cva--on eliquis    rtc 4 months        Amanda Sue III, DO

## 2020-03-02 NOTE — PATIENT INSTRUCTIONS
Office Policies Phone calls/patient messages: Please allow up to 24 hours for someone in the office to contact you about your call or message. Be mindful your provider may be out of the office or your message may require further review. We encourage you to use Thumb for your messages as this is a faster, more efficient way to communicate with our office Medication Refills: 
         
Prescription medications require 48-72 business hours to process. We encourage you to use Thumb for your refills. For controlled medications: Please allow 72 business hours to process. Certain medications may require you to  a written prescription at our office. NO narcotic/controlled medications will be prescribed after 4pm Monday through Friday or on weekends Form/Paperwork Completion: 
         
Please note a $25 fee may incur for all paperwork for completed by our providers. We ask that you allow 7-10 business days. Pre-payment is due prior to picking up/faxing the completed form. You may also download your forms to Thumb to have your doctor print off. Deciding Between Electrical Cardioversion and Rate Control Medicines for Atrial Fibrillation How can you decide between electrical cardioversion and rate control medicines for atrial fibrillation? What is atrial fibrillation? Atrial fibrillation (say \"AY-tree-doug [id number]-jkru-IVZ-shun\") is a kind of uneven heartbeat. It can make you feel lightheaded and dizzy. You may feel weak. It also can make you more likely to have a stroke. Electrical cardioversion can return your heart to a normal rhythm. First you'll get medicines to make you sleepy and control pain. Then your doctor will use patches to send an electric current to your heart. This resets the rhythm of your heart. Not everyone with atrial fibrillation needs this treatment.  For some people, taking medicines may be better. Most people can live with an uneven heartbeat. It just has to be kept under control so the heart does not beat too fast. 
Use this information to help you and your doctor decide which treatment to choose for atrial fibrillation. What are puri points about this decision? · Electrical cardioversion can return your heart to a normal rhythm. But the problem can come back. The longer you have had atrial fibrillation, the more likely it is to come back after this treatment. · Cardioversion may not work as well when an uneven heartbeat is caused by another heart disease, such as heart failure. · If your symptoms bother you a lot, you may want to try cardioversion. But even if it works, you may still need to take blood thinners to prevent a stroke. · If you don't have symptoms, or if they don't bother you much, you can try medicines to slow your heart rate. And you can take blood thinners to prevent a stroke. · Cardioversion does have risks, such as stroke. Discuss the risks with your doctor. Make sure you understand them. · You may have more than one heart problem. Cardioversion doesn't work as well if you have more than one heart problem. Why might you choose electrical cardioversion? · It restores the normal heart rhythm for most people. · The idea of having an electric shock does not bother you. · Your symptoms bother you a lot. · You have had atrial fibrillation just one time. · You do not have other heart problems. · You may not have to take as many medicines. Or you may not need to take them as long. Why might you choose rate-control medicines? · These medicines keep many people from having symptoms. · You prefer to take medicines rather than have an electric shock. · Your symptoms don't bother you much. · If these medicines don't work, you can still try electrical cardioversion. Your decision Thinking about the facts and your feelings can help you make a decision that is right for you. Be sure you understand the benefits and risks of your options. And think about what else you need to do before you make the decision. Where can you learn more? Go to http://compa-lucia.info/. Enter W200 in the search box to learn more about \"Deciding Between Electrical Cardioversion and Rate Control Medicines for Atrial Fibrillation. \" Current as of: April 9, 2019 Content Version: 12.2 © 5051-8776 [employer]. Care instructions adapted under license by [employer] (which disclaims liability or warranty for this information). If you have questions about a medical condition or this instruction, always ask your healthcare professional. [address] any warranty or liability for your use of this information.

## 2020-03-05 ENCOUNTER — PATIENT OUTREACH (OUTPATIENT)
Dept: INTERNAL MEDICINE CLINIC | Age: 65
End: 2020-03-05

## 2020-03-17 ENCOUNTER — HOSPITAL ENCOUNTER (OUTPATIENT)
Dept: NON INVASIVE DIAGNOSTICS | Age: 65
Discharge: HOME | DRG: 291 | End: 2020-03-17
Attending: INTERNAL MEDICINE
Payer: MEDICARE

## 2020-03-17 VITALS
HEIGHT: 72 IN | DIASTOLIC BLOOD PRESSURE: 61 MMHG | HEART RATE: 53 BPM | WEIGHT: 240 LBS | BODY MASS INDEX: 32.51 KG/M2 | OXYGEN SATURATION: 94 % | RESPIRATION RATE: 14 BRPM | SYSTOLIC BLOOD PRESSURE: 116 MMHG

## 2020-03-17 DIAGNOSIS — I48.91 ATRIAL FIBRILLATION, UNSPECIFIED TYPE (HCC): ICD-10-CM

## 2020-03-17 PROCEDURE — 77030018729 HC ELECTRD DEFIB PAD CARD -B

## 2020-03-17 PROCEDURE — 74011250636 HC RX REV CODE- 250/636: Performed by: INTERNAL MEDICINE

## 2020-03-17 PROCEDURE — 99152 MOD SED SAME PHYS/QHP 5/>YRS: CPT

## 2020-03-17 PROCEDURE — 93005 ELECTROCARDIOGRAM TRACING: CPT

## 2020-03-17 PROCEDURE — 92960 CARDIOVERSION ELECTRIC EXT: CPT

## 2020-03-17 RX ORDER — MIDAZOLAM HYDROCHLORIDE 1 MG/ML
.5-2 INJECTION, SOLUTION INTRAMUSCULAR; INTRAVENOUS
Status: DISCONTINUED | OUTPATIENT
Start: 2020-03-17 | End: 2020-03-17

## 2020-03-17 RX ORDER — FENTANYL CITRATE 50 UG/ML
25-50 INJECTION, SOLUTION INTRAMUSCULAR; INTRAVENOUS
Status: DISCONTINUED | OUTPATIENT
Start: 2020-03-17 | End: 2020-03-17

## 2020-03-17 RX ADMIN — MIDAZOLAM 1 MG: 1 INJECTION INTRAMUSCULAR; INTRAVENOUS at 10:07

## 2020-03-17 RX ADMIN — MIDAZOLAM 0.5 MG: 1 INJECTION INTRAMUSCULAR; INTRAVENOUS at 10:10

## 2020-03-17 RX ADMIN — FENTANYL CITRATE 25 MCG: 50 INJECTION INTRAMUSCULAR; INTRAVENOUS at 10:03

## 2020-03-17 RX ADMIN — FENTANYL CITRATE 25 MCG: 50 INJECTION INTRAMUSCULAR; INTRAVENOUS at 10:06

## 2020-03-17 RX ADMIN — MIDAZOLAM 2 MG: 1 INJECTION INTRAMUSCULAR; INTRAVENOUS at 10:06

## 2020-03-17 RX ADMIN — MIDAZOLAM 2 MG: 1 INJECTION INTRAMUSCULAR; INTRAVENOUS at 10:03

## 2020-03-17 NOTE — DISCHARGE INSTRUCTIONS
DISCHARGE SUMMARY       The following personal items collected during your admission are returned to you:     Clothing:  shirt        PATIENT INSTRUCTIONS: Continue taking all the same medications, Decrease Hydralazine to 25 mg 3x daily(as prescribed by Dr Sudhir Harrison). The cardioversion procedure can cause redness to the skin where the patches were placed. You may treat this with Aloe or Cortisone cream over the counter lotion. If the skin appears very reddened or blistered contact your physician      Keep your previously arranged follow-up appointment with Dr. Sudhir Harrison on April 4th. What to do at Home:  Recommended activity: No driving today      The discharge information has been reviewed with the PATIENT . The PATIENT  verbalized understanding.

## 2020-03-17 NOTE — PROGRESS NOTES
Discharge instructions reviewed with patient and wife, [place]bridge Sumaya. Allowed adequate time to ask questions, all questions answered. Printed copy of AVS given to patient. All belongings gathered, IV and tele discontinued. Transported via wheelchair to main entrance and into care of family.

## 2020-03-17 NOTE — PROGRESS NOTES
Pt sedated with 5.5mg Versed and 50mcg Fentanyl for Cardioversion, given 2 synchronized shock(s) at 250 Joules, Afib converted to Sinus bradycardia.  (monitored sedation from 1001 to 1015)    EKG obtained

## 2020-03-18 LAB
ATRIAL RATE: 50 BPM
CALCULATED P AXIS, ECG09: 18 DEGREES
CALCULATED R AXIS, ECG10: 44 DEGREES
CALCULATED T AXIS, ECG11: 106 DEGREES
DIAGNOSIS, 93000: NORMAL
P-R INTERVAL, ECG05: 206 MS
Q-T INTERVAL, ECG07: 516 MS
QRS DURATION, ECG06: 102 MS
QTC CALCULATION (BEZET), ECG08: 470 MS
VENTRICULAR RATE, ECG03: 50 BPM

## 2020-03-19 ENCOUNTER — APPOINTMENT (OUTPATIENT)
Dept: GENERAL RADIOLOGY | Age: 65
DRG: 291 | End: 2020-03-19
Attending: EMERGENCY MEDICINE
Payer: MEDICARE

## 2020-03-19 ENCOUNTER — HOSPITAL ENCOUNTER (INPATIENT)
Age: 65
LOS: 1 days | Discharge: HOME | DRG: 291 | End: 2020-03-21
Attending: EMERGENCY MEDICINE | Admitting: HOSPITALIST
Payer: MEDICARE

## 2020-03-19 DIAGNOSIS — Z98.890 HISTORY OF CARDIOVERSION: ICD-10-CM

## 2020-03-19 DIAGNOSIS — I50.9 ACUTE CONGESTIVE HEART FAILURE, UNSPECIFIED HEART FAILURE TYPE (HCC): Primary | ICD-10-CM

## 2020-03-19 LAB
ALBUMIN SERPL-MCNC: 4.2 G/DL (ref 3.5–5)
ALBUMIN/GLOB SERPL: 1.3 {RATIO} (ref 1.1–2.2)
ALP SERPL-CCNC: 57 U/L (ref 45–117)
ALT SERPL-CCNC: 38 U/L (ref 12–78)
ANION GAP SERPL CALC-SCNC: 8 MMOL/L (ref 5–15)
AST SERPL-CCNC: 15 U/L (ref 15–37)
ATRIAL RATE: 61 BPM
ATRIAL RATE: 68 BPM
BASOPHILS # BLD: 0.1 K/UL (ref 0–0.1)
BASOPHILS NFR BLD: 1 % (ref 0–1)
BILIRUB SERPL-MCNC: 0.7 MG/DL (ref 0.2–1)
BNP SERPL-MCNC: 816 PG/ML
BUN SERPL-MCNC: 24 MG/DL (ref 6–20)
BUN/CREAT SERPL: 16 (ref 12–20)
CALCIUM SERPL-MCNC: 8.4 MG/DL (ref 8.5–10.1)
CALCULATED P AXIS, ECG09: 62 DEGREES
CALCULATED P AXIS, ECG09: 78 DEGREES
CALCULATED R AXIS, ECG10: 44 DEGREES
CALCULATED R AXIS, ECG10: 62 DEGREES
CALCULATED T AXIS, ECG11: 83 DEGREES
CALCULATED T AXIS, ECG11: 84 DEGREES
CHLORIDE SERPL-SCNC: 108 MMOL/L (ref 97–108)
CO2 SERPL-SCNC: 21 MMOL/L (ref 21–32)
COMMENT, HOLDF: NORMAL
CREAT SERPL-MCNC: 1.46 MG/DL (ref 0.7–1.3)
DIAGNOSIS, 93000: NORMAL
DIAGNOSIS, 93000: NORMAL
DIFFERENTIAL METHOD BLD: ABNORMAL
EOSINOPHIL # BLD: 0.2 K/UL (ref 0–0.4)
EOSINOPHIL NFR BLD: 2 % (ref 0–7)
ERYTHROCYTE [DISTWIDTH] IN BLOOD BY AUTOMATED COUNT: 13.3 % (ref 11.5–14.5)
GLOBULIN SER CALC-MCNC: 3.2 G/DL (ref 2–4)
GLUCOSE SERPL-MCNC: 109 MG/DL (ref 65–100)
HCT VFR BLD AUTO: 38.5 % (ref 36.6–50.3)
HGB BLD-MCNC: 12.5 G/DL (ref 12.1–17)
IMM GRANULOCYTES # BLD AUTO: 0.1 K/UL (ref 0–0.04)
IMM GRANULOCYTES NFR BLD AUTO: 0 % (ref 0–0.5)
LYMPHOCYTES # BLD: 1.8 K/UL (ref 0.8–3.5)
LYMPHOCYTES NFR BLD: 15 % (ref 12–49)
MCH RBC QN AUTO: 30.6 PG (ref 26–34)
MCHC RBC AUTO-ENTMCNC: 32.5 G/DL (ref 30–36.5)
MCV RBC AUTO: 94.1 FL (ref 80–99)
MONOCYTES # BLD: 0.7 K/UL (ref 0–1)
MONOCYTES NFR BLD: 6 % (ref 5–13)
NEUTS SEG # BLD: 9.4 K/UL (ref 1.8–8)
NEUTS SEG NFR BLD: 76 % (ref 32–75)
NRBC # BLD: 0 K/UL (ref 0–0.01)
NRBC BLD-RTO: 0 PER 100 WBC
P-R INTERVAL, ECG05: 178 MS
P-R INTERVAL, ECG05: 180 MS
PLATELET # BLD AUTO: 214 K/UL (ref 150–400)
PMV BLD AUTO: 11.6 FL (ref 8.9–12.9)
POTASSIUM SERPL-SCNC: 4 MMOL/L (ref 3.5–5.1)
PROT SERPL-MCNC: 7.4 G/DL (ref 6.4–8.2)
Q-T INTERVAL, ECG07: 490 MS
Q-T INTERVAL, ECG07: 492 MS
QRS DURATION, ECG06: 100 MS
QRS DURATION, ECG06: 102 MS
QTC CALCULATION (BEZET), ECG08: 495 MS
QTC CALCULATION (BEZET), ECG08: 521 MS
RBC # BLD AUTO: 4.09 M/UL (ref 4.1–5.7)
SAMPLES BEING HELD,HOLD: NORMAL
SODIUM SERPL-SCNC: 137 MMOL/L (ref 136–145)
TROPONIN I SERPL-MCNC: 0.05 NG/ML
TROPONIN I SERPL-MCNC: <0.05 NG/ML
VENTRICULAR RATE, ECG03: 61 BPM
VENTRICULAR RATE, ECG03: 68 BPM
WBC # BLD AUTO: 12.2 K/UL (ref 4.1–11.1)

## 2020-03-19 PROCEDURE — 84484 ASSAY OF TROPONIN QUANT: CPT

## 2020-03-19 PROCEDURE — 96375 TX/PRO/DX INJ NEW DRUG ADDON: CPT

## 2020-03-19 PROCEDURE — 93005 ELECTROCARDIOGRAM TRACING: CPT

## 2020-03-19 PROCEDURE — 96374 THER/PROPH/DIAG INJ IV PUSH: CPT

## 2020-03-19 PROCEDURE — 74011000250 HC RX REV CODE- 250: Performed by: EMERGENCY MEDICINE

## 2020-03-19 PROCEDURE — 74011250636 HC RX REV CODE- 250/636: Performed by: INTERNAL MEDICINE

## 2020-03-19 PROCEDURE — 74011250637 HC RX REV CODE- 250/637: Performed by: HOSPITALIST

## 2020-03-19 PROCEDURE — 74011250636 HC RX REV CODE- 250/636: Performed by: EMERGENCY MEDICINE

## 2020-03-19 PROCEDURE — 83880 ASSAY OF NATRIURETIC PEPTIDE: CPT

## 2020-03-19 PROCEDURE — 74011000250 HC RX REV CODE- 250: Performed by: INTERNAL MEDICINE

## 2020-03-19 PROCEDURE — 74011250637 HC RX REV CODE- 250/637: Performed by: EMERGENCY MEDICINE

## 2020-03-19 PROCEDURE — 99285 EMERGENCY DEPT VISIT HI MDM: CPT

## 2020-03-19 PROCEDURE — 80053 COMPREHEN METABOLIC PANEL: CPT

## 2020-03-19 PROCEDURE — 71046 X-RAY EXAM CHEST 2 VIEWS: CPT

## 2020-03-19 PROCEDURE — 94761 N-INVAS EAR/PLS OXIMETRY MLT: CPT

## 2020-03-19 PROCEDURE — 85025 COMPLETE CBC W/AUTO DIFF WBC: CPT

## 2020-03-19 PROCEDURE — 99218 HC RM OBSERVATION: CPT

## 2020-03-19 PROCEDURE — 36415 COLL VENOUS BLD VENIPUNCTURE: CPT

## 2020-03-19 PROCEDURE — 96376 TX/PRO/DX INJ SAME DRUG ADON: CPT

## 2020-03-19 RX ORDER — LEVOTHYROXINE SODIUM 50 UG/1
50 TABLET ORAL
Status: DISCONTINUED | OUTPATIENT
Start: 2020-03-19 | End: 2020-03-21 | Stop reason: HOSPADM

## 2020-03-19 RX ORDER — CARVEDILOL 12.5 MG/1
25 TABLET ORAL 2 TIMES DAILY WITH MEALS
Status: DISCONTINUED | OUTPATIENT
Start: 2020-03-19 | End: 2020-03-21 | Stop reason: HOSPADM

## 2020-03-19 RX ORDER — FAMOTIDINE 20 MG/1
20 TABLET, FILM COATED ORAL
Status: COMPLETED | OUTPATIENT
Start: 2020-03-19 | End: 2020-03-19

## 2020-03-19 RX ORDER — NALOXONE HYDROCHLORIDE 0.4 MG/ML
0.4 INJECTION, SOLUTION INTRAMUSCULAR; INTRAVENOUS; SUBCUTANEOUS AS NEEDED
Status: DISCONTINUED | OUTPATIENT
Start: 2020-03-19 | End: 2020-03-21 | Stop reason: HOSPADM

## 2020-03-19 RX ORDER — SUCRALFATE 1 G/1
1 TABLET ORAL
Status: DISCONTINUED | OUTPATIENT
Start: 2020-03-19 | End: 2020-03-19

## 2020-03-19 RX ORDER — ACETAMINOPHEN 325 MG/1
650 TABLET ORAL
Status: DISCONTINUED | OUTPATIENT
Start: 2020-03-19 | End: 2020-03-21 | Stop reason: HOSPADM

## 2020-03-19 RX ORDER — BUMETANIDE 0.25 MG/ML
2 INJECTION INTRAMUSCULAR; INTRAVENOUS 2 TIMES DAILY
Status: DISCONTINUED | OUTPATIENT
Start: 2020-03-19 | End: 2020-03-20

## 2020-03-19 RX ORDER — SUCRALFATE 1 G/1
1 TABLET ORAL
Status: DISCONTINUED | OUTPATIENT
Start: 2020-03-19 | End: 2020-03-21 | Stop reason: HOSPADM

## 2020-03-19 RX ORDER — MORPHINE SULFATE 2 MG/ML
4 INJECTION, SOLUTION INTRAMUSCULAR; INTRAVENOUS
Status: COMPLETED | OUTPATIENT
Start: 2020-03-19 | End: 2020-03-19

## 2020-03-19 RX ORDER — MELATONIN
2000 DAILY
Status: DISCONTINUED | OUTPATIENT
Start: 2020-03-20 | End: 2020-03-21 | Stop reason: HOSPADM

## 2020-03-19 RX ORDER — ONDANSETRON 2 MG/ML
4 INJECTION INTRAMUSCULAR; INTRAVENOUS
Status: DISCONTINUED | OUTPATIENT
Start: 2020-03-19 | End: 2020-03-21 | Stop reason: HOSPADM

## 2020-03-19 RX ORDER — METHOCARBAMOL 750 MG/1
750 TABLET, FILM COATED ORAL ONCE
Status: COMPLETED | OUTPATIENT
Start: 2020-03-19 | End: 2020-03-19

## 2020-03-19 RX ORDER — SODIUM CHLORIDE 0.9 % (FLUSH) 0.9 %
5-40 SYRINGE (ML) INJECTION AS NEEDED
Status: DISCONTINUED | OUTPATIENT
Start: 2020-03-19 | End: 2020-03-21 | Stop reason: HOSPADM

## 2020-03-19 RX ORDER — THERA TABS 400 MCG
1 TAB ORAL DAILY
Status: DISCONTINUED | OUTPATIENT
Start: 2020-03-20 | End: 2020-03-21 | Stop reason: HOSPADM

## 2020-03-19 RX ORDER — FENTANYL CITRATE 50 UG/ML
50 INJECTION, SOLUTION INTRAMUSCULAR; INTRAVENOUS ONCE
Status: COMPLETED | OUTPATIENT
Start: 2020-03-19 | End: 2020-03-19

## 2020-03-19 RX ORDER — FENTANYL CITRATE 50 UG/ML
50 INJECTION, SOLUTION INTRAMUSCULAR; INTRAVENOUS
Status: DISCONTINUED | OUTPATIENT
Start: 2020-03-19 | End: 2020-03-21 | Stop reason: HOSPADM

## 2020-03-19 RX ORDER — LISINOPRIL 20 MG/1
20 TABLET ORAL DAILY
Status: DISCONTINUED | OUTPATIENT
Start: 2020-03-19 | End: 2020-03-20

## 2020-03-19 RX ORDER — LIDOCAINE HYDROCHLORIDE 20 MG/ML
15 SOLUTION OROPHARYNGEAL
Status: COMPLETED | OUTPATIENT
Start: 2020-03-19 | End: 2020-03-19

## 2020-03-19 RX ORDER — BUMETANIDE 0.25 MG/ML
2 INJECTION INTRAMUSCULAR; INTRAVENOUS
Status: COMPLETED | OUTPATIENT
Start: 2020-03-19 | End: 2020-03-19

## 2020-03-19 RX ORDER — FENTANYL CITRATE 50 UG/ML
50 INJECTION, SOLUTION INTRAMUSCULAR; INTRAVENOUS
Status: COMPLETED | OUTPATIENT
Start: 2020-03-19 | End: 2020-03-19

## 2020-03-19 RX ORDER — SPIRONOLACTONE 25 MG/1
50 TABLET ORAL DAILY
Status: DISCONTINUED | OUTPATIENT
Start: 2020-03-19 | End: 2020-03-21 | Stop reason: HOSPADM

## 2020-03-19 RX ORDER — HYDRALAZINE HYDROCHLORIDE 25 MG/1
25 TABLET, FILM COATED ORAL 3 TIMES DAILY
Status: DISCONTINUED | OUTPATIENT
Start: 2020-03-19 | End: 2020-03-21 | Stop reason: HOSPADM

## 2020-03-19 RX ORDER — OXYCODONE HYDROCHLORIDE 5 MG/1
5 TABLET ORAL
Status: DISCONTINUED | OUTPATIENT
Start: 2020-03-19 | End: 2020-03-21 | Stop reason: HOSPADM

## 2020-03-19 RX ORDER — AMIODARONE HYDROCHLORIDE 200 MG/1
200 TABLET ORAL 2 TIMES DAILY
Status: DISCONTINUED | OUTPATIENT
Start: 2020-03-19 | End: 2020-03-21 | Stop reason: HOSPADM

## 2020-03-19 RX ORDER — METHOCARBAMOL 500 MG/1
750 TABLET, FILM COATED ORAL
Status: DISCONTINUED | OUTPATIENT
Start: 2020-03-19 | End: 2020-03-21 | Stop reason: HOSPADM

## 2020-03-19 RX ORDER — NITROGLYCERIN 0.4 MG/1
0.4 TABLET SUBLINGUAL
Status: COMPLETED | OUTPATIENT
Start: 2020-03-19 | End: 2020-03-19

## 2020-03-19 RX ORDER — PANTOPRAZOLE SODIUM 40 MG/1
40 TABLET, DELAYED RELEASE ORAL
Status: DISCONTINUED | OUTPATIENT
Start: 2020-03-19 | End: 2020-03-21 | Stop reason: HOSPADM

## 2020-03-19 RX ORDER — SODIUM CHLORIDE 0.9 % (FLUSH) 0.9 %
5-40 SYRINGE (ML) INJECTION EVERY 8 HOURS
Status: DISCONTINUED | OUTPATIENT
Start: 2020-03-19 | End: 2020-03-21 | Stop reason: HOSPADM

## 2020-03-19 RX ORDER — ISOSORBIDE MONONITRATE 30 MG/1
30 TABLET, EXTENDED RELEASE ORAL DAILY
Status: DISCONTINUED | OUTPATIENT
Start: 2020-03-19 | End: 2020-03-21 | Stop reason: HOSPADM

## 2020-03-19 RX ADMIN — APIXABAN 5 MG: 5 TABLET, FILM COATED ORAL at 11:34

## 2020-03-19 RX ADMIN — FENTANYL CITRATE 50 MCG: 50 INJECTION INTRAMUSCULAR; INTRAVENOUS at 10:24

## 2020-03-19 RX ADMIN — CARVEDILOL 25 MG: 12.5 TABLET, FILM COATED ORAL at 19:03

## 2020-03-19 RX ADMIN — FENTANYL CITRATE 50 MCG: 50 INJECTION INTRAMUSCULAR; INTRAVENOUS at 05:15

## 2020-03-19 RX ADMIN — OXYCODONE 5 MG: 5 TABLET ORAL at 13:34

## 2020-03-19 RX ADMIN — HYDRALAZINE HYDROCHLORIDE 25 MG: 25 TABLET, FILM COATED ORAL at 19:03

## 2020-03-19 RX ADMIN — SUCRALFATE 1 G: 1 TABLET ORAL at 19:01

## 2020-03-19 RX ADMIN — ACETAMINOPHEN 650 MG: 325 TABLET ORAL at 10:41

## 2020-03-19 RX ADMIN — SPIRONOLACTONE 50 MG: 25 TABLET ORAL at 13:34

## 2020-03-19 RX ADMIN — BUMETANIDE 2 MG: 0.25 INJECTION, SOLUTION INTRAMUSCULAR; INTRAVENOUS at 08:24

## 2020-03-19 RX ADMIN — AMIODARONE HYDROCHLORIDE 200 MG: 200 TABLET ORAL at 11:29

## 2020-03-19 RX ADMIN — BUMETANIDE 2 MG: 0.25 INJECTION INTRAMUSCULAR; INTRAVENOUS at 18:56

## 2020-03-19 RX ADMIN — APIXABAN 5 MG: 5 TABLET, FILM COATED ORAL at 19:01

## 2020-03-19 RX ADMIN — FAMOTIDINE 20 MG: 20 TABLET, FILM COATED ORAL at 06:31

## 2020-03-19 RX ADMIN — NITROGLYCERIN 0.4 MG: 0.4 TABLET, ORALLY DISINTEGRATING SUBLINGUAL at 08:23

## 2020-03-19 RX ADMIN — ISOSORBIDE MONONITRATE 30 MG: 30 TABLET, EXTENDED RELEASE ORAL at 13:34

## 2020-03-19 RX ADMIN — METHOCARBAMOL TABLETS 750 MG: 750 TABLET, COATED ORAL at 11:30

## 2020-03-19 RX ADMIN — ALUMINUM HYDROXIDE AND MAGNESIUM HYDROXIDE 30 ML: 200; 200 SUSPENSION ORAL at 06:31

## 2020-03-19 RX ADMIN — LISINOPRIL 20 MG: 20 TABLET ORAL at 11:31

## 2020-03-19 RX ADMIN — MORPHINE SULFATE 4 MG: 2 INJECTION, SOLUTION INTRAMUSCULAR; INTRAVENOUS at 06:37

## 2020-03-19 RX ADMIN — AMIODARONE HYDROCHLORIDE 200 MG: 200 TABLET ORAL at 19:04

## 2020-03-19 RX ADMIN — CARVEDILOL 25 MG: 12.5 TABLET, FILM COATED ORAL at 11:30

## 2020-03-19 RX ADMIN — PANTOPRAZOLE SODIUM 40 MG: 40 TABLET, DELAYED RELEASE ORAL at 19:01

## 2020-03-19 RX ADMIN — LIDOCAINE HYDROCHLORIDE 15 ML: 20 SOLUTION ORAL; TOPICAL at 06:31

## 2020-03-19 RX ADMIN — Medication 10 ML: at 18:57

## 2020-03-19 NOTE — H&P
Hospitalist Admission Note    NAME: Jewel Harada   :  1955   MRN:  [id number]     Date/Time:  3/19/2020 10:33 AM    Patient PCP: Odalys Andre DO   Cardiologist: Dr. Gavin Goodwin  Renal: Dr. Niurka Silver  ______________________________________________________________________   Assessment & Plan:  Acute on chronic diastolic chf, POA  --Chest x-ray with mild pulmonary edema. proBNP 819  --Not hypoxic, 96% on room air. --Given Bumex 2 mg IV in the emergency room and has diuresed nearly 2 L by patient report. Hold off on further diuretics other than his home spironolactone for today, assess tomorrow  --Cardiology consulted by ER and recommended admission. --Observe patient overnight  --Monitor weight daily    Chest pain after cardioversion 2 days ago  Seems to be primarily musculoskeletal.  Requiring morphine, fentanyl x3 doses  --Troponin initially 0.05 and normalized on second set. EKG nonischemic  --Give Tylenol as needed, oxycodone as needed for moderate pain, fentanyl as needed for severe pain. --Give Robaxin 750×1 now and then 3 times daily as needed. Patient reports that helped him on last admission    History of recurrent A. fib, status post cardioversion   --Currently in sinus rhythm. Continue amiodarone 200 mg twice daily and Eliquis  Coronary artery disease, history of cardiac stent   Hypertension  --Cardiac cath 2019 without any significant disease. --Continue Coreg, hydralazine, Imdur, lisinopril, Aldactone  --He is intolerant of statins and Zetia. Repatha have been recommended by neurology last admission but was not formulary and patient refused WelChol. ZONIA creatinine 1.46  --Baseline appears to be 1.1  --May improve with diuresis.   If he does not, would hold lisinopril    Esophagitis  --Continue Protonix twice daily and Carafate 4 times daily    History of TIA 2020 due to hypotension  --Continue Eliquis    History of prostate cancer status post prostatectomy 2010    Mild to moderate pulmonary hypertension on echo February 2020  Obesity  --Follow-up with PCP. Consider referral for outpatient sleep study to evaluate for obstructive sleep apnea. Body mass index is 32.55 kg/m². Code: Full code  DVT prophylaxis: Eliquis  Surrogate decision maker: Wife        Subjective:   CHIEF COMPLAINT: Chest pain and shortness of breath    HISTORY OF PRESENT ILLNESS:     Popeye Leon is a 77 y.o. male medical history of coronary artery disease, history of stent 2010, paroxysmal A. fib first diagnosed in January 6648, chronic diastolic CHF EF of 50 to 18% on echo February 2020, CKD stage 2 baseline creatinine 1.1 who underwent his second cardioversion 2 days ago. At 3 PM yesterday he developed tightness in his chest in right upper chest midsternal and right upper back including pain where his cardioversion pads were placed, associated with shortness of breath and mild nonproductive cough. He began to develop wheezing. The shortness of breath and chest pain gradually got worse. He called the on-call doctor for cardiology who was going to try and get him into the office today but eventually he felt the pain and shortness of breath was too severe to wait and called EMS. EMS reported patient in respiratory distress, tripoding, speaking 4-5 words. He was given aspirin 324 mg and sublingual nitro. There was minimal relief. He required morphine, fentanyl twice. His chest pain has improved from 9 out of 10 to currently 6 out of 10. He reports the symptoms of chest pain shortness of breath, wheezing is similar to when he was hospitalized in January for CHF exacerbation after cardioversion. He denies any fever, chills or weight gain. He reports his weight is usually 237 pounds in the morning and 2  pounds at night. He has quit chewing tobacco, no longer using nicotine patch.   He has not had any alcohol since December    This is his third hospitalization since January 2020. He was admitted February 21 through February 26, 2020 with CHF exacerbation with acute hypoxic respiratory failure, hypokalemia, TIA due to hypotension, ZONIA with creatinine of 2.2. We were asked to admit for work up and evaluation of the above problems. Past Medical History:   Diagnosis Date    Arrhythmia 01/03/2020    AFIB     CAD (coronary artery disease)     h/o stent- '10    Cancer [facility]) 2010    prostate    Congestive heart failure ([facility] [address]     Hypertension     Ill-defined condition     small bowel obstruction    Right rotator cuff tear 1/26/2017    SBO (small bowel obstruction) ([address] 11/3/2015    Stroke ([facility] [address] May or June 2010    lacunar  infarctions (found on scan- no sxs)    Thyroid disease     hypo      Past Surgical History:   Procedure Laterality Date    CARDIAC SURG PROCEDURE UNLIST  2010    coronary stent    EXCIS KNEE CARTILAGE,MEDIAL OR LAT  1980's x3    HX CHOLECYSTECTOMY  1991    HX HEENT  1962    T&A    HX HERNIA REPAIR  1996    HX MALIGNANT SKIN LESION EXCISION      2004 and 2017    HX ORTHOPAEDIC  1974    Ganglion cyst left wrist    HX ORTHOPAEDIC Left 2017    shoulder repair    HX PROSTATECTOMY  2010    UPPER GI ENDOSCOPY,BIOPSY  2/25/2020          Social History     Tobacco Use    Smoking status: Never Smoker    Smokeless tobacco: Current User   Substance Use Topics    Alcohol use: Yes     Comment: occas    Drug use:  None  , retired      Family History   Problem Relation Age of Onset    Dementia Mother     Other Father 55        cerebral hemorrhage      Allergies   Allergen Reactions    Clonidine Anxiety    Lasix [Furosemide] Other (comments)     May be the cause of kidney failure    Niacin Other (comments)     Decreased vision    Statins-Hmg-Coa Reductase Inhibitors Other (comments)     Muscle and Joint pains    Zetia [Ezetimibe] Myalgia     Pt reports getting joint and muscle pain.          Prior to Admission medications    Medication Sig Start Date End Date Taking? Authorizing Provider   lisinopril (PRINIVIL, ZESTRIL) 20 mg tablet Take 1 Tab by mouth daily. 2/26/20  Yes Olivia Saavedra NP   hydrALAZINE (APRESOLINE) 25 mg tablet Take 1 Tab by mouth three (3) times daily. Patient taking differently: Take 50 mg by mouth three (3) times daily. 2/26/20  Yes Olivia Saavedra NP   spironolactone (ALDACTONE) 25 mg tablet Take 1 Tab by mouth daily. Patient taking differently: Take 50 mg by mouth daily. 2/26/20  Yes Olivia Saavedra NP   sucralfate (CARAFATE) 1 gram tablet Take 1 Tab by mouth Before breakfast, lunch, dinner and at bedtime. 2/26/20  Yes Olivia Saavedra NP   pantoprazole (PROTONIX) 40 mg tablet Take 1 Tab by mouth Before breakfast and dinner. 2/26/20  Yes Olivia Saavedra NP   amiodarone (CORDARONE) 200 mg tablet Take  by mouth two (2) times a day. Yes Provider, Historical   apixaban (ELIQUIS) 5 mg tablet Take 5 mg by mouth two (2) times a day. Yes Provider, Historical   isosorbide mononitrate ER (IMDUR) 30 mg tablet Take 1 Tab by mouth daily. 1/31/20  Yes Morenita Crowley MD   carvedilol (COREG) 25 mg tablet Take 1 Tab by mouth two (2) times daily (with meals). 5/3/19  Yes Herman Villa III, DO   levothyroxine (SYNTHROID) 50 mcg tablet Take 1 Tab by mouth Daily (before breakfast). 5/3/19  Yes Herman Villa III, DO   cholecalciferol, vitamin D3, (VITAMIN D3) 2,000 unit tab Take 2,000 Units by mouth daily. Yes Provider, Historical   MULTI-VITAMIN HI-PO PO Take 1 Tab by mouth daily. 10/11/10  Yes Provider, Historical   nicotine (NICODERM CQ) 21 mg/24 hr 1 Patch by TransDERmal route every twenty-four (24) hours for 30 days. 2/26/20 3/27/20  Anna Saavedra NP     REVIEW OF SYSTEMS:  POSITIVE= Bold.   Negative = normal text  General:  fever, chills, sweats, generalized weakness, weight loss/gain, loss of appetite  Eyes:  blurred vision, eye pain, loss of vision, diplopia  Ear Nose and Throat:  rhinorrhea, pharyngitis  Respiratory:   cough, sputum production, SOB, wheezing, DENG, pleuritic pain  Cardiology:  chest pain, palpitations, orthopnea, PND, edema, syncope   Gastrointestinal:  abdominal pain, N/V, dysphagia, diarrhea, constipation, bleeding  Genitourinary:  frequency, urgency, dysuria, hematuria, incontinence  Muskuloskeletal :  arthralgia, myalgia  Hematology:  easy bruising, bleeding, lymphadenopathy  Dermatological:  rash, ulceration, pruritis  Endocrine:  hot flashes or polydipsia  Neurological:  headache, dizziness, confusion, focal weakness, paresthesia, memory loss, gait disturbance  Psychological: anxiety, depression, agitation      Objective:   VITALS:    Visit Vitals  /80 (BP 1 Location: Right arm, BP Patient Position: At rest)   Pulse 74   Temp 98.9 °F (37.2 °C)   Resp 18   Ht 6' (1.829 m)   Wt 108.9 kg (240 lb)   SpO2 94%   BMI 32.55 kg/m²     Temp (24hrs), Av.9 °F (37.2 °C), Min:98.8 °F (37.1 °C), Max:98.9 °F (37.2 °C)    Wt Readings from Last 25 Encounters:   20 108.9 kg (240 lb)   20 108.9 kg (240 lb)   20 113.8 kg (250 lb 12.8 oz)   20 109.7 kg (241 lb 14.4 oz)   20 114 kg (251 lb 6.4 oz)   20 109.9 kg (242 lb 4.8 oz)   20 108.9 kg (240 lb)   20 114.2 kg (251 lb 12.3 oz)   20 114.8 kg (253 lb)   19 109.8 kg (242 lb)   08/10/19 109 kg (240 lb 4.8 oz)   19 112.9 kg (249 lb)   17 113.4 kg (250 lb)   17 111.6 kg (246 lb)   11/03/15 110.5 kg (243 lb 9.7 oz)   11 102.1 kg (225 lb)   10/11/10 107.3 kg (236 lb 8 oz)     Body mass index is 32.55 kg/m². PHYSICAL EXAM:    General:    Alert, obese, cooperative, no distress, appears stated age. HEENT: Atraumatic, anicteric sclerae, pink conjunctivae     No oral ulcers, mucosa moist, throat clear. Hearing intact. Neck:  Supple, symmetrical,  thyroid: non tender.   No JVD  Lungs:   Mild crackles in lower lungs b/l.  No Wheezing or Rhonchi. No rales. Chest wall:  No tenderness  No Accessory muscle use. Heart:   Regular  rhythm,  No  murmur   No gallop. No edema. Abdomen:   Soft, non-tender. Not distended. Bowel sounds normal. No masses  Extremities: No cyanosis. No clubbing  Skin:     Not pale Not Jaundiced  No rashes   Psych:  Good insight. Not depressed. Not anxious or agitated. Neurologic: EOMs intact. No facial asymmetry. No aphasia or slurred speech. Symmetrical strength, Alert and oriented X 3. Peripheral pulse: Bilateral, DP, 2+  Capillary refill:  normal    IMAGING RESULTS:   []       I have personally reviewed the actual   []     CXR  []     CT scan  CXR:  CT :  EKG: SR 68, prolong QT ________________________________________________________________________  Care Plan discussed with:    Comments   Patient y    [facility]:      ________________________________________________________________________  Prophylaxis:  GI PPI, carafate   DVT eliquis   ________________________________________________________________________  Recommended Disposition:   Home with Family y   HH/PT/OT/RN    SNF/LTC    [facility]    ________________________________________________________________________  Code Status:  Full Code y   DNR/DNI    ________________________________________________________________________  TOTAL TIME:  60 minutes      Comments    y Reviewed previous records   >50% of visit spent in counseling and coordination of care  Discussion with patient and/or family and questions answered         ______________________________________________________________________  Matthew Chris MD      Procedures: see electronic medical records for all procedures/Xrays and details which were not copied into this note but were reviewed prior to creation of Plan.     LAB DATA REVIEWED:    Recent Results (from the past 24 hour(s))   EKG, 12 LEAD, INITIAL    Collection Time: 03/19/20 4:59 AM   Result Value Ref Range    Ventricular Rate 68 BPM    Atrial Rate 68 BPM    P-R Interval 180 ms    QRS Duration 102 ms    Q-T Interval 490 ms    QTC Calculation (Bezet) 521 ms    Calculated P Axis 78 degrees    Calculated R Axis 62 degrees    Calculated T Axis 84 degrees    Diagnosis       Normal sinus rhythm  Prolonged QT  When compared with ECG of 17-MAR-2020 10:16,  Nonspecific T wave abnormality, improved in Anterolateral leads  Confirmed by Virgil Diane ([id number]) on 3/19/2020 9:12:39 AM     CBC WITH AUTOMATED DIFF    Collection Time: 03/19/20  5:09 AM   Result Value Ref Range    WBC 12.2 (H) 4.1 - 11.1 K/uL    RBC 4.09 (L) 4.10 - 5.70 M/uL    HGB 12.5 12.1 - 17.0 g/dL    HCT 38.5 36.6 - 50.3 %    MCV 94.1 80.0 - 99.0 FL    MCH 30.6 26.0 - 34.0 PG    MCHC 32.5 30.0 - 36.5 g/dL    RDW 13.3 11.5 - 14.5 %    PLATELET 474 042 - 999 K/uL    MPV 11.6 8.9 - 12.9 FL    NRBC 0.0 0  WBC    ABSOLUTE NRBC 0.00 0.00 - 0.01 K/uL    NEUTROPHILS 76 (H) 32 - 75 %    LYMPHOCYTES 15 12 - 49 %    MONOCYTES 6 5 - 13 %    EOSINOPHILS 2 0 - 7 %    BASOPHILS 1 0 - 1 %    IMMATURE GRANULOCYTES 0 0.0 - 0.5 %    ABS. NEUTROPHILS 9.4 (H) 1.8 - 8.0 K/UL    ABS. LYMPHOCYTES 1.8 0.8 - 3.5 K/UL    ABS. MONOCYTES 0.7 0.0 - 1.0 K/UL    ABS. EOSINOPHILS 0.2 0.0 - 0.4 K/UL    ABS. BASOPHILS 0.1 0.0 - 0.1 K/UL    ABS. IMM.  GRANS. 0.1 (H) 0.00 - 0.04 K/UL    DF AUTOMATED     METABOLIC PANEL, COMPREHENSIVE    Collection Time: 03/19/20  5:09 AM   Result Value Ref Range    Sodium 137 136 - 145 mmol/L    Potassium 4.0 3.5 - 5.1 mmol/L    Chloride 108 97 - 108 mmol/L    CO2 21 21 - 32 mmol/L    Anion gap 8 5 - 15 mmol/L    Glucose 109 (H) 65 - 100 mg/dL    BUN 24 (H) 6 - 20 MG/DL    Creatinine 1.46 (H) 0.70 - 1.30 MG/DL    BUN/Creatinine ratio 16 12 - 20      GFR est AA 58 (L) >60 ml/min/1.73m2    GFR est non-AA 48 (L) >60 ml/min/1.73m2    Calcium 8.4 (L) 8.5 - 10.1 MG/DL    Bilirubin, total 0.7 0.2 - 1.0 MG/DL    ALT (SGPT) 38 12 - 78 U/L    AST (SGOT) 15 15 - 37 U/L    Alk. phosphatase 57 45 - 117 U/L    Protein, total 7.4 6.4 - 8.2 g/dL    Albumin 4.2 3.5 - 5.0 g/dL    Globulin 3.2 2.0 - 4.0 g/dL    A-G Ratio 1.3 1.1 - 2.2     NT-PRO BNP    Collection Time: 03/19/20  5:09 AM   Result Value Ref Range    NT pro- (H) <125 PG/ML   TROPONIN I    Collection Time: 03/19/20  5:09 AM   Result Value Ref Range    Troponin-I, Qt. 0.05 (H) <0.05 ng/mL   SAMPLES BEING HELD    Collection Time: 03/19/20  5:11 AM   Result Value Ref Range    SAMPLES BEING HELD  1 BLUE     COMMENT        Add-on orders for these samples will be processed based on acceptable specimen integrity and analyte stability, which may vary by analyte.    EKG, 12 LEAD, SUBSEQUENT    Collection Time: 03/19/20  8:10 AM   Result Value Ref Range    Ventricular Rate 61 BPM    Atrial Rate 61 BPM    P-R Interval 178 ms    QRS Duration 100 ms    Q-T Interval 492 ms    QTC Calculation (Bezet) 495 ms    Calculated P Axis 62 degrees    Calculated R Axis 44 degrees    Calculated T Axis 83 degrees    Diagnosis       Normal sinus rhythm  Nonspecific ST and T wave abnormality  Prolonged QT    Confirmed by Kallie Zuñiga ([id number]) on 3/19/2020 9:14:10 AM     TROPONIN I    Collection Time: 03/19/20  8:15 AM   Result Value Ref Range    Troponin-I, Qt. <0.05 <0.05 ng/mL

## 2020-03-19 NOTE — PROGRESS NOTES
Pharmacy Medication Reconciliation     The patient was interviewed regarding current PTA medication list, use and drug allergies;  patient present in room and obtained permission from patient to discuss drug regimen with visitor(s) present. The patient was questioned regarding use of any other inhalers, topical products, over the counter medications, herbal medications, vitamin products or ophthalmic/nasal/otic medication use. Allergy Update: Clonidine; Lasix [furosemide]; Niacin; Statins-hmg-coa reductase inhibitors; and Zetia [ezetimibe]    Recommendations/Findings: The following amendments were made to the patient's active medication list on file at [id number]:   1) Additions: None    2) Deletions:   -nicotine patch    3) Changes: none      Pertinent Findings: none    -Clarified PTA med list with patient interview via RN and MD, Rx query, and February discharge med rec. PTA medication list was corrected to the following:     Prior to Admission Medications   Prescriptions Last Dose Informant Taking? MULTI-VITAMIN HI-PO PO 3/18/2020 at Unknown time Self Yes   Sig: Take 1 Tab by mouth daily. amiodarone (CORDARONE) 200 mg tablet 3/18/2020 at Unknown time Self Yes   Sig: Take  by mouth two (2) times a day. apixaban (ELIQUIS) 5 mg tablet 3/18/2020 at Unknown time Self Yes   Sig: Take 5 mg by mouth two (2) times a day. carvedilol (COREG) 25 mg tablet 3/18/2020 at Unknown time Self Yes   Sig: Take 1 Tab by mouth two (2) times daily (with meals). cholecalciferol, vitamin D3, (VITAMIN D3) 2,000 unit tab 3/18/2020 at Unknown time Self Yes   Sig: Take 2,000 Units by mouth daily. hydrALAZINE (APRESOLINE) 25 mg tablet 3/18/2020 at Unknown time  Yes   Sig: Take 1 Tab by mouth three (3) times daily. isosorbide mononitrate ER (IMDUR) 30 mg tablet 3/18/2020 at Unknown time Self Yes   Sig: Take 1 Tab by mouth daily.    levothyroxine (SYNTHROID) 50 mcg tablet 3/18/2020 at Unknown time Self Yes   Sig: Take 1 Tab by mouth Daily (before breakfast). lisinopril (PRINIVIL, ZESTRIL) 20 mg tablet 3/18/2020 at Unknown time  Yes   Sig: Take 1 Tab by mouth daily. pantoprazole (PROTONIX) 40 mg tablet 3/18/2020 at Unknown time  Yes   Sig: Take 1 Tab by mouth Before breakfast and dinner. spironolactone (ALDACTONE) 25 mg tablet 3/18/2020 at Unknown time  Yes   Sig: Take 1 Tab by mouth daily. Patient taking differently: Take 50 mg by mouth daily. sucralfate (CARAFATE) 1 gram tablet 3/18/2020 at Unknown time  Yes   Sig: Take 1 Tab by mouth Before breakfast, lunch, dinner and at bedtime.       Facility-Administered Medications: None          Thank you,  DELGADO Kwong

## 2020-03-19 NOTE — PROGRESS NOTES
Bedside and Verbal shift change report GIVEN TO JUAN JOSE bright. Report included the following information SBAR, Kardex, ED Summary, Procedure Summary, Intake/Output, MAR and Recent Results. Uneventful shift.

## 2020-03-19 NOTE — PROGRESS NOTES
3:48pm- CM met with pt at bedside. CM introduced self and role. Observation notice provided in writing to patient and/or caregiver as well as verbal explanation of the policy. Patients who are in outpatient status also receive the Observation notice. CM will continue to follow patient for discharge planning needs and arrange for services as deemed necessary.     Geraldo Palomo [address]  [phone]

## 2020-03-19 NOTE — ED PROVIDER NOTES
EMERGENCY DEPARTMENT HISTORY AND PHYSICAL EXAM     ----------------------------------------------------------------------------  Please note that this dictation was completed with [employer], the computer voice recognition software. Quite often unanticipated grammatical, syntax, homophones, and other interpretive errors are inadvertently transcribed by the computer software. Please disregard these errors. Please excuse any errors that have escaped final proofreading  ----------------------------------------------------------------------------      Date: 3/19/2020  Patient Name: Olinda Dave    History of Presenting Illness     Chief Complaint   Patient presents with    Chest Pain    Shortness of Breath       History Provided By:  Patient    HPI: Olinda Dave is a 77 y.o. male, with significant pmhx of afib with rvr and mulitple cardioversions, HTN, thryoid dysfunction who presents via EMS to the ED with c/o lower chest tightness/discomfort since 3 PM yesterday. Patient notes associated shortness of breath and mild cough. Patient reports having recurrent episodes of A. fib with RVR requiring repeated cardioversions. Noted he was most recently cardioverted 2 days ago with Dr. Megan Strong. Has been compliant with his Eliquis and amiodarone. Notes that he had similar episode to this after cardioversion in the past and had recurrent episode of atrial fibrillation. Patient specifically denies any associated fevers, chills,  nausea, vomiting, diarrhea,  changes in BM, urinary sxs, or headache. Social Hx: denies tobacco  denies EtOH , denies Illicit Drugs    There are no other complaints, changes, or physical findings at this time.      PCP: Michell Can, DO    Allergies   Allergen Reactions    Clonidine Anxiety    Lasix [Furosemide] Other (comments)     May be the cause of kidney failure    Niacin Other (comments)     Decreased vision    Statins-Hmg-Coa Reductase Inhibitors Other (comments) Muscle and Joint pains    Zetia [Ezetimibe] Myalgia     Pt reports getting joint and muscle pain. Current Facility-Administered Medications   Medication Dose Route Frequency Provider Last Rate Last Dose    morphine injection 4 mg  4 mg IntraVENous NOW Yaquelin Alberts MD        aluminum-magnesium hydroxide (MAALOX) oral suspension 30 mL  30 mL Oral NOW Yaquelin Alberts MD        famotidine (PEPCID) tablet 20 mg  20 mg Oral NOW Yaquelin Alberts MD        lidocaine (XYLOCAINE) 2 % viscous solution 15 mL  15 mL Mouth/Throat NOW Yaquelin Alberts MD         Current Outpatient Medications   Medication Sig Dispense Refill    lisinopril (PRINIVIL, ZESTRIL) 20 mg tablet Take 1 Tab by mouth daily. 30 Tab 0    hydrALAZINE (APRESOLINE) 25 mg tablet Take 1 Tab by mouth three (3) times daily. (Patient taking differently: Take 50 mg by mouth three (3) times daily.) 90 Tab 0    spironolactone (ALDACTONE) 25 mg tablet Take 1 Tab by mouth daily. (Patient taking differently: Take 50 mg by mouth daily.) 30 Tab 0    sucralfate (CARAFATE) 1 gram tablet Take 1 Tab by mouth Before breakfast, lunch, dinner and at bedtime. 120 Tab 0    pantoprazole (PROTONIX) 40 mg tablet Take 1 Tab by mouth Before breakfast and dinner. 60 Tab 0    amiodarone (CORDARONE) 200 mg tablet Take  by mouth two (2) times a day.  apixaban (ELIQUIS) 5 mg tablet Take 5 mg by mouth two (2) times a day.  isosorbide mononitrate ER (IMDUR) 30 mg tablet Take 1 Tab by mouth daily. 30 Tab 12    carvedilol (COREG) 25 mg tablet Take 1 Tab by mouth two (2) times daily (with meals). 180 Tab 3    levothyroxine (SYNTHROID) 50 mcg tablet Take 1 Tab by mouth Daily (before breakfast). 90 Tab 3    cholecalciferol, vitamin D3, (VITAMIN D3) 2,000 unit tab Take 2,000 Units by mouth daily.  MULTI-VITAMIN HI-PO PO Take 1 Tab by mouth daily.  nicotine (NICODERM CQ) 21 mg/24 hr 1 Patch by TransDERmal route every twenty-four (24) hours for 30 days.  30 Patch 0       Past History     Past Medical History:  Past Medical History:   Diagnosis Date    Arrhythmia 01/03/2020    AFIB     CAD (coronary artery disease)     h/o stent- '10    Cancer [facility]) 2010    prostate    [facility]     Hypertension     Ill-defined condition     small bowel obstruction    Right rotator cuff tear 1/26/2017    SBO (small bowel obstruction) ([facility] [address] 11/3/2015    Stroke ([facility] [address] May or June 2010    lacunar  infarctions (found on scan- no sxs)    Thyroid disease     hypo       Past Surgical History:  Past Surgical History:   Procedure Laterality Date    CARDIAC SURG PROCEDURE UNLIST  2010    coronary stent    EXCIS KNEE CARTILAGE,MEDIAL OR LAT  1980's x3    HX CHOLECYSTECTOMY  1991    HX HEENT  1962    T&A    HX HERNIA REPAIR  1996    HX MALIGNANT SKIN LESION EXCISION      2004 and 2017    HX ORTHOPAEDIC  1974    Ganglion cyst left wrist    HX ORTHOPAEDIC Left 2017    shoulder repair    HX PROSTATECTOMY  2010    UPPER GI ENDOSCOPY,BIOPSY  2/25/2020            Family History:  Family History   Problem Relation Age of Onset    Dementia Mother     Other Father 55        cerebral hemorrhage       Social History:  Social History     Tobacco Use    Smoking status: Never Smoker    Smokeless tobacco: Current User   Substance Use Topics    Alcohol use: Yes     Comment: occas    Drug use: Never       Allergies: Allergies   Allergen Reactions    Clonidine Anxiety    Lasix [Furosemide] Other (comments)     May be the cause of kidney failure    Niacin Other (comments)     Decreased vision    Statins-Hmg-Coa Reductase Inhibitors Other (comments)     Muscle and Joint pains    Zetia [Ezetimibe] Myalgia     Pt reports getting joint and muscle pain. Review of Systems   Review of Systems   Constitutional: Negative for chills and fever. HENT: Negative. Eyes: Negative. Respiratory: Positive for cough and shortness of breath. Negative for chest tightness. Cardiovascular: Negative for chest pain and leg swelling. Gastrointestinal: Negative for abdominal pain, diarrhea, nausea and vomiting. Endocrine: Negative. Genitourinary: Negative for difficulty urinating and dysuria. Musculoskeletal: Negative for myalgias. Skin: Negative. Neurological: Negative. Psychiatric/Behavioral: Negative. All other systems reviewed and are negative. Physical Exam   Physical Exam  Vitals signs and nursing note reviewed. Constitutional:       General: He is not in acute distress. Appearance: He is well-developed. He is not diaphoretic. HENT:      Head: Normocephalic and atraumatic. Nose: Nose normal.      Mouth/Throat:      Pharynx: No oropharyngeal exudate. Eyes:      Conjunctiva/sclera: Conjunctivae normal.      Pupils: Pupils are equal, round, and reactive to light. Neck:      Musculoskeletal: Normal range of motion and neck supple. Vascular: No JVD. Cardiovascular:      Rate and Rhythm: Normal rate and regular rhythm. Heart sounds: Normal heart sounds. No murmur. No friction rub. Pulmonary:      Effort: Pulmonary effort is normal. No respiratory distress. Breath sounds: Normal breath sounds. No stridor. No wheezing or rales. Abdominal:      General: Bowel sounds are normal. There is no distension. Palpations: Abdomen is soft. Tenderness: There is abdominal tenderness in the epigastric area. There is no rebound. Musculoskeletal: Normal range of motion. General: No tenderness. Skin:     General: Skin is warm and dry. Findings: No rash. Neurological:      Mental Status: He is alert and oriented to person, place, and time. Cranial Nerves: No cranial nerve deficit. Psychiatric:         Speech: Speech normal.         Behavior: Behavior normal.         Thought Content:  Thought content normal.         Judgment: Judgment normal.           Diagnostic Study Results     Labs -     Recent Results (from the past 12 hour(s))   EKG, 12 LEAD, INITIAL    Collection Time: 03/19/20  4:59 AM   Result Value Ref Range    Ventricular Rate 68 BPM    Atrial Rate 68 BPM    P-R Interval 180 ms    QRS Duration 102 ms    Q-T Interval 490 ms    QTC Calculation (Bezet) 521 ms    Calculated P Axis 78 degrees    Calculated R Axis 62 degrees    Calculated T Axis 84 degrees    Diagnosis       Normal sinus rhythm  Prolonged QT  When compared with ECG of 17-MAR-2020 10:16,  Nonspecific T wave abnormality, improved in Anterolateral leads     CBC WITH AUTOMATED DIFF    Collection Time: 03/19/20  5:09 AM   Result Value Ref Range    WBC 12.2 (H) 4.1 - 11.1 K/uL    RBC 4.09 (L) 4.10 - 5.70 M/uL    HGB 12.5 12.1 - 17.0 g/dL    HCT 38.5 36.6 - 50.3 %    MCV 94.1 80.0 - 99.0 FL    MCH 30.6 26.0 - 34.0 PG    MCHC 32.5 30.0 - 36.5 g/dL    RDW 13.3 11.5 - 14.5 %    PLATELET 862 149 - 360 K/uL    MPV 11.6 8.9 - 12.9 FL    NRBC 0.0 0  WBC    ABSOLUTE NRBC 0.00 0.00 - 0.01 K/uL    NEUTROPHILS 76 (H) 32 - 75 %    LYMPHOCYTES 15 12 - 49 %    MONOCYTES 6 5 - 13 %    EOSINOPHILS 2 0 - 7 %    BASOPHILS 1 0 - 1 %    IMMATURE GRANULOCYTES 0 0.0 - 0.5 %    ABS. NEUTROPHILS 9.4 (H) 1.8 - 8.0 K/UL    ABS. LYMPHOCYTES 1.8 0.8 - 3.5 K/UL    ABS. MONOCYTES 0.7 0.0 - 1.0 K/UL    ABS. EOSINOPHILS 0.2 0.0 - 0.4 K/UL    ABS. BASOPHILS 0.1 0.0 - 0.1 K/UL    ABS. IMM.  GRANS. 0.1 (H) 0.00 - 0.04 K/UL    DF AUTOMATED     METABOLIC PANEL, COMPREHENSIVE    Collection Time: 03/19/20  5:09 AM   Result Value Ref Range    Sodium 137 136 - 145 mmol/L    Potassium 4.0 3.5 - 5.1 mmol/L    Chloride 108 97 - 108 mmol/L    CO2 21 21 - 32 mmol/L    Anion gap 8 5 - 15 mmol/L    Glucose 109 (H) 65 - 100 mg/dL    BUN 24 (H) 6 - 20 MG/DL    Creatinine 1.46 (H) 0.70 - 1.30 MG/DL    BUN/Creatinine ratio 16 12 - 20      GFR est AA 58 (L) >60 ml/min/1.73m2    GFR est non-AA 48 (L) >60 ml/min/1.73m2    Calcium 8.4 (L) 8.5 - 10.1 MG/DL    Bilirubin, total 0.7 0.2 - 1.0 MG/DL    ALT (SGPT) 38 12 - 78 U/L    AST (SGOT) 15 15 - 37 U/L    Alk. phosphatase 57 45 - 117 U/L    Protein, total 7.4 6.4 - 8.2 g/dL    Albumin 4.2 3.5 - 5.0 g/dL    Globulin 3.2 2.0 - 4.0 g/dL    A-G Ratio 1.3 1.1 - 2.2     NT-PRO BNP    Collection Time: 03/19/20  5:09 AM   Result Value Ref Range    NT pro- (H) <125 PG/ML   TROPONIN I    Collection Time: 03/19/20  5:09 AM   Result Value Ref Range    Troponin-I, Qt. 0.05 (H) <0.05 ng/mL   SAMPLES BEING HELD    Collection Time: 03/19/20  5:11 AM   Result Value Ref Range    SAMPLES BEING HELD  1 BLUE     COMMENT        Add-on orders for these samples will be processed based on acceptable specimen integrity and analyte stability, which may vary by analyte. Radiologic Studies -   XR CHEST PA LAT    (Results Pending)     CT Results  (Last 48 hours)    None        CXR Results  (Last 48 hours)    None            Medical Decision Making   I am the first provider for this patient. I reviewed the vital signs, available nursing notes, past medical history, past surgical history, family history and social history. Vital Signs-Reviewed the patient's vital signs. Patient Vitals for the past 12 hrs:   Temp Pulse Resp BP SpO2   03/19/20 0546     94 %   03/19/20 0530  64 11 160/77 90 %   03/19/20 0457 98.8 °F (37.1 °C) 68 18 (!) 173/118 94 %       Pulse Oximetry Analysis - 94% on 2L    Cardiac Monitor:   Rate: 66 bpm  Rhythm: nsr      Provider Notes (Medical Decision Making):     DDX:  ACS, arrhythmia, pleural effusion, muscle spasm, chf exacerbation    Plan:  EKG, labs, chest x-ray     Impression:  Pulmonary edema, acute chest pain    ED Course:   Initial assessment performed. The patients presenting problems have been discussed, and they are in agreement with the care plan formulated and outlined with them. I have encouraged them to ask questions as they arise throughout their visit.     I reviewed our electronic medical record system for any past medical records that were available that may contribute to the patients current condition, the nursing notes and and vital signs from today's visit    Nursing notes will be reviewed as they become available in realtime while the pt has been in the ED. Karoline Ashton MD    TOBACCO COUNSELING:  During evaluation pt reported that they are a current tobacco user. I have spent 3 minutes discussing the medical risks of prolonged smoking habits and advised the patient of the benefits of the cessation of smoking, providing specific suggestions on how to quit. Pt has been counseled and encouraged to quit as soon as possible in order to decrease further risks to their health. Pt has conveyed their understanding of the risks involved should they continue to use tobacco products. Karolnie Ashton MD    HYPERTENSION COUNSELING:  Patient made aware of their elevated blood pressure and is instructed to follow up this week with their Primary Care or [address] [id number] for a recheck (should they be discharged.) Patient is counseled regarding consequences of chronic, uncontrolled hypertension including kidney disease, heart disease, stroke or even death. Patient states their understanding    EKG interpretation 0459: NSR, nl Axis, rate 68; , , QTc 521; no acute ischemia; interpreted by Karoline Ashton MD    I personally reviewed/interpreted pt's imaging. Agree with official read by radiology as noted above. Karoline Ashton MD    PROGRESS NOTE:  5:47 AM  Pt noted to be satting in mid to high 80's on RA. Placed on 2L NC. Karoline Ashton MD    6:24 AM  Patient's presentation, labs/imaging and plan of care was reviewed with Dr. Sukhjinder Casarez as part of sign out. They will f/u on repeat trop as part of the plan discussed with the patient. Dr. Sancho Pérez assistance in completion of this plan is greatly appreciated but it should be noted that I will be the provider of record for this patient.     Karoline Ashton MD        ED Course as of Mar 24 0543   Thu Mar 19, 2020   0818 X-ray shows evidence of interstitial edema. We will give him a dose of Bumex. Second troponin is pending. Repeat EKG same as prior. Patient still complaining of midsternal pain so a dose of nitro given. [JS]   9005 Spoke with Ludin Calvillo, cardiology who states that he would like the patient to be admitted to hospitalist.  This is the second time where patient has been cardioverted and then had a pulmonary edema afterwards. Given that patient is still having chest pain, he would like him admitted. [JS]      ED Course User Index  [JS] Jeanie Covington MD       Critical Care Time:     none      Diagnosis     Clinical Impression:   1. Acute congestive heart failure, unspecified heart failure type ([address]    2. History of cardioversion        PLAN:  Admit to hospitalist            This note will not be viewable in [address]

## 2020-03-19 NOTE — ED NOTES
0500: Patient arrives to ED via EMS with CC of chest pain and SOB that began around 1500 yesterday. Patient reports that his pain increased with movement and occasionally coughing. Denies presence of cough prior to onset of symptoms. Patient is a/o x4. Placed on the monitor x3.     0535: Patient O2 saturation at 88% on RA. MD verbal order for 2L of O2.    0618: Xray at bedside. 0725: Bedside shift change report given to 1101 Yassine Milan RN (oncoming nurse) by Lyric Sandoval RN (offgoing nurse). Report included the following information SBAR, Kardex, ED Summary, Intake/Output, MAR and Recent Results.

## 2020-03-19 NOTE — CONSULTS
Consult/Admission    NAME: Denise Cerrato   :  1955   MRN:  [id number]     Date/Time:  3/19/2020 10:51 AM    Patient PCP: Shaheed Garcia, DO  ________________________________________________________________________     Assessment:     Acute systolic heart failure. Chest wall pain   CAD   Recurrent A Fib. With CV to NSR on 3/17/20   Remote PCI   Hypertension  S/p recent TIA   CKD stage 3. Dr Tanvir Diane is his renal doctor. Dr Didier Arroyo:     IV Diuretic   Continue his OP CV meds. []           High complexity decision making was performed        Subjective:   CHIEF COMPLAINT:      SOB     HISTORY OF PRESENT ILLNESS:     Buck Hill is a 77 y.o.  male who . The main symptom bringing this patient to the ER is SOB     He also c/o chest pain that is c/w chest wall pain after his CV on 3/17. He had CV on 3/17 for recurrent A  FIB . Starting yesterday , became more SOB . Progressively worse overnight and came to ER during the night . CXR shows some pulmonary edema. Received IV BUMEX and has responded with diuresis. He is feeling better . Satting in the mid 90s now. Chest pain was relieved by Fentanyl. He had a similar episode after Cardioversion in January. He subsequently went back into A Fib . He was admitted here in February with CHF and during his stay had a TIA .              Past Medical History:   Diagnosis Date    Arrhythmia 2020    AFIB     CAD (coronary artery disease)     h/o stent- 10    Cancer [facility])     prostate    Congestive heart failure ([address]     Hypertension     Ill-defined condition     small bowel obstruction    Right rotator cuff tear 2017    SBO (small bowel obstruction) ([address] 11/3/2015    Stroke ([facility] May or 2010    lacunar  infarctions (found on scan- no sxs)    Thyroid disease     hypo      Past Surgical History:   Procedure Laterality Date    CARDIAC SURG PROCEDURE UNLIST   coronary stent    EXCIS KNEE CARTILAGE,MEDIAL OR LAT  1980's x3    HX CHOLECYSTECTOMY  1991    HX HEENT  1962    T&A    HX HERNIA REPAIR  1996    HX MALIGNANT SKIN LESION EXCISION      2004 and 2017    HX ORTHOPAEDIC  1974    Ganglion cyst left wrist    HX ORTHOPAEDIC Left 2017    shoulder repair    HX PROSTATECTOMY  2010    UPPER GI ENDOSCOPY,BIOPSY  2/25/2020          Allergies   Allergen Reactions    Clonidine Anxiety    Lasix [Furosemide] Other (comments)     May be the cause of kidney failure    Niacin Other (comments)     Decreased vision    Statins-Hmg-Coa Reductase Inhibitors Other (comments)     Muscle and Joint pains    Zetia [Ezetimibe] Myalgia     Pt reports getting joint and muscle pain.        Meds:  See below  Social History     Tobacco Use    Smoking status: Never Smoker    Smokeless tobacco: Former User   Substance Use Topics    Alcohol use: Not Currently     Binge frequency: Patient refused      Family History   Problem Relation Age of Onset    Dementia Mother     Other Father 55        cerebral hemorrhage       REVIEW OF SYSTEMS:     []            Unable to obtain  ROS due to ---   [x]            Total of 12 systems reviewed as follows:    Constitutional: negative fever, negative chills, negative weight loss  Eyes:   negative visual changes  ENT:   negative sore throat, tongue or lip swelling  Respiratory:  negative cough, negative dyspnea  Cards:  negative for chest pain, palpitations, lower extremity edema  GI:   negative for nausea, vomiting, diarrhea, and abdominal pain  Genitourinary: negative for frequency, dysuria  Integument:  negative for rash   Hematologic:  negative for easy bruising and gum/nose bleeding  Musculoskel: negative for myalgias,  back pain  Neurological:  negative for headaches, dizziness, vertigo, weakness  Behavl/Psych: negative for feelings of anxiety, depression     Pertinent Positives include :    Objective:      Physical Exam:    Last 24hrs VS reviewed since prior progress note. Most recent are:    Visit Vitals  /80 (BP 1 Location: Right arm, BP Patient Position: At rest)   Pulse 74   Temp 98.9 °F (37.2 °C)   Resp 18   Ht 6' (1.829 m)   Wt 108.9 kg (240 lb)   SpO2 94%   BMI 32.55 kg/m²       Intake/Output Summary (Last 24 hours) at 3/19/2020 1051  Last data filed at 3/19/2020 1028  Gross per 24 hour   Intake    Output 1700 ml   Net -1700 ml        General Appearance: Well developed, well nourished, alert & oriented x 3,    no acute distress. Ears/Nose/Mouth/Throat: Pupils equal and round, Hearing grossly normal.  Neck: Supple. JVP within normal limits. Carotids good upstrokes, with no bruit. Chest: Lungs clear to auscultation bilaterally. Cardiovascular: Regular rate and rhythm, S1S2 normal, no murmur, rubs, gallops. Abdomen: Soft, non-tender, bowel sounds are active. No organomegaly. Extremities: No edema bilaterally. Femoral pulses +2, Distal Pulses +1. Skin: Warm and dry. Neuro: CN II-XII grossly intact, Strength and sensation grossly intact. Data:      Prior to Admission medications    Medication Sig Start Date End Date Taking? Authorizing Provider   lisinopril (PRINIVIL, ZESTRIL) 20 mg tablet Take 1 Tab by mouth daily. 2/26/20  Yes Olivia Saavedra NP   hydrALAZINE (APRESOLINE) 25 mg tablet Take 1 Tab by mouth three (3) times daily. Patient taking differently: Take 50 mg by mouth three (3) times daily. 2/26/20  Yes Olivia Saavedra NP   spironolactone (ALDACTONE) 25 mg tablet Take 1 Tab by mouth daily. Patient taking differently: Take 50 mg by mouth daily. 2/26/20  Yes Olivia Saavedra NP   sucralfate (CARAFATE) 1 gram tablet Take 1 Tab by mouth Before breakfast, lunch, dinner and at bedtime. 2/26/20  Yes Olivia Saavedra NP   pantoprazole (PROTONIX) 40 mg tablet Take 1 Tab by mouth Before breakfast and dinner.  2/26/20  Yes Quentin, Olivia A, NP   amiodarone (CORDARONE) 200 mg tablet Take  by mouth two (2) times a day. Yes Provider, Historical   apixaban (ELIQUIS) 5 mg tablet Take 5 mg by mouth two (2) times a day. Yes Provider, Historical   isosorbide mononitrate ER (IMDUR) 30 mg tablet Take 1 Tab by mouth daily. 1/31/20  Yes Sophie Dumont MD   carvedilol (COREG) 25 mg tablet Take 1 Tab by mouth two (2) times daily (with meals). 5/3/19  Yes Herman Villa III, DO   levothyroxine (SYNTHROID) 50 mcg tablet Take 1 Tab by mouth Daily (before breakfast). 5/3/19  Yes Herman Villa III, DO   cholecalciferol, vitamin D3, (VITAMIN D3) 2,000 unit tab Take 2,000 Units by mouth daily. Yes Provider, Historical   MULTI-VITAMIN HI-PO PO Take 1 Tab by mouth daily. 10/11/10  Yes Provider, Historical       Recent Results (from the past 24 hour(s))   EKG, 12 LEAD, INITIAL    Collection Time: 03/19/20  4:59 AM   Result Value Ref Range    Ventricular Rate 68 BPM    Atrial Rate 68 BPM    P-R Interval 180 ms    QRS Duration 102 ms    Q-T Interval 490 ms    QTC Calculation (Bezet) 521 ms    Calculated P Axis 78 degrees    Calculated R Axis 62 degrees    Calculated T Axis 84 degrees    Diagnosis       Normal sinus rhythm  Prolonged QT  When compared with ECG of 17-MAR-2020 10:16,  Nonspecific T wave abnormality, improved in Anterolateral leads  Confirmed by Florentin Haney ([id number]) on 3/19/2020 9:12:39 AM     CBC WITH AUTOMATED DIFF    Collection Time: 03/19/20  5:09 AM   Result Value Ref Range    WBC 12.2 (H) 4.1 - 11.1 K/uL    RBC 4.09 (L) 4.10 - 5.70 M/uL    HGB 12.5 12.1 - 17.0 g/dL    HCT 38.5 36.6 - 50.3 %    MCV 94.1 80.0 - 99.0 FL    MCH 30.6 26.0 - 34.0 PG    MCHC 32.5 30.0 - 36.5 g/dL    RDW 13.3 11.5 - 14.5 %    PLATELET 304 614 - 015 K/uL    MPV 11.6 8.9 - 12.9 FL    NRBC 0.0 0  WBC    ABSOLUTE NRBC 0.00 0.00 - 0.01 K/uL    NEUTROPHILS 76 (H) 32 - 75 %    LYMPHOCYTES 15 12 - 49 %    MONOCYTES 6 5 - 13 %    EOSINOPHILS 2 0 - 7 %    BASOPHILS 1 0 - 1 %    IMMATURE GRANULOCYTES 0 0.0 - 0.5 %    ABS. NEUTROPHILS 9.4 (H) 1.8 - 8.0 K/UL    ABS. LYMPHOCYTES 1.8 0.8 - 3.5 K/UL    ABS. MONOCYTES 0.7 0.0 - 1.0 K/UL    ABS. EOSINOPHILS 0.2 0.0 - 0.4 K/UL    ABS. BASOPHILS 0.1 0.0 - 0.1 K/UL    ABS. IMM. GRANS. 0.1 (H) 0.00 - 0.04 K/UL    DF AUTOMATED     METABOLIC PANEL, COMPREHENSIVE    Collection Time: 03/19/20  5:09 AM   Result Value Ref Range    Sodium 137 136 - 145 mmol/L    Potassium 4.0 3.5 - 5.1 mmol/L    Chloride 108 97 - 108 mmol/L    CO2 21 21 - 32 mmol/L    Anion gap 8 5 - 15 mmol/L    Glucose 109 (H) 65 - 100 mg/dL    BUN 24 (H) 6 - 20 MG/DL    Creatinine 1.46 (H) 0.70 - 1.30 MG/DL    BUN/Creatinine ratio 16 12 - 20      GFR est AA 58 (L) >60 ml/min/1.73m2    GFR est non-AA 48 (L) >60 ml/min/1.73m2    Calcium 8.4 (L) 8.5 - 10.1 MG/DL    Bilirubin, total 0.7 0.2 - 1.0 MG/DL    ALT (SGPT) 38 12 - 78 U/L    AST (SGOT) 15 15 - 37 U/L    Alk. phosphatase 57 45 - 117 U/L    Protein, total 7.4 6.4 - 8.2 g/dL    Albumin 4.2 3.5 - 5.0 g/dL    Globulin 3.2 2.0 - 4.0 g/dL    A-G Ratio 1.3 1.1 - 2.2     NT-PRO BNP    Collection Time: 03/19/20  5:09 AM   Result Value Ref Range    NT pro- (H) <125 PG/ML   TROPONIN I    Collection Time: 03/19/20  5:09 AM   Result Value Ref Range    Troponin-I, Qt. 0.05 (H) <0.05 ng/mL   SAMPLES BEING HELD    Collection Time: 03/19/20  5:11 AM   Result Value Ref Range    SAMPLES BEING HELD  1 BLUE     COMMENT        Add-on orders for these samples will be processed based on acceptable specimen integrity and analyte stability, which may vary by analyte.    EKG, 12 LEAD, SUBSEQUENT    Collection Time: 03/19/20  8:10 AM   Result Value Ref Range    Ventricular Rate 61 BPM    Atrial Rate 61 BPM    P-R Interval 178 ms    QRS Duration 100 ms    Q-T Interval 492 ms    QTC Calculation (Bezet) 495 ms    Calculated P Axis 62 degrees    Calculated R Axis 44 degrees    Calculated T Axis 83 degrees    Diagnosis       Normal sinus rhythm  Nonspecific ST and T wave abnormality  Prolonged QT    Confirmed by [email or web address] ([id number]) on 3/19/2020 9:14:10 AM     TROPONIN I    Collection Time: 03/19/20  8:15 AM   Result Value Ref Range    Troponin-I, Qt. <0.05 <0.05 ng/mL

## 2020-03-19 NOTE — PROGRESS NOTES
76 yo  male admitted under observation status on 3/19/20 for Pulmonary Edema. CHF bundle & [facility] pt. Lives in [place] ([address]) with spouse. Reports at baseline Independent in ADLs/IADLs to include driving, spouse assists as needed. No reported HH, SNF, or acute inpatient rehab. No reported DME at home. Preferred Rx is [facility] ([address]). Observation letters previously provided by CM earlier today. No PT/OT consults placed. Pt eligible for [facility] CHF visit - pt declined at this time. CM will re-address prior to discharge should he need additional supports ([facility], [facility], or [employer] [facility]). During recent hospital admission for CHF in February, pt followed up with PCP, Cardiologist, and Nephrologist. Neurology and GI appointments scheduled for future, placed on AVS.     PCP: Dr. Ruy Sun: Dr. Ravi Dsouza  Nephrology: Dr. Katherine Mccray  Neurology: Dr. Rupal Melo  GI: Trenton Higginbotham NP    CM will continue to follow-up with any additional dc planning needs. Care Management Interventions  PCP Verified by CM: Yes  Mode of Transport at Discharge:  Other (see comment)(by private vehicle with spouse)  Transition of Care Consult (CM Consult): Discharge Planning  Discharge Durable Medical Equipment: No  Health Maintenance Reviewed: Yes  Physical Therapy Consult: No  Occupational Therapy Consult: No  Speech Therapy Consult: No  Current Support Network: Lives with Spouse(Two-story house ([address]) with spouse in [place])  Confirm Follow Up Transport: Self  The Plan for Transition of Care is Related to the Following Treatment Goals : TBD, Follow-up Care Appointments  Name of the Patient Representative Who was Provided with a Choice of Provider and Agrees with the Discharge Plan: Dheeraj Perez (patient)  Discharge Location  Discharge Placement: Unable to determine at this time    Zoltan Johnson,  [facility]  [phone]

## 2020-03-19 NOTE — ED NOTES
Dr. José Luis Quevedo states to give pt his Imdur, and spironolactone around 12:30-1 p.m. if BP is stable. Ordered pt a lunch tray to be delivered to [id number] his new IP room.

## 2020-03-19 NOTE — ED NOTES
Provided an update to patients wife with consent from the pt. Gave the patients wife the patients clothes, and insurance/ID cards.

## 2020-03-19 NOTE — ED NOTES
TRANSFER - OUT REPORT:    Verbal report given to Natividad Love RN (name) on Howie Marie  being transferred to [id number] (unit) for routine progression of care       Report consisted of patients Situation, Background, Assessment and   Recommendations(SBAR). Information from the following report(s) SBAR was reviewed with the receiving nurse. Lines:   Peripheral IV 03/19/20 Right Antecubital (Active)   Site Assessment Clean, dry, & intact 3/19/2020  7:29 AM   Phlebitis Assessment 0 3/19/2020  7:29 AM   Infiltration Assessment 0 3/19/2020  7:29 AM   Dressing Status Clean, dry, & intact 3/19/2020  7:29 AM   Dressing Type Transparent 3/19/2020  7:29 AM   Hub Color/Line Status Flushed 3/19/2020  7:29 AM        Opportunity for questions and clarification was provided. Receiving RN is aware to give Imdur, spironolactone, and oxycodone around 12:30 p.m.-1p.m. d/t monitoring pt's BP. Will notify patients wife of his room assignment.

## 2020-03-19 NOTE — PROGRESS NOTES
TRANSFER - IN REPORT:    Verbal report received from thomas valencia(name) on Colletta Borders  being received from [facility](unit) for routine progression of care      Report consisted of patients Situation, Background, Assessment and   Recommendations(SBAR). Information from the following report(s) SBAR, Kardex, ED Summary, Procedure Summary, Intake/Output, MAR and Recent Results was reviewed with the receiving nurse. Opportunity for questions and clarification was provided. Assessment completed upon patients arrival to unit and care assumed.

## 2020-03-19 NOTE — ED NOTES
Patient states his CP has subsided, pt states he mainly just has LL back pain that radiates across abdomen. MD Cuauhtemoc Blake is aware.

## 2020-03-19 NOTE — PROGRESS NOTES
Primary Nurse Rubina Oseguera RN and JUAN JOSE grande performed a dual skin assessment on this patient No impairment noted  Kenroy score is 18

## 2020-03-20 ENCOUNTER — APPOINTMENT (OUTPATIENT)
Dept: GENERAL RADIOLOGY | Age: 65
DRG: 291 | End: 2020-03-20
Attending: INTERNAL MEDICINE
Payer: MEDICARE

## 2020-03-20 PROBLEM — R07.9 CHEST PAIN: Status: ACTIVE | Noted: 2020-03-20

## 2020-03-20 PROBLEM — I50.9 HEART FAILURE (HCC): Status: ACTIVE | Noted: 2020-03-20

## 2020-03-20 LAB
ANION GAP SERPL CALC-SCNC: 6 MMOL/L (ref 5–15)
BUN SERPL-MCNC: 25 MG/DL (ref 6–20)
BUN/CREAT SERPL: 16 (ref 12–20)
CALCIUM SERPL-MCNC: 7.9 MG/DL (ref 8.5–10.1)
CHLORIDE SERPL-SCNC: 106 MMOL/L (ref 97–108)
CO2 SERPL-SCNC: 24 MMOL/L (ref 21–32)
CREAT SERPL-MCNC: 1.61 MG/DL (ref 0.7–1.3)
ERYTHROCYTE [DISTWIDTH] IN BLOOD BY AUTOMATED COUNT: 13.2 % (ref 11.5–14.5)
GLUCOSE SERPL-MCNC: 94 MG/DL (ref 65–100)
HCT VFR BLD AUTO: 35.7 % (ref 36.6–50.3)
HGB BLD-MCNC: 11.4 G/DL (ref 12.1–17)
MAGNESIUM SERPL-MCNC: 2.1 MG/DL (ref 1.6–2.4)
MCH RBC QN AUTO: 30.6 PG (ref 26–34)
MCHC RBC AUTO-ENTMCNC: 31.9 G/DL (ref 30–36.5)
MCV RBC AUTO: 95.7 FL (ref 80–99)
NRBC # BLD: 0 K/UL (ref 0–0.01)
NRBC BLD-RTO: 0 PER 100 WBC
PLATELET # BLD AUTO: 173 K/UL (ref 150–400)
PMV BLD AUTO: 12 FL (ref 8.9–12.9)
POTASSIUM SERPL-SCNC: 3.6 MMOL/L (ref 3.5–5.1)
RBC # BLD AUTO: 3.73 M/UL (ref 4.1–5.7)
SODIUM SERPL-SCNC: 136 MMOL/L (ref 136–145)
WBC # BLD AUTO: 6.5 K/UL (ref 4.1–11.1)

## 2020-03-20 PROCEDURE — 99218 HC RM OBSERVATION: CPT

## 2020-03-20 PROCEDURE — 74011250637 HC RX REV CODE- 250/637: Performed by: EMERGENCY MEDICINE

## 2020-03-20 PROCEDURE — 74011250637 HC RX REV CODE- 250/637: Performed by: HOSPITALIST

## 2020-03-20 PROCEDURE — 74011250637 HC RX REV CODE- 250/637: Performed by: INTERNAL MEDICINE

## 2020-03-20 PROCEDURE — 80048 BASIC METABOLIC PNL TOTAL CA: CPT

## 2020-03-20 PROCEDURE — 85027 COMPLETE CBC AUTOMATED: CPT

## 2020-03-20 PROCEDURE — 65660000000 HC RM CCU STEPDOWN

## 2020-03-20 PROCEDURE — 94760 N-INVAS EAR/PLS OXIMETRY 1: CPT

## 2020-03-20 PROCEDURE — 71046 X-RAY EXAM CHEST 2 VIEWS: CPT

## 2020-03-20 PROCEDURE — 36415 COLL VENOUS BLD VENIPUNCTURE: CPT

## 2020-03-20 PROCEDURE — 83735 ASSAY OF MAGNESIUM: CPT

## 2020-03-20 PROCEDURE — 74011000250 HC RX REV CODE- 250: Performed by: INTERNAL MEDICINE

## 2020-03-20 RX ORDER — BUMETANIDE 1 MG/1
2 TABLET ORAL DAILY
Status: DISCONTINUED | OUTPATIENT
Start: 2020-03-21 | End: 2020-03-21 | Stop reason: HOSPADM

## 2020-03-20 RX ADMIN — HYDRALAZINE HYDROCHLORIDE 25 MG: 25 TABLET, FILM COATED ORAL at 00:20

## 2020-03-20 RX ADMIN — AMIODARONE HYDROCHLORIDE 200 MG: 200 TABLET ORAL at 09:27

## 2020-03-20 RX ADMIN — AMIODARONE HYDROCHLORIDE 200 MG: 200 TABLET ORAL at 17:39

## 2020-03-20 RX ADMIN — APIXABAN 5 MG: 5 TABLET, FILM COATED ORAL at 10:51

## 2020-03-20 RX ADMIN — SUCRALFATE 1 G: 1 TABLET ORAL at 21:55

## 2020-03-20 RX ADMIN — SUCRALFATE 1 G: 1 TABLET ORAL at 00:20

## 2020-03-20 RX ADMIN — ISOSORBIDE MONONITRATE 30 MG: 30 TABLET, EXTENDED RELEASE ORAL at 09:27

## 2020-03-20 RX ADMIN — HYDRALAZINE HYDROCHLORIDE 25 MG: 25 TABLET, FILM COATED ORAL at 17:05

## 2020-03-20 RX ADMIN — Medication 5 ML: at 00:20

## 2020-03-20 RX ADMIN — PANTOPRAZOLE SODIUM 40 MG: 40 TABLET, DELAYED RELEASE ORAL at 17:04

## 2020-03-20 RX ADMIN — MELATONIN 2 TABLET: at 09:29

## 2020-03-20 RX ADMIN — APIXABAN 5 MG: 5 TABLET, FILM COATED ORAL at 17:39

## 2020-03-20 RX ADMIN — HYDRALAZINE HYDROCHLORIDE 25 MG: 25 TABLET, FILM COATED ORAL at 21:55

## 2020-03-20 RX ADMIN — BUMETANIDE 2 MG: 0.25 INJECTION INTRAMUSCULAR; INTRAVENOUS at 09:27

## 2020-03-20 RX ADMIN — HYDRALAZINE HYDROCHLORIDE 25 MG: 25 TABLET, FILM COATED ORAL at 09:30

## 2020-03-20 RX ADMIN — SUCRALFATE 1 G: 1 TABLET ORAL at 17:04

## 2020-03-20 RX ADMIN — Medication 5 ML: at 06:52

## 2020-03-20 RX ADMIN — SUCRALFATE 1 G: 1 TABLET ORAL at 09:29

## 2020-03-20 RX ADMIN — THERA TABS 1 TABLET: TAB at 09:28

## 2020-03-20 RX ADMIN — PANTOPRAZOLE SODIUM 40 MG: 40 TABLET, DELAYED RELEASE ORAL at 09:28

## 2020-03-20 RX ADMIN — LEVOTHYROXINE SODIUM 50 MCG: 50 TABLET ORAL at 06:52

## 2020-03-20 RX ADMIN — CARVEDILOL 25 MG: 12.5 TABLET, FILM COATED ORAL at 17:04

## 2020-03-20 RX ADMIN — LISINOPRIL 20 MG: 20 TABLET ORAL at 09:28

## 2020-03-20 RX ADMIN — ACETAMINOPHEN 650 MG: 325 TABLET ORAL at 21:55

## 2020-03-20 RX ADMIN — ACETAMINOPHEN 650 MG: 325 TABLET ORAL at 00:20

## 2020-03-20 RX ADMIN — SUCRALFATE 1 G: 1 TABLET ORAL at 10:55

## 2020-03-20 RX ADMIN — Medication 10 ML: at 13:49

## 2020-03-20 RX ADMIN — CARVEDILOL 25 MG: 12.5 TABLET, FILM COATED ORAL at 09:29

## 2020-03-20 RX ADMIN — SPIRONOLACTONE 50 MG: 25 TABLET ORAL at 09:27

## 2020-03-20 NOTE — PHYSICIAN ADVISORY
This patient is at high risk of adverse events and deterioration based on documented clinical data, comorbid conditions and current acute care course. Mr. Jewel Harada is expected to meet Inpatient Admission status criteria in accordance with CMS regulation Section 43 .3. Specifically, due to medical necessity the patient's stay is expected to exceed Two Midnights. It is our recommendation that this patient's hospitalization status should be upgraded from  OBSERVATION to INPATIENT status. The final decision of the patient's hospitalization status depends on the attending physician's judgment.

## 2020-03-20 NOTE — PROGRESS NOTES
Progress Note      3/20/2020 4:38 PM  NAME: Vivi Lesches   MRN:  [id number]   Admit Diagnosis: Pulmonary edema [J81.1]; Chest pain [R07.9]; Heart failure (HCC) [I50.9]     Assessment:     Acute systolic heart failure. Chest wall pain   CAD   Recurrent A Fib. With CV to NSR on 3/17/20   Remote PCI   Hypertension  S/p recent TIA   CKD stage 3. Dr Tomas Serna is his renal doctor.      Dr Alexandra Cooper      3/20   He is feeling better ,   Looks better. diruresed quite well , > 4500cc. CXR is clear. A little lightheaded on standing earlier , but orthostatics were normal .   ( thank you Wilbern Neighbor). Remains in NSR         Plan:     I will d/c afternoon Bumex. Convert Bumex to PO for the AM     If stable could probably discharge. []        High complexity decision making was performed    Subjective:     Vivi Lesches denies chest pain, dyspnea. Discussed with RN events overnight.      Patient Active Problem List   Diagnosis Code    Coronary artery disease involving native coronary artery of native heart without angina pectoris I25.10    Essential hypertension I10    Acquired hypothyroidism E03.9    Mixed hyperlipidemia E78.2    Chronic systolic congestive heart failure (HCC) I50.22    Acute respiratory failure with hypoxia ([facility]) J96.01    Secondary hyperaldosteronism ([facility]) E26.1    Cardiomegaly I51.7    Pulmonary edema J81.1    PAF (paroxysmal atrial fibrillation) ([facility]) I48.0    Hypercoagulability due to atrial fibrillation ([facility]) D68.69, I48.91    Hypokalemia E87.6    TIA (transient ischemic attack) G45.9    Erosive gastritis K29.60    Acute renal failure (ARF) ([facility]) N17.9    Chest pain R07.9    Heart failure (HCC) I50.9       Review of Systems:    Symptom Y/N Comments  Symptom Y/N Comments   Fever/Chills N   Chest Pain N    Poor Appetite N   Edema N    Cough N   Abdominal Pain N    Sputum N   Joint Pain N    SOB/DENG N   Pruritis/Rash N    Nausea/vomit N   Tolerating PT/OT Y    Diarrhea N   Tolerating Diet Y    Constipation N   Other       Could NOT obtain due to:      Objective:      Physical Exam:    Last 24hrs VS reviewed since prior progress note. Most recent are:    Visit Vitals  /61 (BP 1 Location: Left arm, BP Patient Position: Sitting)   Pulse (!) 53   Temp 98.3 °F (36.8 °C)   Resp 18   Ht 6' (1.829 m)   Wt 107.3 kg (236 lb 8.9 oz)   SpO2 97%   BMI 32.08 kg/m²       Intake/Output Summary (Last 24 hours) at 3/20/2020 1638  Last data filed at 3/20/2020 1521  Gross per 24 hour   Intake 1280 ml   Output 4475 ml   Net -3195 ml        General Appearance: Well developed, well nourished, alert & oriented x 3,    no acute distress. Ears/Nose/Mouth/Throat: Hearing grossly normal.  Neck: Supple. Chest: Lungs clear to auscultation bilaterally. Cardiovascular: Regular rate and rhythm, S1S2 normal, no murmur. Abdomen: Soft, non-tender, bowel sounds are active. Extremities: No edema bilaterally. Skin: Warm and dry. PMH/SH reviewed - no change compared to H&P    Data Review    Telemetry: normal sinus rhythm     Lab Data Personally Reviewed:    Recent Labs     03/20/20  0416 03/19/20  0509   WBC 6.5 12.2*   HGB 11.4* 12.5   HCT 35.7* 38.5    214   LABRCNT(INR:3,PTP:3,APTT:3,)  Recent Labs     03/20/20  0416 03/19/20  0509    137   K 3.6 4.0    108   CO2 24 21   BUN 25* 24*   CREA 1.61* 1.46*   GLU 94 109*   CA 7.9* 8.4*   MG 2.1  --    LABRCNT(CPK:3,CpKMB:3,ckndx:3,troiq:3)  Lab Results   Component Value Date/Time    Cholesterol, total 227 (H) 02/23/2020 12:05 PM    HDL Cholesterol 35 02/23/2020 12:05 PM    LDL, calculated 157.6 (H) 02/23/2020 12:05 PM    Triglyceride 172 (H) 02/23/2020 12:05 PM    CHOL/HDL Ratio 6.5 (H) 02/23/2020 12:05 PM   LABRCNT(sgot:3,gpt:3,ap:3,tbiL:3,TP:3,ALB:3,GLOB:3,ggt:3,aml:3,amyp:3,lpse:3,hlpse:3)No results for input(s): PH, PCO2, PO2 in the last 72 hours.   Lab Results   Component Value Date/Time    Cholesterol, total 227 (H) 02/23/2020 12:05 PM    HDL Cholesterol 35 02/23/2020 12:05 PM    LDL, calculated 157.6 (H) 02/23/2020 12:05 PM    Triglyceride 172 (H) 02/23/2020 12:05 PM    CHOL/HDL Ratio 6.5 (H) 02/23/2020 12:05 PM   Tj Nuno MD  No results for input(s): PH, PCO2, PO2 in the last 72 hours.     Medications Personally Reviewed:    Current Facility-Administered Medications   Medication Dose Route Frequency    [START ON 3/21/2020] bumetanide (BUMEX) tablet 2 mg  2 mg Oral DAILY    acetaminophen (TYLENOL) tablet 650 mg  650 mg Oral Q6H PRN    sodium chloride (NS) flush 5-40 mL  5-40 mL IntraVENous Q8H    sodium chloride (NS) flush 5-40 mL  5-40 mL IntraVENous PRN    oxyCODONE IR (ROXICODONE) tablet 5 mg  5 mg Oral Q6H PRN    fentaNYL citrate (PF) injection 50 mcg  50 mcg IntraVENous Q4H PRN    naloxone (NARCAN) injection 0.4 mg  0.4 mg IntraVENous PRN    ondansetron (ZOFRAN) injection 4 mg  4 mg IntraVENous Q6H PRN    methocarbamoL (ROBAXIN) tablet 750 mg  750 mg Oral TID PRN    amiodarone (CORDARONE) tablet 200 mg  200 mg Oral BID    apixaban (ELIQUIS) tablet 5 mg  5 mg Oral BID    carvediloL (COREG) tablet 25 mg  25 mg Oral BID WITH MEALS    hydrALAZINE (APRESOLINE) tablet 25 mg  25 mg Oral TID    isosorbide mononitrate ER (IMDUR) tablet 30 mg  30 mg Oral DAILY    levothyroxine (SYNTHROID) tablet 50 mcg  50 mcg Oral ACB    therapeutic multivitamin (THERAGRAN) tablet 1 Tab  1 Tab Oral DAILY    pantoprazole (PROTONIX) tablet 40 mg  40 mg Oral ACB&D    spironolactone (ALDACTONE) tablet 50 mg  50 mg Oral DAILY    cholecalciferol (VITAMIN D3) (1000 Units /25 mcg) tablet 2 Tab  2,000 Units Oral DAILY    sucralfate (CARAFATE) tablet 1 g  1 g Oral AC&HS         Chen Davidson MD

## 2020-03-20 NOTE — PROGRESS NOTES
Hospitalist Progress Note    NAME: Markus Justice   :  1955   MRN:  [id number]     PCP: Dr. Ruy Sun: Dr. Ravi Dsouza  Nephrology: Dr. Katherine Mccray  Neurology: Dr. Rupal Melo  GI: Trenton Higginbotham NP      Assessment / Plan:  Acute on chronic diastolic HF, POA  -- clinically: better since admission but not close to baseline  + moderate dyspnea with minimal exertion   Wt down 240--> 236   Chest x-ray 3/19: mild pulmonary edema. proBNP 819  Cxray today: result pending   Not hypoxic, 96% on room air.  --Diuretic: Bumex 2 mg IV BID per cardiology   Close IVF status monitoring  daily weight   Monitor electrolytes   -cardiology input appreciated: cont IV diurese     Chest pain after cardioversion 3/17  Seems to be primarily musculoskeletal  - Similar presentation in 2020 s/p cardioversion as well   + chest pain   Troponin negative. EKG nonischemic  --Give Tylenol as needed, oxycodone as needed for moderate pain, fentanyl as needed for severe pain. --Give Robaxin 3 times daily as needed. Patient reports that helped him on last admission     History of recurrent A. fib, status post cardioversion   --Currently in sinus rhythm. Continue amiodarone 200 mg twice daily and Eliquis  Coronary artery disease, history of cardiac stent   Hypertension  Mild to moderate pulmonary hypertension on echo 2020  --Cardiac cath 2019 without any significant disease. --Continue Coreg, hydralazine, Imdur, lisinopril, Aldactone  --He is intolerant of statins and Zetia. Repatha have been recommended by neurology last admission but was not formulary and patient refused WelChol.     ZONIA creatinine 1.46  --Baseline appears to be 1.1, admission 1.46, worsening 1.61   On Bumex IV/ spironolactone  -Holding ACE   - monitor closely.  Avoid nephrotoxic drugs, adjust all meds to GFR.      Esophagitis  --Continue Protonix twice daily and Carafate 4 times daily  -- on AC with Eliquis, closely monitor h/h  Hb stable now      History of TIA February 2020 due to hypotension. Continue Eliquis  History of prostate cancer status post prostatectomy 2010  Obesity. Body mass index is 32.55 kg/m².            Code: Full code  DVT prophylaxis: Eliquis  Surrogate decision maker: Wife    Baseline: Independent in ADLs/IADLs to include driving  Recommended Disposition: Home w/Family 24-48 hr      Subjective:     Chief Complaint / Reason for Physician Visit: HF/ Afib   Feeling better but not close to baseline  C/o lower ribs \" tightness\", pain   Dyspnea: mild with conversation, moderate with going to the bathroom       Discussed with RN events overnight. Review of Systems:  Symptom Y/N Comments  Symptom Y/N Comments   Fever/Chills    Chest Pain y    Poor Appetite    Edema     Cough    Abdominal Pain     Sputum    Joint Pain     SOB/DENG y   Pruritis/Rash     Nausea/vomit    Tolerating PT/OT     Diarrhea    Tolerating Diet     Constipation    Other       Could NOT obtain due to:      Objective:     VITALS:   Last 24hrs VS reviewed since prior progress note.  Most recent are:  Patient Vitals for the past 24 hrs:   Temp Pulse Resp BP SpO2   03/20/20 0413 98 °F (36.7 °C) (!) 54 16 124/60 96 %   03/20/20 0016 98.3 °F (36.8 °C) (!) 58 18 131/74 99 %   03/19/20 2031 98.3 °F (36.8 °C) (!) 56 18 130/67 96 %   03/19/20 1903  (!) 57  130/70    03/19/20 1702 98 °F (36.7 °C) (!) 51 19 128/61 95 %   03/19/20 1302 98.4 °F (36.9 °C) (!) 59 18 148/66 95 %   03/19/20 1200  67 17 159/82 93 %   03/19/20 1129  65 18 158/73 91 %   03/19/20 1031 98.9 °F (37.2 °C) 74 18 186/80 94 %   03/19/20 1000  69 14 186/80 96 %   03/19/20 0930  62 14 153/65 95 %   03/19/20 0926     96 %   03/19/20 0900  67 16 168/81 94 %   03/19/20 0830  63 15 150/77 94 %   03/19/20 0814     95 %   03/19/20 0800  61 12 155/75 95 %       Intake/Output Summary (Last 24 hours) at 3/20/2020 0758  Last data filed at 3/20/2020 0008  Gross per 24 hour Intake 850 ml   Output 4250 ml   Net -3400 ml        PHYSICAL EXAM:  General: WD, WN. Alert, cooperative, no acute distress, mild dyspnea with conversation     EENT:  EOMI. Anicteric sclerae. MMM  Resp:  Diminished BS bases bilaterally, no wheezing or rales. No accessory muscle use  CV:  Regular  rhythm,  No edema  GI:  Soft, Non distended, Non tender.  +Bowel sounds  Neurologic:  Alert and oriented X 3, normal speech,   Psych:   Good insight. Not anxious nor agitated  Skin:  No rashes. No jaundice    Reviewed most current lab test results and cultures  YES  Reviewed most current radiology test results   YES  Review and summation of old records today    NO  Reviewed patient's current orders and MAR    YES  PMH/SH reviewed - no change compared to H&P  ________________________________________________________________________  Care Plan discussed with:    Comments   Patient y    Family      RN y    Care Manager y    Consultant                       y Multidiciplinary team rounds were held today with , nursing, pharmacist and clinical coordinator. Patient's plan of care was discussed; medications were reviewed and discharge planning was addressed. ________________________________________________________________________  Total NON critical care TIME:  35   Minutes    Total CRITICAL CARE TIME Spent:   Minutes non procedure based      Comments   >50% of visit spent in counseling and coordination of care     ________________________________________________________________________  Peggy Chen MD     Procedures: see electronic medical records for all procedures/Xrays and details which were not copied into this note but were reviewed prior to creation of Plan. LABS:  I reviewed today's most current labs and imaging studies.   Pertinent labs include:  Recent Labs     03/20/20  0416 03/19/20  0509   WBC 6.5 12.2*   HGB 11.4* 12.5   HCT 35.7* 38.5    214     Recent Labs     03/20/20  0416 03/19/20  0509    137   K 3.6 4.0    108   CO2 24 21   GLU 94 109*   BUN 25* 24*   CREA 1.61* 1.46*   CA 7.9* 8.4*   MG 2.1  --    ALB  --  4.2   TBILI  --  0.7   SGOT  --  15   ALT  --  38       Signed: Roe Bullard MD

## 2020-03-20 NOTE — PROGRESS NOTES
7:30 AM Bedside report received from [facility], [facility].    8:00 AM Dr. Vince Hayes at bedside. Discussed AM labs and patient's c/o \"belly tightness/tenderness\" and patient statement that he had a recent EGD and \"ulcers were seen. \" MD states to hold eliquis for now. 10:45 AM Spoke with Dr. Vince Hayes. MD states OK to give eliquis. Med given. See MAR. MD aware of patient's c/o dizziness and shortness of breath \"when I bent over and changed positions. \" Orthostatics obtained and documented. No new orders. 5:00 PM Dr. Gianna Galdamez at bedside. MD aware of patient's reports of \"dizziness\" this morning and orthostatics obtained. MD states to dc bumex and MD will start oral bumex in AM. MD states OK to give other meds as ordered with parameters added to coreg to hold for HR less than 50. Meds given. See MAR.    7:30 PM Bedside report given to Donavan Vaughn RN.

## 2020-03-21 VITALS
HEIGHT: 72 IN | DIASTOLIC BLOOD PRESSURE: 66 MMHG | HEART RATE: 56 BPM | RESPIRATION RATE: 18 BRPM | SYSTOLIC BLOOD PRESSURE: 144 MMHG | OXYGEN SATURATION: 98 % | TEMPERATURE: 98.4 F | WEIGHT: 234.79 LBS | BODY MASS INDEX: 31.8 KG/M2

## 2020-03-21 LAB
ANION GAP SERPL CALC-SCNC: 6 MMOL/L (ref 5–15)
BUN SERPL-MCNC: 29 MG/DL (ref 6–20)
BUN/CREAT SERPL: 20 (ref 12–20)
CALCIUM SERPL-MCNC: 8.5 MG/DL (ref 8.5–10.1)
CHLORIDE SERPL-SCNC: 106 MMOL/L (ref 97–108)
CO2 SERPL-SCNC: 25 MMOL/L (ref 21–32)
CREAT SERPL-MCNC: 1.46 MG/DL (ref 0.7–1.3)
ERYTHROCYTE [DISTWIDTH] IN BLOOD BY AUTOMATED COUNT: 13.1 % (ref 11.5–14.5)
GLUCOSE SERPL-MCNC: 112 MG/DL (ref 65–100)
HCT VFR BLD AUTO: 37.2 % (ref 36.6–50.3)
HGB BLD-MCNC: 12.2 G/DL (ref 12.1–17)
MCH RBC QN AUTO: 30.8 PG (ref 26–34)
MCHC RBC AUTO-ENTMCNC: 32.8 G/DL (ref 30–36.5)
MCV RBC AUTO: 93.9 FL (ref 80–99)
NRBC # BLD: 0 K/UL (ref 0–0.01)
NRBC BLD-RTO: 0 PER 100 WBC
PLATELET # BLD AUTO: 180 K/UL (ref 150–400)
PMV BLD AUTO: 11.8 FL (ref 8.9–12.9)
POTASSIUM SERPL-SCNC: 3.6 MMOL/L (ref 3.5–5.1)
RBC # BLD AUTO: 3.96 M/UL (ref 4.1–5.7)
SODIUM SERPL-SCNC: 137 MMOL/L (ref 136–145)
WBC # BLD AUTO: 6.8 K/UL (ref 4.1–11.1)

## 2020-03-21 PROCEDURE — 94760 N-INVAS EAR/PLS OXIMETRY 1: CPT

## 2020-03-21 PROCEDURE — 74011250637 HC RX REV CODE- 250/637: Performed by: HOSPITALIST

## 2020-03-21 PROCEDURE — 74011250637 HC RX REV CODE- 250/637: Performed by: INTERNAL MEDICINE

## 2020-03-21 PROCEDURE — 36415 COLL VENOUS BLD VENIPUNCTURE: CPT

## 2020-03-21 PROCEDURE — 85027 COMPLETE CBC AUTOMATED: CPT

## 2020-03-21 PROCEDURE — 80048 BASIC METABOLIC PNL TOTAL CA: CPT

## 2020-03-21 RX ORDER — BUMETANIDE 2 MG/1
2 TABLET ORAL DAILY
Qty: 30 TAB | Refills: 0 | Status: SHIPPED | OUTPATIENT
Start: 2020-03-22 | End: 2020-04-13 | Stop reason: SDUPTHER

## 2020-03-21 RX ADMIN — THERA TABS 1 TABLET: TAB at 08:23

## 2020-03-21 RX ADMIN — PANTOPRAZOLE SODIUM 40 MG: 40 TABLET, DELAYED RELEASE ORAL at 08:23

## 2020-03-21 RX ADMIN — HYDRALAZINE HYDROCHLORIDE 25 MG: 25 TABLET, FILM COATED ORAL at 08:23

## 2020-03-21 RX ADMIN — LEVOTHYROXINE SODIUM 50 MCG: 50 TABLET ORAL at 05:45

## 2020-03-21 RX ADMIN — AMIODARONE HYDROCHLORIDE 200 MG: 200 TABLET ORAL at 08:22

## 2020-03-21 RX ADMIN — ISOSORBIDE MONONITRATE 30 MG: 30 TABLET, EXTENDED RELEASE ORAL at 08:23

## 2020-03-21 RX ADMIN — CARVEDILOL 25 MG: 12.5 TABLET, FILM COATED ORAL at 08:23

## 2020-03-21 RX ADMIN — Medication 10 ML: at 05:45

## 2020-03-21 RX ADMIN — SUCRALFATE 1 G: 1 TABLET ORAL at 08:41

## 2020-03-21 RX ADMIN — APIXABAN 5 MG: 5 TABLET, FILM COATED ORAL at 08:23

## 2020-03-21 RX ADMIN — MELATONIN 2 TABLET: at 08:23

## 2020-03-21 RX ADMIN — BUMETANIDE 2 MG: 1 TABLET ORAL at 08:22

## 2020-03-21 RX ADMIN — SPIRONOLACTONE 50 MG: 25 TABLET ORAL at 08:23

## 2020-03-21 NOTE — DISCHARGE INSTRUCTIONS
Patient Discharge Instructions    Mallika Aguilera / [id number] : 1955    Admitted 3/19/2020 Discharged: 3/21/2020         DISCHARGE DIAGNOSIS:       Pulmonary edema ( fluids in the lungs)     You were started on fluid pill Bumex 2 mg daily   Stop taking Lisinopril for now. you will need blood work in 1-2 weeks either with PCP or cardiology to follow on kidney function and electrolytes   Discuss with PCP and cardiology during next appointment if you should go back on lisinopril            Take Home Medications       General drug facts     If you have a very bad allergy, wear an allergy ID at all times. It is important that you take the medication exactly as they are prescribed. Keep your medication in the bottles provided by the pharmacist.  Keep a list of all your drugs (prescription, natural products, vitamins, OTC) with you. Give this list to your doctor. Do not take other medications without consulting your doctor. Do not share your drugs with others and do not take anyone else's drugs. Keep all drugs out of the reach of children and pets. Most drugs may be thrown away in household trash after mixing with coffee grounds or alem litter and sealing in a plastic bag. Keep a list Call your doctor for help with any side effects. If in the [place] you may also call the FDA at 3-332-SFI-0226    Talk with the doctor before starting any new drug, including OTC, natural products, or vitamins. What to do at Home    1. Recommended diet: cardiac     2. Recommended activity: Activity as tolerated    3. If you experience any of the following symptoms then please call your primary care physician or return to the emergency room if you cannot get hold of your doctor: dyspnea, chest pain     4. Lab work: you will need blood work in 1-2 weeks either with PCP or cardiology to follow on kidney function and electrolytes     5. Bring these papers with you to your follow up appointments.  The papers will help your doctors be sure to continue the care plan from the hospital.      Follow-up with:   PCP: Deshawn Ojeda, DO  Follow-up Information     Follow up With Specialties Details Why Contact Info    Norah Alvarez MD Cardiology Schedule an appointment as soon as possible for a visit in 3 weeks  [address]  [id number]  [facility] [address], DO Internal Medicine In 1 week 1-2 weeks  [address] [facility]  [address] [phone]      Loni Stewart, DO Neurology On 3/25/2020 11:00AM; Neurology follow-up (previously scheduled) [address] [facility]  [phone]      Princess Tutu NP Nurse Practitioner Go on 4/17/2020 8:00AM; GI Follow-up (previously scheduled) [address]  [address] [facility]  [phone]             Please call for your own appointment        Information obtained by :  I understand that if any problems occur once I am at home I am to contact my physician. I understand and acknowledge receipt of the instructions indicated above.                                                                                                                                            Physician's or R.N.'s Signature                                                                  Date/Time                                                                                                                                              Patient or Representative Signature                                                          Date/Time

## 2020-03-21 NOTE — PROGRESS NOTES
Bedside shift change report given to RN (oncoming nurse) by Melita Estrella (offgoing nurse). Report included the following information SBAR, Kardex, Intake/Output, MAR and Recent Results. No issues overnight. No dizziness overnight. VSS. Labs drawn. Hoping to d/c today.

## 2020-03-21 NOTE — PROGRESS NOTES
Hospitalist Progress Note    NAME: Stan Hicks   :  1955   MRN:  [id number]     PCP: Dr. Jorge Amanda: Dr. Nika Abreu  Nephrology: Dr. Patria Mcconnell  Neurology: Dr. Norma Villanueva  GI: Samantha Rollins, DAHIANA      Assessment / Plan:  Acute on chronic diastolic HF, POA  -- clinically: back to baseline ; dyspnea resolved   Wt down 240--> 236 --> 234   Chest x-ray 3/19: mild pulmonary edema. proBNP 819  Cxray 3/20: edema resolved   Not hypoxic, 96% on room air.  --Diuretic: Bumex 2 mg IV BID --> Bumex 2 mg PO daily   --cont BB  Will be holding lisinopril for now, follow BMP in 1 - 2 weeks first   He will need BMP in 1-2 weeks to follow up on cr/electrolytes   -cardiology input appreciated: Bumex 2 mg daily     Chest pain after cardioversion 3/17  Seems to be primarily musculoskeletal  - Similar presentation in 2020 s/p cardioversion as well   CP is much better   Troponin negative. EKG nonischemic  --Tylenol as needed     History of recurrent A. fib, status post cardioversion   --Currently in sinus rhythm. Continue amiodarone 200 mg twice daily and Eliquis  Coronary artery disease, history of cardiac stent   Hypertension  Mild to moderate pulmonary hypertension on echo 2020  --Cardiac cath 2019 without any significant disease. --Continue Coreg, hydralazine, Imdur, lisinopril, Aldactone  --He is intolerant of statins and Zetia. Repatha have been recommended by neurology last admission but was not formulary and patient refused WelChol.     ZONIA creatinine 1.46  --Baseline appears to be 1.1, admission 1.46 - 1.61 - 1.46    On Bumex / spironolactone cr stable since admission   -Holding ACE for now   - monitor closely. Avoid nephrotoxic drugs, adjust all meds to GFR.      Esophagitis  --Continue Protonix twice daily and Carafate 4 times daily  -- on AC with Eliquis, closely monitor h/h  Hb stable now      History of TIA 2020 due to hypotension.  Continue Eliquis  History of prostate cancer status post prostatectomy 2010  Obesity. Body mass index is 32.55 kg/m².            Code: Full code  DVT prophylaxis: Eliquis  Surrogate decision maker: Wife    Baseline: Independent in ADLs/IADLs to include driving  Recommended Disposition: Home w/Family today      Subjective:     Chief Complaint / Reason for Physician Visit: HF/ Afib   Back to baseline  CP is much better       Discussed with RN events overnight. Review of Systems:  Symptom Y/N Comments  Symptom Y/N Comments   Fever/Chills    Chest Pain n    Poor Appetite    Edema     Cough    Abdominal Pain     Sputum    Joint Pain     SOB/DENG n   Pruritis/Rash     Nausea/vomit    Tolerating PT/OT     Diarrhea    Tolerating Diet     Constipation    Other       Could NOT obtain due to:      Objective:     VITALS:   Last 24hrs VS reviewed since prior progress note. Most recent are:  Patient Vitals for the past 24 hrs:   Temp Pulse Resp BP SpO2   03/21/20 0822  (!) 56  144/66    03/21/20 0317 98.4 °F (36.9 °C) (!) 54 18 144/66 98 %   03/20/20 2253 97.5 °F (36.4 °C) (!) 54 18 124/56 96 %   03/20/20 1922 98.2 °F (36.8 °C) (!) 52 18 128/57 94 %   03/20/20 1704  (!) 56  115/59    03/20/20 1516 98.3 °F (36.8 °C) (!) 53 18 117/61 97 %   03/20/20 1045  (!) 54  126/61    03/20/20 1038 97.6 °F (36.4 °C) (!) 53 18 132/66 98 %       Intake/Output Summary (Last 24 hours) at 3/21/2020 0944  Last data filed at 3/21/2020 0356  Gross per 24 hour   Intake 480 ml   Output 4375 ml   Net -3895 ml        PHYSICAL EXAM:  General: WD, WN. Alert, cooperative, no acute distress, no dyspnea noted    EENT:  EOMI. Anicteric sclerae. MMM  Resp:  CTA bilaterally, no wheezing or rales. No accessory muscle use  CV:  Regular  rhythm,  No edema  GI:  Soft, Non distended, Non tender.  +Bowel sounds  Neurologic:  Alert and oriented X 3, normal speech,   Psych:   Good insight. Not anxious nor agitated  Skin:  No rashes.   No jaundice    Reviewed most current lab test results and cultures  YES  Reviewed most current radiology test results   YES  Review and summation of old records today    NO  Reviewed patient's current orders and MAR    YES  PMH/SH reviewed - no change compared to H&P  ________________________________________________________________________  Care Plan discussed with:    Comments   Patient y    Family      RN y    Care Manager     Consultant                        Multidiciplinary team rounds were held today with , nursing, pharmacist and clinical coordinator. Patient's plan of care was discussed; medications were reviewed and discharge planning was addressed. ________________________________________________________________________  Total NON critical care TIME:  35   Minutes    Total CRITICAL CARE TIME Spent:   Minutes non procedure based      Comments   >50% of visit spent in counseling and coordination of care y Dc coordination    ________________________________________________________________________  Agueda Miller MD     Procedures: see electronic medical records for all procedures/Xrays and details which were not copied into this note but were reviewed prior to creation of Plan. LABS:  I reviewed today's most current labs and imaging studies.   Pertinent labs include:  Recent Labs     03/21/20  0530 03/20/20  0416 03/19/20  0509   WBC 6.8 6.5 12.2*   HGB 12.2 11.4* 12.5   HCT 37.2 35.7* 38.5    173 214     Recent Labs     03/21/20  0530 03/20/20  0416 03/19/20  0509    136 137   K 3.6 3.6 4.0    106 108   CO2 25 24 21   * 94 109*   BUN 29* 25* 24*   CREA 1.46* 1.61* 1.46*   CA 8.5 7.9* 8.4*   MG  --  2.1  --    ALB  --   --  4.2   TBILI  --   --  0.7   SGOT  --   --  15   ALT  --   --  38       Signed: Agueda Miller MD

## 2020-03-21 NOTE — PROGRESS NOTES
SUZY: Follow-up Care Appointments (PCP, Cardiology, GI, Neurology)    Pt to discharge home today by private vehicle with spouse. No PT/OT recommendations at this time. CM offered to schedule [address] CHF visit for pt, however pt adamantly declined services at this time. Declined referral to be sent to [facility] for additional CHF management resources. Neurology and GI f/u appointments scheduled from previous admission. Pt/family to call and schedule PCP and Cardiology f/u appointments as offices are closed at this time (however due to current public health crisis, may not be able to schedule at this time). CM Specialist notified. Observation letters previously provided. All information entered into pt AVS.     Pt has no additional CM needs at this time. Care Management Interventions  PCP Verified by CM: Yes  Palliative Care Criteria Met (RRAT>21 & CHF Dx)?: No  Mode of Transport at Discharge: Other (see comment)(by private vehicle with spouse)  Transition of Care Consult (CM Consult): Discharge Planning  Discharge Durable Medical Equipment: No  Health Maintenance Reviewed: Yes  Physical Therapy Consult: No  Occupational Therapy Consult: No  Speech Therapy Consult: No  Current Support Network: Lives with Spouse(Two-story house ([address]) with spouse in [place])  Confirm Follow Up Transport: Self  The Plan for Transition of Care is Related to the Following Treatment Goals :  Follow-up Care Appointments  The Patient and/or Patient Representative was Provided with a Choice of Provider and Agrees with the Discharge Plan?: No  Name of the Patient Representative Who was Provided with a Choice of Provider and Agrees with the Discharge Plan: Sandy Ma (patient)  Discharge Location  Discharge Placement: Home with family assistance    Rigoberto Thorpe [place] Manager  [phone]

## 2020-03-21 NOTE — DISCHARGE SUMMARY
Hospitalist Discharge Summary     Patient ID:  Darcie Frank  [id number]  8 y.o.  1955  3/19/2020    PCP on record: Arlis Opitz, DO    Admit date: 3/19/2020  Discharge date and time: 3/21/2020    DISCHARGE DIAGNOSIS:    Acute on chronic diastolic HF, POA   Chest pain after cardioversion 3/17 likely musculoskeletal in origin   History of recurrent A. fib, status post cardioversion March [place], 2020  Coronary artery disease, history of cardiac stent 2010  Hypertension  Mild to moderate pulmonary hypertension on echo February 2020  ZONIA creatinine 1.46  Esophagitis  History of TIA February 2020 due to hypotension  History of prostate cancer status post prostatectomy 2010  Obesity. Body mass index is 32.55 kg/m². Code: Full code      CONSULTATIONS:  IP CONSULT TO CARDIOLOGY  IP CONSULT TO HOSPITALIST    Excerpted HPI from H&P of Baldemar Barajas MD:    Darcie Frank is a 77 y.o. male medical history of coronary artery disease, history of stent 2010, paroxysmal A. fib first diagnosed in January 6190, chronic diastolic CHF EF of 50 to 65% on echo February 2020, CKD stage 2 baseline creatinine 1.1 who underwent his second cardioversion 2 days ago. At 3 PM yesterday he developed tightness in his chest in right upper chest midsternal and right upper back including pain where his cardioversion pads were placed, associated with shortness of breath and mild nonproductive cough. He began to develop wheezing. The shortness of breath and chest pain gradually got worse. He called the on-call doctor for cardiology who was going to try and get him into the office today but eventually he felt the pain and shortness of breath was too severe to wait and called EMS.    EMS reported patient in respiratory distress, tripoding, speaking 4-5 words. He was given aspirin 324 mg and sublingual nitro. There was minimal relief. He required morphine, fentanyl twice.   His chest pain has improved from 9 out of 10 to currently 6 out of 10. He reports the symptoms of chest pain shortness of breath, wheezing is similar to when he was hospitalized in January for CHF exacerbation after cardioversion.     He denies any fever, chills or weight gain. He reports his weight is usually 237 pounds in the morning and 2  pounds at night. He has quit chewing tobacco, no longer using nicotine patch. He has not had any alcohol since December     This is his third hospitalization since January 2020. He was admitted February 21 through February 26, 2020 with CHF exacerbation with acute hypoxic respiratory failure, hypokalemia, TIA due to hypotension, ZONIA with creatinine of 2.2. We were asked to admit for work up and evaluation of the above problems.        ______________________________________________________________________  DISCHARGE SUMMARY/HOSPITAL COURSE:  for full details see H&P, daily progress notes, labs, consult notes. Acute on chronic diastolic HF, POA  -- clinically: back to baseline ; dyspnea resolved   Wt down 240--> 236 --> 234   Chest x-ray 3/19: mild pulmonary edema.  proBNP 819  Cxray 3/20: edema resolved   Not hypoxic, 96% on room air.  --Diuretic: Bumex 2 mg IV BID --> Bumex 2 mg PO daily   --cont BB  Will be holding lisinopril for now, follow BMP in 1 - 2 weeks first   He will need BMP in 1-2 weeks to follow up on cr/electrolytes   -cardiology input appreciated: Bumex 2 mg daily      Chest pain after cardioversion 3/17  Seems to be primarily musculoskeletal  - Similar presentation in 2/2020 s/p cardioversion as well   CP is much better   Troponin negative.  EKG nonischemic  --Tylenol as needed     History of recurrent A. fib, status post cardioversion March [place], 2020  --Currently in sinus rhythm.  Continue amiodarone 200 mg twice daily and Eliquis  Coronary artery disease, history of cardiac stent 2010  Hypertension  Mild to moderate pulmonary hypertension on echo February 2020  --Cardiac cath August 2019 without any significant disease. --Continue Coreg, hydralazine, Imdur, lisinopril, Aldactone  --He is intolerant of statins and Zetia.  Repatha have been recommended by neurology last admission but was not formulary and patient refused WelChol.     ZONIA creatinine 1.46  --Baseline appears to be 1.1, admission 1.46 - 1.61 - 1.46    On Bumex / spironolactone cr stable since admission   -Holding ACE for now   - monitor closely. Avoid nephrotoxic drugs, adjust all meds to GFR.      Esophagitis  --Continue Protonix twice daily and Carafate 4 times daily  -- on AC with Eliquis, closely monitor h/h  Hb stable now      History of TIA February 2020 due to hypotension. Continue Eliquis  History of prostate cancer status post prostatectomy 2010  Obesity. Body mass index is 32.55 kg/m².              Code: Full code  DVT prophylaxis: Eliquis  Surrogate decision maker: Wife     Baseline: Independent in ADLs/IADLs to include driving  Recommended Disposition: Home w/Family today       _______________________________________________________________________  Patient seen and examined by me on discharge day. Pertinent Findings:  General:          WD, WN. Alert, cooperative, no acute distress, no dyspnea noted    EENT:              EOMI. Anicteric sclerae. MMM  Resp:               CTA bilaterally, no wheezing or rales. No accessory muscle use  CV:                  Regular  rhythm,  No edema  GI:                   Soft, Non distended, Non tender.  +Bowel sounds  Neurologic:      Alert and oriented X 3, normal speech,   Psych:             Good insight. Not anxious nor agitated  Skin:                No rashes. No jaundice  _______________________________________________________________________  DISCHARGE MEDICATIONS:   Current Discharge Medication List      START taking these medications    Details   bumetanide (BUMEX) 2 mg tablet Take 1 Tab by mouth daily.   Qty: 30 Tab, Refills: 0         CONTINUE these medications which have NOT CHANGED    Details   hydrALAZINE (APRESOLINE) 25 mg tablet Take 1 Tab by mouth three (3) times daily. Qty: 90 Tab, Refills: 0      spironolactone (ALDACTONE) 25 mg tablet Take 1 Tab by mouth daily. Qty: 30 Tab, Refills: 0      sucralfate (CARAFATE) 1 gram tablet Take 1 Tab by mouth Before breakfast, lunch, dinner and at bedtime. Qty: 120 Tab, Refills: 0      pantoprazole (PROTONIX) 40 mg tablet Take 1 Tab by mouth Before breakfast and dinner. Qty: 60 Tab, Refills: 0      amiodarone (CORDARONE) 200 mg tablet Take  by mouth two (2) times a day. apixaban (ELIQUIS) 5 mg tablet Take 5 mg by mouth two (2) times a day. isosorbide mononitrate ER (IMDUR) 30 mg tablet Take 1 Tab by mouth daily. Qty: 30 Tab, Refills: 12      carvedilol (COREG) 25 mg tablet Take 1 Tab by mouth two (2) times daily (with meals). Qty: 180 Tab, Refills: 3      levothyroxine (SYNTHROID) 50 mcg tablet Take 1 Tab by mouth Daily (before breakfast). Qty: 90 Tab, Refills: 3      cholecalciferol, vitamin D3, (VITAMIN D3) 2,000 unit tab Take 2,000 Units by mouth daily. MULTI-VITAMIN HI-PO PO Take 1 Tab by mouth daily. STOP taking these medications       lisinopril (PRINIVIL, ZESTRIL) 20 mg tablet Comments:   Reason for Stopping:  Elevated Cr.  Need BMP in 1-2 weeks prior to re starting               Follow-up Information     Follow up With Specialties Details Why Contact Info    Mariaelena Solo MD Cardiology Schedule an appointment as soon as possible for a visit in 3 weeks  [address]  [id number]  [address] [facility], DO Internal Medicine In 1 week 1-2 weeks  [address]  [place]  [place]  [address]  [address] [phone]      Carmen Valenzuela,  Neurology On 3/25/2020 11:00AM; Neurology follow-up (previously scheduled) [address] [facility]  [address] Yahir Prather NP Nurse Practitioner Go on 4/17/2020 8:00AM; GI Follow-up (previously scheduled) [address]  [address]  [address]  [phone]          ________________________________________________________________    Risk of deterioration: Low    Condition at Discharge:  Stable  __________________________________________________________________    Disposition  Home with family, no needs    ____________________________________________________________________    Code Status: Full Code  ___________________________________________________________________      Total time in minutes spent coordinating this discharge (includes going over instructions, follow-up, prescriptions, and preparing report for sign off to her PCP) :  > 30  minutes    Signed:  Hoang Perez MD

## 2020-03-21 NOTE — PROGRESS NOTES
DISCHARGE SUMMARY FROM NURSE    The patient is stable for discharge. I have reviewed the discharge instructions with the patient. The patient verbalized understanding. All questions were fully answered. The  patient denies any complaints. There were no personal belongings, valuables or home medications left at patients bedside,  or safe. Scripts sent to pharmacy and medication handouts were given and reviewed with the patient. Appropriate educational materials and medication side effects teaching were provided. Cardiac monitor and IV line(s) were removed.

## 2020-03-23 ENCOUNTER — PATIENT OUTREACH (OUTPATIENT)
Dept: INTERNAL MEDICINE CLINIC | Age: 65
End: 2020-03-23

## 2020-03-23 NOTE — PROGRESS NOTES
03/23/20  Patient answered the phone, CTN introduced self and role, patient stated \"I am not interested in this, do not call me, and hung up the phone. \" episode resolved at this time. AR          Per chart review: Will be holding lisinopril for now, follow BMP in 1 - 2 weeks first   He will need BMP in 1-2 weeks to follow up on cr/electrolytes    START taking these medications     Details   bumetanide (BUMEX) 2 mg tablet Take 1 Tab by mouth daily. Qty: 30 Tab, Refills: 0            STOP taking these medications         lisinopril (PRINIVIL, ZESTRIL) 20 mg tablet Comments:   Reason for Stopping:  Elevated Cr.  Need BMP in 1-2 weeks prior to re starting

## 2020-04-02 RX ORDER — PANTOPRAZOLE SODIUM 40 MG/1
40 TABLET, DELAYED RELEASE ORAL
Qty: 180 TAB | Refills: 3 | Status: SHIPPED | OUTPATIENT
Start: 2020-04-02 | End: 2021-05-09

## 2020-04-13 RX ORDER — SUCRALFATE 1 G/1
1 TABLET ORAL
Qty: 360 TAB | Refills: 3 | Status: ON HOLD | OUTPATIENT
Start: 2020-04-13 | End: 2020-08-10

## 2020-04-13 RX ORDER — BUMETANIDE 2 MG/1
2 TABLET ORAL DAILY
Qty: 90 TAB | Refills: 3 | Status: ON HOLD | OUTPATIENT
Start: 2020-04-13 | End: 2020-08-10

## 2020-04-26 NOTE — PROGRESS NOTES
[facility] home with family  Offered hospital to home visit d/t dx of CHF: Pt refused H2H  Second IM letter delivered. Wife will transport Pt home in car. CM emailed CM Specialist to make PCP appt. Office will call to schedule  Dispatch information given   CM called Cardiology Office and made follow up appt. 3/4/2020: 10:45 AM Placed on AVS  CM called Nephrology office and made follow up appt: 3/10/2020 at 2:15 PM   CM called Neurology to make follow up appt: 3/25 at 11 AM  CM talked to GI NP and they wanted follow up in 8 week. 4/17/2020. At 8 AM.  Added to AVS.    Followed by LAWANDA Pretty: CM will send inbasket message to her to notify her of his discharge. Followed by Jose Segovia: He will meet with Pt today prior to DC    Medicare pt has received, reviewed, and signed 2nd IM letter informing them of their right to appeal the discharge. Signed copy has been placed on pt bedside chart. No further CM needs. Care Management Interventions  PCP Verified by CM: Yes  Palliative Care Criteria Met (RRAT>21 & CHF Dx)?: No  Mode of Transport at Discharge:  Other (see comment)  Transition of Care Consult (CM Consult): Discharge Planning  MyChart Signup: No  Discharge Durable Medical Equipment: No  Physical Therapy Consult: No  Occupational Therapy Consult: No  Speech Therapy Consult: No  Current Support Network: Lives with Spouse  Confirm Follow Up Transport: Family  Discharge Location  Discharge Placement: Home with family assistance    Keith, [address] [facility] Impaired

## 2020-04-30 ENCOUNTER — TELEPHONE (OUTPATIENT)
Dept: INTERNAL MEDICINE CLINIC | Age: 65
End: 2020-04-30

## 2020-04-30 NOTE — TELEPHONE ENCOUNTER
[facility]   Phone Number: [phone]             Caller's first and last name: Pt   Reason for call: Form needed to be filled out by Dr. Wai Madrigal required yes/no and why: Yes   Best contact number(s): [phone]   Details to clarify the request: [employer] is denying to cover the medication \"Repatha\" and pt needs a form filled out to have it covered.      Copy/Paste  ENVERA

## 2020-04-30 NOTE — TELEPHONE ENCOUNTER
Ambika Amen [facility]   Phone Number: [phone]             Caller's first and last name: [address]   Reason for call: Regarding pt prescription   Callback required yes/no and why: Yes   Best contact number(s): [phone]   Details to clarify the request: Insurance is denying to cover the medication \"Repatha\". Pt told pharmacy that it is the only medication that works and for the  to put in an appeal for the medication to be covered.      Copy/Paste  ENVERA

## 2020-05-04 ENCOUNTER — VIRTUAL VISIT (OUTPATIENT)
Dept: INTERNAL MEDICINE CLINIC | Age: 65
End: 2020-05-04

## 2020-05-04 ENCOUNTER — TELEPHONE (OUTPATIENT)
Dept: INTERNAL MEDICINE CLINIC | Age: 65
End: 2020-05-04

## 2020-05-04 DIAGNOSIS — I50.32 DIASTOLIC CHF, CHRONIC (HCC): ICD-10-CM

## 2020-05-04 DIAGNOSIS — I48.0 PAF (PAROXYSMAL ATRIAL FIBRILLATION) (HCC): ICD-10-CM

## 2020-05-04 DIAGNOSIS — E03.9 ACQUIRED HYPOTHYROIDISM: ICD-10-CM

## 2020-05-04 DIAGNOSIS — I10 ESSENTIAL HYPERTENSION: ICD-10-CM

## 2020-05-04 DIAGNOSIS — Z00.00 MEDICARE ANNUAL WELLNESS VISIT, SUBSEQUENT: Primary | ICD-10-CM

## 2020-05-04 DIAGNOSIS — Z85.46 HISTORY OF PROSTATE CANCER: ICD-10-CM

## 2020-05-04 DIAGNOSIS — Z86.73 HISTORY OF LACUNAR CEREBROVASCULAR ACCIDENT (CVA): ICD-10-CM

## 2020-05-04 DIAGNOSIS — I25.10 CORONARY ARTERY DISEASE INVOLVING NATIVE CORONARY ARTERY OF NATIVE HEART WITHOUT ANGINA PECTORIS: ICD-10-CM

## 2020-05-04 NOTE — PROGRESS NOTES
Colette Pena is a 77 y.o. male who was seen by synchronous (real-time) audio-video technology on 5/4/2020. Consent: Colette Pena, who was seen by synchronous (real-time) audio-video technology, and/or his healthcare decision maker, is aware that this patient-initiated, Telehealth encounter on 5/4/2020 is a billable service, with coverage as determined by his insurance carrier. He is aware that he may receive a bill and has provided verbal consent to proceed: Yes. Assessment & Plan:   Diagnoses and all orders for this visit:    1. Medicare annual wellness visit, subsequent    2. PAF (paroxysmal atrial fibrillation) ([facility] [address]    3. Coronary artery disease involving native coronary artery of native heart without angina pectoris    4. History of lacunar cerebrovascular accident (CVA)    5. Diastolic CHF, chronic (HCC)    6. Essential hypertension    7. Acquired hypothyroidism    8. History of prostate cancer          I spent at least 40 minutes on this visit with this established patient. [phone]  Subjective:   Colette Pena is a 77 y.o. male who was seen for Annual Wellness Visit    Last seen by me march 2, 2020. Since then, he had another hospital stay for a/c diastolic chf, immediately after another failed cardioversion. He has done much better since then, and has remained in nsr, on amio. Weight is also down about 10 lbs, taking bumex most days now, though he states some days it hurts his back and kidneys too much to take. Has not been able to get repatha again, and is intolerant to statins and zetia. This is a virtual visit due to covid 19. Prior to Admission medications    Medication Sig Start Date End Date Taking? Authorizing Provider   bumetanide (BUMEX) 2 mg tablet Take 1 Tab by mouth daily. 4/13/20  Yes Herman Villa III, DO   sucralfate (CARAFATE) 1 gram tablet Take 1 Tab by mouth Before breakfast, lunch, dinner and at bedtime.  4/13/20  Yes Sancho Murillo III, DO pantoprazole (Protonix) 40 mg tablet Take 1 Tab by mouth Before breakfast and dinner. 4/2/20  Yes Herman Villa III, DO   hydrALAZINE (APRESOLINE) 25 mg tablet Take 1 Tab by mouth three (3) times daily. 2/26/20  Yes Olivia Saavedra NP   spironolactone (ALDACTONE) 25 mg tablet Take 1 Tab by mouth daily. Patient taking differently: Take 50 mg by mouth daily. 2/26/20  Yes Olivia Saavedra NP   amiodarone (CORDARONE) 200 mg tablet Take  by mouth two (2) times a day. Yes Provider, Historical   apixaban (ELIQUIS) 5 mg tablet Take 5 mg by mouth two (2) times a day. Yes Provider, Historical   isosorbide mononitrate ER (IMDUR) 30 mg tablet Take 1 Tab by mouth daily. 1/31/20  Yes Jackie Rivero MD   carvedilol (COREG) 25 mg tablet Take 1 Tab by mouth two (2) times daily (with meals). 5/3/19  Yes Herman Villa III, DO   levothyroxine (SYNTHROID) 50 mcg tablet Take 1 Tab by mouth Daily (before breakfast). 5/3/19  Yes Herman Villa III, DO   cholecalciferol, vitamin D3, (VITAMIN D3) 2,000 unit tab Take 2,000 Units by mouth daily. Yes Provider, Historical   MULTI-VITAMIN HI-PO PO Take 1 Tab by mouth daily. 10/11/10  Yes Provider, Historical     Allergies   Allergen Reactions    Clonidine Anxiety    Lasix [Furosemide] Other (comments)     May be the cause of kidney failure    Niacin Other (comments)     Decreased vision    Statins-Hmg-Coa Reductase Inhibitors Other (comments)     Muscle and Joint pains    Zetia [Ezetimibe] Myalgia     Pt reports getting joint and muscle pain. ROS      Objective: There were no vitals taken for this visit.    General: alert, cooperative, no distress   Mental  status: normal mood, behavior, speech, dress, motor activity, and thought processes, able to follow commands   HENT: NCAT   Neck: no visualized mass   Resp: no respiratory distress   Neuro: no gross deficits   Skin: no discoloration or lesions of concern on visible areas Psychiatric: normal affect, consistent with stated mood, no evidence of hallucinations     Additional exam findings:     1.  pafib--back in nsr, on amio and eliquis  2. Chronic diastolic chf--doing better now, trying to take bumex daily. Also on coreg  3.  htn--on coreg, hydralazine, imdur, aldactone, coreg  4. Hypothyroid--check tsh, on synthroid  5. Hx lacunar cva--on eliquis now  6. Cad with stents--on eliquis, coreg  7. Hx prostate cancer--sp prostatectomy, check psa  8. Esophagitis--on carafate prn now  9.  hyperlipids--intolerant to statins and zetia. Will work on getting repatha again. rtc 6 months      We discussed the expected course, resolution and complications of the diagnosis(es) in detail. Medication risks, benefits, costs, interactions, and alternatives were discussed as indicated. I advised him to contact the office if his condition worsens, changes or fails to improve as anticipated. He expressed understanding with the diagnosis(es) and plan. Howie Marie is a 77 y.o. male who was evaluated by a video visit encounter for concerns as above. Patient identification was verified prior to start of the visit. A caregiver was present when appropriate. Due to this being a TeleHealth encounter (During [facility] public [facility] emergency), evaluation of the following organ systems was limited: Vitals/Constitutional/EENT/Resp/CV/GI//MS/Neuro/Skin/Heme-Lymph-Imm. Pursuant to the emergency declaration under the [facility], [facility] waiver authority and the [employer] and [employer] Act, this Virtual  Visit was conducted, with patient's (and/or legal guardian's) consent, to reduce the patient's risk of exposure to COVID-19 and provide necessary medical care. Services were provided through a video synchronous discussion virtually to substitute for in-person clinic visit.    Patient and provider were located at their individual homes. Nan Medina III, DO          This is the Subsequent Medicare Annual Wellness Exam, performed 12 months or more after the Initial AWV or the last Subsequent AWV    Consent: Danielle Turner, who was seen by synchronous (real-time) audio-video technology, and/or his healthcare decision maker, is aware that this patient-initiated, Telehealth encounter on 5/4/2020 is a billable service. While AWVs are fully covered by Medicare, any services rendered on this date that are not included in an AWV are subject to additional billing, with coverage as determined by his insurance carrier. He is aware that he may receive a bill for any such additional services and has provided verbal consent to proceed: Yes. I have reviewed the patient's medical history in detail and updated the computerized patient record.      History     Patient Active Problem List   Diagnosis Code    Coronary artery disease involving native coronary artery of native heart without angina pectoris I25.10    Essential hypertension I10    Acquired hypothyroidism E03.9    Mixed hyperlipidemia E78.2    Chronic systolic congestive heart failure (HCC) I50.22    Acute respiratory failure with hypoxia ([facility]) J96.01    Secondary hyperaldosteronism ([address] E26.1    Cardiomegaly I51.7    Pulmonary edema J81.1    PAF (paroxysmal atrial fibrillation) ([facility]) I48.0    Hypercoagulability due to atrial fibrillation ([address] D68.69, I48.91    Hypokalemia E87.6    TIA (transient ischemic attack) G45.9    Erosive gastritis K29.60    Acute renal failure (ARF) ([facility]) N17.9    Chest pain R07.9    Heart failure ([address] I50.9     Past Medical History:   Diagnosis Date    Arrhythmia 01/03/2020    AFIB     CAD (coronary artery disease)     h/o stent- '10    Cancer [facility]) 2010    prostate    Congestive heart failure ([address]     Hypertension     Ill-defined condition     small bowel obstruction    Right rotator cuff tear 1/26/2017    SBO (small bowel obstruction) ([facility] [address] 11/3/2015    Stroke [facility]) May or June 2010    lacunar  infarctions (found on scan- no sxs)    Thyroid disease     hypo      Past Surgical History:   Procedure Laterality Date    CARDIAC SURG PROCEDURE UNLIST  2010    coronary stent    EXCIS KNEE CARTILAGE,MEDIAL OR LAT  1980's x3    HX CHOLECYSTECTOMY  1991    HX HEENT  1962    T&A    HX HERNIA REPAIR  1996    HX MALIGNANT SKIN LESION EXCISION      2004 and 2017    HX ORTHOPAEDIC  1974    Ganglion cyst left wrist    HX ORTHOPAEDIC Left 2017    shoulder repair    HX PROSTATECTOMY  2010    UPPER GI ENDOSCOPY,BIOPSY  2/25/2020          Current Outpatient Medications   Medication Sig Dispense Refill    bumetanide (BUMEX) 2 mg tablet Take 1 Tab by mouth daily. 90 Tab 3    sucralfate (CARAFATE) 1 gram tablet Take 1 Tab by mouth Before breakfast, lunch, dinner and at bedtime. 360 Tab 3    pantoprazole (Protonix) 40 mg tablet Take 1 Tab by mouth Before breakfast and dinner. 180 Tab 3    hydrALAZINE (APRESOLINE) 25 mg tablet Take 1 Tab by mouth three (3) times daily. 90 Tab 0    spironolactone (ALDACTONE) 25 mg tablet Take 1 Tab by mouth daily. (Patient taking differently: Take 50 mg by mouth daily.) 30 Tab 0    amiodarone (CORDARONE) 200 mg tablet Take  by mouth two (2) times a day.  apixaban (ELIQUIS) 5 mg tablet Take 5 mg by mouth two (2) times a day.  isosorbide mononitrate ER (IMDUR) 30 mg tablet Take 1 Tab by mouth daily. 30 Tab 12    carvedilol (COREG) 25 mg tablet Take 1 Tab by mouth two (2) times daily (with meals). 180 Tab 3    levothyroxine (SYNTHROID) 50 mcg tablet Take 1 Tab by mouth Daily (before breakfast). 90 Tab 3    cholecalciferol, vitamin D3, (VITAMIN D3) 2,000 unit tab Take 2,000 Units by mouth daily.  MULTI-VITAMIN HI-PO PO Take 1 Tab by mouth daily.        Allergies   Allergen Reactions    Clonidine Anxiety    Lasix [Furosemide] Other (comments)     May be the cause of kidney failure  Niacin Other (comments)     Decreased vision    Statins-Hmg-Coa Reductase Inhibitors Other (comments)     Muscle and Joint pains    Zetia [Ezetimibe] Myalgia     Pt reports getting joint and muscle pain. Family History   Problem Relation Age of Onset   [facility] Dementia Mother     Other Father 55        cerebral hemorrhage     Social History     Tobacco Use    Smoking status: Never Smoker    Smokeless tobacco: Former User   Substance Use Topics    Alcohol use: Not Currently     Binge frequency: Patient refused       Depression Risk Factor Screening:     3 most recent PHQ Screens 3/2/2020   Little interest or pleasure in doing things Not at all   Feeling down, depressed, irritable, or hopeless Not at all   Total Score PHQ 2 0       Alcohol Risk Factor Screening (MALE > 65): Do you average more 1 drink per night or more than 7 drinks a week: No    In the past three months have you have had more than 4 drinks containing alcohol on one occasion: No      Functional Ability and Level of Safety:   Hearing: Hearing is good. Activities of Daily Living: The home contains: no safety equipment. Patient does total self care    Ambulation: with no difficulty    Fall Risk:  Fall Risk Assessment, last 12 mths 3/2/2020   Able to walk? Yes   Fall in past 12 months? No       Abuse Screen:  Patient is not abused. Lives with wife of 52 years.     Cognitive Screening   Has your family/caregiver stated any concerns about your memory: no  Cognitive Screening: Normal - MMSE (Mini Mental Status Exam)    Patient Care Team   Patient Care Team:  Odalys Andre DO as PCP - General (Internal Medicine)  Odalys Andre DO as PCP - REHABILITATION [facility] Empaneled Provider  Vivek Durant MD (Nephrology)  Janice Lizarraga MD (Cardiology)  Jennyfer Kauffman MD (Gastroenterology)  Abilio Mendez OD (Optometry)    Assessment/Plan   Education and counseling provided:  Are appropriate based on today's review and evaluation  End-of-Life planning (with patient's consent)  Pneumococcal Vaccine  Influenza Vaccine  Hepatitis B Vaccine  Prostate cancer screening tests (PSA, covered annually)    Diagnoses and all orders for this visit:    1. Medicare annual wellness visit, subsequent        Health Maintenance Due   Topic Date Due    DTaP/Tdap/Td series (1 - Tdap) 02/07/1974    Shingrix Vaccine Age 50> (1 of 2) 02/07/2003    Medicare Yearly Exam  05/03/2020         Constantino Alegria is a 77 y.o. male who was evaluated by a video visit encounter for concerns as above. Patient identification was verified prior to start of the visit. A caregiver was present when appropriate. Due to this being a TeleHealth encounter (During [address] emergency), evaluation of the following organ systems was limited: Vitals/Constitutional/EENT/Resp/CV/GI//MS/Neuro/Skin/Heme-Lymph-Imm. Pursuant to the emergency declaration under the [facility]5 waiver authority and the [employer] and [employer] Act, this Virtual  Visit was conducted, with patient's (and/or legal guardian's) consent, to reduce the patient's risk of exposure to COVID-19 and provide necessary medical care. Services were provided through a video synchronous discussion virtually to substitute for in-person clinic visit. Patient and provider were located at their individual homes.     Danielle Rasheed III, DO

## 2020-05-04 NOTE — TELEPHONE ENCOUNTER
Pharmacy Progress Note - Medication Access     Contacted Mr. Vivi Lesches 77 y.o. regarding Rita Medici access today. Verified patient's PHI. Hx of statin intolerance. Dr. Alexandra Cooper (Cardiology). Pt reports he was on Repatha for 2 weeks back in April. Rx insurance denied refill request.  Prior authorization was denied. Pt's prescription insurance: [employer] ([phone]). Uses [address] Discuss - Pt needs to be enrolled in Repatha Ready program first.  He will have to initiate communication - call [phone]. If he is denied enrollment, next step will be [facility]. Provided patient with my contact information. Will continue to follow.      Thank you for the consult,  Elayne Quiroga, PharmD, BCACP, CDE                             CLINICAL PHARMACY CONSULT: MED RECONCILIATION/REVIEW ADDENDUM    For Pharmacy Admin Tracking Only    PHSO: PHSO Patient?: Yes  Time Spent (min): 45

## 2020-05-04 NOTE — TELEPHONE ENCOUNTER
Pharmacy Progress Note - Telephone Encounter    S/O: Mr. Alejo Sommers 77 y.o. male contacted office/me via an inbound telephone call to discuss [address] today. Verified patients identifiers (name & ) per HIPAA policy. - Reports he would not qualify for this program d/t income level. A/P:  - Contacted Pt's insurance - [employer]. Will need to submit appeal to recent prior authorization denial.  - Case # [id number]. Fax proof of patient's statin intolerance and Repatha clinical eligibility to [phone]. Deadline: .   - Shared updates with patient. All questions answered at this time.      Thank you,  Elayne Gibson, PharmD, BCACP, CDE       CLINICAL PHARMACY CONSULT: MED RECONCILIATION/REVIEW ADDENDUM    For Pharmacy Admin Tracking Only    PHSO: PHSO Patient?: Yes  Time Spent (min): 30

## 2020-05-04 NOTE — PATIENT INSTRUCTIONS
Medicare Wellness Visit, Male The best way to live healthy is to have a lifestyle where you eat a well-balanced diet, exercise regularly, limit alcohol use, and quit all forms of tobacco/nicotine, if applicable. Regular preventive services are another way to keep healthy. Preventive services (vaccines, screening tests, monitoring & exams) can help personalize your care plan, which helps you manage your own care. Screening tests can find health problems at the earliest stages, when they are easiest to treat. [facility] follows the current, evidence-based guidelines published by the [facility] J[facility] Norma ([facility]) when recommending preventive services for our patients. Because we follow these guidelines, sometimes recommendations change over time as research supports it. (For example, a prostate screening blood test is no longer routinely recommended for men with no symptoms). Of course, you and your doctor may decide to screen more often for some diseases, based on your risk and co-morbidities (chronic disease you are already diagnosed with). Preventive services for you include: - Medicare offers their members a free annual wellness visit, which is time for you and your primary care provider to discuss and plan for your preventive service needs. Take advantage of this benefit every year! 
-All adults over age 72 should receive the recommended pneumonia vaccines. Current USPSTF guidelines recommend a series of two vaccines for the best pneumonia protection.  
-All adults should have a flu vaccine yearly and tetanus vaccine every 10 years. 
-All adults age 48 and older should receive the shingles vaccines (series of two vaccines).       
-All adults age 38-68 who are overweight should have a diabetes screening test once every three years.  
-Other screening tests & preventive services for persons with diabetes include: an eye exam to screen for diabetic retinopathy, a kidney function test, a foot exam, and stricter control over your cholesterol.  
-Cardiovascular screening for adults with routine risk involves an electrocardiogram (ECG) at intervals determined by the provider.  
-Colorectal cancer screening should be done for adults age 54-65 with no increased risk factors for colorectal cancer. There are a number of acceptable methods of screening for this type of cancer. Each test has its own benefits and drawbacks. Discuss with your provider what is most appropriate for you during your annual wellness visit. The different tests include: colonoscopy (considered the best screening method), a fecal occult blood test, a fecal DNA test, and sigmoidoscopy. 
-All adults born between [facility] should be screened once for Hepatitis C. 
-An Abdominal Aortic Aneurysm (AAA) Screening is recommended for men age 73-68 who has ever smoked in their lifetime. Here is a list of your current Health Maintenance items (your personalized list of preventive services) with a due date: 
Health Maintenance Due Topic Date Due  
 DTaP/Tdap/Td  (1 - Tdap) 02/07/1974  Shingles Vaccine (1 of 2) 02/07/2003 Christopher Annual Well Visit  05/03/2020

## 2020-05-05 RX ORDER — EVOLOCUMAB 140 MG/ML
140 INJECTION, SOLUTION SUBCUTANEOUS
COMMUNITY
End: 2020-06-02

## 2020-05-05 NOTE — TELEPHONE ENCOUNTER
Pharmacy Progress Note - Medication Access     Confirmation of Constantino Alegria 77 y.o. 's Repatha Prior Authorization Appeal received today. Pt is now approved for coverage until 5/2023. Confirmed with patient and pharmacy. Copay for Katia Chacon is now $42/month. Thank you for the consult,  Elayne Holloway, PharmD, BCACP, CDE                                 CLINICAL PHARMACY CONSULT: MED RECONCILIATION/REVIEW ADDENDUM    For Pharmacy Admin Tracking Only    PHSO: PHSO Patient?: Yes  Total # of Interventions Recommended: Count: 1  - Updated Order #: 1 Updated Order Reason(s):  Other    Time Spent (min): 30

## 2020-05-13 ENCOUNTER — TELEPHONE (OUTPATIENT)
Dept: INTERNAL MEDICINE CLINIC | Age: 65
End: 2020-05-13

## 2020-05-13 NOTE — TELEPHONE ENCOUNTER
#[phone] Naye with [facility] states pt will be seen there next week and will be getting labs drawn. Please fax over lab orders and they will do our draw as well.     Fax #[phone]  Attn: Nursing

## 2020-08-09 ENCOUNTER — HOSPITAL ENCOUNTER (INPATIENT)
Age: 65
LOS: 1 days | Discharge: HOME | DRG: 286 | End: 2020-08-11
Attending: EMERGENCY MEDICINE | Admitting: INTERNAL MEDICINE
Payer: MEDICARE

## 2020-08-09 DIAGNOSIS — J96.01 ACUTE RESPIRATORY FAILURE WITH HYPOXIA (HCC): Primary | ICD-10-CM

## 2020-08-09 DIAGNOSIS — I50.9 ACUTE ON CHRONIC CONGESTIVE HEART FAILURE, UNSPECIFIED HEART FAILURE TYPE (HCC): ICD-10-CM

## 2020-08-09 DIAGNOSIS — R07.9 CHEST PAIN, UNSPECIFIED TYPE: ICD-10-CM

## 2020-08-09 PROCEDURE — 99285 EMERGENCY DEPT VISIT HI MDM: CPT

## 2020-08-09 PROCEDURE — 96375 TX/PRO/DX INJ NEW DRUG ADDON: CPT

## 2020-08-09 PROCEDURE — 96376 TX/PRO/DX INJ SAME DRUG ADON: CPT

## 2020-08-09 PROCEDURE — 96374 THER/PROPH/DIAG INJ IV PUSH: CPT

## 2020-08-09 NOTE — Clinical Note
TRANSFER - OUT REPORT:     Verbal report given to: Yessy Wang RN. Report consisted of patient's Situation, Background, Assessment and   Recommendations(SBAR). Opportunity for questions and clarification was provided. Patient transported with a Registered Nurse. Patient transported to: IVCU.

## 2020-08-10 ENCOUNTER — APPOINTMENT (OUTPATIENT)
Dept: GENERAL RADIOLOGY | Age: 65
DRG: 286 | End: 2020-08-10
Attending: EMERGENCY MEDICINE
Payer: MEDICARE

## 2020-08-10 ENCOUNTER — APPOINTMENT (OUTPATIENT)
Dept: NON INVASIVE DIAGNOSTICS | Age: 65
DRG: 286 | End: 2020-08-10
Attending: INTERNAL MEDICINE
Payer: MEDICARE

## 2020-08-10 PROBLEM — R06.02 SOB (SHORTNESS OF BREATH): Status: ACTIVE | Noted: 2020-08-10

## 2020-08-10 LAB
ALBUMIN SERPL-MCNC: 3.9 G/DL (ref 3.5–5)
ALBUMIN/GLOB SERPL: 1.1 {RATIO} (ref 1.1–2.2)
ALP SERPL-CCNC: 74 U/L (ref 45–117)
ALT SERPL-CCNC: 49 U/L (ref 12–78)
ANION GAP SERPL CALC-SCNC: 4 MMOL/L (ref 5–15)
APTT PPP: 25.4 SEC (ref 22.1–32)
APTT PPP: 30.3 SEC (ref 22.1–32)
ARTERIAL PATENCY WRIST A: YES
AST SERPL-CCNC: 24 U/L (ref 15–37)
ATRIAL RATE: 58 BPM
ATRIAL RATE: 60 BPM
BASE EXCESS BLD CALC-SCNC: 0 MMOL/L
BASOPHILS # BLD: 0.1 K/UL (ref 0–0.1)
BASOPHILS NFR BLD: 1 % (ref 0–1)
BDY SITE: ABNORMAL
BILIRUB SERPL-MCNC: 0.7 MG/DL (ref 0.2–1)
BNP SERPL-MCNC: 1302 PG/ML
BUN SERPL-MCNC: 19 MG/DL (ref 6–20)
BUN/CREAT SERPL: 11 (ref 12–20)
CA-I BLD-SCNC: 1.21 MMOL/L (ref 1.12–1.32)
CALCIUM SERPL-MCNC: 8.2 MG/DL (ref 8.5–10.1)
CALCULATED P AXIS, ECG09: 61 DEGREES
CALCULATED P AXIS, ECG09: 79 DEGREES
CALCULATED R AXIS, ECG10: 25 DEGREES
CALCULATED R AXIS, ECG10: 34 DEGREES
CALCULATED T AXIS, ECG11: 46 DEGREES
CALCULATED T AXIS, ECG11: 51 DEGREES
CHLORIDE SERPL-SCNC: 109 MMOL/L (ref 97–108)
CK MB CFR SERPL CALC: 1.1 % (ref 0–2.5)
CK MB SERPL-MCNC: 1.1 NG/ML (ref 5–25)
CK SERPL-CCNC: 103 U/L (ref 39–308)
CO2 SERPL-SCNC: 29 MMOL/L (ref 21–32)
COMMENT, HOLDF: NORMAL
CREAT SERPL-MCNC: 1.7 MG/DL (ref 0.7–1.3)
DIAGNOSIS, 93000: NORMAL
DIAGNOSIS, 93000: NORMAL
DIFFERENTIAL METHOD BLD: ABNORMAL
ECHO AO ROOT DIAM: 3.94 CM
ECHO AV AREA PEAK VELOCITY: 3.91 CM2
ECHO AV AREA PEAK VELOCITY: 3.93 CM2
ECHO AV PEAK GRADIENT: 11.57 MMHG
ECHO EST RA PRESSURE: 10 MMHG
ECHO LA AREA 4C: 21.99 CM2
ECHO LA MAJOR AXIS: 4.4 CM
ECHO LA MINOR AXIS: 1.88 CM
ECHO LA TO AORTIC ROOT RATIO: 2.02
ECHO LA VOL 4C: 61.18 ML (ref 18–58)
ECHO LA VOLUME INDEX A4C: 26.12 ML/M2 (ref 16–28)
ECHO LV E' LATERAL VELOCITY: 15.21 CM/S
ECHO LV E' SEPTAL VELOCITY: 7.83 CM/S
ECHO LV INTERNAL DIMENSION DIASTOLIC: 5.37 CM (ref 4.2–5.9)
ECHO LV INTERNAL DIMENSION SYSTOLIC: 4.46 CM
ECHO LV IVSD: 2.13 CM (ref 0.6–1)
ECHO LV MASS 2D: 393.6 G (ref 88–224)
ECHO LV MASS INDEX 2D: 168 G/M2 (ref 49–115)
ECHO LV POSTERIOR WALL DIASTOLIC: 1.06 CM (ref 0.6–1)
ECHO LV POSTERIOR WALL SYSTOLIC: 0.92 CM
ECHO LVOT CARDIAC OUTPUT: 24.05 LITER/MINUTE
ECHO LVOT DIAM: 2.31 CM
ECHO LVOT PEAK GRADIENT: 9.77 MMHG
ECHO LVOT PEAK GRADIENT: 9.88 MMHG
ECHO LVOT PEAK VELOCITY: 156.3 CM/S
ECHO LVOT PEAK VELOCITY: 157.2 CM/S
ECHO LVOT SV: 163.2 ML
ECHO LVOT VTI: 38.85 CM
ECHO MV AREA VTI: 4.62 CM2
ECHO MV MAX VELOCITY: 110.56 CM/S
ECHO MV MEAN GRADIENT: 2.52 MMHG
ECHO MV PEAK GRADIENT: 4.89 MMHG
ECHO MV REGURGITANT VTIA: 148.38 CM
ECHO MV VTI: 35.34 CM
ECHO PV REGURGITANT MAX VELOCITY: 168.16 CM/S
ECHO PV REGURGITANT MAX VELOCITY: 487.94 CM/S
ECHO RA AREA 4C: 22.71 CM2
ECHO RIGHT VENTRICULAR SYSTOLIC PRESSURE (RVSP): 47.27 MMHG
ECHO TV REGURGITANT MAX VELOCITY: 302.22 CM/S
ECHO TV REGURGITANT PEAK GRADIENT: 37.27 MMHG
EOSINOPHIL # BLD: 0.2 K/UL (ref 0–0.4)
EOSINOPHIL NFR BLD: 3 % (ref 0–7)
ERYTHROCYTE [DISTWIDTH] IN BLOOD BY AUTOMATED COUNT: 12.6 % (ref 11.5–14.5)
GAS FLOW.O2 O2 DELIVERY SYS: ABNORMAL L/MIN
GLOBULIN SER CALC-MCNC: 3.5 G/DL (ref 2–4)
GLUCOSE SERPL-MCNC: 105 MG/DL (ref 65–100)
HCO3 BLD-SCNC: 24 MMOL/L (ref 22–26)
HCT VFR BLD AUTO: 38.6 % (ref 36.6–50.3)
HGB BLD-MCNC: 12.1 G/DL (ref 12.1–17)
IMM GRANULOCYTES # BLD AUTO: 0 K/UL (ref 0–0.04)
IMM GRANULOCYTES NFR BLD AUTO: 0 % (ref 0–0.5)
INR PPP: 1 (ref 0.9–1.1)
LVOT MG: 4.7 MMHG
LYMPHOCYTES # BLD: 1.4 K/UL (ref 0.8–3.5)
LYMPHOCYTES NFR BLD: 15 % (ref 12–49)
MCH RBC QN AUTO: 31.2 PG (ref 26–34)
MCHC RBC AUTO-ENTMCNC: 31.3 G/DL (ref 30–36.5)
MCV RBC AUTO: 99.5 FL (ref 80–99)
MONOCYTES # BLD: 0.4 K/UL (ref 0–1)
MONOCYTES NFR BLD: 5 % (ref 5–13)
NEUTS SEG # BLD: 7.1 K/UL (ref 1.8–8)
NEUTS SEG NFR BLD: 76 % (ref 32–75)
NRBC # BLD: 0 K/UL (ref 0–0.01)
NRBC BLD-RTO: 0 PER 100 WBC
P-R INTERVAL, ECG05: 170 MS
P-R INTERVAL, ECG05: 190 MS
PCO2 BLD: 34.2 MMHG (ref 35–45)
PH BLD: 7.46 [PH] (ref 7.35–7.45)
PLATELET # BLD AUTO: 203 K/UL (ref 150–400)
PMV BLD AUTO: 12.2 FL (ref 8.9–12.9)
PO2 BLD: 61 MMHG (ref 80–100)
POTASSIUM SERPL-SCNC: 3.6 MMOL/L (ref 3.5–5.1)
PROT SERPL-MCNC: 7.4 G/DL (ref 6.4–8.2)
PROTHROMBIN TIME: 10.7 SEC (ref 9–11.1)
Q-T INTERVAL, ECG07: 498 MS
Q-T INTERVAL, ECG07: 554 MS
QRS DURATION, ECG06: 102 MS
QRS DURATION, ECG06: 106 MS
QTC CALCULATION (BEZET), ECG08: 498 MS
QTC CALCULATION (BEZET), ECG08: 543 MS
RBC # BLD AUTO: 3.88 M/UL (ref 4.1–5.7)
SAMPLES BEING HELD,HOLD: NORMAL
SAO2 % BLD: 93 % (ref 92–97)
SODIUM SERPL-SCNC: 142 MMOL/L (ref 136–145)
SPECIMEN TYPE: ABNORMAL
THERAPEUTIC RANGE,PTTT: NORMAL SECS (ref 58–77)
THERAPEUTIC RANGE,PTTT: NORMAL SECS (ref 58–77)
TOTAL RESP. RATE, ITRR: 13
TROPONIN I SERPL-MCNC: 0.07 NG/ML
TROPONIN I SERPL-MCNC: 0.07 NG/ML
TROPONIN I SERPL-MCNC: 0.08 NG/ML
VENTRICULAR RATE, ECG03: 58 BPM
VENTRICULAR RATE, ECG03: 60 BPM
WBC # BLD AUTO: 9.2 K/UL (ref 4.1–11.1)

## 2020-08-10 PROCEDURE — 77030029065 HC DRSG HEMO QCLOT ZMED -B: Performed by: INTERNAL MEDICINE

## 2020-08-10 PROCEDURE — C1894 INTRO/SHEATH, NON-LASER: HCPCS | Performed by: INTERNAL MEDICINE

## 2020-08-10 PROCEDURE — C1769 GUIDE WIRE: HCPCS | Performed by: INTERNAL MEDICINE

## 2020-08-10 PROCEDURE — 36415 COLL VENOUS BLD VENIPUNCTURE: CPT

## 2020-08-10 PROCEDURE — 77030028837 HC SYR ANGI PWR INJ COEU -A: Performed by: INTERNAL MEDICINE

## 2020-08-10 PROCEDURE — 82550 ASSAY OF CK (CPK): CPT

## 2020-08-10 PROCEDURE — 93306 TTE W/DOPPLER COMPLETE: CPT

## 2020-08-10 PROCEDURE — B2151ZZ FLUOROSCOPY OF LEFT HEART USING LOW OSMOLAR CONTRAST: ICD-10-PCS | Performed by: INTERNAL MEDICINE

## 2020-08-10 PROCEDURE — 85347 COAGULATION TIME ACTIVATED: CPT

## 2020-08-10 PROCEDURE — 84484 ASSAY OF TROPONIN QUANT: CPT

## 2020-08-10 PROCEDURE — 74011000250 HC RX REV CODE- 250: Performed by: INTERNAL MEDICINE

## 2020-08-10 PROCEDURE — 74011000250 HC RX REV CODE- 250: Performed by: EMERGENCY MEDICINE

## 2020-08-10 PROCEDURE — 93005 ELECTROCARDIOGRAM TRACING: CPT

## 2020-08-10 PROCEDURE — B2111ZZ FLUOROSCOPY OF MULTIPLE CORONARY ARTERIES USING LOW OSMOLAR CONTRAST: ICD-10-PCS | Performed by: INTERNAL MEDICINE

## 2020-08-10 PROCEDURE — 85730 THROMBOPLASTIN TIME PARTIAL: CPT

## 2020-08-10 PROCEDURE — 74011636320 HC RX REV CODE- 636/320: Performed by: INTERNAL MEDICINE

## 2020-08-10 PROCEDURE — 83880 ASSAY OF NATRIURETIC PEPTIDE: CPT

## 2020-08-10 PROCEDURE — 77030004549 HC CATH ANGI DX PRF MRTM -A: Performed by: INTERNAL MEDICINE

## 2020-08-10 PROCEDURE — 74011250636 HC RX REV CODE- 250/636: Performed by: EMERGENCY MEDICINE

## 2020-08-10 PROCEDURE — 82803 BLOOD GASES ANY COMBINATION: CPT

## 2020-08-10 PROCEDURE — 85610 PROTHROMBIN TIME: CPT

## 2020-08-10 PROCEDURE — 4A023N7 MEASUREMENT OF CARDIAC SAMPLING AND PRESSURE, LEFT HEART, PERCUTANEOUS APPROACH: ICD-10-PCS | Performed by: INTERNAL MEDICINE

## 2020-08-10 PROCEDURE — 74011250636 HC RX REV CODE- 250/636: Performed by: INTERNAL MEDICINE

## 2020-08-10 PROCEDURE — 93458 L HRT ARTERY/VENTRICLE ANGIO: CPT | Performed by: INTERNAL MEDICINE

## 2020-08-10 PROCEDURE — 77030029684 HC NEB SM VOL KT MONA -A

## 2020-08-10 PROCEDURE — 80053 COMPREHEN METABOLIC PANEL: CPT

## 2020-08-10 PROCEDURE — 36600 WITHDRAWAL OF ARTERIAL BLOOD: CPT

## 2020-08-10 PROCEDURE — 71045 X-RAY EXAM CHEST 1 VIEW: CPT

## 2020-08-10 PROCEDURE — 99152 MOD SED SAME PHYS/QHP 5/>YRS: CPT | Performed by: INTERNAL MEDICINE

## 2020-08-10 PROCEDURE — 85025 COMPLETE CBC W/AUTO DIFF WBC: CPT

## 2020-08-10 PROCEDURE — 65660000000 HC RM CCU STEPDOWN

## 2020-08-10 PROCEDURE — 74011250637 HC RX REV CODE- 250/637: Performed by: INTERNAL MEDICINE

## 2020-08-10 PROCEDURE — 74011250637 HC RX REV CODE- 250/637: Performed by: EMERGENCY MEDICINE

## 2020-08-10 PROCEDURE — 99153 MOD SED SAME PHYS/QHP EA: CPT | Performed by: INTERNAL MEDICINE

## 2020-08-10 RX ORDER — IODIXANOL 320 MG/ML
INJECTION, SOLUTION INTRAVASCULAR AS NEEDED
Status: DISCONTINUED | OUTPATIENT
Start: 2020-08-10 | End: 2020-08-10 | Stop reason: HOSPADM

## 2020-08-10 RX ORDER — LIDOCAINE HYDROCHLORIDE 10 MG/ML
INJECTION INFILTRATION; PERINEURAL AS NEEDED
Status: DISCONTINUED | OUTPATIENT
Start: 2020-08-10 | End: 2020-08-10 | Stop reason: HOSPADM

## 2020-08-10 RX ORDER — SODIUM CHLORIDE 9 MG/ML
75 INJECTION, SOLUTION INTRAVENOUS CONTINUOUS
Status: DISPENSED | OUTPATIENT
Start: 2020-08-10 | End: 2020-08-10

## 2020-08-10 RX ORDER — GUAIFENESIN 100 MG/5ML
81 LIQUID (ML) ORAL DAILY
Status: DISCONTINUED | OUTPATIENT
Start: 2020-08-10 | End: 2020-08-11 | Stop reason: HOSPADM

## 2020-08-10 RX ORDER — BUMETANIDE 0.25 MG/ML
1 INJECTION INTRAMUSCULAR; INTRAVENOUS 2 TIMES DAILY
Status: DISCONTINUED | OUTPATIENT
Start: 2020-08-10 | End: 2020-08-10

## 2020-08-10 RX ORDER — CARVEDILOL 25 MG/1
TABLET ORAL
COMMUNITY
End: 2020-08-11

## 2020-08-10 RX ORDER — HEPARIN SODIUM 10000 [USP'U]/100ML
8-25 INJECTION, SOLUTION INTRAVENOUS
Status: DISCONTINUED | OUTPATIENT
Start: 2020-08-10 | End: 2020-08-10

## 2020-08-10 RX ORDER — BUMETANIDE 1 MG/1
1 TABLET ORAL
Status: ON HOLD | COMMUNITY
End: 2020-08-11 | Stop reason: SDUPTHER

## 2020-08-10 RX ORDER — SUCRALFATE 1 G/1
1 TABLET ORAL
COMMUNITY
End: 2020-11-04 | Stop reason: SDUPTHER

## 2020-08-10 RX ORDER — PANTOPRAZOLE SODIUM 40 MG/1
40 TABLET, DELAYED RELEASE ORAL
Status: DISCONTINUED | OUTPATIENT
Start: 2020-08-10 | End: 2020-08-11 | Stop reason: HOSPADM

## 2020-08-10 RX ORDER — MIDAZOLAM HYDROCHLORIDE 1 MG/ML
INJECTION, SOLUTION INTRAMUSCULAR; INTRAVENOUS AS NEEDED
Status: DISCONTINUED | OUTPATIENT
Start: 2020-08-10 | End: 2020-08-10 | Stop reason: HOSPADM

## 2020-08-10 RX ORDER — SODIUM CHLORIDE 0.9 % (FLUSH) 0.9 %
5-40 SYRINGE (ML) INJECTION AS NEEDED
Status: DISCONTINUED | OUTPATIENT
Start: 2020-08-10 | End: 2020-08-11 | Stop reason: HOSPADM

## 2020-08-10 RX ORDER — MORPHINE SULFATE 2 MG/ML
2 INJECTION, SOLUTION INTRAMUSCULAR; INTRAVENOUS
Status: COMPLETED | OUTPATIENT
Start: 2020-08-10 | End: 2020-08-10

## 2020-08-10 RX ORDER — BUMETANIDE 0.25 MG/ML
2 INJECTION INTRAMUSCULAR; INTRAVENOUS
Status: COMPLETED | OUTPATIENT
Start: 2020-08-10 | End: 2020-08-10

## 2020-08-10 RX ORDER — POLYETHYLENE GLYCOL 3350 17 G/17G
17 POWDER, FOR SOLUTION ORAL DAILY PRN
Status: DISCONTINUED | OUTPATIENT
Start: 2020-08-10 | End: 2020-08-11 | Stop reason: HOSPADM

## 2020-08-10 RX ORDER — PROMETHAZINE HYDROCHLORIDE 25 MG/1
12.5 TABLET ORAL
Status: DISCONTINUED | OUTPATIENT
Start: 2020-08-10 | End: 2020-08-11 | Stop reason: HOSPADM

## 2020-08-10 RX ORDER — NALOXONE HYDROCHLORIDE 0.4 MG/ML
0.4 INJECTION, SOLUTION INTRAMUSCULAR; INTRAVENOUS; SUBCUTANEOUS AS NEEDED
Status: DISCONTINUED | OUTPATIENT
Start: 2020-08-10 | End: 2020-08-11 | Stop reason: HOSPADM

## 2020-08-10 RX ORDER — BUMETANIDE 0.25 MG/ML
2 INJECTION INTRAMUSCULAR; INTRAVENOUS 2 TIMES DAILY
Status: DISCONTINUED | OUTPATIENT
Start: 2020-08-10 | End: 2020-08-10

## 2020-08-10 RX ORDER — HEPARIN SODIUM 5000 [USP'U]/ML
4000 INJECTION, SOLUTION INTRAVENOUS; SUBCUTANEOUS ONCE
Status: COMPLETED | OUTPATIENT
Start: 2020-08-10 | End: 2020-08-10

## 2020-08-10 RX ORDER — LEVOTHYROXINE SODIUM 50 UG/1
50 TABLET ORAL
COMMUNITY
End: 2021-08-24

## 2020-08-10 RX ORDER — HEPARIN SODIUM 200 [USP'U]/100ML
INJECTION, SOLUTION INTRAVENOUS
Status: COMPLETED | OUTPATIENT
Start: 2020-08-10 | End: 2020-08-10

## 2020-08-10 RX ORDER — SPIRONOLACTONE 25 MG/1
25 TABLET ORAL DAILY
Status: DISCONTINUED | OUTPATIENT
Start: 2020-08-10 | End: 2020-08-10

## 2020-08-10 RX ORDER — NITROGLYCERIN 0.4 MG/1
0.4 TABLET SUBLINGUAL AS NEEDED
Status: DISCONTINUED | OUTPATIENT
Start: 2020-08-10 | End: 2020-08-11 | Stop reason: HOSPADM

## 2020-08-10 RX ORDER — SODIUM CHLORIDE 0.9 % (FLUSH) 0.9 %
5-40 SYRINGE (ML) INJECTION AS NEEDED
Status: DISCONTINUED | OUTPATIENT
Start: 2020-08-10 | End: 2020-08-11 | Stop reason: SDUPTHER

## 2020-08-10 RX ORDER — GUAIFENESIN 100 MG/5ML
325 LIQUID (ML) ORAL
Status: COMPLETED | OUTPATIENT
Start: 2020-08-10 | End: 2020-08-10

## 2020-08-10 RX ORDER — AMIODARONE HYDROCHLORIDE 200 MG/1
200 TABLET ORAL DAILY
Status: DISCONTINUED | OUTPATIENT
Start: 2020-08-10 | End: 2020-08-11 | Stop reason: HOSPADM

## 2020-08-10 RX ORDER — MORPHINE SULFATE 2 MG/ML
1 INJECTION, SOLUTION INTRAMUSCULAR; INTRAVENOUS
Status: DISCONTINUED | OUTPATIENT
Start: 2020-08-10 | End: 2020-08-11 | Stop reason: HOSPADM

## 2020-08-10 RX ORDER — HYDRALAZINE HYDROCHLORIDE 50 MG/1
25 TABLET, FILM COATED ORAL 3 TIMES DAILY
Status: DISCONTINUED | OUTPATIENT
Start: 2020-08-10 | End: 2020-08-11

## 2020-08-10 RX ORDER — ONDANSETRON 2 MG/ML
4 INJECTION INTRAMUSCULAR; INTRAVENOUS
Status: DISCONTINUED | OUTPATIENT
Start: 2020-08-10 | End: 2020-08-11 | Stop reason: HOSPADM

## 2020-08-10 RX ORDER — SODIUM CHLORIDE 0.9 % (FLUSH) 0.9 %
5-40 SYRINGE (ML) INJECTION EVERY 8 HOURS
Status: DISCONTINUED | OUTPATIENT
Start: 2020-08-10 | End: 2020-08-11 | Stop reason: HOSPADM

## 2020-08-10 RX ORDER — IPRATROPIUM BROMIDE AND ALBUTEROL SULFATE 2.5; .5 MG/3ML; MG/3ML
3 SOLUTION RESPIRATORY (INHALATION) ONCE
Status: COMPLETED | OUTPATIENT
Start: 2020-08-10 | End: 2020-08-10

## 2020-08-10 RX ORDER — LEVOTHYROXINE SODIUM 50 UG/1
50 TABLET ORAL
Status: DISCONTINUED | OUTPATIENT
Start: 2020-08-10 | End: 2020-08-11 | Stop reason: HOSPADM

## 2020-08-10 RX ORDER — ACETAMINOPHEN 325 MG/1
650 TABLET ORAL
Status: DISCONTINUED | OUTPATIENT
Start: 2020-08-10 | End: 2020-08-11 | Stop reason: HOSPADM

## 2020-08-10 RX ORDER — SODIUM CHLORIDE 0.9 % (FLUSH) 0.9 %
5-40 SYRINGE (ML) INJECTION EVERY 8 HOURS
Status: DISCONTINUED | OUTPATIENT
Start: 2020-08-10 | End: 2020-08-11 | Stop reason: SDUPTHER

## 2020-08-10 RX ORDER — DIPHENHYDRAMINE HYDROCHLORIDE 50 MG/ML
25 INJECTION, SOLUTION INTRAMUSCULAR; INTRAVENOUS
Status: DISCONTINUED | OUTPATIENT
Start: 2020-08-10 | End: 2020-08-11 | Stop reason: HOSPADM

## 2020-08-10 RX ORDER — FENTANYL CITRATE 50 UG/ML
INJECTION, SOLUTION INTRAMUSCULAR; INTRAVENOUS AS NEEDED
Status: DISCONTINUED | OUTPATIENT
Start: 2020-08-10 | End: 2020-08-10 | Stop reason: HOSPADM

## 2020-08-10 RX ORDER — ACETAMINOPHEN 650 MG/1
650 SUPPOSITORY RECTAL
Status: DISCONTINUED | OUTPATIENT
Start: 2020-08-10 | End: 2020-08-11 | Stop reason: HOSPADM

## 2020-08-10 RX ORDER — ISOSORBIDE MONONITRATE 30 MG/1
30 TABLET, EXTENDED RELEASE ORAL DAILY
Status: DISCONTINUED | OUTPATIENT
Start: 2020-08-10 | End: 2020-08-11 | Stop reason: HOSPADM

## 2020-08-10 RX ADMIN — MORPHINE SULFATE 2 MG: 2 INJECTION, SOLUTION INTRAMUSCULAR; INTRAVENOUS at 03:57

## 2020-08-10 RX ADMIN — MORPHINE SULFATE 1 MG: 2 INJECTION, SOLUTION INTRAMUSCULAR; INTRAVENOUS at 08:25

## 2020-08-10 RX ADMIN — Medication 10 ML: at 07:02

## 2020-08-10 RX ADMIN — BUMETANIDE 2 MG: 0.25 INJECTION, SOLUTION INTRAMUSCULAR; INTRAVENOUS at 00:28

## 2020-08-10 RX ADMIN — ASPIRIN 81 MG CHEWABLE TABLET 81 MG: 81 TABLET CHEWABLE at 08:29

## 2020-08-10 RX ADMIN — ISOSORBIDE MONONITRATE 30 MG: 30 TABLET, EXTENDED RELEASE ORAL at 08:29

## 2020-08-10 RX ADMIN — Medication 10 ML: at 01:52

## 2020-08-10 RX ADMIN — ASPIRIN 81 MG CHEWABLE TABLET 324 MG: 81 TABLET CHEWABLE at 01:51

## 2020-08-10 RX ADMIN — Medication 10 ML: at 21:30

## 2020-08-10 RX ADMIN — IPRATROPIUM BROMIDE AND ALBUTEROL SULFATE 3 ML: .5; 3 SOLUTION RESPIRATORY (INHALATION) at 00:28

## 2020-08-10 RX ADMIN — HYDRALAZINE HYDROCHLORIDE 25 MG: 50 TABLET, FILM COATED ORAL at 08:29

## 2020-08-10 RX ADMIN — SODIUM CHLORIDE 75 ML/HR: 900 INJECTION, SOLUTION INTRAVENOUS at 10:43

## 2020-08-10 RX ADMIN — HEPARIN SODIUM 8 UNITS/KG/HR: 10000 INJECTION, SOLUTION INTRAVENOUS at 03:58

## 2020-08-10 RX ADMIN — MORPHINE SULFATE 1 MG: 2 INJECTION, SOLUTION INTRAMUSCULAR; INTRAVENOUS at 15:18

## 2020-08-10 RX ADMIN — HEPARIN SODIUM 4000 UNITS: 5000 INJECTION INTRAVENOUS; SUBCUTANEOUS at 03:55

## 2020-08-10 RX ADMIN — PANTOPRAZOLE SODIUM 40 MG: 40 TABLET, DELAYED RELEASE ORAL at 07:02

## 2020-08-10 RX ADMIN — BUMETANIDE 2 MG: 0.25 INJECTION INTRAMUSCULAR; INTRAVENOUS at 08:26

## 2020-08-10 RX ADMIN — HYDRALAZINE HYDROCHLORIDE 25 MG: 50 TABLET, FILM COATED ORAL at 21:29

## 2020-08-10 RX ADMIN — ACETAMINOPHEN 650 MG: 325 TABLET ORAL at 23:42

## 2020-08-10 RX ADMIN — HYDRALAZINE HYDROCHLORIDE 25 MG: 50 TABLET, FILM COATED ORAL at 15:47

## 2020-08-10 RX ADMIN — SPIRONOLACTONE 25 MG: 25 TABLET ORAL at 08:29

## 2020-08-10 RX ADMIN — NITROGLYCERIN 1 INCH: 20 OINTMENT TOPICAL at 00:28

## 2020-08-10 RX ADMIN — LEVOTHYROXINE SODIUM 50 MCG: 0.05 TABLET ORAL at 07:02

## 2020-08-10 RX ADMIN — PANTOPRAZOLE SODIUM 40 MG: 40 TABLET, DELAYED RELEASE ORAL at 15:47

## 2020-08-10 RX ADMIN — AMIODARONE HYDROCHLORIDE 200 MG: 200 TABLET ORAL at 08:29

## 2020-08-10 NOTE — PROGRESS NOTES
2nd IM provided to patient. Copy signed and placed on chart. [id number]  [facility] letter provided to patient.      Jared Barrios RN CM  Ext [phone]

## 2020-08-10 NOTE — ED NOTES
TRANSFER - OUT REPORT:    Verbal report given to Alta(name) on Chela Fong  being transferred to IVCU(unit) for routine progression of care       Report consisted of patients Situation, Background, Assessment and   Recommendations(SBAR). Information from the following report(s) SBAR, ED Summary, Intake/Output, MAR and Recent Results, rhythm (NSR) was reviewed with the receiving nurse. Lines:   Peripheral IV 08/10/20 Right Antecubital (Active)   Site Assessment Clean, dry, & intact 08/10/20 0017   Phlebitis Assessment 0 08/10/20 0017   Infiltration Assessment 0 08/10/20 0017   Dressing Status Clean, dry, & intact 08/10/20 0017   Dressing Type Transparent 08/10/20 0017   Hub Color/Line Status Pink 08/10/20 0017   Alcohol Cap Used No 08/10/20 0017       Peripheral IV 08/10/20 Left Forearm (Active)   Site Assessment Clean, dry, & intact 08/10/20 0527   Phlebitis Assessment 0 08/10/20 0527   Infiltration Assessment 0 08/10/20 0527   Dressing Status Clean, dry, & intact 08/10/20 0527   Hub Color/Line Status Pink;Flushed 08/10/20 0527   Alcohol Cap Used No 08/10/20 0527        Opportunity for questions and clarification was provided.       Patient transported with:   Monitor  O2 @ 2 liters  Registered Nurse

## 2020-08-10 NOTE — H&P
Hospitalist Admission Note    NAME: Shar Kennedy   :  1955   MRN:  [id number]     Date/Time:  8/10/2020 4:18 AM    Patient PCP: Sheryl Sevilla, DO  ______________________________________________________________________  Given the patient's current clinical presentation, I have a high level of concern for decompensation if discharged from the emergency department. Complex decision making was performed, which includes reviewing the patient's available past medical records, laboratory results, and x-ray films. My assessment of this patient's clinical condition and my plan of care is as follows.     Assessment / Plan:      Acute on chronic diastolic heart failure, POA   Presented with shortness of breath, exertional dyspnea   Most recent echo 2020 revealed EF 55%   Currently on Bumex 2 mg p.o. twice daily   X-ray revealed moderately severe pulmonary edema and cardiomegaly   Obtain a new echo given change in clinical status   Change Bumex to IV 2 mg twice daily  Daily weights  HECTOR's    Atypical chest pain concerning for NSTEMI, POA  Coronary artery disease status post PCI    Does not meet full criteria for anginal chest pain   Troponin 0 0.07, 0.08   Nonspecific ST changes on EKG   We will start heparin drip given high suspicious for an STEMI and high risk patient   Continue following troponin every 6 hours x3  Was given full dose aspirin in the ED, started on heparin drip  Aspirin 81 mg daily  Patient is intolerant to Zetia and statins as he reported that he has severe myalgia usually  Carvedilol on hold given bradycardia   Cardiology consultation    Sinus bradycardia  QTC prolongation   Heart rate between 55 and 60 bpm   Asymptomatic  Continue holding Coreg for now    on admission  Avoid QTC prolonging agents and check QTc before discharge    CKD stage III  Creatinine baseline  Continue to follow BMP    History of recurrent A. fib status post cardioversion March 2020  Hypertension   Continue hydralazine, spironolactone, Imdur   Carvedilol on hold due to above  Has been off Eliquis since June as he reported    History of TIA February 2020  History of prostate cancer status post prostatectomy 2010    Morbid obesity  Counseling was provided about weight loss    Code Status: Full code  Surrogate Decision Maker: His wife    DVT Prophylaxis: Heparin  GI Prophylaxis: not indicated    Baseline: Active, independent      Subjective:   CHIEF COMPLAINT: Shortness of breath and chest pain    HISTORY OF PRESENT ILLNESS:       The patient is 77years old man with past medical history recurrent A. fib, diastolic heart failure, coronary artery disease, CKD, TIA presented emergency department due to shortness of breath and chest tightness started earlier today. Patient reported that he has been getting short of breath every time he moves around or exerts himself. He also reported chest tightness started around 10 PM today when he got to the emergency department. He reported that his chest tightness is retrosternal, nonradiating, not aggravating or elevating by any factors. He denied any nausea, vomiting, diarrhea, constipation. He reported that he has not been taking his spironolactone as he supposed to and he has been skipping doses sometimes. In the emergency department, EKG revealed nonspecific ST changes with mildly elevated troponin suspicious for possible NSTEMI and chest x-ray revealed moderately severe pulmonary edema. We were asked to admit for work up and evaluation of the above problems.      Past Medical History:   Diagnosis Date    Arrhythmia 01/03/2020    AFIB     CAD (coronary artery disease)     h/o stent- '10    Cancer [facility]) 2010    prostate    [address]estive heart failure ([facility] [address]     Hypertension     Ill-defined condition     small bowel obstruction    Right rotator cuff tear 1/26/2017    SBO (small bowel obstruction) ([address] 11/3/2015    Stroke ([address] May or June 2010    lacunar  infarctions (found on scan- no sxs)    Thyroid disease     hypo        Past Surgical History:   Procedure Laterality Date    CARDIAC SURG PROCEDURE UNLIST  2010    coronary stent    EXCIS KNEE CARTILAGE,MEDIAL OR LAT  1980's x3    HX CHOLECYSTECTOMY  1991    HX HEENT  1962    T&A    HX HERNIA REPAIR  1996    HX MALIGNANT SKIN LESION EXCISION      2004 and 2017    HX ORTHOPAEDIC  1974    Ganglion cyst left wrist    HX ORTHOPAEDIC Left 2017    shoulder repair    HX PROSTATECTOMY  2010    UPPER GI ENDOSCOPY,BIOPSY  2/25/2020            Social History     Tobacco Use    Smoking status: Never Smoker    Smokeless tobacco: Former User   Substance Use Topics    Alcohol use: Not Currently     Binge frequency: Patient refused        Family History   Problem Relation Age of Onset   [facility] Dementia Mother    [address] [facility] Other Father 55        cerebral hemorrhage     Allergies   Allergen Reactions    Clonidine Anxiety    Lasix [Furosemide] Other (comments)     May be the cause of kidney failure    Niacin Other (comments)     Decreased vision    Statins-Hmg-Coa Reductase Inhibitors Other (comments)     Muscle and Joint pains    Zetia [Ezetimibe] Myalgia     Pt reports getting joint and muscle pain. Prior to Admission medications    Medication Sig Start Date End Date Taking? Authorizing Provider   levothyroxine (SYNTHROID) 50 mcg tablet Take 50 mcg by mouth Daily (before breakfast). Yes Other, MD Asa   bumetanide (BUMEX) 2 mg tablet Take 1 Tab by mouth daily. Patient taking differently: Take 1 mg by mouth daily. 4/13/20  Yes Herman Villa III, DO   sucralfate (CARAFATE) 1 gram tablet Take 1 Tab by mouth Before breakfast, lunch, dinner and at bedtime. Patient taking differently: Take 1 g by mouth as needed. 4/13/20  Yes Herman Villa III, DO   pantoprazole (Protonix) 40 mg tablet Take 1 Tab by mouth Before breakfast and dinner.  4/2/20  Yes Herman Villa III, DO   hydrALAZINE (APRESOLINE) 25 mg tablet Take 1 Tab by mouth three (3) times daily. 2/26/20  Yes Olivia Saavedra NP   spironolactone (ALDACTONE) 25 mg tablet Take 1 Tab by mouth daily. Patient taking differently: Take 25 mg by mouth every other day. 2/26/20  Yes Olivia Saavedra NP   amiodarone (CORDARONE) 200 mg tablet Take  by mouth daily. Yes Provider, Historical   isosorbide mononitrate ER (IMDUR) 30 mg tablet Take 1 Tab by mouth daily. 1/31/20  Yes Sharri Ricardo MD   carvedilol (COREG) 25 mg tablet Take 1 Tab by mouth two (2) times daily (with meals). 5/3/19  Yes Herman Villa III, DO   cholecalciferol, vitamin D3, (VITAMIN D3) 2,000 unit tab Take 2,000 Units by mouth daily. Yes Provider, Historical   Repatha SureClick pen injection INJECT 1 ML SUBCUTANEOUSLY EVERY 14 DAYS 6/2/20   Taryn CHRISTIAN III, DO       REVIEW OF SYSTEMS:     I am not able to complete the review of systems because:    The patient is intubated and sedated    The patient has altered mental status due to his acute medical problems    The patient has baseline aphasia from prior stroke(s)    The patient has baseline dementia and is not reliable historian    The patient is in acute medical distress and unable to provide information           Total of 12 systems reviewed as follows:       POSITIVE= underlined text  Negative = text not underlined  General:  fever, chills, sweats, generalized weakness, weight loss/gain,      loss of appetite   Eyes:    blurred vision, eye pain, loss of vision, double vision  ENT:    rhinorrhea, pharyngitis   Respiratory:   cough, sputum production, SOB, DENG, wheezing, pleuritic pain   Cardiology:   chest pain, palpitations, orthopnea, PND, edema, syncope   Gastrointestinal:  abdominal pain , N/V, diarrhea, dysphagia, constipation, bleeding   Genitourinary:  frequency, urgency, dysuria, hematuria, incontinence   Muskuloskeletal :  arthralgia, myalgia, back pain  Hematology:  easy bruising, nose or gum bleeding, lymphadenopathy   Dermatological: rash, ulceration, pruritis, color change / jaundice  Endocrine:   hot flashes or polydipsia   Neurological:  headache, dizziness, confusion, focal weakness, paresthesia,     Speech difficulties, memory loss, gait difficulty  Psychological: Feelings of anxiety, depression, agitation    Objective:   VITALS:    Visit Vitals  /84   Pulse (!) 59   Temp 98.2 °F (36.8 °C)   Resp 17   Ht 6' (1.829 m)   Wt 113.4 kg (250 lb)   SpO2 94%   BMI 33.91 kg/m²       PHYSICAL EXAM:    General:    Alert, cooperative, no distress, appears stated age. HEENT: Atraumatic, anicteric sclerae, pink conjunctivae     No oral ulcers, mucosa moist, throat clear, dentition fair  Neck:  Supple, symmetrical,  thyroid: non tender  Lungs:   Clear to auscultation bilaterally. No Wheezing or Rhonchi. No rales. Chest wall:  No tenderness  No Accessory muscle use. Heart:   Regular  rhythm,  No  murmur   No edema  Abdomen:   Soft, non-tender. Not distended. Bowel sounds normal  Extremities: No cyanosis. No clubbing,      Skin turgor normal, Capillary refill normal, Radial dial pulse 2+  Skin:     Not pale. Not Jaundiced  No rashes   Psych:  Good insight. Not depressed. Not anxious or agitated. Neurologic: EOMs intact. No facial asymmetry. No aphasia or slurred speech. Symmetrical strength, Sensation grossly intact.  Alert and oriented X 4.     _______________________________________________________________________  Care Plan discussed with:    Comments   Patient x    Family  x  patient's wife at bedside   RN x    Care Manager                    Consultant:      _______________________________________________________________________  Expected  Disposition:   Home with Family x   HH/PT/OT/RN    SNF/LTC    CARLOS    ________________________________________________________________________  TOTAL TIME:  61 Minutes    Critical Care Provided     Minutes non procedure based      Comments    x Reviewed previous records   >50% of visit spent in counseling and coordination of care x Discussion with patient and/or family and questions answered       ________________________________________________________________________  Signed: Georges Olmos MD    Procedures: see electronic medical records for all procedures/Xrays and details which were not copied into this note but were reviewed prior to creation of Plan. LAB DATA REVIEWED:    Recent Results (from the past 24 hour(s))   EKG, 12 LEAD, INITIAL    Collection Time: 08/10/20 12:04 AM   Result Value Ref Range    Ventricular Rate 60 BPM    Atrial Rate 60 BPM    P-R Interval 170 ms    QRS Duration 106 ms    Q-T Interval 498 ms    QTC Calculation (Bezet) 498 ms    Calculated P Axis 79 degrees    Calculated R Axis 34 degrees    Calculated T Axis 51 degrees    Diagnosis       Normal sinus rhythm  Nonspecific ST abnormality  Prolonged QT  When compared with ECG of 19-MAR-2020 08:10,  No significant change was found     SAMPLES BEING HELD    Collection Time: 08/10/20 12:15 AM   Result Value Ref Range    SAMPLES BEING HELD LAVENDER TUBE, BLUE TUBE, PST TUBE     COMMENT        Add-on orders for these samples will be processed based on acceptable specimen integrity and analyte stability, which may vary by analyte.    CK W/ CKMB & INDEX    Collection Time: 08/10/20 12:15 AM   Result Value Ref Range    CK - MB 1.1 <3.6 NG/ML    CK-MB Index 1.1 0.0 - 2.5       39 - 308 U/L   NT-PRO BNP    Collection Time: 08/10/20 12:15 AM   Result Value Ref Range    NT pro-BNP 1,302 (H) <125 PG/ML   TROPONIN I    Collection Time: 08/10/20 12:15 AM   Result Value Ref Range    Troponin-I, Qt. 0.07 (H) <0.05 ng/mL   CBC WITH AUTOMATED DIFF    Collection Time: 08/10/20 12:15 AM   Result Value Ref Range    WBC 9.2 4.1 - 11.1 K/uL    RBC 3.88 (L) 4.10 - 5.70 M/uL    HGB 12.1 12.1 - 17.0 g/dL    HCT 38.6 36.6 - 50.3 %    MCV 99.5 (H) 80.0 - 99.0 FL MCH 31.2 26.0 - 34.0 PG    MCHC 31.3 30.0 - 36.5 g/dL    RDW 12.6 11.5 - 14.5 %    PLATELET 423 868 - 766 K/uL    MPV 12.2 8.9 - 12.9 FL    NRBC 0.0 0  WBC    ABSOLUTE NRBC 0.00 0.00 - 0.01 K/uL    NEUTROPHILS 76 (H) 32 - 75 %    LYMPHOCYTES 15 12 - 49 %    MONOCYTES 5 5 - 13 %    EOSINOPHILS 3 0 - 7 %    BASOPHILS 1 0 - 1 %    IMMATURE GRANULOCYTES 0 0.0 - 0.5 %    ABS. NEUTROPHILS 7.1 1.8 - 8.0 K/UL    ABS. LYMPHOCYTES 1.4 0.8 - 3.5 K/UL    ABS. MONOCYTES 0.4 0.0 - 1.0 K/UL    ABS. EOSINOPHILS 0.2 0.0 - 0.4 K/UL    ABS. BASOPHILS 0.1 0.0 - 0.1 K/UL    ABS. IMM. GRANS. 0.0 0.00 - 0.04 K/UL    DF AUTOMATED     PROTHROMBIN TIME + INR    Collection Time: 08/10/20 12:15 AM   Result Value Ref Range    INR 1.0 0.9 - 1.1      Prothrombin time 10.7 9.0 - 11.1 sec   PTT    Collection Time: 08/10/20 12:15 AM   Result Value Ref Range    aPTT 25.4 22.1 - 32.0 sec    aPTT, therapeutic range     58.0 - 40.3 SECS   METABOLIC PANEL, COMPREHENSIVE    Collection Time: 08/10/20 12:15 AM   Result Value Ref Range    Sodium 142 136 - 145 mmol/L    Potassium 3.6 3.5 - 5.1 mmol/L    Chloride 109 (H) 97 - 108 mmol/L    CO2 29 21 - 32 mmol/L    Anion gap 4 (L) 5 - 15 mmol/L    Glucose 105 (H) 65 - 100 mg/dL    BUN 19 6 - 20 MG/DL    Creatinine 1.70 (H) 0.70 - 1.30 MG/DL    BUN/Creatinine ratio 11 (L) 12 - 20      GFR est AA 49 (L) >60 ml/min/1.73m2    GFR est non-AA 40 (L) >60 ml/min/1.73m2    Calcium 8.2 (L) 8.5 - 10.1 MG/DL    Bilirubin, total 0.7 0.2 - 1.0 MG/DL    ALT (SGPT) 49 12 - 78 U/L    AST (SGOT) 24 15 - 37 U/L    Alk.  phosphatase 74 45 - 117 U/L    Protein, total 7.4 6.4 - 8.2 g/dL    Albumin 3.9 3.5 - 5.0 g/dL    Globulin 3.5 2.0 - 4.0 g/dL    A-G Ratio 1.1 1.1 - 2.2     POC EG7    Collection Time: 08/10/20 12:51 AM   Result Value Ref Range    Calcium, ionized (POC) 1.21 1.12 - 1.32 mmol/L    pH (POC) 7.46 (H) 7.35 - 7.45      pCO2 (POC) 34.2 (L) 35.0 - 45.0 MMHG    pO2 (POC) 61 (L) 80 - 100 MMHG    HCO3 (POC) 24.0 22 - 26 MMOL/L    Base excess (POC) 0 mmol/L    sO2 (POC) 93 92 - 97 %    Site RIGHT RADIAL      Device: ROOM AIR      Allens test (POC) YES      Specimen type (POC) ARTERIAL      Total resp.  rate 13     TROPONIN I    Collection Time: 08/10/20  2:29 AM   Result Value Ref Range    Troponin-I, Qt. 0.08 (H) <0.05 ng/mL

## 2020-08-10 NOTE — ED NOTES
Assumed care of pt from EMS. Pt CC of SOB, cough, and expiratory wheezing starting about 2230. Pt has hx of a fib, CHF, HTN. Pt NSR at this time. Pt take amio daily. VSS. Call bell in reach. Pt on monitor x3. MD at bedside.     Pt denies CP, fever, abd pain, n/v/d.

## 2020-08-10 NOTE — CONSULTS
Cardiology Consult Note    CC: acute dyspnea    PCP:Kem Villa III, DO  Reason for consult:  Flash pulm edema; CP  Requesting MD:  Dr. Noman Pearson. Admit Date: 8/9/2020   Today's Date: 8/10/2020   Cardiologist:  Dr Aicha Candelario. Cardiac Assessment/Plan:   1) CAD (D BMS 11/2010), w/ intermittent atypical CP; Neg MPI 2014 (Dx with clavicle separation)   *DENG/atypical CP 8/2019: worse BP (pt denies PTA); +/- abnl MPI: 40-50% D1 o/w normal: Med Rx. *EF 40-45% (global) @ [facility] 1/2020. EF 50-55% 2/2020.     2) HTN, no renal artery stenosis at cath 8/2019  3) Dyslipidemia, (intolerant of statins/niacin/fenofibrate; Repatha/praluent too expensive). 4) AI: moderate 2010 & 8/2019; Moderate @ [facility] 1/2020.  5) AFib 12/2019, new onset, Sx (DENG/palps in 11 & 12/2019): Rx w/ eliquis/norvasc to dilt. *CV 1/28/20:  Recurrent AFib, amio added: Afib/flutter persistent 3/2020; CV 3/2020. *NSR; no palps: eliquis d/cd 6/2020 (Pt stopped previously due to cost): chronic amio. 6) palpitations:  Negative Holter/EvR 2011 & 2014 respectively. 7) prostate CA, remote prostatectomy  8) hypothyroidism  9) CKD IV: ZONIA 2/2020 (CR 2.8 from 1; 1.25 on discharge);    *Cr 2.29/GFR28 6/2020, off ACEi/ARB/aldactone (Dr Godfrey Floyd). 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 11) Esophageal dysmotility on BAgram & Erosive gastritis on EGD 2/2020 (Dr Héctor Lei)    Admitted with flash pulm edema (BPs 180); then CP after admit. I have recommended diagnostic cath for definitive evaluation of the patient's coronary anatomy and PCI if appropriate; All risks, benefits, and alternatives were discussed and patient wishes to proceed. Echo pending.        Hospital Problem List:  Active Problems:    Chest pain (3/20/2020)      SOB (shortness of breath) (8/10/2020)       ____________________________________________________________________  Florance Devoid is a 77 y.o. male presents with Chest pain [R07.9]  SOB (shortness of breath) [R06.02]. As noted in H&P: \"71 years old man with past medical history recurrent A. fib, diastolic heart failure, coronary artery disease, CKD, TIA presented emergency department due to shortness of breath and chest tightness started earlier today. Patient reported that he has been getting short of breath every time he moves around or exerts himself. He also reported chest tightness started around 10 PM today when he got to the emergency department. He reported that his chest tightness is retrosternal, nonradiating, not aggravating or elevating by any factors. He denied any nausea, vomiting, diarrhea, constipation. He reported that he has not been taking his spironolactone as he supposed to and he has been skipping doses sometimes.     In the emergency department, EKG revealed nonspecific ST changes with mildly elevated troponin suspicious for possible NSTEMI and chest x-ray revealed moderately severe pulmonary edema. \"    ______________________________________________________________________    The patient reports no Sx until yesterday; active w/o Sx; fairly good compliance with low sodium diet (\" 1 handful of chips yesterday. \")   He stopped aldactone 1 week ago (?stopped previously); Noted to have CHF/hypoxic resp failure POA: no acute ecg changes/no MI, w/ indeterminate troponin. Developed mild chest pressure overnight: better now. No PND, orthopnea, palpitations, pre-syncope, syncope, peripheral edema, or decrease in exercise tolerance. No current complaints. ECG: sinus massimo; no acute ST changes. Tele: sinus  CXR: \"Cardiomegaly with moderately severe pulmonary edema\"    Notable labs: Hg 12.1; Cr 1.7 from 1.5-1.6 (GFR 40s).  proBNP 1k; trop 0.07/0.08; nl CK x1. 7.46/34/61 on RA.    ______________________________________________________________________    Notable prior cardiac history:  @ [facility] 6/17/20   Mr Jac Vargas has a h/o:  1) CAD (D BMS 11/2010), w/ intermittent atypical CP; Neg MPI 2014 (Dx with clavicle separation)   *DENG/atypical CP 8/2019: worse BP (pt denies PTA); +/- abnl MPI: 40-50% D1 o/w normal: Med Rx. *EF 40-45% (global) @ [facility] 1/2020. EF 50-55% 2/2020.   2) HTN, no renal artery stenosis at cath 8/2019  3) Dyslipidemia,   4) AI: moderate 2010 & 8/2019; Moderate @ [facility] 1/2020.  5) AFib 12/2019, new onset, Sx (DENG/palps in 11 & 12/2019): Rx w/ eliquis/norvasc to dilt. *CV 1/28/20:  Recurrent AFib, amio added: Afib/flutter persistent 3/2020; CV 3/2020. *NSR; no palps: eliquis d/cd 6/2020 (Pt stopped previously due to cost). 6) palpitations:  Negative Holter/EvR 2011 & 2014 respectively. 7) prostate CA, remote prostatectomy  8) hypothyroidism  9) CKD IV: ZONIA 2/2020 (CR 2.8 from 1; 1.25 on discharge);    *Cr 2.29/GFR28 6/2020, off ACEi/ARB/aldactone (Dr Azra Davis). 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 11) Esophageal dysmotility on BAgram & Erosive gastritis on EGD 2/2020 (Dr Jossy Mendosa). 2/2020: BP well controlled during the day, but elevated at night even after PM meds. Started on amio after last OV. Still with flutters in his chest from time to time. Hiatal hernia bothersome as well. 3/2020:  Palpitations & dyspnea with exertion since being discharged. Occasional GERD symptoms that is decreased with Carafate. No resting or anginal symptoms. His BPs been running 140-180 (on lower dose of ACEi due to ZONIA); follow-up with Dr. Azra Davis pending. 6/2020:  No chest pain or recent dyspnea. No palpitations. No falling or bleeding. He self discontinued Eliquis due to cost as well as decreased amio on his own; not using repatha due to cost (praluent same cost). Rare ethanol; knows should be none. No added salt. ACEi & aldactone were stopped (& no ARB) due to CKD. Home BPs have been 150-180. IMPRESSION AND PLAN  01.  PAF (paroxysmal atrial fibrillation) (I48.0):  New onset 12/2019, but also likely had some in 11/2019. Underwent DCCV 1/2020 & 3/2020.    6/2020: cont NSR; cont amio but 200 qd; d/c eliquis. Patient knows to call if any palpitations. Again recommended he avoid alcohol. ECG done   02. Atherosclerotic heart disease of native coronary artery without angina pectoris (I25.10):  Remote diagonal BMS; mild diagonal disease at cath 8/2019, otherwise unremarkable. We discussed the signs and symptoms of unstable angina, myocardial infarction and malignant arrhythmia. The patient knows to seek immediate medical attention should they occur. 03. Essential (primary) hypertension (I10): Inadequately controlled. Increase hydralazine to 50 tid; warned of potential side effects (pressure was low in the morning when previously taking 50 tid but was on ACEi at that time). Patient will maintain and submit a BP diary. 04. Mixed hyperlipidemia (E78.2):  Patient is tolerating lipid lowering therapy well. Lipid labs drawn by PCP.   05. Nonrheumatic aortic (valve) insufficiency (I35.1):  Asymptomatic; consistently moderate; continue to follow. 06. Nicotine dependence, unspecified, uncomplicated (H69.641):  He continues to use smokeless tobacco.  The patient was instructed on tobacco cessation. 07. Body mass index (BMI) 34.0-34.9, adult (Z68.34): The patient was instructed on AHA diet and regular exercise. ORDERS:  1 ECG done    2 Dietary management education, guidance, and counseling    3 Return office visit with Hayden Chowdhury MD in 6 Months. 4 The patient was instructed on AHA diet and regular exercise.     5 Patient will maintain and submit a BP diar        6/2020 MEDICATION LIST  Medication Sig Desc Non-VCS   amiodarone 200 mg tablet take 1 tablet by oral route  every day N   bumetanide 1 mg tablet take 1 tablet by oral route  every day N   Coreg 12.5 mg tablet take 1 tablet by oral route 2 times every day with food N   hydralazine 50 mg tablet take 1 tablet by oral route 3 times every day with food N   isosorbide mononitrate ER 30 mg tablet,extended release 24 hr take 1 tablet by oral route  every day in the morning N   multivitamin Tab Take 1 tablet by mouth once a day N   Protonix 40 mg Tab Take 1 tablet by mouth once a day N   Repatha SureClick 874 mg/mL subcutaneous pen injector inject 1 milliliter by subcutaneous route  every 2 weeks in the abdomen, thigh, or outer area of upper arm (rotate sites) N   sucralfate 1 gram tablet take 1 tablet by oral route  every 4 days on an empty stomach 1 hour before meals and at bedtime N   Synthroid 50 mcg Tab Take 1 tablet by mouth once a day N   Vitamin D3 2,000 unit capsule 1 po qd N         __________________________________________________________________________  CARDIAC HISTORY  CAD:  1 Chest Pain Syndrome, atypical. Pre-op eval. [Normal EF, 50% PLB, 75% Diagonal:  BMS to D.] - 44/5620   2 Chest Pain Syndrome, exertional/Dyspnea: ever since stent. [Non-Ischemic Stress, Marked HTN: Sx improved with BP control.] - 1/2011   3 Chest Pain Syndrome, atypical. [Non-Ischemic Stress] - 7/2014     ARRHYTHMIA:  1 Palpitations [Neg holter.] - 1/2011   2 Palpitations [Neg EVR and MPI.] - 6/2014     RISK FACTORS:  1 Hypertension   2 Dyslipidemia   3 Peripheral Vascular Disease   4 Tobacco Use       CARDIOVASCULAR PROCEDURES  Procedure Date Results   [facility] MPI 08/09/2019 EF 0.45 (45%), Possible Basal-Inferolateral Ischemia, (Early + ETT before Alicia). [facility] MPI 01/19/2011 EF 0.50 (50%), No Ischemia   Carotid Duplex 01/25/2017 1 - 49% Bilateral ICAs, Vertebral: Bilateral Antegrade Flow   Cath 08/09/2019 EF 0.50 (50%), Minimal CAD, 40-50% D1: Med Rx. Cath  Normal EF, 50% PLB, 75% Diagonal:  BMS to D.   DCCV 03/17/2020 Initial Rhythm A Fib, Final Rhythm Sinus, Max Joules 350, 2 Shocks   DCCV 01/28/2020 Initial Rhythm A Fib, Final Rhythm Sinus, Max Joules 300, 1 Shock   Echo 02/21/2020 EF 50-55% with mild LVE. Mild LVH. Normal RV. At [facility]. Echo 2019 EF 0.55 (55%), NWMA, Mild TR, Mild LVH, Mild MR, Moderate AR, Est RVSP 16 mmHg, Nl RV; @ [facility]. Echo 10/27/2010 EF 0.65 (65%), Moderate AR, Mild MR, No change vs 2010. EKG 2020 Sinus Rhythm, . . EVR  Sinus Rhythm, No Malignant Arrhythmias, No events. Holter 2020 Atrial Fib, HR , ave 76; 2.5 sec pauses during sleep; \"CP,dyspnea\" do not correspond to other arrhythmia. Holter 2011 Sinus Rhythm, No Malignant Arrhythmias, No Sx. MPI 2014 EF 0.58 (58%), No Ischemia, 9:30. [facility] 2020 EF 40-45%; global HK. Normal RV. Moderate AI. No thrombus. No PFO. ACTIVE ALLERGIES:  Ingredient Reaction Comment   FENOFIBRATE,MICRONIZED myalgia    FISH OIL couldn't tolerate    SIMVASTATIN myalgia    NIACIN decreased vision    FENOFIBRATE NANOCRYSTALLIZED myalgia    ATORVASTATIN CALCIUM decreased vision    PITAVASTATIN CALCIUM Muscular Pain    CLONIDINE HCL anxiety        PROBLEM LIST:  Problem Description Chronic   Chest pain Y   AFIB Y   CAD Y   Benign HTN Y   Mixed hyperlipidaemia Y   CVA Y       PAST MEDICAL/SURGICAL HISTORY  (Detailed)    Disease/disorder Onset Date Surgical History Date Comments     Cholecystectomy       Tonsillectomy     Anxiety       Depression       Hyperlipidemia       Hypertension       Hypothyroidism. knee surgery       prostectomy         Family History  (Detailed)  Relationship Family Member Name  Age at Death Condition Onset Age Cause of Death   Brother  N  Hypertension  N   Brother  [de-identified] and well  N   Father  Y  Hypertension  N   Father  Y  Stroke 55 Y     SOCIAL HISTORY  (Detailed)  Tobacco use reviewed. Preferred language is [place]. EDUCATION/EMPLOYMENT/OCCUPATION  Employment History Status Retired Restrictions            MARITAL STATUS/FAMILY/SOCIAL SUPPORT  Currently . Smoking status: Current every day smoker.       SMOKING STATUS  Use Status Type Smoking Status Usage Per Day Years Used Total Pack Years   yes  Current every day smoker        TOBACCO CESSATION INFORMATION  Date Counseled By Order Status Description Code Tobacco Cessation Information   06/30/2014      Smoking cessation education   06/30/2014 Tonnie Meals Tobacco cessation counseling completed   Tobacco Cessation Counseling   01/17/2017 Mera Hitchcock Tobacco cessation counseling completed   Tobacco Cessation Counseling   01/13/2020 Tonnie Meals Tobacco cessation counseling completed   Smoking effects education   02/19/2020 Samaria Quintana Tobacco cessation counseling completed   Counseling about tobacco use (procedure)         ALCOHOL  There is a history of alcohol use. consumed frequently.     CAFFEINE  The patient uses caffeine: coffee.    ______________________________________________________________________  Patient Active Problem List    Diagnosis Date Noted    SOB (shortness of breath) 08/10/2020    Chest pain 03/20/2020    Heart failure ([facility] [address] 03/20/2020    Acute respiratory failure with hypoxia ([address] 02/26/2020    Secondary hyperaldosteronism ([address] 02/26/2020    Cardiomegaly 02/26/2020    Pulmonary edema 02/26/2020    PAF (paroxysmal atrial fibrillation) ([address] 02/26/2020    Hypercoagulability due to atrial fibrillation ([address] 02/26/2020    Hypokalemia 02/26/2020    TIA (transient ischemic attack) 02/26/2020    Erosive gastritis 02/26/2020    Acute renal failure (ARF) ([address] 02/26/2020    Chronic systolic congestive heart failure ([address] 02/21/2020    Coronary artery disease involving native coronary artery of native heart without angina pectoris 05/03/2019    Essential hypertension 05/03/2019    Acquired hypothyroidism 05/03/2019    Mixed hyperlipidemia 05/03/2019        Past Medical History:   Diagnosis Date    Arrhythmia 01/03/2020    AFIB     CAD (coronary artery disease)     h/o stent- '10    Cancer [facility]) 2010    prostate    Congestive heart failure ([address]     Hypertension  Ill-defined condition     small bowel obstruction    Right rotator cuff tear 1/26/2017    SBO (small bowel obstruction) ([facility] [address] 11/3/2015    Stroke [facility]) May or June 2010    lacunar  infarctions (found on scan- no sxs)    Thyroid disease     hypo      Past Surgical History:   Procedure Laterality Date    CARDIAC SURG PROCEDURE UNLIST  2010    coronary stent    EXCIS KNEE CARTILAGE,MEDIAL OR LAT  1980's x3    HX CHOLECYSTECTOMY  1991    HX HEENT  1962    T&A    HX HERNIA REPAIR  1996    HX MALIGNANT SKIN LESION EXCISION      2004 and 2017    HX ORTHOPAEDIC  1974    Ganglion cyst left wrist    HX ORTHOPAEDIC Left 2017    shoulder repair    HX PROSTATECTOMY  2010    UPPER GI ENDOSCOPY,BIOPSY  2/25/2020          Allergies   Allergen Reactions    Clonidine Anxiety    Lasix [Furosemide] Other (comments)     May be the cause of kidney failure    Niacin Other (comments)     Decreased vision    Statins-Hmg-Coa Reductase Inhibitors Other (comments)     Muscle and Joint pains    Zetia [Ezetimibe] Myalgia     Pt reports getting joint and muscle pain.        Family History   Problem Relation Age of Onset    Dementia Mother     Other Father 55        cerebral hemorrhage      Social History     Socioeconomic History    Marital status:      Spouse name: Not on file    Number of children: Not on file    Years of education: Not on file    Highest education level: Not on file   Occupational History    Not on file   Social Needs    Financial resource strain: Not on file    Food insecurity     Worry: Not on file     Inability: Not on file    Transportation needs     Medical: Not on file     Non-medical: Not on file   Tobacco Use    Smoking status: Never Smoker    Smokeless tobacco: Former User   Substance and Sexual Activity    Alcohol use: Not Currently     Binge frequency: Patient refused    Drug use: Never    Sexual activity: Not Currently   Lifestyle    Physical activity     Days per week: Not on file     Minutes per session: Not on file    Stress: Not on file   Relationships    Social connections     Talks on phone: Not on file     Gets together: Not on file     Attends [employer] service: Not on file     Active member of club or organization: Not on file     Attends meetings of clubs or organizations: Not on file     Relationship status: Not on file    Intimate partner violence     Fear of current or ex partner: Not on file     Emotionally abused: Not on file     Physically abused: Not on file     Forced sexual activity: Not on file   Other Topics Concern    Not on file   Social History Narrative    Not on file     Current Facility-Administered Medications   Medication Dose Route Frequency    sodium chloride (NS) flush 5-40 mL  5-40 mL IntraVENous Q8H    sodium chloride (NS) flush 5-40 mL  5-40 mL IntraVENous PRN    heparin 25,000 units in D5W 250 ml infusion  8-25 Units/kg/hr IntraVENous TITRATE    amiodarone (CORDARONE) tablet 200 mg  200 mg Oral DAILY    hydrALAZINE (APRESOLINE) tablet 25 mg  25 mg Oral TID    isosorbide mononitrate ER (IMDUR) tablet 30 mg  30 mg Oral DAILY    levothyroxine (SYNTHROID) tablet 50 mcg  50 mcg Oral ACB    pantoprazole (PROTONIX) tablet 40 mg  40 mg Oral ACB&D    spironolactone (ALDACTONE) tablet 25 mg  25 mg Oral DAILY    sodium chloride (NS) flush 5-40 mL  5-40 mL IntraVENous Q8H    sodium chloride (NS) flush 5-40 mL  5-40 mL IntraVENous PRN    acetaminophen (TYLENOL) tablet 650 mg  650 mg Oral Q6H PRN    Or    acetaminophen (TYLENOL) suppository 650 mg  650 mg Rectal Q6H PRN    polyethylene glycol (MIRALAX) packet 17 g  17 g Oral DAILY PRN    promethazine (PHENERGAN) tablet 12.5 mg  12.5 mg Oral Q6H PRN    Or    ondansetron (ZOFRAN) injection 4 mg  4 mg IntraVENous Q6H PRN    nitroglycerin (NITROSTAT) tablet 0.4 mg  0.4 mg SubLINGual PRN    morphine injection 1 mg  1 mg IntraVENous Q4H PRN    aspirin chewable tablet 81 mg  81 mg Oral DAILY  bumetanide (BUMEX) injection 2 mg  2 mg IntraVENous BID        Prior to Admission Medications:  Prior to Admission medications    Medication Sig Start Date End Date Taking? Authorizing Provider   levothyroxine (SYNTHROID) 50 mcg tablet Take 50 mcg by mouth Daily (before breakfast). Yes Vandana, MD Asa   bumetanide (BUMEX) 2 mg tablet Take 1 Tab by mouth daily. Patient taking differently: Take 1 mg by mouth daily. 4/13/20  Yes Herman Villa III, DO   sucralfate (CARAFATE) 1 gram tablet Take 1 Tab by mouth Before breakfast, lunch, dinner and at bedtime. Patient taking differently: Take 1 g by mouth as needed. 4/13/20  Yes Herman Villa III, DO   pantoprazole (Protonix) 40 mg tablet Take 1 Tab by mouth Before breakfast and dinner. 4/2/20  Yes Herman Villa III, DO   hydrALAZINE (APRESOLINE) 25 mg tablet Take 1 Tab by mouth three (3) times daily. 2/26/20  Yes Olivia Saavedra NP   spironolactone (ALDACTONE) 25 mg tablet Take 1 Tab by mouth daily. Patient taking differently: Take 25 mg by mouth every other day. 2/26/20  Yes Olivia Saavedra NP   amiodarone (CORDARONE) 200 mg tablet Take  by mouth daily. Yes Provider, Historical   isosorbide mononitrate ER (IMDUR) 30 mg tablet Take 1 Tab by mouth daily. 1/31/20  Yes Meghan Shay MD   carvedilol (COREG) 25 mg tablet Take 1 Tab by mouth two (2) times daily (with meals). Patient taking differently: Take 25 mg by mouth two (2) times daily (with meals). Pt takes 1/2 tablet in morning, 1 tablet in evening, 1 tablet at bedtime 5/3/19  Yes Jaycee Jenkins,    cholecalciferol, vitamin D3, (VITAMIN D3) 2,000 unit tab Take 2,000 Units by mouth daily.    Yes Provider, Historical   Repatha SureClick pen injection INJECT 1 ML SUBCUTANEOUSLY EVERY 14 DAYS 6/2/20   Jennifer CHRISTIAN III, DO        Review of Symptoms: As noted in H&P:  \"Total of 12 systems reviewed as follows:                                        POSITIVE= underlined text  Negative = text not underlined  General:                     fever, chills, sweats, generalized weakness, weight loss/gain,                                       loss of appetite   Eyes:                           blurred vision, eye pain, loss of vision, double vision  ENT:                            rhinorrhea, pharyngitis   Respiratory:               cough, sputum production, SOB, DENG, wheezing, pleuritic pain   Cardiology:                chest pain, palpitations, orthopnea, PND, edema, syncope   Gastrointestinal:       abdominal pain , N/V, diarrhea, dysphagia, constipation, bleeding   Genitourinary:           frequency, urgency, dysuria, hematuria, incontinence   Muskuloskeletal :      arthralgia, myalgia, back pain  Hematology:              easy bruising, nose or gum bleeding, lymphadenopathy   Dermatological:         rash, ulceration, pruritis, color change / jaundice  Endocrine:                 hot flashes or polydipsia   Neurological:             headache, dizziness, confusion, focal weakness, paresthesia,                                      Speech difficulties, memory loss, gait difficulty  Psychological:          Feelings of anxiety, depression, agitation\".      24 hr VS reviewed, overall VSSAF  Temp (24hrs), Av.4 °F (36.9 °C), Min:98 °F (36.7 °C), Max:98.7 °F (37.1 °C)    Patient Vitals for the past 8 hrs:   Pulse   08/10/20 0730 (!) 56   08/10/20 0700 (!) 54   08/10/20 0654 (!) 55   08/10/20 0630 (!) 55   08/10/20 0601 (!) 58   08/10/20 0515 (!) 58   08/10/20 0500 (!) 55   08/10/20 0445 (!) 53   08/10/20 0430 (!) 52   08/10/20 0415 (!) 54   08/10/20 0400 (!) 59   08/10/20 0330 (!) 54   08/10/20 0315 (!) 57   08/10/20 0300 (!) 56   08/10/20 0245 (!) 57   08/10/20 0230 (!) 58   08/10/20 0215 61   08/10/20 0200 (!) 58   08/10/20 0145 (!) 59   08/10/20 0115 61   08/10/20 0100 (!) 56   08/10/20 0045 (!) 59     Patient Vitals for the past 8 hrs:   Resp   08/10/20 0730 12   08/10/20 0700 11 08/10/20 0654 17   08/10/20 0630 14   08/10/20 0601 14   08/10/20 0515 15   08/10/20 0500 13   08/10/20 0445 11   08/10/20 0430 10   08/10/20 0415 15   08/10/20 0400 17   08/10/20 0330 14   08/10/20 0315 17   08/10/20 0300 15   08/10/20 0245 17   08/10/20 0230 16   08/10/20 0215 15   08/10/20 0200 8   08/10/20 0145 16   08/10/20 0115 16   08/10/20 0100 17   08/10/20 0045 15     Patient Vitals for the past 8 hrs:   BP   08/10/20 0730 153/53   08/10/20 0700 162/65   08/10/20 0654 154/66   08/10/20 0630 170/62   08/10/20 0601 171/68   08/10/20 0515 153/59   08/10/20 0500 142/62   08/10/20 0445 163/63   08/10/20 0430 177/70   08/10/20 0415 169/67   08/10/20 0400 187/84   08/10/20 0330 153/75   08/10/20 0315 183/74   08/10/20 0300 182/75   08/10/20 0245 177/76   08/10/20 0230 184/76   08/10/20 0215 165/72   08/10/20 0200 172/70   08/10/20 0145 168/70   08/10/20 0115 191/74   08/10/20 0100 177/70   08/10/20 0045 171/73       Intake/Output Summary (Last 24 hours) at 8/10/2020 0838  Last data filed at 8/10/2020 0528  Gross per 24 hour   Intake    Output 1700 ml   Net -1700 ml         Physical Exam (complete single organ system exam)    Cons: The patient is no distress. Appears stated age. HEENT: Normal conjunctivae and palate. No xanthelasma. Neck: Flat JVP without appreciable HJR. Resp: Normal respiratory effort with clear lungs bilaterally. CV: Regular rate and rhythm. PMI not palpated. Normal S1,S2  No gallop or rubs appreciated. LOLA murmur appreciated. Intact carotid upstroke bilaterally without appreciated bruits. Abdominal aorta not palpated; no abdominal bruit noted. Intact pedal pulses. No peripheral edema. GI: No abd mass noted, soft; no organomegaly noted. Bowel sounds present. Muscular:  No significant kyphosis. Strength WNL for age. Ext: No cyanosis, clubbing, or stigmata of peripheral embolization. Derm: No ulcers or stasis dermatitis of lower extremities.    Neuro: Alert and oriented x 3;  Grossly non-focal. Normal mood and affect. Labs:   Recent Results (from the past 24 hour(s))   EKG, 12 LEAD, INITIAL    Collection Time: 08/10/20 12:04 AM   Result Value Ref Range    Ventricular Rate 60 BPM    Atrial Rate 60 BPM    P-R Interval 170 ms    QRS Duration 106 ms    Q-T Interval 498 ms    QTC Calculation (Bezet) 498 ms    Calculated P Axis 79 degrees    Calculated R Axis 34 degrees    Calculated T Axis 51 degrees    Diagnosis       Normal sinus rhythm  Nonspecific ST abnormality  Prolonged QT  When compared with ECG of 19-MAR-2020 08:10,  No significant change was found     SAMPLES BEING HELD    Collection Time: 08/10/20 12:15 AM   Result Value Ref Range    SAMPLES BEING HELD LAVENDER TUBE, BLUE TUBE, PST TUBE     COMMENT        Add-on orders for these samples will be processed based on acceptable specimen integrity and analyte stability, which may vary by analyte. CK W/ CKMB & INDEX    Collection Time: 08/10/20 12:15 AM   Result Value Ref Range    CK - MB 1.1 <3.6 NG/ML    CK-MB Index 1.1 0.0 - 2.5       39 - 308 U/L   NT-PRO BNP    Collection Time: 08/10/20 12:15 AM   Result Value Ref Range    NT pro-BNP 1,302 (H) <125 PG/ML   TROPONIN I    Collection Time: 08/10/20 12:15 AM   Result Value Ref Range    Troponin-I, Qt. 0.07 (H) <0.05 ng/mL   CBC WITH AUTOMATED DIFF    Collection Time: 08/10/20 12:15 AM   Result Value Ref Range    WBC 9.2 4.1 - 11.1 K/uL    RBC 3.88 (L) 4.10 - 5.70 M/uL    HGB 12.1 12.1 - 17.0 g/dL    HCT 38.6 36.6 - 50.3 %    MCV 99.5 (H) 80.0 - 99.0 FL    MCH 31.2 26.0 - 34.0 PG    MCHC 31.3 30.0 - 36.5 g/dL    RDW 12.6 11.5 - 14.5 %    PLATELET 394 596 - 494 K/uL    MPV 12.2 8.9 - 12.9 FL    NRBC 0.0 0  WBC    ABSOLUTE NRBC 0.00 0.00 - 0.01 K/uL    NEUTROPHILS 76 (H) 32 - 75 %    LYMPHOCYTES 15 12 - 49 %    MONOCYTES 5 5 - 13 %    EOSINOPHILS 3 0 - 7 %    BASOPHILS 1 0 - 1 %    IMMATURE GRANULOCYTES 0 0.0 - 0.5 %    ABS. NEUTROPHILS 7.1 1.8 - 8.0 K/UL    ABS. LYMPHOCYTES 1.4 0.8 - 3.5 K/UL    ABS. MONOCYTES 0.4 0.0 - 1.0 K/UL    ABS. EOSINOPHILS 0.2 0.0 - 0.4 K/UL    ABS. BASOPHILS 0.1 0.0 - 0.1 K/UL    ABS. IMM. GRANS. 0.0 0.00 - 0.04 K/UL    DF AUTOMATED     PROTHROMBIN TIME + INR    Collection Time: 08/10/20 12:15 AM   Result Value Ref Range    INR 1.0 0.9 - 1.1      Prothrombin time 10.7 9.0 - 11.1 sec   PTT    Collection Time: 08/10/20 12:15 AM   Result Value Ref Range    aPTT 25.4 22.1 - 32.0 sec    aPTT, therapeutic range     58.0 - 29.0 SECS   METABOLIC PANEL, COMPREHENSIVE    Collection Time: 08/10/20 12:15 AM   Result Value Ref Range    Sodium 142 136 - 145 mmol/L    Potassium 3.6 3.5 - 5.1 mmol/L    Chloride 109 (H) 97 - 108 mmol/L    CO2 29 21 - 32 mmol/L    Anion gap 4 (L) 5 - 15 mmol/L    Glucose 105 (H) 65 - 100 mg/dL    BUN 19 6 - 20 MG/DL    Creatinine 1.70 (H) 0.70 - 1.30 MG/DL    BUN/Creatinine ratio 11 (L) 12 - 20      GFR est AA 49 (L) >60 ml/min/1.73m2    GFR est non-AA 40 (L) >60 ml/min/1.73m2    Calcium 8.2 (L) 8.5 - 10.1 MG/DL    Bilirubin, total 0.7 0.2 - 1.0 MG/DL    ALT (SGPT) 49 12 - 78 U/L    AST (SGOT) 24 15 - 37 U/L    Alk. phosphatase 74 45 - 117 U/L    Protein, total 7.4 6.4 - 8.2 g/dL    Albumin 3.9 3.5 - 5.0 g/dL    Globulin 3.5 2.0 - 4.0 g/dL    A-G Ratio 1.1 1.1 - 2.2     POC EG7    Collection Time: 08/10/20 12:51 AM   Result Value Ref Range    Calcium, ionized (POC) 1.21 1.12 - 1.32 mmol/L    pH (POC) 7.46 (H) 7.35 - 7.45      pCO2 (POC) 34.2 (L) 35.0 - 45.0 MMHG    pO2 (POC) 61 (L) 80 - 100 MMHG    HCO3 (POC) 24.0 22 - 26 MMOL/L    Base excess (POC) 0 mmol/L    sO2 (POC) 93 92 - 97 %    Site RIGHT RADIAL      Device: ROOM AIR      Allens test (POC) YES      Specimen type (POC) ARTERIAL      Total resp.  rate 13     EKG, 12 LEAD, SUBSEQUENT    Collection Time: 08/10/20  1:01 AM   Result Value Ref Range    Ventricular Rate 58 BPM    Atrial Rate 58 BPM    P-R Interval 190 ms    QRS Duration 102 ms    Q-T Interval 554 ms    QTC Calculation (Bezet) 543 ms    Calculated P Axis 61 degrees    Calculated R Axis 25 degrees    Calculated T Axis 46 degrees    Diagnosis       Sinus bradycardia  Nonspecific ST and T wave abnormality    Confirmed by Ann Beauchamp ([id number]) on 8/10/2020 8:33:55 AM     TROPONIN I    Collection Time: 08/10/20  2:29 AM   Result Value Ref Range    Troponin-I, Qt. 0.08 (H) <0.05 ng/mL     Recent Labs     08/10/20  0229 08/10/20  0015   CPK  --  103   CKMB  --  1.1   CKNDX  --  1.1   TROIQ 0.08* 0.07*       Recent Labs     08/10/20  0015      K 3.6   *   CO2 29   BUN 19   CREA 1.70*   GFRAA 49*   *   CA 8.2*   ALB 3.9   WBC 9.2   HGB 12.1   HCT 38.6          Ara Kim MD

## 2020-08-10 NOTE — ED PROVIDER NOTES
EMERGENCY DEPARTMENT HISTORY AND PHYSICAL EXAM      Date: 8/9/2020  Patient Name: Jeanie García    History of Presenting Illness     Chief Complaint   Patient presents with    Shortness of Breath       History Provided By: Patient and Patient's Wife    HPI: Jeanie García, 77 y.o. male with PMHx significant for chronic kidney disease, atrial fibrillation, coronary artery disease, status post stent, prostate cancer, congestive heart failure, hypertension, CVA presents to the ED with chief complaint of shortness of breath that started about10:30 PM tonight. Patient was feeling well through the day, and symptoms seem to start out of the blue. He denies any cough, fever, chills. He does report some leg swelling lately. He denies chest pain but has had some tightness in his chest since the shortness of breath started. He denies any nausea, vomiting, diarrhea. Patient is followed by Dr. Anju Medina from cardiology and Dr. Marilu Tim for nephrology. Guerita Bautista PCP: Magdalena Reyes, DO    No current facility-administered medications on file prior to encounter. Current Outpatient Medications on File Prior to Encounter   Medication Sig Dispense Refill    levothyroxine (SYNTHROID) 50 mcg tablet Take 50 mcg by mouth Daily (before breakfast).  bumetanide (BUMEX) 2 mg tablet Take 1 Tab by mouth daily. (Patient taking differently: Take 1 mg by mouth daily.) 90 Tab 3    sucralfate (CARAFATE) 1 gram tablet Take 1 Tab by mouth Before breakfast, lunch, dinner and at bedtime. (Patient taking differently: Take 1 g by mouth as needed.) 360 Tab 3    pantoprazole (Protonix) 40 mg tablet Take 1 Tab by mouth Before breakfast and dinner. 180 Tab 3    hydrALAZINE (APRESOLINE) 25 mg tablet Take 1 Tab by mouth three (3) times daily. 90 Tab 0    spironolactone (ALDACTONE) 25 mg tablet Take 1 Tab by mouth daily.  (Patient taking differently: Take 25 mg by mouth every other day.) 30 Tab 0    amiodarone (CORDARONE) 200 mg tablet Take  by mouth daily.  isosorbide mononitrate ER (IMDUR) 30 mg tablet Take 1 Tab by mouth daily. 30 Tab 12    carvedilol (COREG) 25 mg tablet Take 1 Tab by mouth two (2) times daily (with meals). 180 Tab 3    cholecalciferol, vitamin D3, (VITAMIN D3) 2,000 unit tab Take 2,000 Units by mouth daily.  [DISCONTINUED] Euthyrox 50 mcg tablet TAKE 1 TABLET BY MOUTH ONCE DAILY BEFORE BREAKFAST 90 Tab 3    Repatha SureClick pen injection INJECT 1 ML SUBCUTANEOUSLY EVERY 14 DAYS 2 mL 11    [DISCONTINUED] apixaban (ELIQUIS) 5 mg tablet Take 5 mg by mouth two (2) times a day.  [DISCONTINUED] MULTI-VITAMIN HI-PO PO Take 1 Tab by mouth daily.          Past History     Past Medical History:  Past Medical History:   Diagnosis Date    Arrhythmia 01/03/2020    AFIB     CAD (coronary artery disease)     h/o stent- '10    Cancer [facility]) 2010    prostate    Congestive heart failure ([address]     Hypertension     Ill-defined condition     small bowel obstruction    Right rotator cuff tear 1/26/2017    SBO (small bowel obstruction) ([address] 11/3/2015    Stroke [address] [address] May or June 2010    lacunar  infarctions (found on scan- no sxs)    Thyroid disease     hypo       Past Surgical History:  Past Surgical History:   Procedure Laterality Date    CARDIAC SURG PROCEDURE UNLIST  2010    coronary stent    EXCIS KNEE CARTILAGE,MEDIAL OR LAT  1980's x3    HX CHOLECYSTECTOMY  1991    HX HEENT  1962    T&A    HX HERNIA REPAIR  1996    HX MALIGNANT SKIN LESION EXCISION      2004 and 2017    HX ORTHOPAEDIC  1974    Ganglion cyst left wrist    HX ORTHOPAEDIC Left 2017    shoulder repair    HX PROSTATECTOMY  2010    UPPER GI ENDOSCOPY,BIOPSY  2/25/2020            Family History:  Family History   Problem Relation Age of Onset    Dementia Mother     Other Father 55        cerebral hemorrhage       Social History:  Social History     Tobacco Use    Smoking status: Never Smoker    Smokeless tobacco: Former User   Substance Use Topics    Alcohol use: Not Currently     Binge frequency: Patient refused    Drug use: Never       Allergies: Allergies   Allergen Reactions    Clonidine Anxiety    Lasix [Furosemide] Other (comments)     May be the cause of kidney failure    Niacin Other (comments)     Decreased vision    Statins-Hmg-Coa Reductase Inhibitors Other (comments)     Muscle and Joint pains    Zetia [Ezetimibe] Myalgia     Pt reports getting joint and muscle pain. Review of Systems   Review of Systems   Constitutional: Negative for chills and fever. HENT: Negative for congestion, ear pain, rhinorrhea and sore throat. Respiratory: Positive for cough, chest tightness, shortness of breath and wheezing. Cardiovascular: Positive for leg swelling. Negative for chest pain and palpitations. Gastrointestinal: Negative for abdominal pain, diarrhea, nausea and vomiting. Genitourinary: Negative for dysuria, flank pain and hematuria. Musculoskeletal: Negative for back pain and myalgias. Skin: Negative for rash and wound. Neurological: Negative for dizziness, syncope, weakness, light-headedness and headaches. Psychiatric/Behavioral: Negative for confusion. The patient is not nervous/anxious. All other systems reviewed and are negative.         Physical Exam   General appearance -overweight, moderate respiratory distress, anxious  eyes - pupils equal and reactive, extraocular eye movements intact  ENT - mucous membranes moist, pharynx normal without lesions  Neck - supple, no significant adenopathy; non-tender to palpation  Chest -crackles bilateral bases, scattered expiratory wheezes, tachypneic, non-tender to palpation  Heart - normal rate and regular rhythm, S1 and S2 normal, no murmurs noted  Abdomen - soft, nontender, nondistended, no masses or organomegaly  Musculoskeletal - no joint tenderness, deformity or swelling; normal ROM  Extremities - peripheral pulses normal,   1+ pitting edema bilateral lower extremities   Skin - normal coloration and turgor, no rashes  Neurological - alert, oriented x3, normal speech, no focal findings or movement disorder noted    Diagnostic Study Results     Labs -     Recent Results (from the past 12 hour(s))   EKG, 12 LEAD, INITIAL    Collection Time: 08/10/20 12:04 AM   Result Value Ref Range    Ventricular Rate 60 BPM    Atrial Rate 60 BPM    P-R Interval 170 ms    QRS Duration 106 ms    Q-T Interval 498 ms    QTC Calculation (Bezet) 498 ms    Calculated P Axis 79 degrees    Calculated R Axis 34 degrees    Calculated T Axis 51 degrees    Diagnosis       Normal sinus rhythm  Nonspecific ST abnormality  Prolonged QT  When compared with ECG of 19-MAR-2020 08:10,  No significant change was found     SAMPLES BEING HELD    Collection Time: 08/10/20 12:15 AM   Result Value Ref Range    SAMPLES BEING HELD LAVENDER TUBE, BLUE TUBE, PST TUBE     COMMENT        Add-on orders for these samples will be processed based on acceptable specimen integrity and analyte stability, which may vary by analyte. Radiologic Studies -   XR CHEST PORT   Final Result   IMPRESSION: Cardiomegaly with moderately severe pulmonary edema. CT Results  (Last 48 hours)    None        CXR Results  (Last 48 hours)    None            Medical Decision Making   I am the first provider for this patient. I reviewed the vital signs, available nursing notes, past medical history, past surgical history, family history and social history. Vital Signs-Reviewed the patient's vital signs.   Patient Vitals for the past 12 hrs:   Temp Pulse Resp BP SpO2   08/10/20 0020     95 %   08/10/20 0014 98.7 °F (37.1 °C) 62 18 188/74 95 %       EKG on August 10, 2020 at 12:04 AM interpreted by me: Normal sinus rhythm, 60 bpm, normal axis, normal FL, QRS levels, prolonged QTc interval, nonspecific ST changes    Records Reviewed: Nursing Notes and Old Medical Records    Provider Notes (Medical Decision Making): Differential diagnosis: Congestive heart failure, fluid overload, pneumonia, bronchitis, will check CBC, CMP, CPK, troponin, proBNP, chest x-ray, ABG. Will administer Bumex and nitroglycerin paste and DuoNeb. ED Course:   Initial assessment performed. The patients presenting problems have been discussed, and they are in agreement with the care plan formulated and outlined with them. I have encouraged them to ask questions as they arise throughout their visit. Progress Notes:   Patient with evidence of pulmonary edema on chest x-ray. He is hypoxic. Has been treated with Bumex, nitroglycerin, albuterol, and morphine for chest tightness. Troponin was initially 0.07 and ankit to 0.08. Patient additionally given aspirin 324 mg and started on a heparin drip. Will admit to hospitalist    CRITICAL CARE NOTE :          IMPENDING DETERIORATION -Respiratory, Cardiovascular, Metabolic and Renal    ASSOCIATED RISK FACTORS - Hypoxia and Metabolic changes    MANAGEMENT- Bedside Assessment and Supervision of Care    INTERPRETATION -  Xrays, Blood Gases, ECG and Blood Pressure    INTERVENTIONS - hemodynamic mngmt, vascular control and Metobolic interventions    CASE REVIEW - Hospitalist, Nursing and Family    TREATMENT RESPONSE -Improved    PERFORMED BY - Self        NOTES   :      I have spent 45 minutes of critical care time involved in lab review, consultations with specialist, family decision- making, bedside attention and documentation. During this entire length of time I was immediately available to the patient . Chapito White MD            Disposition:  Admit to hospitalist        Diagnosis     Clinical Impression:   1. Acute respiratory failure with hypoxia (HCC)    2. Chest pain, unspecified type    3.  Acute on chronic congestive heart failure, unspecified heart failure type ([facility]

## 2020-08-10 NOTE — ED NOTES
Placed pt on 2L NC due to hypoxia at rest. Pt o2 saturation was 88% on RA. Pt now 96% with 2L NC. Pt reports that he still has chest tightness and SOB but both have improved some since medications. MD aware. Will continue to monitor.

## 2020-08-10 NOTE — CONSULTS
Consultation Note    NAME: Otoniel Carl   :  1955   MRN:  [id number]     Date/Time:  8/10/2020 11:46 AM    I have been asked to see this patient by Dr. Adair Rayo  for advice/opinion re: CKD. Assessment :    Plan:  CKD stage 3b-4  HTN, difficult to control  Multiple medication intolerances  Flash pulmonary edema Creatinine as an outpatient has been ~ 2.3 lately (better now at 1.7)    [facility] today (no intervention needed)    Poorly tolerated aldactone. Continue on bumex (increase the dose since not tolerating aldactone). No SARAH on angio . SBP now 130. Continue on Hydralazine 25 tid, imdur 30 for BP for now. Restart Bumex in the am (getting gentle hydration with contrast exposure)    Holding RAAS inh for now. Subjective:   CHIEF COMPLAINT:  ckd/htn    HISTORY OF PRESENT ILLNESS:     Juan Stephens is a 77 y.o.   male who has a history of the below. He had decreased dose of aldactone and stopped altogether about a week ago -- gynecomastia/breast pain/upper abdominal tightness. He had sudden onset of sob last night. Pulmonary edema on cxr. Some randell and weight had gone up. Good diuresis with bumex 2 mg iv last night. Sob better now. Just out from [address] BP's high (170 in the day, 200 at night).       Past Medical History:   Diagnosis Date    Arrhythmia 2020    AFIB     CAD (coronary artery disease)     h/o stent- '10    Cancer [facility])     prostate    Congestive heart failure ([address]     Hypertension     Ill-defined condition     small bowel obstruction    Right rotator cuff tear 2017    SBO (small bowel obstruction) ([address] 11/3/2015    Stroke ([facility] [address] May or 2010    lacunar  infarctions (found on scan- no sxs)    Thyroid disease     hypo      Past Surgical History:   Procedure Laterality Date    CARDIAC SURG PROCEDURE UNLIST      coronary stent    EXCIS KNEE CARTILAGE,MEDIAL OR LAT   x3    HX CHOLECYSTECTOMY      HX HEENT      T&A    HX HERNIA REPAIR [address] MALIGNANT SKIN LESION EXCISION      2004 and 2017    HX ORTHOPAEDIC  1974    Ganglion cyst left wrist    HX ORTHOPAEDIC Left 2017    shoulder repair    HX PROSTATECTOMY  2010    UPPER GI ENDOSCOPY,BIOPSY  2/25/2020          Social History     Tobacco Use    Smoking status: Never Smoker    Smokeless tobacco: Former User   Substance Use Topics    Alcohol use: Not Currently     Binge frequency: Patient refused      Family History   Problem Relation Age of Onset   Marcie Holder Dementia Mother    Marcie Holder Other Father 55        cerebral hemorrhage      Allergies   Allergen Reactions    Clonidine Anxiety    Lasix [Furosemide] Other (comments)     May be the cause of kidney failure    Niacin Other (comments)     Decreased vision    Statins-Hmg-Coa Reductase Inhibitors Other (comments)     Muscle and Joint pains    Zetia [Ezetimibe] Myalgia     Pt reports getting joint and muscle pain. Prior to Admission medications    Medication Sig Start Date End Date Taking? Authorizing Provider   levothyroxine (SYNTHROID) 50 mcg tablet Take 50 mcg by mouth Daily (before breakfast). Yes Other, MD Asa   bumetanide (BUMEX) 2 mg tablet Take 1 Tab by mouth daily. Patient taking differently: Take 1 mg by mouth daily. 4/13/20  Yes Herman Villa III, DO   sucralfate (CARAFATE) 1 gram tablet Take 1 Tab by mouth Before breakfast, lunch, dinner and at bedtime. Patient taking differently: Take 1 g by mouth as needed. 4/13/20  Yes Herman Villa III, DO   pantoprazole (Protonix) 40 mg tablet Take 1 Tab by mouth Before breakfast and dinner. 4/2/20  Yes Herman Villa III, DO   hydrALAZINE (APRESOLINE) 25 mg tablet Take 1 Tab by mouth three (3) times daily. 2/26/20  Yes Olivia Saavedra NP   spironolactone (ALDACTONE) 25 mg tablet Take 1 Tab by mouth daily. Patient taking differently: Take 25 mg by mouth every other day.  2/26/20  Yes Olivia Saavedra NP   amiodarone (CORDARONE) 200 mg tablet Take  by mouth daily. Yes Provider, Historical   isosorbide mononitrate ER (IMDUR) 30 mg tablet Take 1 Tab by mouth daily. 1/31/20  Yes Margarita Dudley MD   carvedilol (COREG) 25 mg tablet Take 1 Tab by mouth two (2) times daily (with meals). Patient taking differently: Take 25 mg by mouth two (2) times daily (with meals). Pt takes 1/2 tablet in morning, 1 tablet in evening, 1 tablet at bedtime 5/3/19  Yes Sheryl Sevilla DO   cholecalciferol, vitamin D3, (VITAMIN D3) 2,000 unit tab Take 2,000 Units by mouth daily.    Yes Provider, Historical   Repatha SureClick pen injection INJECT 1 ML SUBCUTANEOUSLY EVERY 14 DAYS 6/2/20   Genna CHRISTIAN III, DO     REVIEW OF SYSTEMS:     []  Unable to obtain reliable ROS due to  [] mental status  [] sedated   [] intubated   [x] Total of 12 systems reviewed as follows:  Constitutional: negative fever, negative chills, negative weight loss  Eyes:   negative visual changes  ENT:   negative sore throat, tongue or lip swelling  Respiratory:  negative cough, + dyspnea  Cards:  negative for chest pain, palpitations,+ lower extremity edema  GI:   negative for nausea, vomiting, diarrhea, and abdominal pain  :  negative for frequency, dysuria  Integument:  negative for rash and pruritus  Heme:  negative for easy bruising and gum/nose bleeding  Musculoskel: negative for myalgias,  back pain and muscle weakness  Neuro:  negative for headaches, dizziness, vertigo  Psych:  negative for feelings of anxiety, depression   Travel?: none    Objective:   VITALS:    Visit Vitals  /61   Pulse (!) 47   Temp 98 °F (36.7 °C)   Resp 12   Ht 6' (1.829 m)   Wt 113.4 kg (250 lb)   SpO2 96%   BMI 33.91 kg/m²     PHYSICAL EXAM:  Gen:  []  WD []  WN  [] cachectic []  thin [x]  obese []  disheveled             []  ill apearing  []   Critical  []   Chronic    [x]  No acute distress    HEENT:   [x] NC/AT/PERRLA/EOMI    [] pink conjunctivae      [] pale conjunctivae                  PERRL  [] yes [] no      [] moist mucosa    [] dry mucosa    hearing intact to voice [] yes  [] No                 NECK:   supple [x] yes  [] no        masses [] yes  [] No               thyroid  []  non tender  []  tender    RESP:   [x] CTA bilaterally/no wheezing/rhonchi/rales/crackles    [] rhonchi bilaterally - no dullness  [] wheezing   [] rhonchi   [] crackles     use of accessory muscles [] yes [] no    CARD:   [x]  regular rate and rhythm/No murmurs/rubs/gallops    murmur  [] yes ()  [] no      Rubs  [] yes  [] no       Gallops [] yes  [] no    Rate []  regular  []  irregular        carotid bruits  [] Right  []  Left                 LE edema [] yes  [x] no           JVP  []  yes   []  no    ABD:    [x] soft/non distended/non tender/+bowel sounds/no HSM    []  Rigid    tenderness [] yes [] no   Liver enlargement  []  yes []  no                Spleen enlargement  []  yes []  no     distended []  yes [] no     bowel sound  [] hypoactive   [] hyperactive    LYMPH:    Neck []  yes []  no       Axillae []  yes []  no    SKIN:   Rashes []  yes   [x]  no    Ulcers []  yes   []  no               [] tight to palpitation    skin turgor []  good  [] poor  [] decreased               Cyanosis/clubbing []  yes []  no    NEUR:   [x] cranial nerves II-XII grossly intact       [] Cranial nerves deficit                 []  facial droop    []  slurred speech   [] aphasic     [] Strength normal     []  weakness  []  LUE  []   RUE/ []  LLE  []   RLE    follows commands  [x]  yes []  no           PSYCH:   insight [] poor [x] good   Alert and Oriented to  [x] person  [x] place  [x]  time                    [] depressed [] anxious [] agitated  [] lethargic [] stuporous  [] sedated     LAB DATA REVIEWED:    Recent Labs     08/10/20  0015   WBC 9.2   HGB 12.1   HCT 38.6        Recent Labs     08/10/20  0015      K 3.6   *   CO2 29   BUN 19   CREA 1.70*   *   CA 8.2*     Recent Labs     08/10/20  0015   ALT 49   AP 74 TBILI 0.7   ALB 3.9   GLOB 3.5     Recent Labs     08/10/20  0920 08/10/20  0015   INR  --  1.0   PTP  --  10.7   APTT 30.3 25.4      No results for input(s): FE, TIBC, PSAT, FERR in the last 72 hours. No results for input(s): PH, PCO2, PO2 in the last 72 hours. Recent Labs     08/10/20  0015      CKMB 1.1     Lab Results   Component Value Date/Time    Glucose (POC) 114 (H) 02/24/2020 03:31 PM    Glucose (POC) 123 (H) 02/24/2020 11:24 AM    Glucose (POC) 117 (H) 02/23/2020 11:36 AM    Glucose (POC) 118 (H) 01/30/2020 08:19 AM       Procedures: see electronic medical records for all procedures/Xrays and details which were not copied into this note but were reviewed prior to creation of Plan.    ________________________________________________________________________       ___________________________________________________  Consulting Physician:  Noy Diaz MD

## 2020-08-10 NOTE — PROGRESS NOTES
Pt admitted earlier today by my colleague , reviewed chart , agree with H&P  C/w current management   Non billable round

## 2020-08-10 NOTE — PROGRESS NOTES
Pharmacy Medication Reconciliation     The patient was interviewed regarding current PTA medication list, use and drug allergies; Wife  present in room and obtained permission from patient to discuss drug regimen with visitor(s) present. The patient was questioned regarding use of any other inhalers, topical products, over the counter medications, herbal medications, vitamin products or ophthalmic/nasal/otic medication use. Allergy Update: Clonidine; Lasix [furosemide]; Niacin; Statins-hmg-coa reductase inhibitors; and Zetia [ezetimibe]    Recommendations/Findings: The following amendments were made to the patient's active medication list on file at [address]:   1) Additions:     2) Deletions: Spironolactone    3) Changes: Carvediol ; Sucarlfate      Pertinent Findings: Patient has stopped taking Apixaban in June and he discussed with his cardiologist. It was due to cost and because his wife indicates that his A-Fib was under control. Patient on his own has been taking 4 of 81 mg ECASA Daily. He stopped taking Repatha due to side effects, He believes it has increased his BP and he has joint pain and it is costly.     -Clarified PTA med list with patient and wife , rx query . PTA medication list was corrected to the following:     Prior to Admission Medications   Prescriptions Last Dose Informant Taking? Repatha SureClick pen injection Not Taking at Unknown time Self No   Sig: INJECT 1 ML SUBCUTANEOUSLY EVERY 14 DAYS   amiodarone (CORDARONE) 200 mg tablet 8/9/2020 at PM Self Yes   Sig: Take  by mouth nightly. bumetanide (BUMEX) 1 mg tablet  Self Yes   Sig: Take 1 mg by mouth nightly. carvediloL (Coreg) 25 mg tablet  Self Yes   Sig: Take half a tab in the morning, one tab in the evening, one tab at bedtime   cholecalciferol, vitamin D3, (VITAMIN D3) 2,000 unit tab 8/9/2020 at AM Self Yes   Sig: Take 2,000 Units by mouth daily.    hydrALAZINE (APRESOLINE) 25 mg tablet 8/9/2020 at PM Self Yes   Sig: Take 1 Tab by mouth three (3) times daily. isosorbide mononitrate ER (IMDUR) 30 mg tablet 8/9/2020 at AM Self Yes   Sig: Take 1 Tab by mouth daily. levothyroxine (SYNTHROID) 50 mcg tablet 8/9/2020 at AM Self Yes   Sig: Take 50 mcg by mouth Daily (before breakfast). pantoprazole (Protonix) 40 mg tablet 8/9/2020 at PM Self Yes   Sig: Take 1 Tab by mouth Before breakfast and dinner. sucralfate (CARAFATE) 1 gram tablet  Self Yes   Sig: Take 1 g by mouth nightly as needed for Other (GI upset).       Facility-Administered Medications: None          Thank you,  Feliciano Gutierrez, [facility]

## 2020-08-10 NOTE — ACP (ADVANCE CARE PLANNING)
Advance Care Planning Note      NAME: Timi Ledesma   :  1955   MRN:  [id number]     Date/Time:  8/10/2020 4:33 AM    Active Diagnoses:  Hospital Problems  Date Reviewed: 3/2/2020          Codes Class Noted POA    SOB (shortness of breath) ICD-10-CM: R06.02  ICD-9-CM: 786.05  8/10/2020 Unknown        Chest pain ICD-10-CM: R07.9  ICD-9-CM: 786.50  3/20/2020 Unknown              These active diagnoses are of sufficient risk that focused discussion on advance care planning is indicated in order to allow the patient to thoughtfully consider personal goals of care, and if situations arise that prevent the ability to personally give input, to ensure appropriate representation of their personal desires for different levels and aggressiveness of care. Discussion:   Code status addressed and wants to be a Full Code. Patient wants central line and vasopressors if needed. Patient would also want a feeding tube, if needed, for nutritional support. Patient  would like to assign his wife as the surrogate decision maker. Persons present and participating in discussion: Ron Cleaning MD, patient's wife      Time Spent:   Total time spent face-to-face in education and discussion:   17  minutes.          Manuel Fallon MD   Hospitalist

## 2020-08-10 NOTE — PROGRESS NOTES
[facility]  Home  Follow up appointments    Patient does not have home O2. Will need weaned or O2 challenge prior to discharge. Reason for Admission:   SOB, Chest pain                  RUR Score:     25%             PCP: First and Last name:  Sarah Rodriguez   Name of Practice:    Are you a current patient: Yes/No: Yes   Approximate date of last visit: Not sure   Can you participate in a virtual visit if needed: Not sure    Do you (patient/family) have any concerns for transition/discharge? No concerns identified. Plan for utilizing home health:   n/a    Current Advanced Directive/Advance Care Plan: Wife is Saturnino Ying    CM met with patient to discuss discharge planning. Pt name and  confirmed as pt identifiers. Demographics confirmed. ADL's/IADL' - independent pta to include steps and driving  DME - none  Preferred Rx - [facility] [address] [place]    Wife will provide transportation home at time of discharge. Care Management Interventions  PCP Verified by CM: Yes  Mode of Transport at Discharge: Other (see comment)(family)  Discharge Durable Medical Equipment: No  Physical Therapy Consult: No  Occupational Therapy Consult: No  Speech Therapy Consult: No  Current Support Network: Lives with Spouse(2 story home.   reports being able to safely navigate steps pta)  Confirm Follow Up Transport: Family  Discharge Location  Discharge Placement: Raymond Araiza RN CM  Ext [phone]

## 2020-08-11 ENCOUNTER — APPOINTMENT (OUTPATIENT)
Dept: GENERAL RADIOLOGY | Age: 65
DRG: 286 | End: 2020-08-11
Attending: INTERNAL MEDICINE
Payer: MEDICARE

## 2020-08-11 VITALS
DIASTOLIC BLOOD PRESSURE: 58 MMHG | WEIGHT: 247.14 LBS | TEMPERATURE: 98.3 F | SYSTOLIC BLOOD PRESSURE: 142 MMHG | HEIGHT: 72 IN | HEART RATE: 54 BPM | RESPIRATION RATE: 16 BRPM | OXYGEN SATURATION: 93 % | BODY MASS INDEX: 33.47 KG/M2

## 2020-08-11 LAB
ACT BLD: 125 SECS (ref 79–138)
ANION GAP SERPL CALC-SCNC: 7 MMOL/L (ref 5–15)
APTT PPP: 27.3 SEC (ref 22.1–32)
BUN SERPL-MCNC: 18 MG/DL (ref 6–20)
BUN/CREAT SERPL: 12 (ref 12–20)
CALCIUM SERPL-MCNC: 7.9 MG/DL (ref 8.5–10.1)
CHLORIDE SERPL-SCNC: 107 MMOL/L (ref 97–108)
CO2 SERPL-SCNC: 27 MMOL/L (ref 21–32)
COMMENT, HOLDF: NORMAL
CREAT SERPL-MCNC: 1.45 MG/DL (ref 0.7–1.3)
ERYTHROCYTE [DISTWIDTH] IN BLOOD BY AUTOMATED COUNT: 12.6 % (ref 11.5–14.5)
GLUCOSE SERPL-MCNC: 85 MG/DL (ref 65–100)
HCT VFR BLD AUTO: 31.8 % (ref 36.6–50.3)
HGB BLD-MCNC: 10.1 G/DL (ref 12.1–17)
MCH RBC QN AUTO: 31.7 PG (ref 26–34)
MCHC RBC AUTO-ENTMCNC: 31.8 G/DL (ref 30–36.5)
MCV RBC AUTO: 99.7 FL (ref 80–99)
NRBC # BLD: 0 K/UL (ref 0–0.01)
NRBC BLD-RTO: 0 PER 100 WBC
PLATELET # BLD AUTO: 155 K/UL (ref 150–400)
PMV BLD AUTO: 12.6 FL (ref 8.9–12.9)
POTASSIUM SERPL-SCNC: 3.1 MMOL/L (ref 3.5–5.1)
RBC # BLD AUTO: 3.19 M/UL (ref 4.1–5.7)
SAMPLES BEING HELD,HOLD: NORMAL
SODIUM SERPL-SCNC: 141 MMOL/L (ref 136–145)
THERAPEUTIC RANGE,PTTT: NORMAL SECS (ref 58–77)
TROPONIN I SERPL-MCNC: 0.08 NG/ML
WBC # BLD AUTO: 7.1 K/UL (ref 4.1–11.1)

## 2020-08-11 PROCEDURE — 85730 THROMBOPLASTIN TIME PARTIAL: CPT

## 2020-08-11 PROCEDURE — 85027 COMPLETE CBC AUTOMATED: CPT

## 2020-08-11 PROCEDURE — 74011250637 HC RX REV CODE- 250/637: Performed by: INTERNAL MEDICINE

## 2020-08-11 PROCEDURE — 71045 X-RAY EXAM CHEST 1 VIEW: CPT

## 2020-08-11 PROCEDURE — 94760 N-INVAS EAR/PLS OXIMETRY 1: CPT

## 2020-08-11 PROCEDURE — 36415 COLL VENOUS BLD VENIPUNCTURE: CPT

## 2020-08-11 PROCEDURE — 80048 BASIC METABOLIC PNL TOTAL CA: CPT

## 2020-08-11 PROCEDURE — 74011250636 HC RX REV CODE- 250/636: Performed by: INTERNAL MEDICINE

## 2020-08-11 PROCEDURE — 84484 ASSAY OF TROPONIN QUANT: CPT

## 2020-08-11 RX ORDER — BUMETANIDE 1 MG/1
1 TABLET ORAL
Status: DISCONTINUED | OUTPATIENT
Start: 2020-08-11 | End: 2020-08-11

## 2020-08-11 RX ORDER — BUMETANIDE 1 MG/1
2 TABLET ORAL 2 TIMES DAILY
Qty: 120 TAB | Refills: 2 | Status: SHIPPED | OUTPATIENT
Start: 2020-08-11 | End: 2020-11-09

## 2020-08-11 RX ORDER — CARVEDILOL 6.25 MG/1
6.25 TABLET ORAL 2 TIMES DAILY WITH MEALS
Qty: 60 TAB | Refills: 2 | Status: SHIPPED | OUTPATIENT
Start: 2020-08-11 | End: 2020-11-09

## 2020-08-11 RX ORDER — HYDRALAZINE HYDROCHLORIDE 100 MG/1
100 TABLET, FILM COATED ORAL 3 TIMES DAILY
Qty: 90 TAB | Refills: 2 | Status: SHIPPED | OUTPATIENT
Start: 2020-08-11 | End: 2020-11-09

## 2020-08-11 RX ORDER — CARVEDILOL 6.25 MG/1
6.25 TABLET ORAL 2 TIMES DAILY WITH MEALS
Status: DISCONTINUED | OUTPATIENT
Start: 2020-08-11 | End: 2020-08-11 | Stop reason: HOSPADM

## 2020-08-11 RX ORDER — GUAIFENESIN 100 MG/5ML
81 LIQUID (ML) ORAL DAILY
Qty: 30 TAB | Refills: 3 | Status: SHIPPED | OUTPATIENT
Start: 2020-08-12 | End: 2020-12-10

## 2020-08-11 RX ORDER — HYDRALAZINE HYDROCHLORIDE 50 MG/1
100 TABLET, FILM COATED ORAL 3 TIMES DAILY
Status: DISCONTINUED | OUTPATIENT
Start: 2020-08-11 | End: 2020-08-11 | Stop reason: HOSPADM

## 2020-08-11 RX ORDER — POTASSIUM CHLORIDE 750 MG/1
40 TABLET, FILM COATED, EXTENDED RELEASE ORAL ONCE
Status: DISCONTINUED | OUTPATIENT
Start: 2020-08-11 | End: 2020-08-11

## 2020-08-11 RX ORDER — BUMETANIDE 1 MG/1
2 TABLET ORAL 2 TIMES DAILY
Status: DISCONTINUED | OUTPATIENT
Start: 2020-08-11 | End: 2020-08-11 | Stop reason: HOSPADM

## 2020-08-11 RX ORDER — POTASSIUM CHLORIDE 7.45 MG/ML
10 INJECTION INTRAVENOUS
Status: COMPLETED | OUTPATIENT
Start: 2020-08-11 | End: 2020-08-11

## 2020-08-11 RX ORDER — BUMETANIDE 1 MG/1
1 TABLET ORAL 2 TIMES DAILY
Status: DISCONTINUED | OUTPATIENT
Start: 2020-08-11 | End: 2020-08-11

## 2020-08-11 RX ADMIN — ASPIRIN 81 MG CHEWABLE TABLET 81 MG: 81 TABLET CHEWABLE at 08:15

## 2020-08-11 RX ADMIN — PANTOPRAZOLE SODIUM 40 MG: 40 TABLET, DELAYED RELEASE ORAL at 06:41

## 2020-08-11 RX ADMIN — LEVOTHYROXINE SODIUM 50 MCG: 0.05 TABLET ORAL at 06:41

## 2020-08-11 RX ADMIN — POTASSIUM CHLORIDE 10 MEQ: 7.46 INJECTION, SOLUTION INTRAVENOUS at 12:55

## 2020-08-11 RX ADMIN — HYDRALAZINE HYDROCHLORIDE 25 MG: 50 TABLET, FILM COATED ORAL at 08:15

## 2020-08-11 RX ADMIN — POTASSIUM CHLORIDE 10 MEQ: 7.46 INJECTION, SOLUTION INTRAVENOUS at 14:38

## 2020-08-11 RX ADMIN — POTASSIUM CHLORIDE 10 MEQ: 7.46 INJECTION, SOLUTION INTRAVENOUS at 10:41

## 2020-08-11 RX ADMIN — BUMETANIDE 2 MG: 1 TABLET ORAL at 09:31

## 2020-08-11 RX ADMIN — CARVEDILOL 6.25 MG: 6.25 TABLET, FILM COATED ORAL at 10:41

## 2020-08-11 RX ADMIN — AMIODARONE HYDROCHLORIDE 200 MG: 200 TABLET ORAL at 08:16

## 2020-08-11 RX ADMIN — ISOSORBIDE MONONITRATE 30 MG: 30 TABLET, EXTENDED RELEASE ORAL at 08:15

## 2020-08-11 RX ADMIN — POTASSIUM CHLORIDE 10 MEQ: 7.46 INJECTION, SOLUTION INTRAVENOUS at 11:56

## 2020-08-11 NOTE — PROGRESS NOTES
Cardiology Progress Note  8/11/2020     Admit Date: 8/9/2020  Admit Diagnosis: Chest pain [R07.9]  SOB (shortness of breath) [R06.02]  CC: none currently  Cardiologist:  Dr Aicha Candelario. Cardiac Assessment/Plan:    1) CAD (D BMS 11/2010), w/ intermittent atypical CP; Neg MPI 2014 (Dx with clavicle separation)   *DENG/atypical CP 8/2019: worse BP (pt denies PTA); +/- abnl MPI: 40-50% D1 o/w normal: Med Rx. *EF 40-45% (global) @ [facility] 1/2020. EF 50-55% 2/2020. *Cath 8/10/20: No change in CAD. EF 45-50% on echo.     2) HTN, no renal artery stenosis at cath 8/2019  3) Dyslipidemia, (intolerant of statins/niacin/fenofibrate; Repatha/praluent too expensive). 4) AI: moderate 2010 & 8/2019; Moderate @ [facility] 1/2020.  5) AFib 12/2019, new onset, Sx (DENG/palps in 11 & 12/2019): Rx w/ eliquis/norvasc to dilt. *CV 1/28/20:  Recurrent AFib, amio added: Afib/flutter persistent 3/2020; CV 3/2020. *NSR; no palps: eliquis d/cd 6/2020 (Pt stopped previously due to cost): chronic amio.    6) palpitations:  Negative Holter/EvR 2011 & 2014 respectively. 7) prostate CA, remote prostatectomy  8) hypothyroidism  9) CKD IV: ZONIA 2/2020 (CR 2.8 from 1; 1.25 on discharge);    *Cr 2.29/GFR28 6/2020, off ACEi/ARB/aldactone (Dr Godfrey Floyd). 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 11) Esophageal dysmotility on BAgram & Erosive gastritis on EGD 2/2020 (Dr Héctor Lei)     Admitted with flash pulm edema (BPs 180); then CP after admit. I have recommended diagnostic cath for definitive evaluation of the patient's coronary anatomy and PCI if appropriate; All risks, benefits, and alternatives were discussed and patient wishes to proceed. Echo pending.         Echo 8/10/20:   · LV: Mildly dilated left ventricle. Mild concentric hypertrophy. Low normal systolic function. Estimated left ventricular ejection fraction is 45 - 50%. Visually measured ejection fraction. · LA: Mildly dilated left atrium. · MV: Mild mitral valve regurgitation is present. · TV: Mild tricuspid valve regurgitation is present. · AV: Mild aortic valve regurgitation is present. 8/11: BPs 130-170; HR 50s; Sinus; 95-97% RA. Good UOP (No Is recorded). Hg 10.1; Cr 1.45; K 3.1  Cardiac meds:  amio 200 qday; asa; bumex 2 PO bid; hydralazine 25 tid; imdur 30 qday; kcl 40 x1. Rec: stable cardiac status; Med Rx mild LV dysfxn; Recheck full TFTs. Increase hydralazine to 100 tid, decrease home coreg to 6.25 bid. Standing diuretic dose per Dr Ghanshyam Crow. Stable cardiac status; Dispo per primary team; Check sats with ambulation (ordered). F/U with me in a few weeks; Also f/u with Dr Phong Fowler to discuss ablation instead of chronic amio. Hospital Problem List:  Active Problems: Chest pain (3/20/2020) SOB (shortness of breath) (8/10/2020)   ____________________________________________________________________  Kailyn Sorenson is a 77 y.o. male presents with Chest pain [R07.9]  SOB (shortness of breath) [R06.02].    As noted in H&P: \"71 years old man with past medical history recurrent A. fib, diastolic heart failure, coronary artery disease, CKD, TIA presented emergency department due to shortness of breath and chest tightness started earlier today. Anoop Cortez reported that he has been getting short of breath every time he moves around or exerts himself. Maddy Lee also reported chest tightness started around 10 PM today when he got to the emergency department. Maddy Lee reported that his chest tightness is retrosternal, nonradiating, not aggravating or elevating by any factors.   He denied any nausea, vomiting, diarrhea, constipation. Maddy Lee reported that he has not been taking his spironolactone as he supposed to and he has been skipping doses sometimes.     In the emergency department, EKG revealed nonspecific ST changes with mildly elevated troponin suspicious for possible NSTEMI and chest x-ray revealed moderately severe pulmonary edema. \"     ______________________________________________________________________     The patient reports no Sx until yesterday; active w/o Sx; fairly good compliance with low sodium diet (\" 1 handful of chips yesterday. \")   He stopped aldactone 1 week ago (?stopped previously); Noted to have CHF/hypoxic resp failure POA: no acute ecg changes/no MI, w/ indeterminate troponin. Developed mild chest pressure overnight: better now.     No PND, orthopnea, palpitations, pre-syncope, syncope, peripheral edema, or decrease in exercise tolerance. No current complaints.     ECG: sinus massimo; no acute ST changes. Tele: sinus  CXR: \"Cardiomegaly with moderately severe pulmonary edema\"     Notable labs: Hg 12.1; Cr 1.7 from 1.5-1.6 (GFR 40s). proBNP 1k; trop 0.07/0.08; nl CK x1. 7.46/34/61 on RA. TSH 7.4 2/2020.     ______________________________________________________________________     Notable prior cardiac history:  @ [facility] 6/17/20   Mr Chandni Vazquez has a h/o:  1) CAD (D BMS 11/2010), w/ intermittent atypical CP; Neg MPI 2014 (Dx with clavicle separation)   *DENG/atypical CP 8/2019: worse BP (pt denies PTA); +/- abnl MPI: 40-50% D1 o/w normal: Med Rx. *EF 40-45% (global) @ [facility] 1/2020. EF 50-55% 2/2020.   2) HTN, no renal artery stenosis at cath 8/2019  3) Dyslipidemia,   4) AI: moderate 2010 & 8/2019; Moderate @ [facility] 1/2020.  5) AFib 12/2019, new onset, Sx (DENG/palps in 11 & 12/2019): Rx w/ eliquis/norvasc to dilt. *CV 1/28/20:  Recurrent AFib, amio added: Afib/flutter persistent 3/2020; CV 3/2020. *NSR; no palps: eliquis d/cd 6/2020 (Pt stopped previously due to cost).    6) palpitations:  Negative Holter/EvR 2011 & 2014 respectively. 7) prostate CA, remote prostatectomy  8) hypothyroidism  9) CKD IV: ZONIA 2/2020 (CR 2.8 from 1; 1.25 on discharge);    *Cr 2.29/GFR28 6/2020, off ACEi/ARB/aldactone (Dr Chelly Plascencia).   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 11) Esophageal dysmotility on BAgram & Erosive gastritis on EGD 2/2020 (Dr Hannah Wang).     2/2020: BP well controlled during the day, but elevated at night even after PM meds. Started on amio after last OV. Still with flutters in his chest from time to time. Hiatal hernia bothersome as well.     3/2020:  Palpitations & dyspnea with exertion since being discharged. Occasional GERD symptoms that is decreased with Carafate. No resting or anginal symptoms. His BPs been running 140-180 (on lower dose of ACEi due to ZONIA); follow-up with Dr. Marietta vEans pending.     6/2020:  No chest pain or recent dyspnea. No palpitations. No falling or bleeding. He self discontinued Eliquis due to cost as well as decreased amio on his own; not using repatha due to cost (praluent same cost). Rare ethanol; knows should be none. No added salt. ACEi & aldactone were stopped (& no ARB) due to CKD. Home BPs have been 150-180.     IMPRESSION AND PLAN  01. PAF (paroxysmal atrial fibrillation) (I48.0):  New onset 12/2019, but also likely had some in 11/2019. Underwent DCCV 1/2020 & 3/2020.     6/2020: cont NSR; cont amio but 200 qd; d/c eliquis. Patient knows to call if any palpitations. Again recommended he avoid alcohol. ECG done   02. Atherosclerotic heart disease of native coronary artery without angina pectoris (I25.10):  Remote diagonal BMS; mild diagonal disease at cath 8/2019, otherwise unremarkable.      We discussed the signs and symptoms of unstable angina, myocardial infarction and malignant arrhythmia. The patient knows to seek immediate medical attention should they occur. 03. Essential (primary) hypertension (I10): Inadequately controlled. Increase hydralazine to 50 tid; warned of potential side effects (pressure was low in the morning when previously taking 50 tid but was on ACEi at that time). Patient will maintain and submit a BP diary. 04.  Mixed hyperlipidemia (E78.2):  Patient is tolerating lipid lowering therapy well. Lipid labs drawn by PCP.   05. Nonrheumatic aortic (valve) insufficiency (I35.1):  Asymptomatic; consistently moderate; continue to follow. 06. Nicotine dependence, unspecified, uncomplicated (L71.576):  He continues to use smokeless tobacco.  The patient was instructed on tobacco cessation. 07. Body mass index (BMI) 34.0-34.9, adult (Z68.34): The patient was instructed on AHA diet and regular exercise.      ORDERS:  1 ECG done   2 Dietary management education, guidance, and counseling   3 Return office visit with Philip Chowdhury MD in 6 Months. 4 The patient was instructed on AHA diet and regular exercise.    5 Patient will maintain and submit a BP diar         6/2020 MEDICATION LIST  Medication Sig Desc   amiodarone 200 mg tablet take 1 tablet by oral route  every day   bumetanide 1 mg tablet take 1 tablet by oral route  every day   Coreg 12.5 mg tablet take 1 tablet by oral route 2 times every day with food   hydralazine 50 mg tablet take 1 tablet by oral route 3 times every day with food   isosorbide mononitrate ER 30 mg tablet,extended release 24 hr take 1 tablet by oral route  every day in the morning   multivitamin Tab Take 1 tablet by mouth once a day   Protonix 40 mg Tab Take 1 tablet by mouth once a day   Repatha SureClick 763 mg/mL subcutaneous pen injector inject 1 milliliter by subcutaneous route  every 2 weeks in the abdomen, thigh, or outer area of upper arm (rotate sites)   sucralfate 1 gram tablet take 1 tablet by oral route  every 4 days on an empty stomach 1 hour before meals and at bedtime   Synthroid 50 mcg Tab Take 1 tablet by mouth once a day   Vitamin D3 2,000 unit capsule 1 po qd            __________________________________________________________________________  CARDIAC HISTORY  CAD:  1 Chest Pain Syndrome, atypical. Pre-op eval. [Normal EF, 50% PLB, 75% Diagonal:  BMS to D.] - 87/5575   2 Chest Pain Syndrome, exertional/Dyspnea: ever since stent. [Non-Ischemic Stress, Marked HTN: Sx improved with BP control.] - 1/2011   3 Chest Pain Syndrome, atypical. [Non-Ischemic Stress] - 7/2014      ARRHYTHMIA:  1 Palpitations [Neg holter.] - 1/2011   2 Palpitations [Neg EVR and MPI.] - 6/2014      RISK FACTORS:  1 Hypertension   2 Dyslipidemia   3 Peripheral Vascular Disease   4 Tobacco Use         CARDIOVASCULAR PROCEDURES  Procedure Date Results   [facility] MPI 08/09/2019 EF 0.45 (45%), Possible Basal-Inferolateral Ischemia, (Early + ETT before Alicia). [facility] MPI 01/19/2011 EF 0.50 (50%), No Ischemia   Carotid Duplex 01/25/2017 1 - 49% Bilateral ICAs, Vertebral: Bilateral Antegrade Flow   Cath 08/09/2019 EF 0.50 (50%), Minimal CAD, 40-50% D1: Med Rx. Cath   Normal EF, 50% PLB, 75% Diagonal:  BMS to D.   DCCV 03/17/2020 Initial Rhythm A Fib, Final Rhythm Sinus, Max Joules 350, 2 Shocks   DCCV 01/28/2020 Initial Rhythm A Fib, Final Rhythm Sinus, Max Joules 300, 1 Shock   Echo 02/21/2020 EF 50-55% with mild LVE. Mild LVH. Normal RV. At [facility]. Echo 08/07/2019 EF 0.55 (55%), NWMA, Mild TR, Mild LVH, Mild MR, Moderate AR, Est RVSP 16 mmHg, Nl RV; @ [facility]. Echo 10/27/2010 EF 0.65 (65%), Moderate AR, Mild MR, No change vs 6/2010. EKG 06/17/2020 Sinus Rhythm, . . EVR   Sinus Rhythm, No Malignant Arrhythmias, No events. Holter 02/04/2020 Atrial Fib, HR , ave 76; 2.5 sec pauses during sleep; \"CP,dyspnea\" do not correspond to other arrhythmia. Holter 07/13/2011 Sinus Rhythm, No Malignant Arrhythmias, No Sx. MPI 07/09/2014 EF 0.58 (58%), No Ischemia, 9:30. [facility] 01/28/2020 EF 40-45%; global HK. Normal RV. Moderate AI. No thrombus. No PFO.        For other plans, see orders.   Hospital problem list     Active Hospital Problems    Diagnosis Date Noted    SOB (shortness of breath) 08/10/2020    Chest pain 03/20/2020        Subjective: Ramin Bruce reports       Chest pain X none  consistent with:  Non-cardiac CP Atypical CP     None now  On going  Anginal CP     Dyspnea X none  at rest  with exertion         improved  unchanged  worse              PND X none  overnight       Orthopnea X none  improved  unchanged  worse   Presyncope X none  improved  unchanged  worse     Ambulated in hallway without symptoms   Yes   Ambulated in room without symptoms  Yes   Objective:     Physical Exam:  Overall VSSAF;    Visit Vitals  /76 (BP 1 Location: Right arm, BP Patient Position: At rest)   Pulse (!) 57   Temp 98 °F (36.7 °C)   Resp 16   Ht 6' (1.829 m)   Wt 112.1 kg (247 lb 2.2 oz)   SpO2 96%   BMI 33.52 kg/m²     Temp (24hrs), Av.3 °F (36.8 °C), Min:98 °F (36.7 °C), Max:98.8 °F (37.1 °C)    Patient Vitals for the past 8 hrs:   Pulse   20 0759 (!) 57   20 0758 (!) 57   20 0303 (!) 59     Patient Vitals for the past 8 hrs:   Resp   20 0759 16   20 0303 16     Patient Vitals for the past 8 hrs:   BP   20 0759 163/76   20 0303 173/65       Intake/Output Summary (Last 24 hours) at 2020 0934  Last data filed at 2020 0321  Gross per 24 hour   Intake    Output 2250 ml   Net -2250 ml     General Appearance: Well developed, well nourished, no acute distress. Ears/Nose/Mouth/Throat:   Normal MM; anicteric. JVP: WNL   Resp:   Lungs clear to auscultation bilaterally. Nl resp effort. Cardiovascular:  RRR, S1, S2 normal, no new murmur. No gallop or rub. Abdomen:   Soft, non-tender, bowel sounds are present. Extremities: No edema bilaterally. Skin:  Neuro: Warm and dry. A/O x3, grossly nonfocal       cath site intact w/o hematoma or new bruit; distal pulse unchanged x Yes   Data Review:     Telemetry independently reviewed x sinus  chronic afib  parox afib  NSVT     ECG independently reviewed  NSR  afib  no significant changes  NSST-Tw chgs      no new ECG provided for review   Lab results reviewed as noted below. Current medications reviewed as noted below.     No results for input(s): PH, PCO2, PO2 in the last 72 hours.   Recent Labs     08/11/20  0307 08/10/20  0920 08/10/20  0229 08/10/20  0015   CPK  --   --   --  103   CKMB  --   --   --  1.1   CKNDX  --   --   --  1.1   TROIQ 0.08* 0.07* 0.08* 0.07*     Recent Labs     08/11/20  0307 08/10/20  0015    142   K 3.1* 3.6    109*   CO2 27 29   BUN 18 19   CREA 1.45* 1.70*   GFRAA 59* 49*   GLU 85 105*   CA 7.9* 8.2*   ALB  --  3.9   WBC 7.1 9.2   HGB 10.1* 12.1   HCT 31.8* 38.6    203     Lab Results   Component Value Date/Time    Cholesterol, total 227 (H) 02/23/2020 12:05 PM    HDL Cholesterol 35 02/23/2020 12:05 PM    LDL, calculated 157.6 (H) 02/23/2020 12:05 PM    Triglyceride 172 (H) 02/23/2020 12:05 PM    CHOL/HDL Ratio 6.5 (H) 02/23/2020 12:05 PM     Recent Labs     08/10/20  0015   AP 74   TP 7.4   ALB 3.9   GLOB 3.5     Recent Labs     08/11/20  0307 08/10/20  0920 08/10/20  0015   INR  --   --  1.0   PTP  --   --  10.7   APTT 27.3 30.3 25.4      No components found for: GLPOC    Current Facility-Administered Medications   Medication Dose Route Frequency    potassium chloride SR (KLOR-CON 10) tablet 40 mEq  40 mEq Oral ONCE    bumetanide (BUMEX) tablet 2 mg  2 mg Oral BID    sodium chloride (NS) flush 5-40 mL  5-40 mL IntraVENous Q8H    sodium chloride (NS) flush 5-40 mL  5-40 mL IntraVENous PRN    amiodarone (CORDARONE) tablet 200 mg  200 mg Oral DAILY    hydrALAZINE (APRESOLINE) tablet 25 mg  25 mg Oral TID    isosorbide mononitrate ER (IMDUR) tablet 30 mg  30 mg Oral DAILY    levothyroxine (SYNTHROID) tablet 50 mcg  50 mcg Oral ACB    pantoprazole (PROTONIX) tablet 40 mg  40 mg Oral ACB&D    sodium chloride (NS) flush 5-40 mL  5-40 mL IntraVENous Q8H    sodium chloride (NS) flush 5-40 mL  5-40 mL IntraVENous PRN    acetaminophen (TYLENOL) tablet 650 mg  650 mg Oral Q6H PRN    Or    acetaminophen (TYLENOL) suppository 650 mg  650 mg Rectal Q6H PRN    polyethylene glycol (MIRALAX) packet 17 g  17 g Oral DAILY PRN    promethazine (PHENERGAN) tablet 12.5 mg  12.5 mg Oral Q6H PRN    Or    ondansetron (ZOFRAN) injection 4 mg  4 mg IntraVENous Q6H PRN    nitroglycerin (NITROSTAT) tablet 0.4 mg  0.4 mg SubLINGual PRN    morphine injection 1 mg  1 mg IntraVENous Q4H PRN    aspirin chewable tablet 81 mg  81 mg Oral DAILY    sodium chloride (NS) flush 5-40 mL  5-40 mL IntraVENous Q8H    sodium chloride (NS) flush 5-40 mL  5-40 mL IntraVENous PRN    naloxone (NARCAN) injection 0.4 mg  0.4 mg IntraVENous PRN    diphenhydrAMINE (BENADRYL) injection 25 mg  25 mg IntraVENous Q4H PRN        Allan Harris MD

## 2020-08-11 NOTE — ROUTINE PROCESS
PCP [facility] apt scheduled with Dr. Marvel Ralph on 9/7/20(earliest avail apt) at 10:30AM.  Left message for nurse who will contact patient with earlier apt if possible

## 2020-08-11 NOTE — DISCHARGE INSTRUCTIONS
[address], [address]    [phone]  [place] [address]    [email or web address]    Patient Discharge Instructions    Ivy Fallon / [id number] : 1955    Admitted 2020 Discharged 2020     · It is important that you take the medication exactly as they are prescribed. · Keep your medication in the bottles provided by the pharmacist and keep a list of the medication names, dosages, and times to be taken in your wallet. · Do not take other medications without consulting your doctor. BRING ALL OF YOUR MEDICINES or a list of medicines with dosages TO YOUR OFFICE VISIT with Dr. Valentin Marie. Cardiac Catheterization  Discharge Instructions     Do not drive, operate any machinery, or sign any legal documents for 24 hours after your procedure. You must have someone to drive you home.  You may take a shower 24 hours after your cardiac catheterization. Be sure to get the dressing wet and then remove it; gently wash the area with warm soapy water. Pat dry and leave open to air. To help prevent infections, be sure to keep the cath site clean and dry. No lotions, creams, powders, ointments, etc. in the cath site for approximately 1 week.  Do not take a tub bath, get in a hot tub or swimming pool for approximately 5 days or until the cath site is completely healed.  No strenuous activity or heavy lifting over 10 lbs. for 7 days.  Drink plenty of fluids for 24-48 hours after your cath to flush the contrast dye from your kidneys. No alcoholic beverages for 24 hours. You may resume your previous diet (low fat, low cholesterol) after your cath.  After your cath, some bruising or discomfort is common during the healing process. Tylenol, 1-2 tablets every 6 hours as needed, is recommended if you experience any discomfort.   If you experience any signs or symptoms of infection such as fever, chills, or poorly healing incision, persistent tenderness or swelling in the groin, redness and/or warmth to the touch, numbness, significant tingling or pain at the groin site or affected extremity, rash, drainage from the cath site, or if the leg feels tight or swollen, call your physician right away.  If bleeding at the cath site occurs, take a clean gauze pad and apply direct pressure to the groin just above the puncture site. Call 911 immediately, and continue to apply direct pressure until an ambulance gets to your location.  You may return to work  2  days after your cardiac cath if no bleeding at the cath site. Heart Failure: After Your Visit  Your Care Instructions  Heart failure occurs when your heart does not pump as much blood as the body needs. Failure does not mean that the heart has stopped pumping but rather that it is not pumping as well as it should. Over time, this causes fluid buildup in your lungs and other parts of your body. Fluid buildup can cause shortness of breath, fatigue, swollen ankles, and other problems. By taking medicines regularly, reducing sodium (salt) in your diet, checking your weight every day, and making lifestyle changes, you can feel better and live longer. Follow-up care is a key part of your treatment and safety. Be sure to make and go to all appointments, and call your doctor if you are having problems. You need to keep a list of the medicines you take and the medicine dosages. How can you care for yourself at home? Diet  · Limit sodium (salt) in your diet to less than 1800 mg per day. People get most of their sodium from table salt that is added to food and from processed foods. Fast food and restaurant meals also tend to be very high in sodium. Do not add salt to your food. · Limit your liquids as directed by Dr Agustina Hobbs. Medicines  · Take your medicines exactly as prescribed. Call your doctor if you think you are having a problem with your medicine.   · Do not take any vitamins, over-the-counter medicine, or herbal products without talking to your doctor first. Crystal Claudio not take ibuprofen (Advil or Motrin) and naproxen (Aleve) without talking to your doctor first. They could make your heart failure worse. · You may be taking some of the following medicine. ¨ Beta-blockers can slow heart rate, decrease blood pressure, and improve your condition. Taking a beta-blocker may lower your chance of needing to be hospitalized again. ¨ Angiotensin-converting enzyme inhibitors (ACEIs) reduce the heart's workload, lower blood pressure, and reduce swelling. Taking an ACEI may lower your chance of needing to be hospitalized again. ¨ Angiotensin II receptor blockers (ARBs) work like ACEIs. Your doctor may prescribe them instead of or in addition to ACEIs. ¨ Diuretics, also called water pills, reduce swelling. ¨ Spironolactone is a diuretic that also keeps heart failure from getting worse. ¨ Digoxin reduces symptoms for some people with heart failure. ¨ Aspirin and other blood thinners prevent blood clots, which can cause a stroke or heart attack. ¨ Potassium supplements replace this important mineral, which is sometimes lost with diuretics. When should you call for help? Call 911 if you have symptoms of sudden heart failure such as:  · You have severe trouble breathing. · You cough up pink, foamy mucus. · You have a new irregular or rapid heartbeat. Call your doctor now or seek immediate medical care if:  · You have new or increased shortness of breath. · You are dizzy or lightheaded, or you feel like you may faint. · You have sudden weight gain, such as 3 pounds or more in 2 to 3 days. · You have increased swelling in your legs, ankles, or feet. · You are suddenly so tired or weak that you cannot do your usual activities. Watch closely for changes in your health, and be sure to contact your doctor if:  · You develop new symptoms  If you are smoking, please stop.  Smoking Cessation Program is a free, phone/text/email based, smoking cessation program. The program is individualized to meet each patient's needs. To enroll use this link https://[employer].StrongView/ra/survey/9411    Follow-up:   Follow-up Information     Follow up With Specialties Details Why Contact Info    Meagan Danielle DO Internal Medicine  PCP -hospital follow up  [address] [facility]  [phone]      Lisbet Scherer MD Cardiology In 3 weeks  [address]  [id number]  [facility]  [phone]      Mary Anne Malcolm MD Nephrology  Nephrology - hospital follow up  [facility] [address]  [phone]      Juan A Woodson MD Cardiology Schedule an appointment as soon as possible for a visit as they direct to discuss ablation instead of chronic amio. [address]  [address]  [facility]  [phone]            Information obtained by :  I understand that if any problems occur once I am at home I am to contact my physician. I understand and acknowledge receipt of the instructions indicated above. R.N.'s Signature                                                                  Date/Time                                                                                                                                              Patient or Representative Signature                                                          Date/Time      Sidney Weber MD      [address], [address]    [phone]  [address]    [email or web address]    DISCHARGE DIAGNOSIS:  Acute on chronic diastolic heart failure, POA  Atypical chest pain concerning for NSTEMI, POA  Coronary artery disease status post PCI 2010  Sinus bradycardia  QTC prolongation   CKD stage III  -History of recurrent A. fib status post cardioversion March 2020  Hypertension  History of TIA February 2020  History of prostate cancer status post prostatectomy 2010  Morbid obesity       MEDICATIONS:  · It is important that you take the medication exactly as they are prescribed. · Keep your medication in the bottles provided by the pharmacist and keep a list of the medication names, dosages, and times to be taken in your wallet. · Do not take other medications without consulting your doctor. Pain Management: per above medications    What to do at [address]:  Cardiac Diet    Recommended activity: Activity as tolerated    If you have questions regarding the hospital related prescriptions or hospital related issues please call [facility] at . You can always direct your questions to your primary care doctor if you are unable to reach your hospital physician; your PCP works as an extension of your hospital doctor just like your hospital doctor is an extension of your PCP for your time at the hospital [facility]).     If you experience any of the following symptoms then please call your primary care physician or return to the emergency room if you cannot get hold of your doctor:  Fever, chills, nausea, vomiting, diarrhea, change in mentation, falling, bleeding, shortness of breath

## 2020-08-11 NOTE — DISCHARGE SUMMARY
Hospitalist Discharge Summary     Patient ID:  Aliza Taylor  [id number]  6 y.o.  1955 8/9/2020    PCP on record: Barba Buerger, DO    Admit date: 8/9/2020  Discharge date and time: 8/11/2020    DISCHARGE DIAGNOSIS:  Acute on chronic diastolic heart failure, POA  Acute pulmonary edema due to heart disease   Atypical chest pain concerning for NSTEMI, POA  Coronary artery disease status post PCI 2010  Sinus bradycardia  QTC prolongation   CKD stage III  History of recurrent A. fib status post cardioversion March 2020  Hypertension  History of TIA February 2020  History of prostate cancer status post prostatectomy 2010  Morbid obesity    CONSULTATIONS:  IP CONSULT TO CARDIOLOGY    Excerpted HPI from H&P of Lelo Paulino MD:  The patient is 77years old man with past medical history recurrent A. fib, diastolic heart failure, coronary artery disease, CKD, TIA presented emergency department due to shortness of breath and chest tightness started earlier today. Patient reported that he has been getting short of breath every time he moves around or exerts himself. He also reported chest tightness started around 10 PM today when he got to the emergency department. He reported that his chest tightness is retrosternal, nonradiating, not aggravating or elevating by any factors. He denied any nausea, vomiting, diarrhea, constipation. He reported that he has not been taking his spironolactone as he supposed to and he has been skipping doses sometimes.     In the emergency department, EKG revealed nonspecific ST changes with mildly elevated troponin suspicious for possible NSTEMI and chest x-ray revealed moderately severe pulmonary edema.     We were asked to admit for work up and evaluation of the above problems.      ______________________________________________________________________  DISCHARGE SUMMARY/HOSPITAL COURSE:  for full details see H&P, daily progress notes, labs, consult notes.   Acute on chronic diastolic heart failure, POA   acute Pulmonary edema due to heart disease   Presented with shortness of breath, exertional dyspnea   Most recent echo February 2020 revealed EF 55%   X-ray revealed moderately severe pulmonary edema and cardiomegaly   echo showed EF 09-57% with diastolic dysfunction   S/p  Bumex to IV 2 mg twice daily  Daily weights  HECTOR's     Atypical chest pain concerning for NSTEMI, POA  Coronary artery disease status post PCI 2010  S/p cardiac cath on 08/10 which showed no significant obstruction    Does not meet full criteria for anginal chest pain   Troponin 0 0.07, 0.08   Nonspecific ST changes on EKG  s/p  heparin drip given high suspicious for an nSTEMI and high risk patient  Was given full dose aspirin in the ED,   Aspirin 81 mg daily  Patient is intolerant to Zetia and statins as he reported that he has severe myalgia usually  Carvedilol reduced    Cardiology consultation appreciated      Sinus bradycardia  QTC prolongation   Heart rate between 55 and 60 bpm   Asymptomatic  Coreg reduced to 6.25mg bid     on admission  Avoid QTC prolonging agents     CKD stage III  Creatinine worse than  Baseline on admission , had gentle hydration after cath   Creat back to baseline   C/w bumex     History of recurrent A. fib status post cardioversion March 2020  Hypertension   Continue hydralazine,, Imdur   Carvedilol retsrted at lower does  spirinolactone discontinued   Has been off Eliquis since June as he reported     History of TIA February 2020  History of prostate cancer status post prostatectomy 2010     Morbid obesity  Counseling was provided about weight loss    _______________________________________________________________________  Patient seen and examined by me on discharge day. Pertinent Findings:  Gen:    Not in distress  Chest: Clear lungs  CVS:   Regular rhythm.   No edema  Abd:  Soft, not distended, not tender  Neuro:  Alert, orientedx4  _______________________________________________________________________  DISCHARGE MEDICATIONS:   Current Discharge Medication List      START taking these medications    Details   aspirin 81 mg chewable tablet Take 1 Tab by mouth daily for 120 days. Qty: 30 Tab, Refills: 3         CONTINUE these medications which have CHANGED    Details   bumetanide (BUMEX) 1 mg tablet Take 2 Tabs by mouth two (2) times a day for 90 days. Qty: 120 Tab, Refills: 2      carvediloL (COREG) 6.25 mg tablet Take 1 Tab by mouth two (2) times daily (with meals) for 90 days. Qty: 60 Tab, Refills: 2      hydrALAZINE (APRESOLINE) 100 mg tablet Take 1 Tab by mouth three (3) times daily for 90 days. Qty: 90 Tab, Refills: 2         CONTINUE these medications which have NOT CHANGED    Details   levothyroxine (SYNTHROID) 50 mcg tablet Take 50 mcg by mouth Daily (before breakfast). sucralfate (CARAFATE) 1 gram tablet Take 1 g by mouth nightly as needed for Other (GI upset). pantoprazole (Protonix) 40 mg tablet Take 1 Tab by mouth Before breakfast and dinner. Qty: 180 Tab, Refills: 3      amiodarone (CORDARONE) 200 mg tablet Take  by mouth nightly. isosorbide mononitrate ER (IMDUR) 30 mg tablet Take 1 Tab by mouth daily. Qty: 30 Tab, Refills: 12      cholecalciferol, vitamin D3, (VITAMIN D3) 2,000 unit tab Take 2,000 Units by mouth daily. Repatha SureClick pen injection INJECT 1 ML SUBCUTANEOUSLY EVERY 14 DAYS  Qty: 2 mL, Refills: 11               Patient Follow Up Instructions: Activity: Activity as tolerated  Diet: Cardiac Diet  Wound Care: None needed    Follow-up with PCP  in 7 days.   Follow-up tests/labs   Follow-up Information     Follow up With Specialties Details Why Amol Cohen DO Internal Medicine Go on 9/7/2020 PCP -hospital follow up at 10:30AM.  The nurse will contact you with earlier apt  [facility] Fatuma Ashley MD Cardiology Go on 9/21/2020 Cardiology - hospital follow up. August 21st at 9:15am [address]  [id number]  [facility] [address]  [phone]      Barbara Gibbs MD Nephrology  Nephrology - hospital follow up  [address]   [address]  [address] [phone]      Ja Curran MD Cardiology Schedule an appointment as soon as possible for a visit as they direct to discuss ablation instead of chronic amio. Office nurse will review with MD and will contact patient to schedule.   [address]  [address]  [facility] [address]  [phone]          ________________________________________________________________    Risk of deterioration: High    Condition at Discharge:  Stable  __________________________________________________________________    Disposition  Home with family, no needs    ____________________________________________________________________    Code Status: Full Code  ___________________________________________________________________      Total time in minutes spent coordinating this discharge (includes going over instructions, follow-up, prescriptions, and preparing report for sign off to her PCP) :  45 minutes    Signed:  Cristian Marino MD

## 2020-08-11 NOTE — PROGRESS NOTES
Bedside shift change report given to Roosevelt Johnson (oncoming nurse) by Tess Dunlap (offgoing nurse). Report included the following information SBAR, Kardex, Procedure Summary, Intake/Output and Recent Results.

## 2020-08-11 NOTE — PROGRESS NOTES
NAME: Jeanie García        :  1955        MRN:  [id number]        Assessment :    Plan:  --CKD stage 3b-4    HTN, difficult to control    Multiple medication intolerances    Flash pulmonary edema    hypokalemia -Creatinine looks great at 1.5; [facility] 8/10    SBP yesterday ~ 130, high since last night (on lower coreg at 6.25 bid, bumex 2 mg po bid added and Hydralazine increased 25 to 100 tid, imdur 30 today). Stay off Aldactone for now ( I think his recent high outpatient creatinine has been from Aldactone). Getting K replacement as tolerates (states he poorly tolerates po K). Keep current f/u apt with me next week. Subjective:     Chief Complaint:  Feels better. No sob. No edema. Has the tight feeling and back pain today which he thought was from the aldactone. Review of Systems:    Symptom Y/N Comments  Symptom Y/N Comments   Fever/Chills    Chest Pain     Poor Appetite    Edema     Cough    Abdominal Pain     Sputum    Joint Pain     SOB/DENG    Pruritis/Rash     Nausea/vomit    Tolerating PT/OT     Diarrhea    Tolerating Diet     Constipation    Other       Could not obtain due to:      Objective:     VITALS:   Last 24hrs VS reviewed since prior progress note.  Most recent are:  Visit Vitals  /76 (BP 1 Location: Right arm, BP Patient Position: At rest)   Pulse (!) 57   Temp 98 °F (36.7 °C)   Resp 16   Ht 6' (1.829 m)   Wt 112.1 kg (247 lb 2.2 oz)   SpO2 96%   BMI 33.52 kg/m²       Intake/Output Summary (Last 24 hours) at 2020 1017  Last data filed at 2020 0321  Gross per 24 hour   Intake    Output 2250 ml   Net -2250 ml      Telemetry Reviewed:     PHYSICAL EXAM:  General: NAD  No randell  cta mohan    Lab Data Reviewed: (see below)    Medications Reviewed: (see below)    PMH/SH reviewed - no change compared to H&P  ________________________________________________________________________  Care Plan discussed with:  Patient     Family      RN     Care Manager                    Consultant:          Comments   >50% of visit spent in counseling and coordination of care       ________________________________________________________________________  Cindy Carlisle MD     Procedures: see electronic medical records for all procedures/Xrays and details which  were not copied into this note but were reviewed prior to creation of Plan. LABS:  Recent Labs     08/11/20  0307 08/10/20  0015   WBC 7.1 9.2   HGB 10.1* 12.1   HCT 31.8* 38.6    203     Recent Labs     08/11/20  0307 08/10/20  0015    142   K 3.1* 3.6    109*   CO2 27 29   BUN 18 19   CREA 1.45* 1.70*   GLU 85 105*   CA 7.9* 8.2*     Recent Labs     08/10/20  0015   AP 74   TP 7.4   ALB 3.9   GLOB 3.5     Recent Labs     08/11/20  0307 08/10/20  0920 08/10/20  0015   INR  --   --  1.0   PTP  --   --  10.7   APTT 27.3 30.3 25.4      No results for input(s): FE, TIBC, PSAT, FERR in the last 72 hours. Lab Results   Component Value Date/Time    Folate 27.2 (H) 02/23/2020 12:05 PM      No results for input(s): PH, PCO2, PO2 in the last 72 hours.   Recent Labs     08/10/20  0015      CKMB 1.1     No components found for: [place]  Lab Results   Component Value Date/Time    Color YELLOW/STRAW 02/21/2020 03:41 AM    Appearance CLEAR 02/21/2020 03:41 AM    Specific gravity 1.011 02/21/2020 03:41 AM    Specific gravity >1.030 (H) 10/11/2010 10:45 AM    pH (UA) 5.5 02/21/2020 03:41 AM    Protein NEGATIVE  02/21/2020 03:41 AM    Glucose NEGATIVE  02/21/2020 03:41 AM    Ketone NEGATIVE  02/21/2020 03:41 AM    Bilirubin NEGATIVE  02/21/2020 03:41 AM    Urobilinogen 0.2 02/21/2020 03:41 AM    Nitrites NEGATIVE  02/21/2020 03:41 AM    Leukocyte Esterase NEGATIVE  02/21/2020 03:41 AM    Epithelial cells FEW 02/21/2020 03:41 AM    Bacteria NEGATIVE  02/21/2020 03:41 AM    WBC 0-4 02/21/2020 03:41 AM    RBC 0-5 02/21/2020 03:41 AM MEDICATIONS:  Current Facility-Administered Medications   Medication Dose Route Frequency    bumetanide (BUMEX) tablet 2 mg  2 mg Oral BID    hydrALAZINE (APRESOLINE) tablet 100 mg  100 mg Oral TID    carvediloL (COREG) tablet 6.25 mg  6.25 mg Oral BID WITH MEALS    potassium chloride 10 mEq in 100 ml IVPB  10 mEq IntraVENous Q1H    sodium chloride (NS) flush 5-40 mL  5-40 mL IntraVENous Q8H    sodium chloride (NS) flush 5-40 mL  5-40 mL IntraVENous PRN    amiodarone (CORDARONE) tablet 200 mg  200 mg Oral DAILY    isosorbide mononitrate ER (IMDUR) tablet 30 mg  30 mg Oral DAILY    levothyroxine (SYNTHROID) tablet 50 mcg  50 mcg Oral ACB    pantoprazole (PROTONIX) tablet 40 mg  40 mg Oral ACB&D    sodium chloride (NS) flush 5-40 mL  5-40 mL IntraVENous Q8H    sodium chloride (NS) flush 5-40 mL  5-40 mL IntraVENous PRN    acetaminophen (TYLENOL) tablet 650 mg  650 mg Oral Q6H PRN    Or    acetaminophen (TYLENOL) suppository 650 mg  650 mg Rectal Q6H PRN    polyethylene glycol (MIRALAX) packet 17 g  17 g Oral DAILY PRN    promethazine (PHENERGAN) tablet 12.5 mg  12.5 mg Oral Q6H PRN    Or    ondansetron (ZOFRAN) injection 4 mg  4 mg IntraVENous Q6H PRN    nitroglycerin (NITROSTAT) tablet 0.4 mg  0.4 mg SubLINGual PRN    morphine injection 1 mg  1 mg IntraVENous Q4H PRN    aspirin chewable tablet 81 mg  81 mg Oral DAILY    sodium chloride (NS) flush 5-40 mL  5-40 mL IntraVENous Q8H    sodium chloride (NS) flush 5-40 mL  5-40 mL IntraVENous PRN    naloxone (NARCAN) injection 0.4 mg  0.4 mg IntraVENous PRN    diphenhydrAMINE (BENADRYL) injection 25 mg  25 mg IntraVENous Q4H PRN

## 2020-08-11 NOTE — PROGRESS NOTES
Problem: Falls - Risk of  Goal: *Absence of Falls  Description: Document Mitch Portillo Fall Risk and appropriate interventions in the flowsheet.   Outcome: Progressing Towards Goal  Note: Fall Risk Interventions:            Medication Interventions: Assess postural VS orthostatic hypotension, Teach patient to arise slowly                   Problem: Patient Education: Go to Patient Education Activity  Goal: Patient/Family Education  Outcome: Progressing Towards Goal

## 2020-08-11 NOTE — ROUTINE PROCESS
Discharge instructions reviewed with patient. IVs removed. Site CDI. Pt discharged via wheelchair to care of wife.

## 2020-08-11 NOTE — CDMP QUERY
Pt admitted with SOB, CP, Acute on chronic diastolic heart failure, POA Pt noted to have \"flash pulm edema\". If possible, please document in the progress notes and d/c summary if you are evaluating and / or treating any of the following:  Acute Pulmonary Edema due to heart disease or heart failure  Chronic Pulmonary Edema due to heart disease or heart failure  Noncardiogenic Acute Pulmonary Edema unrelated to heart disease or heart failure  Noncardiogenic Acute Pulmonary Edema due to other (please specify)  Other, please specify  Clinically unable to determine The medical record reflects the following: 
   Risk Factors: CKD 4, CHF, afib, htn Clinical Indicators: c/o SOB, cough, CP. cxr-  Cardiomegaly with moderately severe pulmonary edema PN 8/10 documented \"flash pulm edema\" BNP 1302 Treatment: consult cardiology, iv bumex, consult renal, I&O, daily weights, Increase hydralazine to 100 tid, decrease home coreg to 6.25 bid. EKG, ECHO supplemental O2 Thank You Deion Alatorre, [facility] 
   [phone]

## 2020-08-11 NOTE — PROGRESS NOTES
Chart review. Nurse notes reflect patient successfully ambulated on room air. SPO2 at 96%. SUZY  Home  Follow up appointments    CM will continue to follow for discharge planning.     Jailyn Delgado RN CM  Ext [phone]

## 2020-08-11 NOTE — ROUTINE PROCESS
Pulse ox spot check performed per order. Pt ambulated in hallway up and back 4 times. Pt maintained SpO2 at 96% on room air before, during, and after walk. HR stayed around 75. Pt had no complaints.

## 2020-08-11 NOTE — PROGRESS NOTES
CM acknowledged discharge order. SUZY  Home  Follow up appointments    CM tasks complete. Nursing informed.     [address] Bala Hammond, RN CM  Ext [phone]

## 2020-08-12 ENCOUNTER — PATIENT OUTREACH (OUTPATIENT)
Dept: CASE MANAGEMENT | Age: 65
End: 2020-08-12

## 2020-08-12 ENCOUNTER — TELEPHONE (OUTPATIENT)
Dept: INTERNAL MEDICINE CLINIC | Age: 65
End: 2020-08-12

## 2020-08-12 NOTE — TELEPHONE ENCOUNTER
Called, spoke to pt. Two identifiers confirmed. Notified pt per Dr. Kyung Estrella to follow up with cardiologist for hospital f/u. Pt verbalized understanding of information discussed w/ no further questions at this time.

## 2020-08-12 NOTE — TELEPHONE ENCOUNTER
#[phone] pt's hospital f/u is scheduled for Labor Day, 9-7-20. This needs to be changed. How did they schedule this on a holiday? Pt states he was instructed while in the hospital to start taking potassium. Does Dr. Barrera Risk prescribe this or Dr. Kristine Peacock? Please call pt on both.

## 2020-10-15 ENCOUNTER — APPOINTMENT (OUTPATIENT)
Dept: CARDIAC CATH/INVASIVE PROCEDURES | Age: 65
End: 2020-10-15
Attending: INTERNAL MEDICINE
Payer: MEDICARE

## 2020-10-18 ENCOUNTER — HOSPITAL ENCOUNTER (OUTPATIENT)
Dept: PREADMISSION TESTING | Age: 65
Discharge: HOME | End: 2020-10-18
Payer: MEDICARE

## 2020-10-18 PROCEDURE — 87635 SARS-COV-2 COVID-19 AMP PRB: CPT

## 2020-10-19 LAB
HEALTH STATUS, XMCV2T: NORMAL
SARS-COV-2, COV2NT: NOT DETECTED
SOURCE, COVRS: NORMAL
SPECIMEN SOURCE, FCOV2M: NORMAL
SPECIMEN TYPE, XMCV1T: NORMAL

## 2020-10-22 ENCOUNTER — APPOINTMENT (OUTPATIENT)
Dept: CARDIAC CATH/INVASIVE PROCEDURES | Age: 65
End: 2020-10-22
Attending: INTERNAL MEDICINE
Payer: MEDICARE

## 2020-10-22 ENCOUNTER — ANESTHESIA (OUTPATIENT)
Dept: CARDIAC CATH/INVASIVE PROCEDURES | Age: 65
End: 2020-10-22
Payer: MEDICARE

## 2020-10-22 ENCOUNTER — ANESTHESIA EVENT (OUTPATIENT)
Dept: CARDIAC CATH/INVASIVE PROCEDURES | Age: 65
End: 2020-10-22
Payer: MEDICARE

## 2020-10-22 ENCOUNTER — HOSPITAL ENCOUNTER (OUTPATIENT)
Age: 65
Discharge: HOME | End: 2020-10-23
Attending: INTERNAL MEDICINE | Admitting: INTERNAL MEDICINE
Payer: MEDICARE

## 2020-10-22 DIAGNOSIS — I48.91 ATRIAL FIBRILLATION, UNSPECIFIED TYPE (HCC): ICD-10-CM

## 2020-10-22 PROCEDURE — 77030026438 HC STYL ET INTUB CARD -A: Performed by: ANESTHESIOLOGY

## 2020-10-22 PROCEDURE — 74011250636 HC RX REV CODE- 250/636: Performed by: ANESTHESIOLOGY

## 2020-10-22 PROCEDURE — C1894 INTRO/SHEATH, NON-LASER: HCPCS | Performed by: INTERNAL MEDICINE

## 2020-10-22 PROCEDURE — 77010033678 HC OXYGEN DAILY

## 2020-10-22 PROCEDURE — C1893 INTRO/SHEATH, FIXED,NON-PEEL: HCPCS | Performed by: INTERNAL MEDICINE

## 2020-10-22 PROCEDURE — 76210000016 HC OR PH I REC 1 TO 1.5 HR

## 2020-10-22 PROCEDURE — C1766 INTRO/SHEATH,STRBLE,NON-PEEL: HCPCS | Performed by: INTERNAL MEDICINE

## 2020-10-22 PROCEDURE — C2630 CATH, EP, COOL-TIP: HCPCS | Performed by: INTERNAL MEDICINE

## 2020-10-22 PROCEDURE — 77030008684 HC TU ET CUF COVD -B: Performed by: ANESTHESIOLOGY

## 2020-10-22 PROCEDURE — C1730 CATH, EP, 19 OR FEW ELECT: HCPCS | Performed by: INTERNAL MEDICINE

## 2020-10-22 PROCEDURE — 77030003700 HC NDL TSEPTL STJU -C: Performed by: INTERNAL MEDICINE

## 2020-10-22 PROCEDURE — 77030033352 HC TBNG IRR PMP COOL PNT STJU -B: Performed by: INTERNAL MEDICINE

## 2020-10-22 PROCEDURE — 74011000250 HC RX REV CODE- 250: Performed by: NURSE ANESTHETIST, CERTIFIED REGISTERED

## 2020-10-22 PROCEDURE — 93656 COMPRE EP EVAL ABLTJ ATR FIB: CPT | Performed by: INTERNAL MEDICINE

## 2020-10-22 PROCEDURE — 93657 TX L/R ATRIAL FIB ADDL: CPT | Performed by: INTERNAL MEDICINE

## 2020-10-22 PROCEDURE — 77030029359 HC PRB ESOPH TEMP CATH ANTM -F: Performed by: INTERNAL MEDICINE

## 2020-10-22 PROCEDURE — 74011250636 HC RX REV CODE- 250/636: Performed by: NURSE ANESTHETIST, CERTIFIED REGISTERED

## 2020-10-22 PROCEDURE — 74011250637 HC RX REV CODE- 250/637: Performed by: NURSE PRACTITIONER

## 2020-10-22 PROCEDURE — 77030040754 HC DRSG QCLOT ZMED -D: Performed by: INTERNAL MEDICINE

## 2020-10-22 PROCEDURE — 77030013797 HC KT TRNSDUC PRSSR EDWD -A: Performed by: INTERNAL MEDICINE

## 2020-10-22 PROCEDURE — 77030018729 HC ELECTRD DEFIB PAD CARD -B: Performed by: INTERNAL MEDICINE

## 2020-10-22 PROCEDURE — C1759 CATH, INTRA ECHOCARDIOGRAPHY: HCPCS | Performed by: INTERNAL MEDICINE

## 2020-10-22 PROCEDURE — 77030029065 HC DRSG HEMO QCLOT ZMED -B: Performed by: INTERNAL MEDICINE

## 2020-10-22 PROCEDURE — 93662 INTRACARDIAC ECG (ICE): CPT

## 2020-10-22 PROCEDURE — 93613 INTRACARDIAC EPHYS 3D MAPG: CPT

## 2020-10-22 PROCEDURE — 77030013079 HC BLNKT BAIR HGGR 3M -A: Performed by: ANESTHESIOLOGY

## 2020-10-22 PROCEDURE — 74011250637 HC RX REV CODE- 250/637: Performed by: INTERNAL MEDICINE

## 2020-10-22 PROCEDURE — 74011250636 HC RX REV CODE- 250/636: Performed by: INTERNAL MEDICINE

## 2020-10-22 PROCEDURE — 74011000258 HC RX REV CODE- 258: Performed by: NURSE ANESTHETIST, CERTIFIED REGISTERED

## 2020-10-22 PROCEDURE — 77030038099 HC CBL CATH DIAG D-AVSE ABBT -D: Performed by: INTERNAL MEDICINE

## 2020-10-22 PROCEDURE — 77030015398 HC CBL EP EXT STJU -C: Performed by: INTERNAL MEDICINE

## 2020-10-22 PROCEDURE — 85347 COAGULATION TIME ACTIVATED: CPT

## 2020-10-22 PROCEDURE — 77030018733 HC ELECTRD KT ENSITE STJU -G: Performed by: INTERNAL MEDICINE

## 2020-10-22 PROCEDURE — 77030010880 HC CBL EP SUPRME STJU -C: Performed by: INTERNAL MEDICINE

## 2020-10-22 PROCEDURE — 76060000038 HC ANESTHESIA 3.5 TO 4 HR: Performed by: INTERNAL MEDICINE

## 2020-10-22 RX ORDER — SODIUM CHLORIDE 0.9 % (FLUSH) 0.9 %
5-40 SYRINGE (ML) INJECTION AS NEEDED
Status: DISCONTINUED | OUTPATIENT
Start: 2020-10-22 | End: 2020-10-23 | Stop reason: HOSPADM

## 2020-10-22 RX ORDER — OXYCODONE AND ACETAMINOPHEN 5; 325 MG/1; MG/1
1 TABLET ORAL
Status: DISCONTINUED | OUTPATIENT
Start: 2020-10-22 | End: 2020-10-23 | Stop reason: HOSPADM

## 2020-10-22 RX ORDER — DEXAMETHASONE SODIUM PHOSPHATE 4 MG/ML
INJECTION, SOLUTION INTRA-ARTICULAR; INTRALESIONAL; INTRAMUSCULAR; INTRAVENOUS; SOFT TISSUE AS NEEDED
Status: DISCONTINUED | OUTPATIENT
Start: 2020-10-22 | End: 2020-10-22 | Stop reason: HOSPADM

## 2020-10-22 RX ORDER — HEPARIN SODIUM 1000 [USP'U]/ML
INJECTION, SOLUTION INTRAVENOUS; SUBCUTANEOUS AS NEEDED
Status: DISCONTINUED | OUTPATIENT
Start: 2020-10-22 | End: 2020-10-22 | Stop reason: HOSPADM

## 2020-10-22 RX ORDER — BUMETANIDE 1 MG/1
2 TABLET ORAL 2 TIMES DAILY
Status: DISCONTINUED | OUTPATIENT
Start: 2020-10-22 | End: 2020-10-22

## 2020-10-22 RX ORDER — SODIUM CHLORIDE 0.9 % (FLUSH) 0.9 %
5-40 SYRINGE (ML) INJECTION AS NEEDED
Status: DISCONTINUED | OUTPATIENT
Start: 2020-10-22 | End: 2020-10-22 | Stop reason: HOSPADM

## 2020-10-22 RX ORDER — SUCRALFATE 1 G/1
1 TABLET ORAL
Status: DISCONTINUED | OUTPATIENT
Start: 2020-10-22 | End: 2020-10-22

## 2020-10-22 RX ORDER — SODIUM CHLORIDE 0.9 % (FLUSH) 0.9 %
5-40 SYRINGE (ML) INJECTION EVERY 8 HOURS
Status: DISCONTINUED | OUTPATIENT
Start: 2020-10-22 | End: 2020-10-22 | Stop reason: HOSPADM

## 2020-10-22 RX ORDER — GLYCOPYRROLATE 0.2 MG/ML
INJECTION INTRAMUSCULAR; INTRAVENOUS AS NEEDED
Status: DISCONTINUED | OUTPATIENT
Start: 2020-10-22 | End: 2020-10-22 | Stop reason: HOSPADM

## 2020-10-22 RX ORDER — SODIUM CHLORIDE 0.9 % (FLUSH) 0.9 %
5-40 SYRINGE (ML) INJECTION EVERY 8 HOURS
Status: DISCONTINUED | OUTPATIENT
Start: 2020-10-22 | End: 2020-10-23 | Stop reason: HOSPADM

## 2020-10-22 RX ORDER — SODIUM CHLORIDE 9 MG/ML
50 INJECTION, SOLUTION INTRAVENOUS CONTINUOUS
Status: DISPENSED | OUTPATIENT
Start: 2020-10-22 | End: 2020-10-23

## 2020-10-22 RX ORDER — SUCCINYLCHOLINE CHLORIDE 20 MG/ML
INJECTION INTRAMUSCULAR; INTRAVENOUS AS NEEDED
Status: DISCONTINUED | OUTPATIENT
Start: 2020-10-22 | End: 2020-10-22 | Stop reason: HOSPADM

## 2020-10-22 RX ORDER — FENTANYL CITRATE 50 UG/ML
INJECTION, SOLUTION INTRAMUSCULAR; INTRAVENOUS AS NEEDED
Status: DISCONTINUED | OUTPATIENT
Start: 2020-10-22 | End: 2020-10-22 | Stop reason: HOSPADM

## 2020-10-22 RX ORDER — ZOLPIDEM TARTRATE 5 MG/1
5 TABLET ORAL
Status: DISCONTINUED | OUTPATIENT
Start: 2020-10-22 | End: 2020-10-23 | Stop reason: HOSPADM

## 2020-10-22 RX ORDER — NEOSTIGMINE METHYLSULFATE 1 MG/ML
INJECTION, SOLUTION INTRAVENOUS AS NEEDED
Status: DISCONTINUED | OUTPATIENT
Start: 2020-10-22 | End: 2020-10-22 | Stop reason: HOSPADM

## 2020-10-22 RX ORDER — SUCRALFATE 1 G/1
1 TABLET ORAL
Status: DISCONTINUED | OUTPATIENT
Start: 2020-10-22 | End: 2020-10-23 | Stop reason: HOSPADM

## 2020-10-22 RX ORDER — COLCHICINE 0.6 MG/1
0.6 TABLET ORAL 2 TIMES DAILY
Status: DISCONTINUED | OUTPATIENT
Start: 2020-10-22 | End: 2020-10-23 | Stop reason: HOSPADM

## 2020-10-22 RX ORDER — CARVEDILOL 6.25 MG/1
6.25 TABLET ORAL 2 TIMES DAILY WITH MEALS
Status: DISCONTINUED | OUTPATIENT
Start: 2020-10-22 | End: 2020-10-23 | Stop reason: HOSPADM

## 2020-10-22 RX ORDER — HYDROMORPHONE HYDROCHLORIDE 1 MG/ML
.2-.5 INJECTION, SOLUTION INTRAMUSCULAR; INTRAVENOUS; SUBCUTANEOUS ONCE
Status: DISCONTINUED | OUTPATIENT
Start: 2020-10-22 | End: 2020-10-22 | Stop reason: HOSPADM

## 2020-10-22 RX ORDER — PROPOFOL 10 MG/ML
INJECTION, EMULSION INTRAVENOUS AS NEEDED
Status: DISCONTINUED | OUTPATIENT
Start: 2020-10-22 | End: 2020-10-22 | Stop reason: HOSPADM

## 2020-10-22 RX ORDER — ISOSORBIDE MONONITRATE 30 MG/1
30 TABLET, EXTENDED RELEASE ORAL DAILY
Status: DISCONTINUED | OUTPATIENT
Start: 2020-10-23 | End: 2020-10-23 | Stop reason: HOSPADM

## 2020-10-22 RX ORDER — FENTANYL CITRATE 50 UG/ML
25 INJECTION, SOLUTION INTRAMUSCULAR; INTRAVENOUS
Status: DISCONTINUED | OUTPATIENT
Start: 2020-10-22 | End: 2020-10-22 | Stop reason: HOSPADM

## 2020-10-22 RX ORDER — SODIUM CHLORIDE, SODIUM LACTATE, POTASSIUM CHLORIDE, CALCIUM CHLORIDE 600; 310; 30; 20 MG/100ML; MG/100ML; MG/100ML; MG/100ML
25 INJECTION, SOLUTION INTRAVENOUS CONTINUOUS
Status: DISCONTINUED | OUTPATIENT
Start: 2020-10-22 | End: 2020-10-22 | Stop reason: HOSPADM

## 2020-10-22 RX ORDER — HEPARIN SODIUM 200 [USP'U]/100ML
INJECTION, SOLUTION INTRAVENOUS
Status: COMPLETED | OUTPATIENT
Start: 2020-10-22 | End: 2020-10-22

## 2020-10-22 RX ORDER — PROTAMINE SULFATE 10 MG/ML
INJECTION, SOLUTION INTRAVENOUS AS NEEDED
Status: DISCONTINUED | OUTPATIENT
Start: 2020-10-22 | End: 2020-10-22 | Stop reason: HOSPADM

## 2020-10-22 RX ORDER — OXYCODONE AND ACETAMINOPHEN 5; 325 MG/1; MG/1
1 TABLET ORAL
Status: DISCONTINUED | OUTPATIENT
Start: 2020-10-22 | End: 2020-10-22 | Stop reason: HOSPADM

## 2020-10-22 RX ORDER — BUMETANIDE 1 MG/1
2 TABLET ORAL 2 TIMES DAILY
Status: DISCONTINUED | OUTPATIENT
Start: 2020-10-23 | End: 2020-10-23 | Stop reason: HOSPADM

## 2020-10-22 RX ORDER — ONDANSETRON 2 MG/ML
INJECTION INTRAMUSCULAR; INTRAVENOUS AS NEEDED
Status: DISCONTINUED | OUTPATIENT
Start: 2020-10-22 | End: 2020-10-22 | Stop reason: HOSPADM

## 2020-10-22 RX ORDER — ROCURONIUM BROMIDE 10 MG/ML
INJECTION, SOLUTION INTRAVENOUS AS NEEDED
Status: DISCONTINUED | OUTPATIENT
Start: 2020-10-22 | End: 2020-10-22 | Stop reason: HOSPADM

## 2020-10-22 RX ORDER — MORPHINE SULFATE 10 MG/ML
2 INJECTION, SOLUTION INTRAMUSCULAR; INTRAVENOUS
Status: DISCONTINUED | OUTPATIENT
Start: 2020-10-22 | End: 2020-10-22 | Stop reason: HOSPADM

## 2020-10-22 RX ORDER — GUAIFENESIN 100 MG/5ML
81 LIQUID (ML) ORAL DAILY
Status: DISCONTINUED | OUTPATIENT
Start: 2020-10-23 | End: 2020-10-23 | Stop reason: HOSPADM

## 2020-10-22 RX ORDER — PANTOPRAZOLE SODIUM 40 MG/1
40 TABLET, DELAYED RELEASE ORAL
Status: DISCONTINUED | OUTPATIENT
Start: 2020-10-22 | End: 2020-10-23 | Stop reason: HOSPADM

## 2020-10-22 RX ORDER — LEVOTHYROXINE SODIUM 50 UG/1
50 TABLET ORAL
Status: DISCONTINUED | OUTPATIENT
Start: 2020-10-23 | End: 2020-10-23 | Stop reason: HOSPADM

## 2020-10-22 RX ORDER — DIPHENHYDRAMINE HYDROCHLORIDE 50 MG/ML
12.5 INJECTION, SOLUTION INTRAMUSCULAR; INTRAVENOUS AS NEEDED
Status: DISCONTINUED | OUTPATIENT
Start: 2020-10-22 | End: 2020-10-22 | Stop reason: HOSPADM

## 2020-10-22 RX ORDER — EPHEDRINE SULFATE/0.9% NACL/PF 50 MG/5 ML
SYRINGE (ML) INTRAVENOUS AS NEEDED
Status: DISCONTINUED | OUTPATIENT
Start: 2020-10-22 | End: 2020-10-22 | Stop reason: HOSPADM

## 2020-10-22 RX ORDER — LIDOCAINE HYDROCHLORIDE 10 MG/ML
0.1 INJECTION, SOLUTION EPIDURAL; INFILTRATION; INTRACAUDAL; PERINEURAL AS NEEDED
Status: DISCONTINUED | OUTPATIENT
Start: 2020-10-22 | End: 2020-10-22 | Stop reason: HOSPADM

## 2020-10-22 RX ORDER — LIDOCAINE HYDROCHLORIDE 20 MG/ML
INJECTION, SOLUTION EPIDURAL; INFILTRATION; INTRACAUDAL; PERINEURAL AS NEEDED
Status: DISCONTINUED | OUTPATIENT
Start: 2020-10-22 | End: 2020-10-22 | Stop reason: HOSPADM

## 2020-10-22 RX ORDER — ENOXAPARIN SODIUM 150 MG/ML
1 INJECTION SUBCUTANEOUS ONCE
Status: COMPLETED | OUTPATIENT
Start: 2020-10-22 | End: 2020-10-22

## 2020-10-22 RX ORDER — HYDRALAZINE HYDROCHLORIDE 50 MG/1
100 TABLET, FILM COATED ORAL 3 TIMES DAILY
Status: DISCONTINUED | OUTPATIENT
Start: 2020-10-22 | End: 2020-10-23 | Stop reason: HOSPADM

## 2020-10-22 RX ORDER — SODIUM CHLORIDE 9 MG/ML
INJECTION, SOLUTION INTRAVENOUS
Status: DISCONTINUED | OUTPATIENT
Start: 2020-10-22 | End: 2020-10-22 | Stop reason: HOSPADM

## 2020-10-22 RX ADMIN — SODIUM CHLORIDE: 9 INJECTION, SOLUTION INTRAVENOUS at 09:36

## 2020-10-22 RX ADMIN — ONDANSETRON HYDROCHLORIDE 4 MG: 2 INJECTION, SOLUTION INTRAMUSCULAR; INTRAVENOUS at 12:47

## 2020-10-22 RX ADMIN — Medication 10 ML: at 13:56

## 2020-10-22 RX ADMIN — Medication 3 MG: at 12:55

## 2020-10-22 RX ADMIN — OXYCODONE HYDROCHLORIDE AND ACETAMINOPHEN 1 TABLET: 5; 325 TABLET ORAL at 15:50

## 2020-10-22 RX ADMIN — COLCHICINE 0.6 MG: 0.6 TABLET ORAL at 16:32

## 2020-10-22 RX ADMIN — PROPOFOL 160 MG: 10 INJECTION, EMULSION INTRAVENOUS at 09:44

## 2020-10-22 RX ADMIN — FENTANYL CITRATE 25 MCG: 50 INJECTION, SOLUTION INTRAMUSCULAR; INTRAVENOUS at 14:07

## 2020-10-22 RX ADMIN — SODIUM CHLORIDE 20 MCG/MIN: 900 INJECTION, SOLUTION INTRAVENOUS at 09:50

## 2020-10-22 RX ADMIN — ENOXAPARIN SODIUM 110 MG: 120 INJECTION SUBCUTANEOUS at 22:07

## 2020-10-22 RX ADMIN — Medication 10 ML: at 21:06

## 2020-10-22 RX ADMIN — SODIUM CHLORIDE 50 ML/HR: 900 INJECTION, SOLUTION INTRAVENOUS at 22:12

## 2020-10-22 RX ADMIN — PANTOPRAZOLE SODIUM 40 MG: 40 TABLET, DELAYED RELEASE ORAL at 21:08

## 2020-10-22 RX ADMIN — MORPHINE SULFATE 2 MG: 10 INJECTION INTRAVENOUS at 13:52

## 2020-10-22 RX ADMIN — HEPARIN SODIUM 12000 UNITS: 1000 INJECTION, SOLUTION INTRAVENOUS; SUBCUTANEOUS at 10:20

## 2020-10-22 RX ADMIN — PROTAMINE SULFATE 70 MG: 10 INJECTION, SOLUTION INTRAVENOUS at 12:39

## 2020-10-22 RX ADMIN — HEPARIN SODIUM 2000 UNITS: 1000 INJECTION, SOLUTION INTRAVENOUS; SUBCUTANEOUS at 11:42

## 2020-10-22 RX ADMIN — FENTANYL CITRATE 25 MCG: 50 INJECTION, SOLUTION INTRAMUSCULAR; INTRAVENOUS at 14:01

## 2020-10-22 RX ADMIN — FENTANYL CITRATE 50 MCG: 50 INJECTION, SOLUTION INTRAMUSCULAR; INTRAVENOUS at 09:43

## 2020-10-22 RX ADMIN — ROCURONIUM BROMIDE 10 MG: 10 INJECTION INTRAVENOUS at 10:32

## 2020-10-22 RX ADMIN — GLYCOPYRROLATE 0.6 MG: 0.2 INJECTION, SOLUTION INTRAMUSCULAR; INTRAVENOUS at 12:55

## 2020-10-22 RX ADMIN — LIDOCAINE HYDROCHLORIDE 100 MG: 20 INJECTION, SOLUTION INTRAVENOUS at 09:43

## 2020-10-22 RX ADMIN — HEPARIN SODIUM 5000 UNITS: 1000 INJECTION, SOLUTION INTRAVENOUS; SUBCUTANEOUS at 11:03

## 2020-10-22 RX ADMIN — DEXAMETHASONE SODIUM PHOSPHATE 4 MG: 4 INJECTION, SOLUTION INTRAMUSCULAR; INTRAVENOUS at 10:03

## 2020-10-22 RX ADMIN — Medication 5 MG: at 09:59

## 2020-10-22 RX ADMIN — SUCCINYLCHOLINE CHLORIDE 140 MG: 20 INJECTION, SOLUTION INTRAMUSCULAR; INTRAVENOUS at 09:44

## 2020-10-22 RX ADMIN — HEPARIN SODIUM 5000 UNITS: 1000 INJECTION, SOLUTION INTRAVENOUS; SUBCUTANEOUS at 10:39

## 2020-10-22 RX ADMIN — ROCURONIUM BROMIDE 45 MG: 10 INJECTION INTRAVENOUS at 09:50

## 2020-10-22 RX ADMIN — HEPARIN SODIUM 2000 UNITS: 1000 INJECTION, SOLUTION INTRAVENOUS; SUBCUTANEOUS at 11:23

## 2020-10-22 RX ADMIN — ROCURONIUM BROMIDE 10 MG: 10 INJECTION INTRAVENOUS at 11:29

## 2020-10-22 RX ADMIN — Medication 5 MG: at 10:32

## 2020-10-22 RX ADMIN — ROCURONIUM BROMIDE 5 MG: 10 INJECTION INTRAVENOUS at 09:43

## 2020-10-22 RX ADMIN — SODIUM CHLORIDE 50 ML/HR: 900 INJECTION, SOLUTION INTRAVENOUS at 16:00

## 2020-10-22 RX ADMIN — OXYCODONE HYDROCHLORIDE AND ACETAMINOPHEN 1 TABLET: 5; 325 TABLET ORAL at 22:05

## 2020-10-22 RX ADMIN — ROCURONIUM BROMIDE 10 MG: 10 INJECTION INTRAVENOUS at 11:04

## 2020-10-22 RX ADMIN — FENTANYL CITRATE 25 MCG: 50 INJECTION, SOLUTION INTRAMUSCULAR; INTRAVENOUS at 14:12

## 2020-10-22 RX ADMIN — SUCRALFATE 1 G: 1 TABLET ORAL at 15:50

## 2020-10-22 RX ADMIN — SODIUM CHLORIDE 500 ML: 900 INJECTION, SOLUTION INTRAVENOUS at 15:42

## 2020-10-22 NOTE — ANESTHESIA PREPROCEDURE EVALUATION
Anesthetic History   No history of anesthetic complications            Review of Systems / Medical History  Patient summary reviewed, nursing notes reviewed and pertinent labs reviewed    Pulmonary  Within defined limits                 Neuro/Psych       CVA (no symptoms, seen on scan): no residual symptoms  TIA     Cardiovascular    Hypertension      CHF  Dysrhythmias : atrial fibrillation  CAD (s/p stent 2010), cardiac stents and hyperlipidemia    Exercise tolerance: >4 METS  Comments: 08/2020  Estimated left ventricular ejection fraction is 45 - 50%.   Mild MR/AR/TR   GI/Hepatic/Renal         Renal disease: CRI  PUD    Comments: Epigastric Pain Endo/Other      Hypothyroidism: well controlled  Obesity and anemia  Pertinent negatives: No cancer (prostate)  Comments: H/O Prostate Cancer s/p Prostatectomy (2010) Other Findings            Physical Exam    Airway  Mallampati: II  TM Distance: 4 - 6 cm  Neck ROM: normal range of motion   Mouth opening: Normal     Cardiovascular    Rhythm: regular  Rate: normal         Dental      Comments: Multiple missing teeth   Pulmonary  Breath sounds clear to auscultation               Abdominal  GI exam deferred       Other Findings            Anesthetic Plan    ASA: 3  Anesthesia type: general    Monitoring Plan: BIS      Induction: Intravenous  Anesthetic plan and risks discussed with: Patient

## 2020-10-22 NOTE — Clinical Note
Transseptal Cath Performed check box under hemodynamic and ICE and Fluoro, BRK via a guiding sheath. Needle inserted.

## 2020-10-22 NOTE — Clinical Note
Sheath #1: sheath exchanged for INTRODUCER SHTH 8FR L63CM DIL 8FR L67CM 0.032IN TRANSSEPTAL. Hemostasis achieved.  8fr sheath RFV removed/ SL1 advanced over package wire/ aspirated and flushed

## 2020-10-22 NOTE — Clinical Note
TRANSFER - OUT REPORT:     Verbal report given to: JUAN JOSE Bailey. Report consisted of patient's Situation, Background, Assessment and   Recommendations(SBAR). Opportunity for questions and clarification was provided. Patient transported with a Registered Nurse, Monitor and Oxygen. Oxygen used for patient = nasal cannula, @ 2 - 6 Liters.     Patient transported to: PACU.   transported with CRNA and EP Lab staff

## 2020-10-22 NOTE — PROGRESS NOTES
He was in sinus at the beginning of the case. S/p PVI with 4/4 PV's isolated with wide encircling lesions. High power (40W) and short duration lesions were carefully given at the left posterior wall to achieve isolation as there was esophageal heating noted there. No further issues. A RA flutter with typical morphology  msec was induced with rapid atrial pacing. This was cardioverted to sinus to continue the case. Due to a history of persistent atrial fibrillation a left atrial roof line was performed. A left atrial flutter spontaneously occurred with  msec and eccentric LA activation. This spontaneously terminated after mapping of the roof line confirmed that there was block and moving the catheter in the region of EGM fractionation on the anterior wall. An anterior mitral isthmus line was performed to address this. Then, after moving the equipment right-sided, a CTI line was done in sinus rhythm. No complications, full note to follow.     Signed By: Sigrid Lipscomb MD     October 22, 2020

## 2020-10-22 NOTE — PERIOP NOTES
TRANSFER - OUT REPORT:    Verbal report given to Kvng Sierra (name) on [facility]  being transferred to [facility](unit) for routine post - op       Report consisted of patients Situation, Background, Assessment and   Recommendations(SBAR). Information from the following report(s) SBAR, Kardex, OR Summary, Intake/Output, MAR, Recent Results and Cardiac Rhythm NSR was reviewed with the receiving nurse. Opportunity for questions and clarification was provided.       Patient transported with:   Monitor  O2 @ 2 liters  Registered Nurse  [facility]

## 2020-10-22 NOTE — PERIOP NOTES
Handoff Report from Operating Room to PACU    Report received from Kiana James  and Eleazar Bernal CRNA regarding Lenoard Curls. Surgeon(s):  Anesthesia, Case  And Procedure(s) (LRB):  SPECIAL PROCEDURE OUTSIDE OF OR (N/A)  confirmed   with allergies, drains and dressings discussed. Anesthesia type, drugs, patient history, complications, estimated blood loss, vital signs, intake and output, and last pain medication and reversal medications were reviewed.

## 2020-10-22 NOTE — ANESTHESIA POSTPROCEDURE EVALUATION
Procedure(s):  ABLATION A-FIB  W COMPLETE EP STUDY  Ep 3d Mapping  Intracardiac Echocardiogram  Ablation Following A-Fib  Addl.    general    Anesthesia Post Evaluation      Multimodal analgesia: multimodal analgesia used between 6 hours prior to anesthesia start to PACU discharge  Patient location during evaluation: bedside  Patient participation: complete - patient participated  Level of consciousness: awake  Pain management: adequate  Airway patency: patent  Anesthetic complications: no  Cardiovascular status: acceptable  Respiratory status: acceptable  Hydration status: acceptable  Post anesthesia nausea and vomiting:  controlled  Final Post Anesthesia Temperature Assessment:  Normothermia (36.0-37.5 degrees C)      INITIAL Post-op Vital signs: No vitals data found for the desired time range.

## 2020-10-22 NOTE — PROGRESS NOTES
Called by nursing, some chest discomfort, possible gout flare, and BP slightly low with mild bradycardia. Holding carvedilol, hydralazine, isosorbide. Hold parameters provided. Restart bumetanide tomorrow AM, will give  cc bolus now. Tylenol and Percocet PRN pain. Will give colchicine starting this afternoon. Will see how he does with adjustments above.

## 2020-10-22 NOTE — Clinical Note
TRANSFER - IN REPORT:     Verbal report received from: [facility]. Report consisted of patient's Situation, Background, Assessment and   Recommendations(SBAR). Opportunity for questions and clarification was provided. Assessment completed upon patient's arrival to unit and care assumed. Patient transported with a Registered Nurse.

## 2020-10-22 NOTE — H&P
EP/ ARRHYTHMIA Preprocedure Note      Patient ID:  Patient: Eliceo Castro  MRN: [id number]  Age: 77 y.o.  : 1955    Date of  Admission: 10/22/2020  8:25 AM   PCP:  Mirian Sage DO    Assessment: 1. Recurrent atrial fibrillation including persistent episode requiring cardioversion. On chronic amiodarone. 2. Chronic anticoagulation. 3. History of old basal ganglia stroke. 4. Moderate AI. 5. CAD with remote PCI. 6. Hypertensive heart disease without heart failure but with CKD stage 3-4.  7. Dyslipidemia. 8. Erosive esophagitis on chronic PPI, sucralfate. 9. Hypothyroidism, on supplement. 10. Prostate CA with remote resection. Plan:     1. Given the potential benefits, risks, and alternatives, he agrees to proceed with Afib ablation using catheter RF. No BELKIS since he's in sinus rhythm today. 2. Stop amiodarone after the ablation. 3. Will use PPI and carafate post-procedure. Eliceo Castro is a 77 y.o. male with a history of the above here for an atrial fibrillation ablation.       Past Medical History:   Diagnosis Date    Arrhythmia 2020    AFIB     CAD (coronary artery disease)     h/o stent- '10    Cancer [facility])     prostate    Congestive heart failure ([address]     Hypertension     Ill-defined condition     small bowel obstruction    Right rotator cuff tear 2017    SBO (small bowel obstruction) ([address] 11/3/2015    Stroke ([facility] May or 2010    lacunar  infarctions (found on scan- no sxs)    Thyroid disease     hypo        Past Surgical History:   Procedure Laterality Date    CARDIAC SURG PROCEDURE UNLIST  2010    coronary stent    EXCIS KNEE CARTILAGE,MEDIAL OR LAT   x3    HX CHOLECYSTECTOMY      HX HEENT      T&A    HX HERNIA REPAIR      HX MALIGNANT SKIN LESION EXCISION       and     HX ORTHOPAEDIC  1974    Ganglion cyst left wrist    HX ORTHOPAEDIC Left 2017    shoulder repair    HX PROSTATECTOMY  2010    UPPER GI ENDOSCOPY,BIOPSY  2/25/2020            Social History     Tobacco Use    Smoking status: Never Smoker    Smokeless tobacco: Former User   Substance Use Topics    Alcohol use: Not Currently     Binge frequency: Patient refused        Family History   Problem Relation Age of Onset   [facility] Dementia Mother    [facility] Other Father 55        cerebral hemorrhage        Allergies   Allergen Reactions    Clonidine Anxiety    Lasix [Furosemide] Other (comments)     May be the cause of kidney failure    Niacin Other (comments)     Decreased vision    Statins-Hmg-Coa Reductase Inhibitors Other (comments)     Muscle and Joint pains    Zetia [Ezetimibe] Myalgia     Pt reports getting joint and muscle pain. OP medications reviewed and include AMIODARONE. Review of Symptoms:  See OV. Objective:      Physical Exam:  Patient Vitals for the past 12 hrs:   Temp Pulse Resp BP SpO2   10/22/20 0859    (!) 173/86    10/22/20 0841 98.6 °F (37 °C) 60 20 (!) 192/163 97 %     Nondiaphoretic, not in acute distress. Neuro grossly nonfocal.  No tremor. Awake and appropriate.     CARDIOGRAPHICS and STUDIES, I reviewed:    Telemetry:  Lisette Shipley MD  10/22/2020

## 2020-10-22 NOTE — Clinical Note
Sheath #1: Sheath: inserted. Sheath inserted/placed in the right femoral  vein. Hemostasis achieved.  Agilis sheath advanced transeptal/ aspirated and flushed/ connected to pressure line/ flush

## 2020-10-23 VITALS
DIASTOLIC BLOOD PRESSURE: 58 MMHG | RESPIRATION RATE: 21 BRPM | OXYGEN SATURATION: 95 % | HEART RATE: 59 BPM | BODY MASS INDEX: 32.51 KG/M2 | WEIGHT: 240 LBS | TEMPERATURE: 98.1 F | SYSTOLIC BLOOD PRESSURE: 131 MMHG | HEIGHT: 72 IN

## 2020-10-23 LAB
ACT BLD: 125 SECS (ref 79–138)
ACT BLD: 219 SECS (ref 79–138)
ACT BLD: 235 SECS (ref 79–138)
ACT BLD: 252 SECS (ref 79–138)
ACT BLD: 257 SECS (ref 79–138)
ACT BLD: 263 SECS (ref 79–138)
ACT BLD: 279 SECS (ref 79–138)
ATRIAL RATE: 63 BPM
CALCULATED P AXIS, ECG09: 73 DEGREES
CALCULATED R AXIS, ECG10: 65 DEGREES
CALCULATED T AXIS, ECG11: 37 DEGREES
DIAGNOSIS, 93000: NORMAL
P-R INTERVAL, ECG05: 178 MS
Q-T INTERVAL, ECG07: 516 MS
QRS DURATION, ECG06: 114 MS
QTC CALCULATION (BEZET), ECG08: 528 MS
VENTRICULAR RATE, ECG03: 63 BPM

## 2020-10-23 PROCEDURE — 74011250637 HC RX REV CODE- 250/637: Performed by: INTERNAL MEDICINE

## 2020-10-23 PROCEDURE — 93005 ELECTROCARDIOGRAM TRACING: CPT

## 2020-10-23 PROCEDURE — 74011250637 HC RX REV CODE- 250/637: Performed by: NURSE PRACTITIONER

## 2020-10-23 RX ORDER — SUCRALFATE 1 G/1
1 TABLET ORAL
Qty: 90 TAB | Refills: 0 | Status: SHIPPED | OUTPATIENT
Start: 2020-10-23

## 2020-10-23 RX ADMIN — OXYCODONE HYDROCHLORIDE AND ACETAMINOPHEN 1 TABLET: 5; 325 TABLET ORAL at 03:38

## 2020-10-23 RX ADMIN — SUCRALFATE 1 G: 1 TABLET ORAL at 08:24

## 2020-10-23 RX ADMIN — PANTOPRAZOLE SODIUM 40 MG: 40 TABLET, DELAYED RELEASE ORAL at 08:24

## 2020-10-23 RX ADMIN — BUMETANIDE 2 MG: 1 TABLET ORAL at 08:24

## 2020-10-23 RX ADMIN — LEVOTHYROXINE SODIUM 50 MCG: 0.05 TABLET ORAL at 07:09

## 2020-10-23 RX ADMIN — Medication 1 AMPULE: at 09:00

## 2020-10-23 RX ADMIN — ASPIRIN 81 MG: 81 TABLET, CHEWABLE ORAL at 08:24

## 2020-10-23 NOTE — PROGRESS NOTES
1930: [facility]    2106: Uribe catheter removed due to bedrest being complete. 2120: Pt up with assist to recliner. Pt c/o dizziness upon walking and sitting in recliner but subsided once seated and resting. 2200: Pt and wife requested VS to be done before 11pm so wife could go home and pt could sleep. 2205: Pt c/o pain in chest form procedure and pain in left foot from Gout flare. Pt given Percocet 5/325 po.

## 2020-10-23 NOTE — PROGRESS NOTES
S/p Afib ablation with RF yesterday. He feels good. Ambulatory and taking oral.  Some pain in his toe, thinks it may be gout. Visit Vitals  BP (!) 119/57   Pulse (!) 58   Temp 98.1 °F (36.7 °C)   Resp 21   Ht 6' (1.829 m)   Wt 108.9 kg (240 lb)   SpO2 95%   BMI 32.55 kg/m²       ND, NAD. Mild pedal edema bilaterally. Awake, appropriate, neuro grossly nonfocal.    Tele:  SR. PLAN:  Discharge to home with F/U in the office in 2 weeks. Medications and instructions reviewed. All questions answered for him and his wife. Patient is aware of signs and sx warranting urgent med F/U or calling 911.     Signed By: Delaney Rock MD     October 23, 2020

## 2020-10-23 NOTE — PROGRESS NOTES
Problem: Pain  Goal: *Control of Pain  10/23/2020 0413 by Megan Mckeon RN  Outcome: Progressing Towards Goal  10/22/2020 2029 by Megan Mckeon RN  Outcome: Progressing Towards Goal

## 2020-10-23 NOTE — DISCHARGE INSTRUCTIONS
Patient ID:  Pranav Swift  [id number]  32 y.o.  1955    Admit Date: 10/22/2020  Discharge Date: 10/23/2020     Admitting Physician: Katherine Solitario MD   Discharge Physician: Katherine Solitario MD    Admission Diagnoses:   Atrial fibrillation, unspecified type [facility]) [I48.91]  Atrial fibrillation [facility]) [I48.91]    Discharge Diagnoses: Active Problems:    Atrial fibrillation ([address] (10/22/2020)      Overview: 10/22/2020 PVI afib ablation      Discharge Condition: Good    Cardiology Procedures this Admission:  Atrial fibrillation ablation using radiofrequency energy    Disposition: home    Reference discharge instructions provided by nursing for diet and activity. POST-ABLATION DISCHARGE INSTRUCTIONS:    You had an atrial fibrillation ablation with Dr. Ama Metzger on 10/22. Please continue all you usual medications including blood thinner, but increase your carafate to 3 times daily for one month, then back to you usual dosing. Do not drive, operate any machinery, or sign any legal documents for 24 hours after your procedure. You must have someone to drive you home. You may take a shower 24 hours after your cardiac procedure. Be sure to get the dressing wet and then remove it; gently wash the area with warm soapy water. Pat dry and leave open to air. To help prevent infections, be sure to keep the cath site clean and dry. No lotions, creams, powders, ointments, etc. in the cath site for approximately 1 week.  Do not take a tub bath, get in a hot tub or swimming pool for approximately 5 days or until the cath site is completely healed.  No strenuous activity or heavy lifting over 20 lbs. for 7 days.  After your procedure, some bruising or discomfort is common during the healing process. Tylenol, 1-2 tablets every 6 hours as needed, is recommended if you experience any discomfort.   If you experience any signs or symptoms of infection such as fever, chills, or poorly healing incision, persistent tenderness or swelling in the groin, redness and/or warmth to the touch, numbness, significant tingling or pain at the groin site or affected extremity, rash, drainage from the site, or if the leg feels tight or swollen, call your physician right away.  If bleeding at the site occurs, take a clean gauze pad and apply direct pressure to the groin just above the puncture site, and call your physician right away.  If your procedure involved ablation therapy, you may feel some mild or vague chest discomfort due to delivery of heat therapy to the heart muscle. This should resolve in 1-2 days. If it gets worse or is associated with shortness of breath, dizziness, loss of consciousness, call your physician right away or call 911 if emergency medical care is needed.       Signed By: Kerry Beltre MD     October 23, 2020

## 2020-10-23 NOTE — PROGRESS NOTES
Dc paperwork reviewed with patient. Aware of fu appointments, medications, post ablation care. Verbalized understanding. With no further questions.  oob and ambulating with steady gait and no assist. Cath sites c/d/i no bleeding or hematoma

## 2020-11-04 ENCOUNTER — OFFICE VISIT (OUTPATIENT)
Dept: INTERNAL MEDICINE CLINIC | Age: 65
End: 2020-11-04
Payer: MEDICARE

## 2020-11-04 VITALS
SYSTOLIC BLOOD PRESSURE: 157 MMHG | HEART RATE: 63 BPM | TEMPERATURE: 94.1 F | OXYGEN SATURATION: 97 % | BODY MASS INDEX: 34.05 KG/M2 | DIASTOLIC BLOOD PRESSURE: 71 MMHG | WEIGHT: 251.4 LBS | HEIGHT: 72 IN | RESPIRATION RATE: 16 BRPM

## 2020-11-04 DIAGNOSIS — Z85.46 HISTORY OF PROSTATE CANCER: ICD-10-CM

## 2020-11-04 DIAGNOSIS — E03.9 ACQUIRED HYPOTHYROIDISM: ICD-10-CM

## 2020-11-04 DIAGNOSIS — I25.10 CORONARY ARTERY DISEASE INVOLVING NATIVE CORONARY ARTERY OF NATIVE HEART WITHOUT ANGINA PECTORIS: ICD-10-CM

## 2020-11-04 DIAGNOSIS — I48.0 PAF (PAROXYSMAL ATRIAL FIBRILLATION) (HCC): Primary | ICD-10-CM

## 2020-11-04 DIAGNOSIS — I10 ESSENTIAL HYPERTENSION: ICD-10-CM

## 2020-11-04 DIAGNOSIS — Z86.73 HISTORY OF LACUNAR CEREBROVASCULAR ACCIDENT (CVA): ICD-10-CM

## 2020-11-04 DIAGNOSIS — I50.32 DIASTOLIC CHF, CHRONIC (HCC): ICD-10-CM

## 2020-11-04 PROCEDURE — G8510 SCR DEP NEG, NO PLAN REQD: HCPCS | Performed by: INTERNAL MEDICINE

## 2020-11-04 PROCEDURE — G8754 DIAS BP LESS 90: HCPCS | Performed by: INTERNAL MEDICINE

## 2020-11-04 PROCEDURE — 99213 OFFICE O/P EST LOW 20 MIN: CPT | Performed by: INTERNAL MEDICINE

## 2020-11-04 PROCEDURE — 3017F COLORECTAL CA SCREEN DOC REV: CPT | Performed by: INTERNAL MEDICINE

## 2020-11-04 PROCEDURE — G8753 SYS BP > OR = 140: HCPCS | Performed by: INTERNAL MEDICINE

## 2020-11-04 PROCEDURE — G8536 NO DOC ELDER MAL SCRN: HCPCS | Performed by: INTERNAL MEDICINE

## 2020-11-04 PROCEDURE — 1101F PT FALLS ASSESS-DOCD LE1/YR: CPT | Performed by: INTERNAL MEDICINE

## 2020-11-04 PROCEDURE — G8417 CALC BMI ABV UP PARAM F/U: HCPCS | Performed by: INTERNAL MEDICINE

## 2020-11-04 PROCEDURE — G8427 DOCREV CUR MEDS BY ELIG CLIN: HCPCS | Performed by: INTERNAL MEDICINE

## 2020-11-04 PROCEDURE — G0463 HOSPITAL OUTPT CLINIC VISIT: HCPCS | Performed by: INTERNAL MEDICINE

## 2020-11-04 NOTE — PROGRESS NOTES
Jessica Castro is a 77 y.o. male who presents for evaluation of routine follow up, as well as hospital follow up. Last seen by me may 4, 2020 in virtual visit, AWV. [facility] with dr Ashley Cooper, had radio frequency ablation (RFA) for his pafib. He remains in nsr. He would like to stop his xarelto asap, as he is concerned about any bleeding issues, though he is not having any bruises or bleeding now. He feels better now then he has in some time. No edema, is taking bumex consistently.       ROS:  Constitutional: negative for fevers, chills, anorexia and weight loss  Eyes:   negative for visual disturbance and irritation  ENT:   negative for tinnitus,sore throat,nasal congestion,ear pain,hoarseness  Respiratory:  negative for cough, hemoptysis, dyspnea,wheezing  CV:   negative for chest pain, palpitations, lower extremity edema  GI:   negative for nausea, vomiting, diarrhea, abdominal pain,melena  Genitourinary: negative for frequency, dysuria and hematuria  Musculoskel: negative for myalgias, arthralgias, back pain, muscle weakness, joint pain  Neurological:  negative for headaches, dizziness, focal weakness, numbness  Psychiatric:     Negative for depression or anxiety      Past Medical History:   Diagnosis Date    Arrhythmia 01/03/2020    AFIB     CAD (coronary artery disease)     h/o stent- '10    Cancer [facility]) 2010    prostate    Congestive heart failure ([address]     Hypertension     Ill-defined condition     small bowel obstruction    Right rotator cuff tear 1/26/2017    SBO (small bowel obstruction) ([address] 11/3/2015    Stroke ([address] May or June 2010    lacunar  infarctions (found on scan- no sxs)    Thyroid disease     hypo       Past Surgical History:   Procedure Laterality Date    CARDIAC SURG PROCEDURE UNLIST  2010    coronary stent    CARDIAC SURG PROCEDURE UNLIST  2020    ablation    EXCIS KNEE CARTILAGE,MEDIAL OR LAT  1980's x3    HX CHOLECYSTECTOMY  1991    HX HEENT 1962    T&A    HX HERNIA REPAIR  1996    HX MALIGNANT SKIN LESION EXCISION      2004 and 2017    HX ORTHOPAEDIC  1974    Ganglion cyst left wrist    HX ORTHOPAEDIC Left 2017    shoulder repair    HX PROSTATECTOMY  2010    INTRACARD ECHO, THER/DX INTERVENT N/A 10/22/2020    Intracardiac Echocardiogram performed by Hieu Milain MD at [facility]ATH LAB    UT ABLATE L/R ATRIAL FIBRIL W/ISOLATED PULM VEIN N/A 10/22/2020    Ablation Following A-Fib  Addl performed by Hieu Milian MD at [facility] CARDIAC CATH LAB    UT EPHYS EVL TRNSPTL TX ATRIAL FIB ISOLAT PULM VEIN N/A 10/22/2020    ABLATION A-FIB  W COMPLETE EP STUDY performed by Hieu Milian MD at [facility] CARDIAC CATH LAB    UT INTRACARDIAC ELECTROPHYSIOLOGIC 3D MAPPING N/A 10/22/2020    Ep 3d Mapping performed by Hieu Milian MD at [facility] CARDIAC CATH LAB    UPPER GI ENDOSCOPY,BIOPSY  2/25/2020            Family History   Problem Relation Age of Onset    Dementia Mother     Other Father 55        cerebral hemorrhage       Social History     Socioeconomic History    Marital status:      Spouse name: Not on file    Number of children: Not on file    Years of education: Not on file    Highest education level: Not on file   Occupational History    Not on file   Social Needs    Financial resource strain: Not on file    Food insecurity     Worry: Not on file     Inability: Not on file   [employer] needs     Medical: Not on file     Non-medical: Not on file   Tobacco Use    Smoking status: Never Smoker    Smokeless tobacco: Former User   Substance and Sexual Activity    Alcohol use: Not Currently     Binge frequency: Patient refused    Drug use: Never    Sexual activity: Not Currently   Lifestyle    Physical activity     Days per week: Not on file     Minutes per session: Not on file    Stress: Not on file   Relationships    Social connections     Talks on phone: Not on file     Gets together: Not on file Attends Bahai service: Not on file     Active member of club or organization: Not on file     Attends meetings of clubs or organizations: Not on file     Relationship status: Not on file    Intimate partner violence     Fear of current or ex partner: Not on file     Emotionally abused: Not on file     Physically abused: Not on file     Forced sexual activity: Not on file   Other Topics Concern    Not on file   Social History Narrative    Not on file            Visit Vitals  BP (!) 157/71 (BP 1 Location: Left arm, BP Patient Position: Sitting)   Pulse 63   Temp (!) 94.1 °F (34.5 °C) (Temporal)   Resp 16   Ht 6' (1.829 m)   Wt 251 lb 6.4 oz (114 kg)   SpO2 97%   BMI 34.10 kg/m²       Physical Examination:   General - Well appearing male  HEENT - PERRL, TM no erythema/opacification, normal nasal turbinates, no oropharyngeal erythema or exudate, MMM  Neck - supple, no bruits, no thyroidomegaly, no lymphadenopathy  Pulm - clear to auscultation bilaterally  Cardio - RRR, normal S1 S2, no murmur  Abd - soft, nontender, no masses, no HSM  Extrem - no edema, +2 distal pulses  Neuro-  No focal deficits, CN intact     Assessment/Plan:    1. Chronic diastolic chf--well compensated now, continue bumex, coreg. It appears his weight should be about 240-245. 2. pafib--sp RFA, remains on xarelto for now, though he wants to stop it asap  3.  htn--eleveated today, continue coreg, hydralazine, bumex, imdur  4. hyperlipids--intolerant of statins, zetia, and now repatha  5. Hx prostate cancer--sp resection  6.  ckd 3/4--has been stable, follows with dr Mehdi Brunson  7. Cad with stents--on asa, coreg, imdur  8.  gerd--controlled with carafate, protonix  9. Gout--prn colchicine keeps things in check    Declines vaccines. rtc 6 months for awv.         Carlin Holman III, DO

## 2020-12-07 ENCOUNTER — TELEPHONE (OUTPATIENT)
Dept: INTERNAL MEDICINE CLINIC | Age: 65
End: 2020-12-07

## 2020-12-07 NOTE — TELEPHONE ENCOUNTER
Patient states he needs a call back to discuss getting medication called in for Gout Flare up in his Left Big Toe. Patient states he would like to see if he can get same medication his Twin Brother has been prescribed before for Gout that Brother advises works well. Please call to discuss.  Thank you

## 2020-12-08 RX ORDER — INDOMETHACIN 25 MG/1
25 CAPSULE ORAL
Qty: 90 CAP | Refills: 0 | Status: SHIPPED | OUTPATIENT
Start: 2020-12-08 | End: 2021-11-01

## 2020-12-08 NOTE — TELEPHONE ENCOUNTER
Caller's first and last name: n/a       Reason for call: Gout Pain, Swollen Toe       Callback required yes/no and why: yes       Best contact number(s):[phone]       Details to clarify the request: Pt need a call from a nurse about his gout pain and possible medication       Yakima Mac

## 2020-12-08 NOTE — TELEPHONE ENCOUNTER
Spoke with pt's wife, Yovany Lawson. Yovany Lawson stated that pt is now on an increased dosage of Bumex 2mg twice a day which seems to \"cause\" pt's gout flares. Pt's brother mentioned indomethacin which helped his gout immediately. Pt would like to try med. Notified Pat a message will be sent to Dr. Ahsan Bland. Pat verbalized understanding of information discussed w/ no further questions at this time.

## 2021-08-16 ENCOUNTER — TELEPHONE (OUTPATIENT)
Dept: INTERNAL MEDICINE CLINIC | Age: 66
End: 2021-08-16

## 2021-11-01 ENCOUNTER — OFFICE VISIT (OUTPATIENT)
Dept: INTERNAL MEDICINE CLINIC | Age: 66
End: 2021-11-01
Payer: MEDICARE

## 2021-11-01 VITALS
WEIGHT: 235.4 LBS | BODY MASS INDEX: 31.89 KG/M2 | HEIGHT: 72 IN | HEART RATE: 64 BPM | DIASTOLIC BLOOD PRESSURE: 71 MMHG | TEMPERATURE: 97.1 F | OXYGEN SATURATION: 96 % | SYSTOLIC BLOOD PRESSURE: 154 MMHG | RESPIRATION RATE: 16 BRPM

## 2021-11-01 DIAGNOSIS — E78.2 MIXED HYPERLIPIDEMIA: ICD-10-CM

## 2021-11-01 DIAGNOSIS — E03.9 ACQUIRED HYPOTHYROIDISM: ICD-10-CM

## 2021-11-01 DIAGNOSIS — I50.32 DIASTOLIC CHF, CHRONIC (HCC): ICD-10-CM

## 2021-11-01 DIAGNOSIS — I10 ESSENTIAL HYPERTENSION: ICD-10-CM

## 2021-11-01 DIAGNOSIS — Z00.00 MEDICARE ANNUAL WELLNESS VISIT, SUBSEQUENT: Primary | ICD-10-CM

## 2021-11-01 DIAGNOSIS — Z85.46 HISTORY OF PROSTATE CANCER: ICD-10-CM

## 2021-11-01 DIAGNOSIS — E53.8 B12 DEFICIENCY: ICD-10-CM

## 2021-11-01 DIAGNOSIS — M1A.00X0 IDIOPATHIC CHRONIC GOUT WITHOUT TOPHUS, UNSPECIFIED SITE: ICD-10-CM

## 2021-11-01 DIAGNOSIS — I48.0 PAF (PAROXYSMAL ATRIAL FIBRILLATION) (HCC): ICD-10-CM

## 2021-11-01 DIAGNOSIS — R73.03 PREDIABETES: ICD-10-CM

## 2021-11-01 DIAGNOSIS — Z86.73 HISTORY OF LACUNAR CEREBROVASCULAR ACCIDENT (CVA): ICD-10-CM

## 2021-11-01 DIAGNOSIS — E26.1 SECONDARY HYPERALDOSTERONISM (HCC): ICD-10-CM

## 2021-11-01 DIAGNOSIS — I25.118 CORONARY ARTERY DISEASE INVOLVING NATIVE CORONARY ARTERY OF NATIVE HEART WITH OTHER FORM OF ANGINA PECTORIS (HCC): ICD-10-CM

## 2021-11-01 DIAGNOSIS — E87.6 HYPOKALEMIA: ICD-10-CM

## 2021-11-01 DIAGNOSIS — G58.9 MONONEUROPATHY: ICD-10-CM

## 2021-11-01 PROBLEM — D68.69 HYPERCOAGULABILITY DUE TO ATRIAL FIBRILLATION (HCC): Status: RESOLVED | Noted: 2020-02-26 | Resolved: 2021-11-01

## 2021-11-01 PROBLEM — I48.91 HYPERCOAGULABILITY DUE TO ATRIAL FIBRILLATION (HCC): Status: RESOLVED | Noted: 2020-02-26 | Resolved: 2021-11-01

## 2021-11-01 PROCEDURE — G8753 SYS BP > OR = 140: HCPCS | Performed by: INTERNAL MEDICINE

## 2021-11-01 PROCEDURE — 99214 OFFICE O/P EST MOD 30 MIN: CPT | Performed by: INTERNAL MEDICINE

## 2021-11-01 PROCEDURE — G8427 DOCREV CUR MEDS BY ELIG CLIN: HCPCS | Performed by: INTERNAL MEDICINE

## 2021-11-01 PROCEDURE — G8510 SCR DEP NEG, NO PLAN REQD: HCPCS | Performed by: INTERNAL MEDICINE

## 2021-11-01 PROCEDURE — 1101F PT FALLS ASSESS-DOCD LE1/YR: CPT | Performed by: INTERNAL MEDICINE

## 2021-11-01 PROCEDURE — G8417 CALC BMI ABV UP PARAM F/U: HCPCS | Performed by: INTERNAL MEDICINE

## 2021-11-01 PROCEDURE — G8536 NO DOC ELDER MAL SCRN: HCPCS | Performed by: INTERNAL MEDICINE

## 2021-11-01 PROCEDURE — G0439 PPPS, SUBSEQ VISIT: HCPCS | Performed by: INTERNAL MEDICINE

## 2021-11-01 PROCEDURE — G8754 DIAS BP LESS 90: HCPCS | Performed by: INTERNAL MEDICINE

## 2021-11-01 PROCEDURE — G0463 HOSPITAL OUTPT CLINIC VISIT: HCPCS | Performed by: INTERNAL MEDICINE

## 2021-11-01 PROCEDURE — 3017F COLORECTAL CA SCREEN DOC REV: CPT | Performed by: INTERNAL MEDICINE

## 2021-11-01 RX ORDER — ZOSTER VACCINE RECOMBINANT, ADJUVANTED 50 MCG/0.5
0.5 KIT INTRAMUSCULAR ONCE
Qty: 0.5 ML | Refills: 1 | Status: SHIPPED | OUTPATIENT
Start: 2021-11-01 | End: 2021-11-01

## 2021-11-01 RX ORDER — HYDRALAZINE HYDROCHLORIDE 100 MG/1
100 TABLET, FILM COATED ORAL DAILY
COMMUNITY
Start: 2021-07-30 | End: 2021-11-01 | Stop reason: SINTOL

## 2021-11-01 RX ORDER — AMLODIPINE BESYLATE 2.5 MG/1
2.5 TABLET ORAL DAILY
COMMUNITY
Start: 2021-08-18

## 2021-11-01 RX ORDER — CARVEDILOL 25 MG/1
25 TABLET ORAL 2 TIMES DAILY WITH MEALS
Qty: 180 TABLET | Refills: 3
Start: 2021-11-01

## 2021-11-01 RX ORDER — CARVEDILOL 25 MG/1
25 TABLET ORAL DAILY
COMMUNITY
Start: 2021-08-31 | End: 2021-11-01

## 2021-11-01 RX ORDER — LEVOTHYROXINE SODIUM 50 UG/1
50 TABLET ORAL
COMMUNITY
End: 2022-08-02 | Stop reason: SDUPTHER

## 2021-11-01 RX ORDER — POTASSIUM CHLORIDE 750 MG/1
10 TABLET, FILM COATED, EXTENDED RELEASE ORAL 2 TIMES DAILY
COMMUNITY
Start: 2021-10-14

## 2021-11-01 RX ORDER — APIXABAN 5 MG/1
5 TABLET, FILM COATED ORAL 2 TIMES DAILY
COMMUNITY
Start: 2021-09-03

## 2021-11-01 RX ORDER — BUMETANIDE 2 MG/1
1 TABLET ORAL 2 TIMES DAILY
Qty: 90 TABLET | Refills: 3
Start: 2021-11-01 | End: 2022-05-02

## 2021-11-01 RX ORDER — BUMETANIDE 2 MG/1
2 TABLET ORAL DAILY
COMMUNITY
Start: 2021-10-25 | End: 2021-11-01

## 2021-11-01 NOTE — PATIENT INSTRUCTIONS
Medicare Wellness Visit, Male    The best way to live healthy is to have a lifestyle where you eat a well-balanced diet, exercise regularly, limit alcohol use, and quit all forms of tobacco/nicotine, if applicable. Regular preventive services are another way to keep healthy. Preventive services (vaccines, screening tests, monitoring & exams) can help personalize your care plan, which helps you manage your own care. Screening tests can find health problems at the earliest stages, when they are easiest to treat. [facility] follows the current, evidence-based guidelines published by the [facility] ([facility]) when recommending preventive services for our patients. Because we follow these guidelines, sometimes recommendations change over time as research supports it. (For example, a prostate screening blood test is no longer routinely recommended for men with no symptoms). Of course, you and your doctor may decide to screen more often for some diseases, based on your risk and co-morbidities (chronic disease you are already diagnosed with). Preventive services for you include:  - Medicare offers their members a free annual wellness visit, which is time for you and your primary care provider to discuss and plan for your preventive service needs. Take advantage of this benefit every year!  -All adults over age 72 should receive the recommended pneumonia vaccines. Current USPSTF guidelines recommend a series of two vaccines for the best pneumonia protection.   -All adults should have a flu vaccine yearly and tetanus vaccine every 10 years.  -All adults age 48 and older should receive the shingles vaccines (series of two vaccines).        -All adults age 38-68 who are overweight should have a diabetes screening test once every three years.   -Other screening tests & preventive services for persons with diabetes include: an eye exam to screen for diabetic retinopathy, a kidney function test, a foot exam, and stricter control over your cholesterol.   -Cardiovascular screening for adults with routine risk involves an electrocardiogram (ECG) at intervals determined by the provider.   -Colorectal cancer screening should be done for adults age 54-65 with no increased risk factors for colorectal cancer. There are a number of acceptable methods of screening for this type of cancer. Each test has its own benefits and drawbacks. Discuss with your provider what is most appropriate for you during your annual wellness visit. The different tests include: colonoscopy (considered the best screening method), a fecal occult blood test, a fecal DNA test, and sigmoidoscopy.  -All adults born between [facility] should be screened once for Hepatitis C.  -An Abdominal Aortic Aneurysm (AAA) Screening is recommended for men age 73-68 who has ever smoked in their lifetime. Here is a list of your current Health Maintenance items (your personalized list of preventive services) with a due date:  Health Maintenance Due   Topic Date Due    COVID-19 Vaccine (1) Never done    DTaP/Tdap/Td  (1 - Tdap) Never done    Shingles Vaccine (1 of 2) Never done    Yearly Flu Vaccine (1) Never done       Come back for fasting labs. Lab opens 8 am.  Nothing to eat after MN, but may drink water.

## 2021-11-01 NOTE — PROGRESS NOTES
Cody Abernatyh is a 74 y.o. male who presents for evaluation of AWV. Last seen by me nov 4, 2020. He no showed for appt sept 20, 2021. Overall this year has been much better. His ablation for atrial fib has worked, and he is doing much better. His weight is down 16 lbs from last year's visit, and he has no edema. He continues to efraín with his own meds, most recently stopped hydralazine due to PN with numbness and tingling in his feel and legs, which has lessened since he stopped it. He has also changed his bumex to 1 mg bid, instead of 2 mg bid. On occasion he also stopped his K replacement.       ROS:  Constitutional: negative for fevers, chills, anorexia and weight loss  Eyes:   negative for visual disturbance and irritation  ENT:   negative for tinnitus,sore throat,nasal congestion,ear pain,hoarseness  Respiratory:  negative for cough, hemoptysis, dyspnea,wheezing  CV:   negative for chest pain, palpitations, lower extremity edema  GI:   negative for nausea, vomiting, diarrhea, abdominal pain,melena  Genitourinary: negative for frequency, dysuria and hematuria  Musculoskel: negative for myalgias, arthralgias, back pain, muscle weakness, joint pain  Neurological:  negative for headaches, dizziness, focal weakness, numbness  Psychiatric:     Negative for depression or anxiety      Past Medical History:   Diagnosis Date    Arrhythmia 01/03/2020    AFIB     CAD (coronary artery disease)     h/o stent- '10    Cancer [facility]) 2010    prostate    Congestive heart failure ([facility]     Hypertension     Ill-defined condition     small bowel obstruction    Right rotator cuff tear 1/26/2017    SBO (small bowel obstruction) ([address] 11/3/2015    Stroke ([address] May or June 2010    lacunar  infarctions (found on scan- no sxs)    Thyroid disease     hypo       Past Surgical History:   Procedure Laterality Date    HX CHOLECYSTECTOMY  1991    HX HEENT  1962    T&A    HX HERNIA REPAIR  1996    HX MALIGNANT SKIN LESION EXCISION      2004 and 2017    HX ORTHOPAEDIC  1974    Ganglion cyst left wrist    HX ORTHOPAEDIC Left 2017    shoulder repair    HX PROSTATECTOMY  2010    INTRACARD ECHO, THER/DX INTERVENT N/A 10/22/2020    Intracardiac Echocardiogram performed by Lita Means MD at [facility]    [place] ABLATE L/R ATRIAL FIBRIL W/ISOLATED PULM VEIN N/A 10/22/2020    Ablation Following A-Fib  Addl performed by Lita Means MD at [facility]    [place] CARDIAC SURG PROCEDURE UNLIST  2010    coronary stent    [place] CARDIAC SURG PROCEDURE UNLIST  2020    ablation    [place] EPHYS EVL TRNSPTL TX ATRIAL FIB ISOLAT PULM VEIN N/A 10/22/2020    ABLATION A-FIB  W COMPLETE EP STUDY performed by Lita Means MD at [facility] CATH LAB    [place] EXCIS KNEE CARTILAGE,MEDIAL OR LAT  1980's x3    [place] INTRACARDIAC ELECTROPHYSIOLOGIC 3D MAPPING N/A 10/22/2020    Ep 3d Mapping performed by Lita Means MD at [facility]ATH [facility]    UPPER GI ENDOSCOPY,BIOPSY  2/25/2020            Family History   Problem Relation Age of Onset    Dementia Mother     Other Father 55        cerebral hemorrhage       Social History     Socioeconomic History    Marital status:      Spouse name: Not on file    Number of children: Not on file    Years of education: Not on file    Highest education level: Not on file   Occupational History    Not on file   Tobacco Use    Smoking status: Never Smoker    Smokeless tobacco: Former User   Substance and Sexual Activity    Alcohol use: Not Currently    Drug use: Never    Sexual activity: Not Currently   Other Topics Concern    Not on file   Social History Narrative    Not on file     Social Determinants of Health     Financial Resource Strain:     Difficulty of Paying Living Expenses:    Food Insecurity:     Worried About Running Out of Food in the Last Year:     [address] in the Last Year:    Transportation Needs:     Lack of Transportation (Medical):  Lack of Transportation (Non-Medical):    Physical Activity:     Days of Exercise per Week:     Minutes of Exercise per Session:    Stress:     Feeling of Stress :    Social Connections:     Frequency of Communication with Friends and Family:     Frequency of Social Gatherings with Friends and Family:     Attends Temple Services:     Active Member of Clubs or Organizations:     Attends Club or Organization Meetings:     Marital Status:    Intimate Partner Violence:     Fear of Current or Ex-Partner:     Emotionally Abused:     Physically Abused:     Sexually Abused:             Visit Vitals  BP (!) 154/71 (BP 1 Location: Left upper arm, BP Patient Position: Sitting)   Pulse 64   Temp 97.1 °F (36.2 °C) (Temporal)   Resp 16   Ht 6' (1.829 m)   Wt 235 lb 6.4 oz (106.8 kg)   SpO2 96%   BMI 31.93 kg/m²       Physical Examination:   General - Well appearing male  HEENT - PERRL, TM no erythema/opacification, normal nasal turbinates, no oropharyngeal erythema or exudate, MMM  Neck - supple, no bruits, no thyroidomegaly, no lymphadenopathy  Pulm - clear to auscultation bilaterally  Cardio - RRR, normal S1 S2, no murmur  Abd - soft, nontender, no masses, no HSM  Extrem - no edema, +2 distal pulses  Neuro-  No focal deficits, CN intact     Assessment/Plan:    1.  htn--bit elevated today, stopped hydralazine due to PN side effects. Continue norvasc, coreg, bumex, imdur  2. Chronic diastolic chf--well compensated, check cbc, cmp, tsh. On meds as above  3. pafib--sp ablation, remains nsr now. On norvasc, coreg, eliquis  4. Hx prostate cacner--sp prostatecomy, check psa  5. Chronic gout--check uric acid. If elevated, he would like to start allopurinol  6.  gerd--on carafte, protonix  7.  hyperlipids--check flp, not on a statin  8. Hypokalemia--on replacement, check cmp, mg  9. PN--he attributes it to hydralazine, which he has since stopped, and his symptoms are improved.     Check tsh, b12    rx given for shingrix. ACP paperwork given  Declines flu  rtc 6 months        Layla Sutherland III, DO            This is the Subsequent Medicare Annual Wellness Exam, performed 12 months or more after the Initial AWV or the last Subsequent AWV    I have reviewed the patient's medical history in detail and updated the computerized patient record. Assessment/Plan   Education and counseling provided:  Are appropriate based on today's review and evaluation  End-of-Life planning (with patient's consent)  Pneumococcal Vaccine  Influenza Vaccine  Prostate cancer screening tests (PSA, covered annually)    1. Medicare annual wellness visit, subsequent       Depression Risk Factor Screening     3 most recent PHQ Screens 11/1/2021   PHQ Not Done -   Little interest or pleasure in doing things Not at all   Feeling down, depressed, irritable, or hopeless Not at all   Total Score PHQ 2 0       Alcohol Risk Screen    Do you average more than 1 drink per night or more than 7 drinks a week: No    In the past three months have you have had more than 4 drinks containing alcohol on one occasion: No        Functional Ability and Level of Safety    Hearing: Hearing is good. Activities of Daily Living: The home contains: no safety equipment. Patient does total self care      Ambulation: with no difficulty     Fall Risk:  Fall Risk Assessment, last 12 mths 11/1/2021   Able to walk? Yes   Fall in past 12 months? 0   Do you feel unsteady? 0   Are you worried about falling 0      Abuse Screen:  Patient is not abused. Lives with wife of 52 years this nov 25.        Cognitive Screening    Has your family/caregiver stated any concerns about your memory: no     Cognitive Screening: Normal - MMSE (Mini Mental Status Exam)    Health Maintenance Due     Health Maintenance Due   Topic Date Due    COVID-19 Vaccine (1) Never done    DTaP/Tdap/Td series (1 - Tdap) Never done    Shingrix Vaccine Age 50> (1 of 2) Never done    Flu Vaccine (1) Never done       Patient Care Team   Patient Care Team:  Sharon Quevedo DO as PCP - General (Internal Medicine)  Sharon Quevedo DO as PCP - [facility] Mayda Triana Emptrungled Provider  Amanda Venegas MD (Nephrology)  Varun Cook MD (Cardiology)  Landen Min MD (Gastroenterology)  Vanessa, Corrina Tutu, OD (Optometry)    History     Patient Active Problem List   Diagnosis Code    Coronary artery disease involving native coronary artery of native heart without angina pectoris I25.10    Essential hypertension I10    Acquired hypothyroidism E03.9    Mixed hyperlipidemia E78.2    Chronic systolic congestive heart failure (HCC) I50.22    Acute respiratory failure with hypoxia ([address] J96.01    Secondary hyperaldosteronism ([address] E26.1    Cardiomegaly I51.7    Pulmonary edema J81.1    PAF (paroxysmal atrial fibrillation) ([address] I48.0    Hypercoagulability due to atrial fibrillation ([address] D68.69, I48.91    Hypokalemia E87.6    TIA (transient ischemic attack) G45.9    Erosive gastritis K29.60    Acute renal failure (ARF) (HCC) N17.9    Chest pain R07.9    Heart failure (HCC) I50.9    SOB (shortness of breath) R06.02    Atrial fibrillation ([address] I48.91     Past Medical History:   Diagnosis Date    Arrhythmia 01/03/2020    AFIB     CAD (coronary artery disease)     h/o stent- '10    Cancer [facility]) 2010    prostate    Congestive heart failure ([address]     Hypertension     Ill-defined condition     small bowel obstruction    Right rotator cuff tear 1/26/2017    SBO (small bowel obstruction) ([address] 11/3/2015    Stroke ([address] May or June 2010    lacunar  infarctions (found on scan- no sxs)    Thyroid disease     hypo      Past Surgical History:   Procedure Laterality Date    HX CHOLECYSTECTOMY  1991    HX HEENT  1962    T&A    HX HERNIA REPAIR  1996    HX MALIGNANT SKIN LESION EXCISION      2004 and 2017    HX ORTHOPAEDIC  1974    Ganglion cyst left wrist    HX ORTHOPAEDIC Left 2017 shoulder repair    HX PROSTATECTOMY  2010    INTRACARD ECHO, THER/DX INTERVENT N/A 10/22/2020    Intracardiac Echocardiogram performed by Gabriela Cleaning MD at [facility]ATH [facility]    [place] ABLATE L/R ATRIAL FIBRIL W/ISOLATED PULM VEIN N/A 10/22/2020    Ablation Following A-Fib  Addl performed by Gabriela Cleaning MD at [facility]ATH [facility]    MD CARDIAC SURG PROCEDURE UNLIST  2010    coronary stent    MD CARDIAC SURG PROCEDURE UNLIST  2020    ablation    MD EPHYS EVL TRNSPTL TX ATRIAL FIB ISOLAT PULM VEIN N/A 10/22/2020    ABLATION A-FIB  W COMPLETE EP STUDY performed by Gabriela Cleaning MD at [facility] CATH LAB    [place] EXCIS KNEE CARTILAGE,MEDIAL OR LAT  1980's x3    MD INTRACARDIAC ELECTROPHYSIOLOGIC 3D MAPPING N/A 10/22/2020    Ep 3d Mapping performed by Gabriela Cleaning MD at [facility]ATH LAB    UPPER GI ENDOSCOPY,BIOPSY  2/25/2020          Current Outpatient Medications   Medication Sig Dispense Refill    amLODIPine (NORVASC) 2.5 mg tablet Take 2.5 mg by mouth daily.  Eliquis 5 mg tablet Take 5 mg by mouth two (2) times a day.  bumetanide (BUMEX) 2 mg tablet Take 2 mg by mouth daily.  carvediloL (COREG) 25 mg tablet Take 25 mg by mouth daily.  hydrALAZINE (APRESOLINE) 100 mg tablet Take 100 mg by mouth daily.  potassium chloride SR (KLOR-CON 10) 10 mEq tablet Take 10 mEq by mouth two (2) times a day.  levothyroxine (SYNTHROID) 50 mcg tablet Take 50 mcg by mouth Daily (before breakfast).  pantoprazole (PROTONIX) 40 mg tablet Take 1 Tab by mouth daily. 90 Tab 3    sucralfate (CARAFATE) 1 gram tablet Take 1 Tab by mouth Before breakfast, lunch, and dinner. 90 Tab 0    isosorbide mononitrate ER (IMDUR) 30 mg tablet Take 1 Tab by mouth daily. 30 Tab 12    cholecalciferol, vitamin D3, (VITAMIN D3) 2,000 unit tab Take 2,000 Units by mouth daily.        Allergies   Allergen Reactions    Clonidine Anxiety    Lasix [Furosemide] Other (comments)     May be the cause of kidney failure    Niacin Other (comments)     Decreased vision    Statins-Hmg-Coa Reductase Inhibitors Other (comments)     Muscle and Joint pains    Zetia [Ezetimibe] Myalgia     Pt reports getting joint and muscle pain.         Family History   Problem Relation Age of Onset   Cruz Dementia Mother     Other Father 55        cerebral hemorrhage     Social History     Tobacco Use    Smoking status: Never Smoker    Smokeless tobacco: Former User   Substance Use Topics    Alcohol use: Not Currently         Nereida García III, DO

## 2021-11-02 ENCOUNTER — LAB ONLY (OUTPATIENT)
Dept: INTERNAL MEDICINE CLINIC | Age: 66
End: 2021-11-02

## 2021-11-02 DIAGNOSIS — E03.9 ACQUIRED HYPOTHYROIDISM: ICD-10-CM

## 2021-11-02 DIAGNOSIS — R73.03 PREDIABETES: ICD-10-CM

## 2021-11-02 DIAGNOSIS — M1A.00X0 IDIOPATHIC CHRONIC GOUT WITHOUT TOPHUS, UNSPECIFIED SITE: ICD-10-CM

## 2021-11-02 DIAGNOSIS — I48.0 PAF (PAROXYSMAL ATRIAL FIBRILLATION) (HCC): ICD-10-CM

## 2021-11-02 DIAGNOSIS — Z85.46 HISTORY OF PROSTATE CANCER: ICD-10-CM

## 2021-11-02 DIAGNOSIS — E87.6 HYPOKALEMIA: ICD-10-CM

## 2021-11-02 DIAGNOSIS — E53.8 B12 DEFICIENCY: ICD-10-CM

## 2021-11-02 DIAGNOSIS — E78.2 MIXED HYPERLIPIDEMIA: ICD-10-CM

## 2021-11-02 LAB
ALBUMIN SERPL-MCNC: 3.7 G/DL (ref 3.5–5)
ALBUMIN/GLOB SERPL: 1.2 {RATIO} (ref 1.1–2.2)
ALP SERPL-CCNC: 59 U/L (ref 45–117)
ALT SERPL-CCNC: 20 U/L (ref 12–78)
ANION GAP SERPL CALC-SCNC: 3 MMOL/L (ref 5–15)
AST SERPL-CCNC: 10 U/L (ref 15–37)
BASOPHILS # BLD: 0.1 K/UL (ref 0–0.1)
BASOPHILS NFR BLD: 1 % (ref 0–1)
BILIRUB SERPL-MCNC: 0.6 MG/DL (ref 0.2–1)
BUN SERPL-MCNC: 13 MG/DL (ref 6–20)
BUN/CREAT SERPL: 13 (ref 12–20)
CALCIUM SERPL-MCNC: 8.9 MG/DL (ref 8.5–10.1)
CHLORIDE SERPL-SCNC: 109 MMOL/L (ref 97–108)
CHOLEST SERPL-MCNC: 218 MG/DL
CO2 SERPL-SCNC: 29 MMOL/L (ref 21–32)
CREAT SERPL-MCNC: 1.03 MG/DL (ref 0.7–1.3)
DIFFERENTIAL METHOD BLD: NORMAL
EOSINOPHIL # BLD: 0.2 K/UL (ref 0–0.4)
EOSINOPHIL NFR BLD: 3 % (ref 0–7)
ERYTHROCYTE [DISTWIDTH] IN BLOOD BY AUTOMATED COUNT: 13.5 % (ref 11.5–14.5)
EST. AVERAGE GLUCOSE BLD GHB EST-MCNC: 100 MG/DL
GLOBULIN SER CALC-MCNC: 3.2 G/DL (ref 2–4)
GLUCOSE SERPL-MCNC: 115 MG/DL (ref 65–100)
HBA1C MFR BLD: 5.1 % (ref 4–5.6)
HCT VFR BLD AUTO: 40 % (ref 36.6–50.3)
HDLC SERPL-MCNC: 34 MG/DL
HDLC SERPL: 6.4 {RATIO} (ref 0–5)
HGB BLD-MCNC: 12.3 G/DL (ref 12.1–17)
IMM GRANULOCYTES # BLD AUTO: 0 K/UL (ref 0–0.04)
IMM GRANULOCYTES NFR BLD AUTO: 0 % (ref 0–0.5)
LDLC SERPL CALC-MCNC: 149.2 MG/DL (ref 0–100)
LYMPHOCYTES # BLD: 1.4 K/UL (ref 0.8–3.5)
LYMPHOCYTES NFR BLD: 25 % (ref 12–49)
MAGNESIUM SERPL-MCNC: 2.2 MG/DL (ref 1.6–2.4)
MCH RBC QN AUTO: 29.2 PG (ref 26–34)
MCHC RBC AUTO-ENTMCNC: 30.8 G/DL (ref 30–36.5)
MCV RBC AUTO: 95 FL (ref 80–99)
MONOCYTES # BLD: 0.5 K/UL (ref 0–1)
MONOCYTES NFR BLD: 8 % (ref 5–13)
NEUTS SEG # BLD: 3.4 K/UL (ref 1.8–8)
NEUTS SEG NFR BLD: 63 % (ref 32–75)
NRBC # BLD: 0 K/UL (ref 0–0.01)
NRBC BLD-RTO: 0 PER 100 WBC
PLATELET # BLD AUTO: 179 K/UL (ref 150–400)
PMV BLD AUTO: 12.2 FL (ref 8.9–12.9)
POTASSIUM SERPL-SCNC: 4 MMOL/L (ref 3.5–5.1)
PROT SERPL-MCNC: 6.9 G/DL (ref 6.4–8.2)
PSA SERPL-MCNC: 0 NG/ML (ref 0.01–4)
RBC # BLD AUTO: 4.21 M/UL (ref 4.1–5.7)
SODIUM SERPL-SCNC: 141 MMOL/L (ref 136–145)
TRIGL SERPL-MCNC: 174 MG/DL (ref ?–150)
TSH SERPL DL<=0.05 MIU/L-ACNC: 0.67 UIU/ML (ref 0.36–3.74)
URATE SERPL-MCNC: 7.4 MG/DL (ref 3.5–7.2)
VIT B12 SERPL-MCNC: 411 PG/ML (ref 193–986)
VLDLC SERPL CALC-MCNC: 34.8 MG/DL
WBC # BLD AUTO: 5.5 K/UL (ref 4.1–11.1)

## 2021-11-03 RX ORDER — ALLOPURINOL 300 MG/1
300 TABLET ORAL DAILY
Qty: 90 TABLET | Refills: 3 | Status: SHIPPED | OUTPATIENT
Start: 2021-11-03 | End: 2022-08-02 | Stop reason: SDUPTHER

## 2021-11-03 NOTE — PROGRESS NOTES
Overall labs look good. Kidney function back to normal, and potassium looks good. I would continue with same dose of bumex (1 mg twice daily) and same potassium replacement. Gout test (uric acid) is elevated. Rx sent in for allopurinol. Cholesterol levels are elevated, though bit improved from past.   I know he did not tolerate statins or zetia. Could consider Nexletol if he wants to research that medicine.

## 2021-11-03 NOTE — PROGRESS NOTES
Sent patient a message via [employer]    Overall labs look good.   Kidney function back to normal, and potassium looks good.   I would continue with same dose of bumex (1 mg twice daily) and same potassium replacement.  Gout test (uric acid) is elevated.  Rx sent in for allopurinol.   Cholesterol levels are elevated, though bit improved from past.   I know he did not tolerate statins or zetia.   Could consider Nexletol if he wants to research that medicine.

## 2022-03-18 PROBLEM — J96.01 ACUTE RESPIRATORY FAILURE WITH HYPOXIA (HCC): Status: ACTIVE | Noted: 2020-02-26

## 2022-03-18 PROBLEM — I10 ESSENTIAL HYPERTENSION: Status: ACTIVE | Noted: 2019-05-03

## 2022-03-18 PROBLEM — I50.9 HEART FAILURE (HCC): Status: ACTIVE | Noted: 2020-03-20

## 2022-03-19 PROBLEM — E87.6 HYPOKALEMIA: Status: ACTIVE | Noted: 2020-02-26

## 2022-03-19 PROBLEM — G45.9 TIA (TRANSIENT ISCHEMIC ATTACK): Status: ACTIVE | Noted: 2020-02-26

## 2022-03-19 PROBLEM — N17.9 ACUTE RENAL FAILURE (ARF) (HCC): Status: ACTIVE | Noted: 2020-02-26

## 2022-03-19 PROBLEM — R07.9 CHEST PAIN: Status: ACTIVE | Noted: 2020-03-20

## 2022-03-19 PROBLEM — R06.02 SOB (SHORTNESS OF BREATH): Status: ACTIVE | Noted: 2020-08-10

## 2022-03-19 PROBLEM — I48.0 PAF (PAROXYSMAL ATRIAL FIBRILLATION) (HCC): Status: ACTIVE | Noted: 2020-02-26

## 2022-03-19 PROBLEM — K29.60 EROSIVE GASTRITIS: Status: ACTIVE | Noted: 2020-02-26

## 2022-03-19 PROBLEM — I48.91 ATRIAL FIBRILLATION (HCC): Status: ACTIVE | Noted: 2020-10-22

## 2022-03-19 PROBLEM — I25.10 CORONARY ARTERY DISEASE INVOLVING NATIVE CORONARY ARTERY OF NATIVE HEART WITHOUT ANGINA PECTORIS: Status: ACTIVE | Noted: 2019-05-03

## 2022-03-19 PROBLEM — E03.9 ACQUIRED HYPOTHYROIDISM: Status: ACTIVE | Noted: 2019-05-03

## 2022-03-19 PROBLEM — E78.2 MIXED HYPERLIPIDEMIA: Status: ACTIVE | Noted: 2019-05-03

## 2022-03-19 PROBLEM — I51.7 CARDIOMEGALY: Status: ACTIVE | Noted: 2020-02-26

## 2022-03-19 PROBLEM — E26.1 SECONDARY HYPERALDOSTERONISM (HCC): Status: ACTIVE | Noted: 2020-02-26

## 2022-03-20 PROBLEM — J81.1 PULMONARY EDEMA: Status: ACTIVE | Noted: 2020-02-26

## 2022-03-20 PROBLEM — I50.22 CHRONIC SYSTOLIC CONGESTIVE HEART FAILURE (HCC): Status: ACTIVE | Noted: 2020-02-21

## 2022-05-02 ENCOUNTER — OFFICE VISIT (OUTPATIENT)
Dept: INTERNAL MEDICINE CLINIC | Age: 67
End: 2022-05-02
Payer: MEDICARE

## 2022-05-02 VITALS
DIASTOLIC BLOOD PRESSURE: 69 MMHG | TEMPERATURE: 98.4 F | OXYGEN SATURATION: 97 % | SYSTOLIC BLOOD PRESSURE: 144 MMHG | HEIGHT: 72 IN | BODY MASS INDEX: 32.67 KG/M2 | WEIGHT: 241.2 LBS | RESPIRATION RATE: 16 BRPM | HEART RATE: 63 BPM

## 2022-05-02 DIAGNOSIS — I48.0 PAF (PAROXYSMAL ATRIAL FIBRILLATION) (HCC): Primary | ICD-10-CM

## 2022-05-02 DIAGNOSIS — E78.2 MIXED HYPERLIPIDEMIA: ICD-10-CM

## 2022-05-02 DIAGNOSIS — Z86.73 HISTORY OF LACUNAR CEREBROVASCULAR ACCIDENT (CVA): ICD-10-CM

## 2022-05-02 DIAGNOSIS — E03.9 ACQUIRED HYPOTHYROIDISM: ICD-10-CM

## 2022-05-02 DIAGNOSIS — I25.10 CORONARY ARTERY DISEASE INVOLVING NATIVE CORONARY ARTERY OF NATIVE HEART WITHOUT ANGINA PECTORIS: ICD-10-CM

## 2022-05-02 DIAGNOSIS — I50.32 DIASTOLIC CHF, CHRONIC (HCC): ICD-10-CM

## 2022-05-02 DIAGNOSIS — M1A.00X0 IDIOPATHIC CHRONIC GOUT WITHOUT TOPHUS, UNSPECIFIED SITE: ICD-10-CM

## 2022-05-02 DIAGNOSIS — E26.1 SECONDARY HYPERALDOSTERONISM (HCC): ICD-10-CM

## 2022-05-02 PROCEDURE — G8432 DEP SCR NOT DOC, RNG: HCPCS | Performed by: INTERNAL MEDICINE

## 2022-05-02 PROCEDURE — G0463 HOSPITAL OUTPT CLINIC VISIT: HCPCS | Performed by: INTERNAL MEDICINE

## 2022-05-02 PROCEDURE — G8427 DOCREV CUR MEDS BY ELIG CLIN: HCPCS | Performed by: INTERNAL MEDICINE

## 2022-05-02 PROCEDURE — 1101F PT FALLS ASSESS-DOCD LE1/YR: CPT | Performed by: INTERNAL MEDICINE

## 2022-05-02 PROCEDURE — G8753 SYS BP > OR = 140: HCPCS | Performed by: INTERNAL MEDICINE

## 2022-05-02 PROCEDURE — 3017F COLORECTAL CA SCREEN DOC REV: CPT | Performed by: INTERNAL MEDICINE

## 2022-05-02 PROCEDURE — G8417 CALC BMI ABV UP PARAM F/U: HCPCS | Performed by: INTERNAL MEDICINE

## 2022-05-02 PROCEDURE — G8754 DIAS BP LESS 90: HCPCS | Performed by: INTERNAL MEDICINE

## 2022-05-02 PROCEDURE — G8536 NO DOC ELDER MAL SCRN: HCPCS | Performed by: INTERNAL MEDICINE

## 2022-05-02 PROCEDURE — 99213 OFFICE O/P EST LOW 20 MIN: CPT | Performed by: INTERNAL MEDICINE

## 2022-05-02 RX ORDER — DOXAZOSIN 2 MG/1
2 TABLET ORAL DAILY
COMMUNITY
Start: 2022-04-14

## 2022-05-02 RX ORDER — BUMETANIDE 1 MG/1
1 TABLET ORAL 2 TIMES DAILY
COMMUNITY
Start: 2022-04-13

## 2022-05-02 NOTE — PROGRESS NOTES
Gary Courtney is a 69 y.o. male who presents for evaluation of routine follow up. Last seen by me nov 1, 2021 in AWV. He has done well since then. Heart has stayed nsr, and he has more energy. Playing golf and bowling again. Asks about trying Nervive to help with his PN.       ROS:  Constitutional: negative for fevers, chills, anorexia and weight loss  Eyes:   negative for visual disturbance and irritation  ENT:   negative for tinnitus,sore throat,nasal congestion,ear pain,hoarseness  Respiratory:  negative for cough, hemoptysis, dyspnea,wheezing  CV:   negative for chest pain, palpitations, lower extremity edema  GI:   negative for nausea, vomiting, diarrhea, abdominal pain,melena  Genitourinary: negative for frequency, dysuria and hematuria  Musculoskel: negative for myalgias, arthralgias, back pain, muscle weakness, joint pain  Neurological:  negative for headaches, dizziness, focal weakness, numbness  Psychiatric:     Negative for depression or anxiety      Past Medical History:   Diagnosis Date    Arrhythmia 01/03/2020    AFIB     CAD (coronary artery disease)     h/o stent- '10    Cancer [facility]) 2010    prostate    [facility]est[facility] failure ([facility]     Hypertension     Ill-defined condition     small bowel obstruction    Right rotator cuff tear 1/26/2017    SBO (small bowel obstruction) ([facility] [facility] 11/3/2015    Stroke ([facility] May or June 2010    lacunar  infarctions (found on scan- no sxs)    Thyroid disease     hypo       Past Surgical History:   Procedure Laterality Date    HX CHOLECYSTECTOMY  1991    HX HEENT  1962    T&A    HX HERNIA REPAIR  1996    HX MALIGNANT SKIN LESION EXCISION      2004 and 2017    HX ORTHOPAEDIC  1974    Ganglion cyst left wrist    HX ORTHOPAEDIC Left 2017    shoulder repair    HX PROSTATECTOMY  2010    [place] ABLATE L/R ATRIAL FIBRIL W/ISOLATED PULM VEIN N/A 10/22/2020    Ablation Following A-Fib  Addl performed by Emiliana Purdy MD at [address] CATH LAB    [place] CARDIAC SURG PROCEDURE UNLIST  2010    coronary stent    [place] CARDIAC SURG PROCEDURE UNLIST  2020    ablation    [place] COMPRE EP EVAL ABLTJ ATR FIB PULM VEIN ISOLATION N/A 10/22/2020    ABLATION A-FIB  W COMPLETE EP STUDY performed by Isaac Schwarz MD at [facility]    [place] EXCIS KNEE CARTILAGE,MEDIAL OR LAT  1980's x3    [place] INTRACARD ECHO, THER/DX INTERVENT N/A 10/22/2020    Intracardiac Echocardiogram performed by Isaac Schwarz MD at [facility]    [place] INTRACARDIAC ELECTROPHYSIOLOGIC 3D MAPPING N/A 10/22/2020    Ep 3d Mapping performed by Isaac Schwarz MD at [facility]    UPPER GI ENDOSCOPY,BIOPSY  2/25/2020            Family History   Problem Relation Age of Onset    Dementia Mother     Other Father 55        cerebral hemorrhage       Social History     Socioeconomic History    Marital status:      Spouse name: Not on file    Number of children: Not on file    Years of education: Not on file    Highest education level: Not on file   Occupational History    Not on file   Tobacco Use    Smoking status: Never Smoker    Smokeless tobacco: Former User   Substance and Sexual Activity    Alcohol use: Not Currently    Drug use: Never    Sexual activity: Not Currently   Other Topics Concern    Not on file   Social History Narrative    Not on file     Social Determinants of Health     Financial Resource Strain:     Difficulty of Paying Living Expenses: Not on file   Food Insecurity:     Worried About [address] in the Last Year: Not on file    [address] in the Last Year: Not on file   Transportation Needs:     Lack of Transportation (Medical): Not on file    Lack of Transportation (Non-Medical):  Not on file   Physical Activity:     Days of Exercise per Week: Not on file    Minutes of Exercise per Session: Not on file   Stress:     Feeling of Stress : Not on file   Social Connections:     Frequency of Communication with Friends and Family: Not on file    Frequency of Social Gatherings with Friends and Family: Not on file    Attends Anabaptism Services: Not on file    Active Member of Clubs or Organizations: Not on file    Attends Club or Organization Meetings: Not on file    Marital Status: Not on file   Intimate Partner Violence:     Fear of Current or Ex-Partner: Not on file    Emotionally Abused: Not on file    Physically Abused: Not on file    Sexually Abused: Not on file   Housing Stability:     Unable to Pay for Housing in the Last Year: Not on file    Number of [place] in the Last Year: Not on file    Unstable Housing in the Last Year: Not on file            Visit Vitals  BP (!) 144/69 (BP 1 Location: Left upper arm, BP Patient Position: Sitting)   Pulse 63   Temp 98.4 °F (36.9 °C) (Temporal)   Resp 16   Ht 6' (1.829 m)   Wt 241 lb 3.2 oz (109.4 kg)   SpO2 97%   BMI 32.71 kg/m²       Physical Examination:   General - Well appearing male  HEENT - PERRL, TM no erythema/opacification, normal nasal turbinates, no oropharyngeal erythema or exudate, MMM  Neck - supple, no bruits, no thyroidomegaly, no lymphadenopathy  Pulm - clear to auscultation bilaterally  Cardio - RRR, normal S1 S2, no murmur  Abd - soft, nontender, no masses, no HSM  Extrem - no edema, +2 distal pulses  Neuro-  No focal deficits, CN intact     Assessment/Plan:    1.  pafib--remains nsr, sp ablation. Continue coreg, norvasc. Also on eliquis  2. Chronic diastolic chf--well compensated, continue bumex, coreg, eliquis  3. Hx prostate cancer--sp prostatectomy  4. Chronic gout--no recent flares, on allopurinol  5. PN--he thought was due to hydralazine, but has not resolved since stopping it. Asks about trying Nervive--I suggested to not, given all of his other meds  6. Hypokalemia--on replacement    He states he had pneumovax 23 here in our office a few visits ago, one year after getting prevnar 13.   None of my notes mention him getting it here.  rtc 6 months for angus Mondragon III, DO

## 2022-05-02 NOTE — PROGRESS NOTES
1. \"Have you been to the ER, urgent care clinic since your last visit? Hospitalized since your last visit? \" no    2. \"Have you seen or consulted any other health care providers outside of the [address] [place] [address] since your last visit? \"  Yes, nephrologist    3. For patients aged 39-70: Has the patient had a colonoscopy / FIT/ Cologuard?  yes

## 2022-11-04 ENCOUNTER — OFFICE VISIT (OUTPATIENT)
Dept: INTERNAL MEDICINE CLINIC | Age: 67
End: 2022-11-04
Payer: MEDICARE

## 2022-11-04 VITALS
SYSTOLIC BLOOD PRESSURE: 146 MMHG | RESPIRATION RATE: 14 BRPM | BODY MASS INDEX: 33.54 KG/M2 | HEIGHT: 72 IN | HEART RATE: 77 BPM | OXYGEN SATURATION: 97 % | WEIGHT: 247.6 LBS | DIASTOLIC BLOOD PRESSURE: 74 MMHG | TEMPERATURE: 98.1 F

## 2022-11-04 DIAGNOSIS — E03.9 ACQUIRED HYPOTHYROIDISM: ICD-10-CM

## 2022-11-04 DIAGNOSIS — I48.0 PAF (PAROXYSMAL ATRIAL FIBRILLATION) (HCC): ICD-10-CM

## 2022-11-04 DIAGNOSIS — Z85.46 HISTORY OF PROSTATE CANCER: ICD-10-CM

## 2022-11-04 DIAGNOSIS — I50.32 DIASTOLIC CHF, CHRONIC (HCC): ICD-10-CM

## 2022-11-04 DIAGNOSIS — Z00.00 MEDICARE ANNUAL WELLNESS VISIT, SUBSEQUENT: Primary | ICD-10-CM

## 2022-11-04 DIAGNOSIS — I25.10 CORONARY ARTERY DISEASE INVOLVING NATIVE CORONARY ARTERY OF NATIVE HEART WITHOUT ANGINA PECTORIS: ICD-10-CM

## 2022-11-04 DIAGNOSIS — E26.1 SECONDARY HYPERALDOSTERONISM (HCC): ICD-10-CM

## 2022-11-04 DIAGNOSIS — M1A.00X0 IDIOPATHIC CHRONIC GOUT WITHOUT TOPHUS, UNSPECIFIED SITE: ICD-10-CM

## 2022-11-04 DIAGNOSIS — E78.2 MIXED HYPERLIPIDEMIA: ICD-10-CM

## 2022-11-04 DIAGNOSIS — R73.03 PREDIABETES: ICD-10-CM

## 2022-11-04 PROCEDURE — G8510 SCR DEP NEG, NO PLAN REQD: HCPCS | Performed by: INTERNAL MEDICINE

## 2022-11-04 PROCEDURE — G0439 PPPS, SUBSEQ VISIT: HCPCS | Performed by: INTERNAL MEDICINE

## 2022-11-04 PROCEDURE — G8417 CALC BMI ABV UP PARAM F/U: HCPCS | Performed by: INTERNAL MEDICINE

## 2022-11-04 PROCEDURE — 3017F COLORECTAL CA SCREEN DOC REV: CPT | Performed by: INTERNAL MEDICINE

## 2022-11-04 PROCEDURE — G8753 SYS BP > OR = 140: HCPCS | Performed by: INTERNAL MEDICINE

## 2022-11-04 PROCEDURE — G8754 DIAS BP LESS 90: HCPCS | Performed by: INTERNAL MEDICINE

## 2022-11-04 PROCEDURE — G8427 DOCREV CUR MEDS BY ELIG CLIN: HCPCS | Performed by: INTERNAL MEDICINE

## 2022-11-04 PROCEDURE — G8536 NO DOC ELDER MAL SCRN: HCPCS | Performed by: INTERNAL MEDICINE

## 2022-11-04 PROCEDURE — 1101F PT FALLS ASSESS-DOCD LE1/YR: CPT | Performed by: INTERNAL MEDICINE

## 2022-11-04 NOTE — PROGRESS NOTES
Kevin Turk is a 69 y.o. male who presents for evaluation of AWV. Last seen by me may 2, 2022. He has overall done well since then, though did have a skin cancer removed from his left forearm, and also had teeth removed. His eliquis has been on hold since then. Overall doing ok.       ROS:  Constitutional: negative for fevers, chills, anorexia and weight loss  Eyes:   negative for visual disturbance and irritation  ENT:   negative for tinnitus,sore throat,nasal congestion,ear pain,hoarseness  Respiratory:  negative for cough, hemoptysis, dyspnea,wheezing  CV:   negative for chest pain, palpitations, lower extremity edema  GI:   negative for nausea, vomiting, diarrhea, abdominal pain,melena  Genitourinary: negative for frequency, dysuria and hematuria  Musculoskel: negative for myalgias, arthralgias, back pain, muscle weakness, joint pain  Neurological:  negative for headaches, dizziness, focal weakness, numbness  Psychiatric:     Negative for depression or anxiety      Past Medical History:   Diagnosis Date    Arrhythmia 01/03/2020    AFIB     CAD (coronary artery disease)     h/o stent- '10    Cancer ([address] 2010    prostate    Congestive heart failure ([address]     Hypertension     Ill-defined condition     small bowel obstruction    Right rotator cuff tear 1/26/2017    SBO (small bowel obstruction) ([address] 11/3/2015    Stroke ([address] May or June 2010    lacunar  infarctions (found on scan- no sxs)    Thyroid disease     hypo       Past Surgical History:   Procedure Laterality Date    HX CHOLECYSTECTOMY  1991    HX HEENT  1962    T&A    HX HERNIA REPAIR  1996    HX MALIGNANT SKIN LESION EXCISION      2004 and 2017    HX ORTHOPAEDIC  1974    Ganglion cyst left wrist    HX ORTHOPAEDIC Left 2017    shoulder repair    HX PROSTATECTOMY  2010    AL ABLATE L/R ATRIAL FIBRIL W/ISOLATED PULM VEIN N/A 10/22/2020    Ablation Following A-Fib  Addl performed by Dior Degroot MD at [address] CARDIAC CATH LAB    AL CARDIAC SURG PROCEDURE UNLIST  2010    coronary stent    AR CARDIAC SURG PROCEDURE UNLIST  2020    ablation    AR COMPRE EP EVAL ABLTJ ATR FIB PULM VEIN ISOLATION N/A 10/22/2020    ABLATION A-FIB  W COMPLETE EP STUDY performed by Crystal Polanco MD at [facility] CARDIAC CATH LAB    AR EXCIS KNEE CARTILAGE,MEDIAL OR LAT  1980's x3    AR INTRACARD ECHO, THER/DX INTERVENT N/A 10/22/2020    Intracardiac Echocardiogram performed by Crystal Polanco MD at [facility] CARDIAC CATH LAB    AR INTRACARDIAC ELECTROPHYSIOLOGIC 3D MAPPING N/A 10/22/2020    Ep 3d Mapping performed by Crystal Polanco MD at [facility] CARDIAC CATH LAB    UPPER GI ENDOSCOPY,BIOPSY  2/25/2020            Family History   Problem Relation Age of Onset    Dementia Mother     Other Father 55        cerebral hemorrhage       Social History     Socioeconomic History    Marital status:      Spouse name: Not on file    Number of children: Not on file    Years of education: Not on file    Highest education level: Not on file   Occupational History    Not on file   Tobacco Use    Smoking status: Never    Smokeless tobacco: Former   Substance and Sexual Activity    Alcohol use: Not Currently    Drug use: Never    Sexual activity: Not Currently   Other Topics Concern    Not on file   Social History Narrative    Not on file     Social Determinants of Health     Financial Resource Strain: Unknown    Difficulty of Paying Living Expenses: Patient refused   Food Insecurity: Unknown    Worried About Running Out of Food in the Last Year: Patient refused    Ran Out of Food in the Last Year: Patient refused   Transportation Needs: Not on file   Physical Activity: Not on file   Stress: Not on file   Social Connections: Not on file   Intimate Partner Violence: Not on file   Housing Stability: Not on file          Visit Vitals  BP (!) 146/74 (BP 1 Location: Right upper arm, BP Patient Position: Sitting)   Pulse 77   Temp 98.1 °F (36.7 °C) (Temporal)   Resp 14   Ht 6' (1.829 m)   Wt 247 lb 9.6 oz (112.3 kg)   SpO2 97%   BMI 33.58 kg/m²       Physical Examination:   General - Well appearing male  HEENT - PERRL, TM no erythema/opacification, normal nasal turbinates, no oropharyngeal erythema or exudate, MMM  Neck - supple, no bruits, no thyroidomegaly, no lymphadenopathy  Pulm - clear to auscultation bilaterally  Cardio - irregular, normal S1 S2, no murmur  Abd - soft, nontender, no masses, no HSM  Extrem - no edema, +2 distal pulses  Neuro-  No focal deficits, CN intact     Assessment/Plan:    Pafib--sp ablation. On coreg, norvasc. Encouraged to resume eliquis until stopped by cardio. Hypothyroid--on synthroid, check tsh  Chronic diastolic chf--controlled, continue bumex, coreg, imdur  Chronic gout--no recent flares, on allopurinol  Htn--improved, continue cardura, norvasc, coreg, imdur, bumex  Hyperlipids--check flp, last . Allergies to statins and zetia. Consider nexletol  Dyspepsia--continue carafate, protonix    Declines vaccines        Karla Patches III, DO              This is the Subsequent Medicare Annual Wellness Exam, performed 12 months or more after the Initial AWV or the last Subsequent AWV    I have reviewed the patient's medical history in detail and updated the computerized patient record. Assessment/Plan   Education and counseling provided:  Are appropriate based on today's review and evaluation  End-of-Life planning (with patient's consent)  Pneumococcal Vaccine  Influenza Vaccine  Prostate cancer screening tests (PSA, covered annually)    1.  Medicare annual wellness visit, subsequent     Depression Risk Factor Screening     3 most recent PHQ Screens 11/1/2022   PHQ Not Done -   Little interest or pleasure in doing things Not at all   Feeling down, depressed, irritable, or hopeless Not at all   Total Score PHQ 2 0       Alcohol & Drug Abuse Risk Screen   Do you average more than 1 drink per night or more than 7 drinks a week?: (P) No  In the past three months have you had more than 4 drinks containing alcohol on one occasion?: (P) Yes          Functional Ability and Level of Safety   Hearing:  Hearing: (P) Patient reports hearing is good    Activities of Daily Living: The home contains: (P) no safety equipment, rugs  Functional ADLs: (P) Patient does total self care   Ambulation:  Patient ambulates: (P) with no difficulty   Fall Risk:  Fall Risk Assessment, last 12 mths 11/4/2022   Able to walk? Yes   Fall in past 12 months? 0   Do you feel unsteady?  0   Are you worried about falling 0     Abuse Screen:  Do you ever feel afraid of your partner?: (P) No  Are you in a relationship with someone who physically or mentally threatens you?: (P) No  Is it safe for you to go home?: (P) Yes      Cognitive Screening   Has your family/caregiver stated any concerns about your memory?: (P) No   Cognitive Screening: Normal - MMSE (Mini Mental Status Exam)    Health Maintenance Due     Health Maintenance Due   Topic Date Due    COVID-19 Vaccine (1) Never done    DTaP/Tdap/Td series (1 - Tdap) Never done    Shingrix Vaccine Age 50> (1 of 2) Never done    Flu Vaccine (1) Never done       Patient Care Team   Patient Care Team:  Estefany Joseph DO as PCP - General (Internal Medicine Physician)  Estefany Joseph DO as PCP - [facility] Provider  Glen Blanca MD (Nephrology)  Evan Moy MD (Cardiovascular Disease Physician)  Cristiano Pabon MD (Gastroenterology)  Phoenix Mendez OD (Optometry)    History     Patient Active Problem List   Diagnosis Code    Coronary artery disease involving native coronary artery of native heart without angina pectoris I25.10    Essential hypertension I10    Acquired hypothyroidism E03.9    Mixed hyperlipidemia E78.2    Chronic systolic congestive heart failure ([address] I50.22    Acute respiratory failure with hypoxia ([address] J96.01    Secondary hyperaldosteronism ([address] E26.1    Cardiomegaly I51.7 Pulmonary edema J81.1    PAF (paroxysmal atrial fibrillation) ([facility]) I48.0    Hypokalemia E87.6    TIA (transient ischemic attack) G45.9    Erosive gastritis K29.60    Acute renal failure (ARF) ([facility]) N17.9    Chest pain R07.9    Heart failure (HCC) I50.9    SOB (shortness of breath) R06.02    Atrial fibrillation (HCC) I48.91     Past Medical History:   Diagnosis Date    Arrhythmia 01/03/2020    AFIB     CAD (coronary artery disease)     h/o stent- '10    Cancer [facility]) 2010    prostate    [facility]est[facility]     Hypertension     Ill-defined condition     small bowel obstruction    Right rotator cuff tear 1/26/2017    SBO (small bowel obstruction) ([facility] 11/3/2015    Stroke ([facility] May or June 2010    lacunar  infarctions (found on scan- no sxs)    Thyroid disease     hypo      Past Surgical History:   Procedure Laterality Date    HX CHOLECYSTECTOMY  1991    HX HEENT  1962    T&A    HX HERNIA REPAIR  1996    HX MALIGNANT SKIN LESION EXCISION      2004 and 2017    HX ORTHOPAEDIC  1974    Ganglion cyst left wrist    HX ORTHOPAEDIC Left 2017    shoulder repair    HX PROSTATECTOMY  2010    IA ABLATE L/R ATRIAL FIBRIL W/ISOLATED PULM VEIN N/A 10/22/2020    Ablation Following A-Fib  Addl performed by Joy Tijerina MD at [facility] CARDIAC CATH LAB    IA CARDIAC SURG PROCEDURE UNLIST  2010    coronary stent    IA CARDIAC SURG PROCEDURE UNLIST  2020    ablation    IA COMPRE EP EVAL ABLTJ ATR FIB PULM VEIN ISOLATION N/A 10/22/2020    ABLATION A-FIB  W COMPLETE EP STUDY performed by Joy Tijerina MD at [facility] CARDIAC CATH LAB    IA EXCIS KNEE CARTILAGE,MEDIAL OR LAT  1980's x3    IA INTRACARD ECHO, THER/DX INTERVENT N/A 10/22/2020    Intracardiac Echocardiogram performed by Joy Tijerina MD at [facility] CARDIAC CATH LAB    IA INTRACARDIAC ELECTROPHYSIOLOGIC 3D MAPPING N/A 10/22/2020    Ep 3d Mapping performed by Joy Tijerina MD at [facility] CARDIAC CATH LAB    UPPER GI ENDOSCOPY,BIOPSY  2/25/2020          Current Outpatient Medications   Medication Sig Dispense Refill    levothyroxine (SYNTHROID) 50 mcg tablet Take 1 Tablet by mouth Daily (before breakfast). 90 Tablet 3    allopurinoL (ZYLOPRIM) 300 mg tablet Take 1 Tablet by mouth in the morning. 90 Tablet 3    doxazosin (CARDURA) 2 mg tablet Take 2 mg by mouth daily. bumetanide (BUMEX) 1 mg tablet Take 1 mg by mouth two (2) times a day. pantoprazole (PROTONIX) 40 mg tablet Take 1 tablet by mouth once daily 90 Tablet 3    amLODIPine (NORVASC) 2.5 mg tablet Take 2.5 mg by mouth daily. potassium chloride SR (KLOR-CON 10) 10 mEq tablet Take 10 mEq by mouth two (2) times a day. carvediloL (COREG) 25 mg tablet Take 1 Tablet by mouth two (2) times daily (with meals). 180 Tablet 3    sucralfate (CARAFATE) 1 gram tablet Take 1 Tab by mouth Before breakfast, lunch, and dinner. 90 Tab 0    isosorbide mononitrate ER (IMDUR) 30 mg tablet Take 1 Tab by mouth daily. 30 Tab 12    cholecalciferol, vitamin D3, 50 mcg (2,000 unit) tab Take 2,000 Units by mouth daily. Eliquis 5 mg tablet Take 5 mg by mouth two (2) times a day. (Patient not taking: Reported on 11/4/2022)       Allergies   Allergen Reactions    Clonidine Anxiety    Lasix [Furosemide] Other (comments)     May be the cause of kidney failure    Niacin Other (comments)     Decreased vision    Statins-Hmg-Coa Reductase Inhibitors Other (comments)     Muscle and Joint pains    Zetia [Ezetimibe] Myalgia     Pt reports getting joint and muscle pain.         Family History   Problem Relation Age of Onset    Dementia Mother     Other Father 55        cerebral hemorrhage     Social History     Tobacco Use    Smoking status: Never    Smokeless tobacco: Former   Substance Use Topics    Alcohol use: Not Currently         Jazlyn Mosqueda III, DO

## 2022-11-04 NOTE — PATIENT INSTRUCTIONS
Medicare Wellness Visit, Male    The best way to live healthy is to have a lifestyle where you eat a well-balanced diet, exercise regularly, limit alcohol use, and quit all forms of tobacco/nicotine, if applicable. Regular preventive services are another way to keep healthy. Preventive services (vaccines, screening tests, monitoring & exams) can help personalize your care plan, which helps you manage your own care. Screening tests can find health problems at the earliest stages, when they are easiest to treat. [facility] follows the current, evidence-based guidelines published by the [facility] ([facility]) when recommending preventive services for our patients. Because we follow these guidelines, sometimes recommendations change over time as research supports it. (For example, a prostate screening blood test is no longer routinely recommended for men with no symptoms). Of course, you and your doctor may decide to screen more often for some diseases, based on your risk and co-morbidities (chronic disease you are already diagnosed with). Preventive services for you include:  - Medicare offers their members a free annual wellness visit, which is time for you and your primary care provider to discuss and plan for your preventive service needs. Take advantage of this benefit every year!  -All adults over age 72 should receive the recommended pneumonia vaccines. Current USPSTF guidelines recommend a series of two vaccines for the best pneumonia protection.   -All adults should have a flu vaccine yearly and tetanus vaccine every 10 years.  -All adults age 48 and older should receive the shingles vaccines (series of two vaccines).        -All adults age 38-68 who are overweight should have a diabetes screening test once every three years.   -Other screening tests & preventive services for persons with diabetes include: an eye exam to screen for diabetic retinopathy, a kidney function test, a foot exam, and stricter control over your cholesterol.   -Cardiovascular screening for adults with routine risk involves an electrocardiogram (ECG) at intervals determined by the provider.   -Colorectal cancer screening should be done for adults age 54-65 with no increased risk factors for colorectal cancer. There are a number of acceptable methods of screening for this type of cancer. Each test has its own benefits and drawbacks. Discuss with your provider what is most appropriate for you during your annual wellness visit. The different tests include: colonoscopy (considered the best screening method), a fecal occult blood test, a fecal DNA test, and sigmoidoscopy.  -All adults born [facility] should be screened once for Hepatitis C.  -An Abdominal Aortic Aneurysm (AAA) Screening is recommended for men age 73-68 who has ever smoked in their lifetime. Here is a list of your current Health Maintenance items (your personalized list of preventive services) with a due date:  Health Maintenance Due   Topic Date Due    COVID-19 Vaccine (1) Never done    DTaP/Tdap/Td  (1 - Tdap) Never done    Shingles Vaccine (1 of 2) Never done    Yearly Flu Vaccine (1) Never done     I would resume your eliquis. Would not stop it until dr Jay Jay Saenz or jose says it is ok to do so.

## 2022-11-04 NOTE — PROGRESS NOTES
1. \"Have you been to the ER, urgent care clinic since your last visit? Hospitalized since your last visit? \" No    2. \"Have you seen or consulted any other health care providers outside of the [address] [place] [address] since your last visit? \" Yes dermatology      3. For patients aged 39-70: Has the patient had a colonoscopy / FIT/ Cologuard? Yes, no care gap        If the patient is female:    4. For patients aged 41-77: Has the patient had a mammogram within the past 2 years? NA - based on age or sex      11. For patients aged 21-65: Has the patient had a pap smear?  NA - based on age or sex

## 2022-12-29 ENCOUNTER — LAB ONLY (OUTPATIENT)
Dept: INTERNAL MEDICINE CLINIC | Age: 67
End: 2022-12-29

## 2022-12-29 DIAGNOSIS — E03.9 ACQUIRED HYPOTHYROIDISM: ICD-10-CM

## 2022-12-29 DIAGNOSIS — E78.2 MIXED HYPERLIPIDEMIA: ICD-10-CM

## 2022-12-29 DIAGNOSIS — I50.32 DIASTOLIC CHF, CHRONIC (HCC): ICD-10-CM

## 2022-12-29 DIAGNOSIS — Z85.46 HISTORY OF PROSTATE CANCER: ICD-10-CM

## 2022-12-29 DIAGNOSIS — R73.03 PREDIABETES: ICD-10-CM

## 2022-12-29 LAB
ALBUMIN SERPL-MCNC: 4.3 G/DL (ref 3.5–5)
ALBUMIN/GLOB SERPL: 1.5 {RATIO} (ref 1.1–2.2)
ALP SERPL-CCNC: 61 U/L (ref 45–117)
ALT SERPL-CCNC: 23 U/L (ref 12–78)
ANION GAP SERPL CALC-SCNC: 8 MMOL/L (ref 5–15)
AST SERPL-CCNC: 14 U/L (ref 15–37)
BASOPHILS # BLD: 0.1 K/UL (ref 0–0.1)
BASOPHILS NFR BLD: 1 % (ref 0–1)
BILIRUB SERPL-MCNC: 0.7 MG/DL (ref 0.2–1)
BUN SERPL-MCNC: 21 MG/DL (ref 6–20)
BUN/CREAT SERPL: 18 (ref 12–20)
CALCIUM SERPL-MCNC: 9.4 MG/DL (ref 8.5–10.1)
CHLORIDE SERPL-SCNC: 107 MMOL/L (ref 97–108)
CHOLEST SERPL-MCNC: 229 MG/DL
CO2 SERPL-SCNC: 26 MMOL/L (ref 21–32)
CREAT SERPL-MCNC: 1.17 MG/DL (ref 0.7–1.3)
DIFFERENTIAL METHOD BLD: NORMAL
EOSINOPHIL # BLD: 0.3 K/UL (ref 0–0.4)
EOSINOPHIL NFR BLD: 4 % (ref 0–7)
ERYTHROCYTE [DISTWIDTH] IN BLOOD BY AUTOMATED COUNT: 13.4 % (ref 11.5–14.5)
EST. AVERAGE GLUCOSE BLD GHB EST-MCNC: 117 MG/DL
GLOBULIN SER CALC-MCNC: 2.8 G/DL (ref 2–4)
GLUCOSE SERPL-MCNC: 118 MG/DL (ref 65–100)
HBA1C MFR BLD: 5.7 % (ref 4–5.6)
HCT VFR BLD AUTO: 44.2 % (ref 36.6–50.3)
HDLC SERPL-MCNC: 40 MG/DL
HDLC SERPL: 5.7 {RATIO} (ref 0–5)
HGB BLD-MCNC: 13.7 G/DL (ref 12.1–17)
IMM GRANULOCYTES # BLD AUTO: 0 K/UL (ref 0–0.04)
IMM GRANULOCYTES NFR BLD AUTO: 0 % (ref 0–0.5)
LDLC SERPL CALC-MCNC: 157.8 MG/DL (ref 0–100)
LYMPHOCYTES # BLD: 1.8 K/UL (ref 0.8–3.5)
LYMPHOCYTES NFR BLD: 29 % (ref 12–49)
MCH RBC QN AUTO: 30.2 PG (ref 26–34)
MCHC RBC AUTO-ENTMCNC: 31 G/DL (ref 30–36.5)
MCV RBC AUTO: 97.6 FL (ref 80–99)
MONOCYTES # BLD: 0.5 K/UL (ref 0–1)
MONOCYTES NFR BLD: 7 % (ref 5–13)
NEUTS SEG # BLD: 3.7 K/UL (ref 1.8–8)
NEUTS SEG NFR BLD: 59 % (ref 32–75)
NRBC # BLD: 0 K/UL (ref 0–0.01)
NRBC BLD-RTO: 0 PER 100 WBC
PLATELET # BLD AUTO: 176 K/UL (ref 150–400)
PMV BLD AUTO: 12.2 FL (ref 8.9–12.9)
POTASSIUM SERPL-SCNC: 3.6 MMOL/L (ref 3.5–5.1)
PROT SERPL-MCNC: 7.1 G/DL (ref 6.4–8.2)
PSA SERPL-MCNC: 0 NG/ML (ref 0.01–4)
RBC # BLD AUTO: 4.53 M/UL (ref 4.1–5.7)
SODIUM SERPL-SCNC: 141 MMOL/L (ref 136–145)
TRIGL SERPL-MCNC: 156 MG/DL (ref ?–150)
TSH SERPL DL<=0.05 MIU/L-ACNC: 2.58 UIU/ML (ref 0.36–3.74)
VLDLC SERPL CALC-MCNC: 31.2 MG/DL
WBC # BLD AUTO: 6.2 K/UL (ref 4.1–11.1)

## 2023-01-02 NOTE — PROGRESS NOTES
Overall labs remain good and stable. However, cholesterol levels are elevated. There is a newer cholesterol med called nexletol that is not a statin or zetia, that is available, if he wants to try that--let me know. Otherwise, contineu same meds.

## 2023-01-03 RX ORDER — BEMPEDOIC ACID 180 MG/1
180 TABLET, FILM COATED ORAL DAILY
Qty: 30 EACH | Refills: 11 | Status: SHIPPED | OUTPATIENT
Start: 2023-01-03

## 2023-01-03 NOTE — PROGRESS NOTES
Called, spoke to pt. Two identifiers confirmed. Pt notified of results/recommendations per Dr. Clarissa Kinney. Pt verbalized understanding of information discussed w/ no further questions at this time. Patient is willing to let the nexletol get sent to pharmacy. Overall labs remain good and stable. However, cholesterol levels are elevated. There is a newer cholesterol med called nexletol that is not a statin or zetia, that is available, if he wants to try that--let me know. Otherwise, contineu same meds.

## 2023-01-27 ENCOUNTER — TELEPHONE (OUTPATIENT)
Dept: INTERNAL MEDICINE CLINIC | Age: 68
End: 2023-01-27

## 2023-01-27 NOTE — TELEPHONE ENCOUNTER
(Key: [id number]) - [id number]  Nexletol 180MG tablets  Status: PA Request  Created: January 18th, 2023 [id number]  Sent: January 27th, 2023

## 2023-01-27 NOTE — TELEPHONE ENCOUNTER
(Key: [id number]) - [id number]  Nexletol 180MG tablets  Status: PA Response - Denied  Created: January 18th, 2023 [id number]  Sent: January 27th, 2023

## 2023-05-04 ENCOUNTER — OFFICE VISIT (OUTPATIENT)
Dept: INTERNAL MEDICINE CLINIC | Age: 68
End: 2023-05-04
Payer: MEDICARE

## 2023-05-04 VITALS
RESPIRATION RATE: 16 BRPM | OXYGEN SATURATION: 96 % | BODY MASS INDEX: 33.48 KG/M2 | SYSTOLIC BLOOD PRESSURE: 165 MMHG | TEMPERATURE: 98.2 F | DIASTOLIC BLOOD PRESSURE: 71 MMHG | HEART RATE: 76 BPM | WEIGHT: 247.2 LBS | HEIGHT: 72 IN

## 2023-05-04 DIAGNOSIS — E26.1 SECONDARY HYPERALDOSTERONISM (HCC): ICD-10-CM

## 2023-05-04 DIAGNOSIS — I50.32 DIASTOLIC CHF, CHRONIC (HCC): Primary | ICD-10-CM

## 2023-05-04 DIAGNOSIS — I25.10 CORONARY ARTERY DISEASE INVOLVING NATIVE CORONARY ARTERY OF NATIVE HEART WITHOUT ANGINA PECTORIS: ICD-10-CM

## 2023-05-04 DIAGNOSIS — I10 ESSENTIAL HYPERTENSION: ICD-10-CM

## 2023-05-04 DIAGNOSIS — Z86.73 HISTORY OF LACUNAR CEREBROVASCULAR ACCIDENT (CVA): ICD-10-CM

## 2023-05-04 DIAGNOSIS — I48.0 PAF (PAROXYSMAL ATRIAL FIBRILLATION) (HCC): ICD-10-CM

## 2023-05-04 DIAGNOSIS — M1A.00X0 IDIOPATHIC CHRONIC GOUT WITHOUT TOPHUS, UNSPECIFIED SITE: ICD-10-CM

## 2023-05-04 PROCEDURE — G8417 CALC BMI ABV UP PARAM F/U: HCPCS | Performed by: INTERNAL MEDICINE

## 2023-05-04 PROCEDURE — 3017F COLORECTAL CA SCREEN DOC REV: CPT | Performed by: INTERNAL MEDICINE

## 2023-05-04 PROCEDURE — G8427 DOCREV CUR MEDS BY ELIG CLIN: HCPCS | Performed by: INTERNAL MEDICINE

## 2023-05-04 PROCEDURE — G0463 HOSPITAL OUTPT CLINIC VISIT: HCPCS | Performed by: INTERNAL MEDICINE

## 2023-05-04 PROCEDURE — 1101F PT FALLS ASSESS-DOCD LE1/YR: CPT | Performed by: INTERNAL MEDICINE

## 2023-05-04 PROCEDURE — G8432 DEP SCR NOT DOC, RNG: HCPCS | Performed by: INTERNAL MEDICINE

## 2023-05-04 PROCEDURE — G8536 NO DOC ELDER MAL SCRN: HCPCS | Performed by: INTERNAL MEDICINE

## 2023-05-04 PROCEDURE — 99213 OFFICE O/P EST LOW 20 MIN: CPT | Performed by: INTERNAL MEDICINE

## 2023-05-04 RX ORDER — BISACODYL 5 MG/1
1 TABLET, COATED ORAL DAILY
COMMUNITY

## 2023-05-04 RX ORDER — CHLORHEXIDINE GLUCONATE 1.2 MG/ML
RINSE ORAL
COMMUNITY
Start: 2022-10-19 | End: 2023-05-04 | Stop reason: ALTCHOICE

## 2023-06-21 NOTE — PROGRESS NOTES
8/10/2020 12:15 PM  Patient without complaints. Last VS:   Visit Vitals  /61   Pulse (!) 47   Temp 98 °F (36.7 °C)   Resp 12   Ht 6' (1.829 m)   Wt 113.4 kg (250 lb)   SpO2 96%   BMI 33.91 kg/m²     Cath site without hematoma, bleeding or new bruit. Distal pulses at baseline. Continue current plan of care. Results of cath discussed with patient and his wife. No new CAD (mild-mod D1 only); Echo pending. D/w Dr Tang Patel. normal/clear to auscultation bilaterally/no wheezes/no rales/no rhonchi/no respiratory distress/no use of accessory muscles

## 2023-08-03 RX ORDER — ALLOPURINOL 300 MG/1
TABLET ORAL
Qty: 90 TABLET | Refills: 3 | Status: SHIPPED | OUTPATIENT
Start: 2023-08-03

## 2023-08-21 RX ORDER — LEVOTHYROXINE SODIUM 0.05 MG/1
TABLET ORAL
Qty: 90 TABLET | Refills: 3 | Status: SHIPPED | OUTPATIENT
Start: 2023-08-21

## 2023-09-14 ENCOUNTER — HOSPITAL ENCOUNTER (OUTPATIENT)
Facility: HOSPITAL | Age: 68
Discharge: HOME | End: 2023-09-14
Payer: MEDICARE

## 2023-09-14 DIAGNOSIS — I25.119 ATHEROSCLEROSIS OF NATIVE CORONARY ARTERY WITH ANGINA PECTORIS, UNSPECIFIED WHETHER NATIVE OR TRANSPLANTED HEART (HCC): ICD-10-CM

## 2023-09-14 DIAGNOSIS — R94.39 ABNORMAL BALLISTOCARDIOGRAM: ICD-10-CM

## 2023-09-14 PROCEDURE — 71046 X-RAY EXAM CHEST 2 VIEWS: CPT

## 2023-09-20 ENCOUNTER — HOSPITAL ENCOUNTER (OUTPATIENT)
Facility: HOSPITAL | Age: 68
Discharge: HOME | End: 2023-09-21
Attending: INTERNAL MEDICINE | Admitting: INTERNAL MEDICINE
Payer: MEDICARE

## 2023-09-20 DIAGNOSIS — I42.9 CARDIOMYOPATHY, UNSPECIFIED TYPE (HCC): Primary | ICD-10-CM

## 2023-09-20 DIAGNOSIS — I20.8 OTHER FORMS OF ANGINA PECTORIS (HCC): ICD-10-CM

## 2023-09-20 DIAGNOSIS — R07.9 CHEST PAIN: ICD-10-CM

## 2023-09-20 LAB
ACT BLD: 239 SECS (ref 79–138)
ACT BLD: 281 SECS (ref 79–138)

## 2023-09-20 PROCEDURE — 2500000003 HC RX 250 WO HCPCS: Performed by: INTERNAL MEDICINE

## 2023-09-20 PROCEDURE — 76937 US GUIDE VASCULAR ACCESS: CPT | Performed by: INTERNAL MEDICINE

## 2023-09-20 PROCEDURE — 2709999900 HC NON-CHARGEABLE SUPPLY: Performed by: INTERNAL MEDICINE

## 2023-09-20 PROCEDURE — 6370000000 HC RX 637 (ALT 250 FOR IP): Performed by: STUDENT IN AN ORGANIZED HEALTH CARE EDUCATION/TRAINING PROGRAM

## 2023-09-20 PROCEDURE — C1887 CATHETER, GUIDING: HCPCS | Performed by: INTERNAL MEDICINE

## 2023-09-20 PROCEDURE — 6370000000 HC RX 637 (ALT 250 FOR IP): Performed by: INTERNAL MEDICINE

## 2023-09-20 PROCEDURE — 2580000003 HC RX 258: Performed by: STUDENT IN AN ORGANIZED HEALTH CARE EDUCATION/TRAINING PROGRAM

## 2023-09-20 PROCEDURE — C1894 INTRO/SHEATH, NON-LASER: HCPCS | Performed by: INTERNAL MEDICINE

## 2023-09-20 PROCEDURE — C1725 CATH, TRANSLUMIN NON-LASER: HCPCS | Performed by: INTERNAL MEDICINE

## 2023-09-20 PROCEDURE — C1874 STENT, COATED/COV W/DEL SYS: HCPCS | Performed by: INTERNAL MEDICINE

## 2023-09-20 PROCEDURE — C1713 ANCHOR/SCREW BN/BN,TIS/BN: HCPCS | Performed by: INTERNAL MEDICINE

## 2023-09-20 PROCEDURE — 99153 MOD SED SAME PHYS/QHP EA: CPT | Performed by: INTERNAL MEDICINE

## 2023-09-20 PROCEDURE — C9600 PERC DRUG-EL COR STENT SING: HCPCS | Performed by: INTERNAL MEDICINE

## 2023-09-20 PROCEDURE — 6360000002 HC RX W HCPCS: Performed by: INTERNAL MEDICINE

## 2023-09-20 PROCEDURE — C1769 GUIDE WIRE: HCPCS | Performed by: INTERNAL MEDICINE

## 2023-09-20 PROCEDURE — 99152 MOD SED SAME PHYS/QHP 5/>YRS: CPT | Performed by: INTERNAL MEDICINE

## 2023-09-20 PROCEDURE — 93458 L HRT ARTERY/VENTRICLE ANGIO: CPT | Performed by: INTERNAL MEDICINE

## 2023-09-20 PROCEDURE — 85347 COAGULATION TIME ACTIVATED: CPT

## 2023-09-20 PROCEDURE — 6360000004 HC RX CONTRAST MEDICATION: Performed by: INTERNAL MEDICINE

## 2023-09-20 RX ORDER — LEVOTHYROXINE SODIUM 0.05 MG/1
50 TABLET ORAL DAILY
Status: DISCONTINUED | OUTPATIENT
Start: 2023-09-21 | End: 2023-09-21 | Stop reason: HOSPADM

## 2023-09-20 RX ORDER — PANTOPRAZOLE SODIUM 40 MG/1
40 TABLET, DELAYED RELEASE ORAL DAILY
Status: DISCONTINUED | OUTPATIENT
Start: 2023-09-21 | End: 2023-09-21 | Stop reason: HOSPADM

## 2023-09-20 RX ORDER — LIDOCAINE HYDROCHLORIDE 10 MG/ML
INJECTION, SOLUTION INFILTRATION; PERINEURAL PRN
Status: DISCONTINUED | OUTPATIENT
Start: 2023-09-20 | End: 2023-09-20 | Stop reason: HOSPADM

## 2023-09-20 RX ORDER — HEPARIN SODIUM 1000 [USP'U]/ML
INJECTION, SOLUTION INTRAVENOUS; SUBCUTANEOUS PRN
Status: DISCONTINUED | OUTPATIENT
Start: 2023-09-20 | End: 2023-09-20 | Stop reason: HOSPADM

## 2023-09-20 RX ORDER — HYDRALAZINE HYDROCHLORIDE 20 MG/ML
10 INJECTION INTRAMUSCULAR; INTRAVENOUS EVERY 10 MIN PRN
Status: DISCONTINUED | OUTPATIENT
Start: 2023-09-20 | End: 2023-09-21 | Stop reason: HOSPADM

## 2023-09-20 RX ORDER — CLOPIDOGREL 300 MG/1
TABLET, FILM COATED ORAL PRN
Status: DISCONTINUED | OUTPATIENT
Start: 2023-09-20 | End: 2023-09-20 | Stop reason: HOSPADM

## 2023-09-20 RX ORDER — SODIUM CHLORIDE 9 MG/ML
INJECTION, SOLUTION INTRAVENOUS PRN
Status: DISCONTINUED | OUTPATIENT
Start: 2023-09-20 | End: 2023-09-21 | Stop reason: HOSPADM

## 2023-09-20 RX ORDER — ALLOPURINOL 300 MG/1
300 TABLET ORAL EVERY MORNING
Status: DISCONTINUED | OUTPATIENT
Start: 2023-09-21 | End: 2023-09-21 | Stop reason: HOSPADM

## 2023-09-20 RX ORDER — ISOSORBIDE MONONITRATE 30 MG/1
30 TABLET, EXTENDED RELEASE ORAL DAILY
Status: DISCONTINUED | OUTPATIENT
Start: 2023-09-20 | End: 2023-09-21 | Stop reason: HOSPADM

## 2023-09-20 RX ORDER — VERAPAMIL HYDROCHLORIDE 2.5 MG/ML
INJECTION, SOLUTION INTRAVENOUS PRN
Status: DISCONTINUED | OUTPATIENT
Start: 2023-09-20 | End: 2023-09-20 | Stop reason: HOSPADM

## 2023-09-20 RX ORDER — HEPARIN SODIUM 10000 [USP'U]/ML
INJECTION, SOLUTION INTRAVENOUS; SUBCUTANEOUS PRN
Status: DISCONTINUED | OUTPATIENT
Start: 2023-09-20 | End: 2023-09-20 | Stop reason: HOSPADM

## 2023-09-20 RX ORDER — ASPIRIN 81 MG/1
81 TABLET ORAL DAILY
Status: DISCONTINUED | OUTPATIENT
Start: 2023-09-20 | End: 2023-09-21 | Stop reason: HOSPADM

## 2023-09-20 RX ORDER — DOXAZOSIN 2 MG/1
2 TABLET ORAL DAILY
Status: DISCONTINUED | OUTPATIENT
Start: 2023-09-21 | End: 2023-09-21 | Stop reason: HOSPADM

## 2023-09-20 RX ORDER — VITAMIN B COMPLEX
2000 TABLET ORAL DAILY
Status: DISCONTINUED | OUTPATIENT
Start: 2023-09-21 | End: 2023-09-21 | Stop reason: HOSPADM

## 2023-09-20 RX ORDER — CARVEDILOL 12.5 MG/1
25 TABLET ORAL 2 TIMES DAILY WITH MEALS
Status: DISCONTINUED | OUTPATIENT
Start: 2023-09-20 | End: 2023-09-21

## 2023-09-20 RX ORDER — ONDANSETRON 2 MG/ML
4 INJECTION INTRAMUSCULAR; INTRAVENOUS EVERY 6 HOURS PRN
Status: DISCONTINUED | OUTPATIENT
Start: 2023-09-20 | End: 2023-09-21 | Stop reason: HOSPADM

## 2023-09-20 RX ORDER — FENTANYL CITRATE 50 UG/ML
INJECTION, SOLUTION INTRAMUSCULAR; INTRAVENOUS PRN
Status: DISCONTINUED | OUTPATIENT
Start: 2023-09-20 | End: 2023-09-20 | Stop reason: HOSPADM

## 2023-09-20 RX ORDER — ACETAMINOPHEN 325 MG/1
650 TABLET ORAL EVERY 4 HOURS PRN
Status: DISCONTINUED | OUTPATIENT
Start: 2023-09-20 | End: 2023-09-21 | Stop reason: HOSPADM

## 2023-09-20 RX ORDER — AMLODIPINE BESYLATE 2.5 MG/1
2.5 TABLET ORAL DAILY
Status: DISCONTINUED | OUTPATIENT
Start: 2023-09-20 | End: 2023-09-21 | Stop reason: HOSPADM

## 2023-09-20 RX ORDER — EZETIMIBE 10 MG/1
10 TABLET ORAL DAILY
COMMUNITY

## 2023-09-20 RX ORDER — CLOPIDOGREL BISULFATE 75 MG/1
75 TABLET ORAL DAILY
Status: DISCONTINUED | OUTPATIENT
Start: 2023-09-20 | End: 2023-09-21 | Stop reason: HOSPADM

## 2023-09-20 RX ORDER — SODIUM CHLORIDE 0.9 % (FLUSH) 0.9 %
5-40 SYRINGE (ML) INJECTION EVERY 12 HOURS SCHEDULED
Status: DISCONTINUED | OUTPATIENT
Start: 2023-09-20 | End: 2023-09-21 | Stop reason: HOSPADM

## 2023-09-20 RX ORDER — ASPIRIN 81 MG/1
81 TABLET ORAL DAILY
Status: ON HOLD | COMMUNITY
End: 2023-09-21 | Stop reason: SDUPTHER

## 2023-09-20 RX ORDER — POTASSIUM CHLORIDE 750 MG/1
10 TABLET, FILM COATED, EXTENDED RELEASE ORAL 2 TIMES DAILY
Status: DISCONTINUED | OUTPATIENT
Start: 2023-09-20 | End: 2023-09-21 | Stop reason: HOSPADM

## 2023-09-20 RX ORDER — SODIUM CHLORIDE 0.9 % (FLUSH) 0.9 %
5-40 SYRINGE (ML) INJECTION PRN
Status: DISCONTINUED | OUTPATIENT
Start: 2023-09-20 | End: 2023-09-21 | Stop reason: HOSPADM

## 2023-09-20 RX ORDER — SODIUM CHLORIDE 9 MG/ML
INJECTION, SOLUTION INTRAVENOUS CONTINUOUS
Status: ACTIVE | OUTPATIENT
Start: 2023-09-20 | End: 2023-09-21

## 2023-09-20 RX ADMIN — ASPIRIN 81 MG: 81 TABLET, COATED ORAL at 16:52

## 2023-09-20 RX ADMIN — ACETAMINOPHEN 650 MG: 325 TABLET ORAL at 23:35

## 2023-09-20 RX ADMIN — AMLODIPINE BESYLATE 2.5 MG: 2.5 TABLET ORAL at 19:22

## 2023-09-20 RX ADMIN — SODIUM CHLORIDE: 9 INJECTION, SOLUTION INTRAVENOUS at 15:35

## 2023-09-20 RX ADMIN — ACETAMINOPHEN 650 MG: 325 TABLET ORAL at 16:52

## 2023-09-20 RX ADMIN — CARVEDILOL 25 MG: 12.5 TABLET, FILM COATED ORAL at 19:22

## 2023-09-20 RX ADMIN — POTASSIUM CHLORIDE 10 MEQ: 750 TABLET, FILM COATED, EXTENDED RELEASE ORAL at 22:00

## 2023-09-20 RX ADMIN — ISOSORBIDE MONONITRATE 30 MG: 30 TABLET, EXTENDED RELEASE ORAL at 16:52

## 2023-09-20 RX ADMIN — SODIUM CHLORIDE, PRESERVATIVE FREE 10 ML: 5 INJECTION INTRAVENOUS at 21:59

## 2023-09-20 RX ADMIN — SODIUM CHLORIDE 1000 ML: 9 INJECTION, SOLUTION INTRAVENOUS at 21:59

## 2023-09-20 ASSESSMENT — PAIN DESCRIPTION - DESCRIPTORS: DESCRIPTORS: ACHING;THROBBING

## 2023-09-20 ASSESSMENT — PAIN SCALES - GENERAL
PAINLEVEL_OUTOF10: 8
PAINLEVEL_OUTOF10: 8
PAINLEVEL_OUTOF10: 2

## 2023-09-20 ASSESSMENT — PAIN DESCRIPTION - LOCATION
LOCATION: SHOULDER

## 2023-09-20 ASSESSMENT — PAIN DESCRIPTION - ORIENTATION
ORIENTATION: LEFT

## 2023-09-20 NOTE — PROGRESS NOTES
Brief Procedure Note:         Indication:   CMP, CAD )(cath by Dr Watt Render   Severe Diagonal disease.  ISR     Procedure:   PCI of diagonal branch     Complications: None   Blood loss: Minimal   Condition: Stable     Brief procedure Result:   LHC, Cors by Dr Cristopher Vizcarra  PCI of proximal diagonal branch     Recommendations:   Cont aspirin and plavix   After 1-2 wks okay to cont plavix with apixaban   Statin and GDMT       Full note and recommendations to follow. '

## 2023-09-20 NOTE — PROGRESS NOTES
1525 Pt arrived from [facility], right radial site clean dry intact, no hematoma. Band @ 13cc    1922 Band off, no hematoma    1935 Pt ambulated to the bathroom and voided. Pt returned to bed.

## 2023-09-21 VITALS
DIASTOLIC BLOOD PRESSURE: 72 MMHG | TEMPERATURE: 98.2 F | BODY MASS INDEX: 32.51 KG/M2 | HEIGHT: 72 IN | OXYGEN SATURATION: 95 % | SYSTOLIC BLOOD PRESSURE: 135 MMHG | HEART RATE: 79 BPM | RESPIRATION RATE: 16 BRPM | WEIGHT: 240 LBS

## 2023-09-21 DIAGNOSIS — Z95.5 STENTED CORONARY ARTERY: Primary | ICD-10-CM

## 2023-09-21 PROBLEM — I25.10 CAD IN NATIVE ARTERY: Status: ACTIVE | Noted: 2023-09-21

## 2023-09-21 LAB — ECHO BSA: 2.35 M2

## 2023-09-21 PROCEDURE — 6370000000 HC RX 637 (ALT 250 FOR IP): Performed by: STUDENT IN AN ORGANIZED HEALTH CARE EDUCATION/TRAINING PROGRAM

## 2023-09-21 PROCEDURE — 2580000003 HC RX 258: Performed by: STUDENT IN AN ORGANIZED HEALTH CARE EDUCATION/TRAINING PROGRAM

## 2023-09-21 RX ORDER — CARVEDILOL 12.5 MG/1
12.5 TABLET ORAL 2 TIMES DAILY WITH MEALS
Status: DISCONTINUED | OUTPATIENT
Start: 2023-09-21 | End: 2023-09-21 | Stop reason: HOSPADM

## 2023-09-21 RX ORDER — CARVEDILOL 12.5 MG/1
12.5 TABLET ORAL 2 TIMES DAILY WITH MEALS
Qty: 180 TABLET | Refills: 4 | Status: SHIPPED | OUTPATIENT
Start: 2023-09-21

## 2023-09-21 RX ORDER — CLOPIDOGREL BISULFATE 75 MG/1
75 TABLET ORAL DAILY
Qty: 90 TABLET | Refills: 4 | Status: SHIPPED | OUTPATIENT
Start: 2023-09-22

## 2023-09-21 RX ORDER — ASPIRIN 81 MG/1
81 TABLET ORAL DAILY
Qty: 30 TABLET | Refills: 0 | Status: SHIPPED
Start: 2023-09-21 | End: 2023-10-21

## 2023-09-21 RX ADMIN — ASPIRIN 81 MG: 81 TABLET, COATED ORAL at 08:20

## 2023-09-21 RX ADMIN — SODIUM CHLORIDE, PRESERVATIVE FREE 10 ML: 5 INJECTION INTRAVENOUS at 08:24

## 2023-09-21 RX ADMIN — CARVEDILOL 25 MG: 12.5 TABLET, FILM COATED ORAL at 08:20

## 2023-09-21 RX ADMIN — POTASSIUM CHLORIDE 10 MEQ: 750 TABLET, FILM COATED, EXTENDED RELEASE ORAL at 08:22

## 2023-09-21 RX ADMIN — LEVOTHYROXINE SODIUM 50 MCG: 0.05 TABLET ORAL at 06:57

## 2023-09-21 RX ADMIN — DOXAZOSIN 2 MG: 2 TABLET ORAL at 08:21

## 2023-09-21 RX ADMIN — PANTOPRAZOLE SODIUM 40 MG: 40 TABLET, DELAYED RELEASE ORAL at 08:20

## 2023-09-21 RX ADMIN — CLOPIDOGREL BISULFATE 75 MG: 75 TABLET ORAL at 08:21

## 2023-09-21 RX ADMIN — Medication 2000 UNITS: at 08:20

## 2023-09-21 NOTE — PLAN OF CARE
Problem: Chronic Conditions and Co-morbidities  Goal: Patient's chronic conditions and co-morbidity symptoms are monitored and maintained or improved  Outcome: Progressing  Flowsheets (Taken 9/20/2023 1922)  Care Plan - Patient's Chronic Conditions and Co-Morbidity Symptoms are Monitored and Maintained or Improved: Monitor and assess patient's chronic conditions and comorbid symptoms for stability, deterioration, or improvement     Problem: Discharge Planning  Goal: Discharge to home or other facility with appropriate resources  Outcome: Progressing  Flowsheets (Taken 9/20/2023 1922)  Discharge to home or other facility with appropriate resources: Identify barriers to discharge with patient and caregiver     Problem: Safety - Adult  Goal: Free from fall injury  Outcome: Progressing     Problem: Pain  Goal: Verbalizes/displays adequate comfort level or baseline comfort level  Outcome: Progressing

## 2023-09-21 NOTE — CARDIO/PULMONARY
Chart reviewed: Patient is 77 y.o. male admitted with Chest pain [R07.9]  CAD in native artery [I25.10]    Education: CAD education folder mailed to Owen Atkinson. Cardiac cath 9/20/23 with intervention. Materials mailed, patient d/c, order entered, [facility] will follow up.     Elvia Grover RN

## 2023-09-21 NOTE — PLAN OF CARE
Problem: Chronic Conditions and Co-morbidities  Goal: Patient's chronic conditions and co-morbidity symptoms are monitored and maintained or improved  9/21/2023 1116 by Ramila Kidd [employer]  Outcome: Completed  9/21/2023 0237 by Lenore Wang RN  Outcome: Progressing  Flowsheets (Taken 9/20/2023 1922)  Care Plan - Patient's Chronic Conditions and Co-Morbidity Symptoms are Monitored and Maintained or Improved: Monitor and assess patient's chronic conditions and comorbid symptoms for stability, deterioration, or improvement     Problem: Discharge Planning  Goal: Discharge to home or other facility with appropriate resources  9/21/2023 1116 by Britta Steel  Outcome: Completed  9/21/2023 0237 by Lenore Wang RN  Outcome: Progressing  Flowsheets (Taken 9/20/2023 1922)  Discharge to home or other facility with appropriate resources: Identify barriers to discharge with patient and caregiver     Problem: Safety - Adult  Goal: Free from fall injury  9/21/2023 1116 by Britta Steel  Outcome: Completed  9/21/2023 0237 by Lenore Wang RN  Outcome: Progressing     Problem: Pain  Goal: Verbalizes/displays adequate comfort level or baseline comfort level  9/21/2023 1116 by Ramila Kidd [employer]  Outcome: Completed  9/21/2023 0237 by Lenore Wang RN  Outcome: Progressing

## 2023-09-21 NOTE — PROGRESS NOTES
I have reviewed Discharge Instructions with the patient. The patient verbalized understanding. Discharge medications reviewed with patient along with appropriate educational materials. Opportunity for questions and clarification was provided. All lines removed without difficulty. Cath sites are clean, dry, and intact. Patient's belongings gathered and with patient. Patient is ready for discharge.

## 2023-09-23 LAB
EKG DIAGNOSIS: NORMAL
EKG Q-T INTERVAL: 488 MS
EKG QRS DURATION: 104 MS
EKG QTC CALCULATION (BAZETT): 507 MS
EKG R AXIS: 27 DEGREES
EKG T AXIS: 155 DEGREES
EKG VENTRICULAR RATE: 65 BPM

## 2023-11-06 ENCOUNTER — OFFICE VISIT (OUTPATIENT)
Age: 68
End: 2023-11-06
Payer: MEDICARE

## 2023-11-06 VITALS
TEMPERATURE: 98.4 F | OXYGEN SATURATION: 95 % | SYSTOLIC BLOOD PRESSURE: 132 MMHG | BODY MASS INDEX: 32.1 KG/M2 | RESPIRATION RATE: 16 BRPM | WEIGHT: 237 LBS | DIASTOLIC BLOOD PRESSURE: 67 MMHG | HEIGHT: 72 IN | HEART RATE: 87 BPM

## 2023-11-06 DIAGNOSIS — I48.0 PAF (PAROXYSMAL ATRIAL FIBRILLATION) (HCC): ICD-10-CM

## 2023-11-06 DIAGNOSIS — E78.2 MIXED HYPERLIPIDEMIA: ICD-10-CM

## 2023-11-06 DIAGNOSIS — I10 ESSENTIAL (PRIMARY) HYPERTENSION: ICD-10-CM

## 2023-11-06 DIAGNOSIS — I50.22 CHRONIC SYSTOLIC CONGESTIVE HEART FAILURE (HCC): ICD-10-CM

## 2023-11-06 DIAGNOSIS — R73.03 PREDIABETES: ICD-10-CM

## 2023-11-06 DIAGNOSIS — Z12.5 PROSTATE CANCER SCREENING: ICD-10-CM

## 2023-11-06 DIAGNOSIS — Z00.00 MEDICARE ANNUAL WELLNESS VISIT, SUBSEQUENT: Primary | ICD-10-CM

## 2023-11-06 DIAGNOSIS — M1A.00X0 IDIOPATHIC CHRONIC GOUT WITHOUT TOPHUS, UNSPECIFIED SITE: ICD-10-CM

## 2023-11-06 DIAGNOSIS — I25.10 ATHEROSCLEROSIS OF NATIVE CORONARY ARTERY OF NATIVE HEART WITHOUT ANGINA PECTORIS: ICD-10-CM

## 2023-11-06 PROCEDURE — G0439 PPPS, SUBSEQ VISIT: HCPCS | Performed by: INTERNAL MEDICINE

## 2023-11-06 PROCEDURE — 3078F DIAST BP <80 MM HG: CPT | Performed by: INTERNAL MEDICINE

## 2023-11-06 PROCEDURE — 1123F ACP DISCUSS/DSCN MKR DOCD: CPT | Performed by: INTERNAL MEDICINE

## 2023-11-06 PROCEDURE — 3017F COLORECTAL CA SCREEN DOC REV: CPT | Performed by: INTERNAL MEDICINE

## 2023-11-06 PROCEDURE — G8484 FLU IMMUNIZE NO ADMIN: HCPCS | Performed by: INTERNAL MEDICINE

## 2023-11-06 PROCEDURE — 3075F SYST BP GE 130 - 139MM HG: CPT | Performed by: INTERNAL MEDICINE

## 2023-11-06 SDOH — ECONOMIC STABILITY: INCOME INSECURITY: HOW HARD IS IT FOR YOU TO PAY FOR THE VERY BASICS LIKE FOOD, HOUSING, MEDICAL CARE, AND HEATING?: NOT HARD AT ALL

## 2023-11-06 SDOH — ECONOMIC STABILITY: HOUSING INSECURITY
IN THE LAST 12 MONTHS, WAS THERE A TIME WHEN YOU DID NOT HAVE A STEADY PLACE TO SLEEP OR SLEPT IN A SHELTER (INCLUDING NOW)?: NO

## 2023-11-06 SDOH — ECONOMIC STABILITY: FOOD INSECURITY: WITHIN THE PAST 12 MONTHS, YOU WORRIED THAT YOUR FOOD WOULD RUN OUT BEFORE YOU GOT MONEY TO BUY MORE.: NEVER TRUE

## 2023-11-06 SDOH — ECONOMIC STABILITY: FOOD INSECURITY: WITHIN THE PAST 12 MONTHS, THE FOOD YOU BOUGHT JUST DIDN'T LAST AND YOU DIDN'T HAVE MONEY TO GET MORE.: NEVER TRUE

## 2023-11-06 ASSESSMENT — PATIENT HEALTH QUESTIONNAIRE - PHQ9
SUM OF ALL RESPONSES TO PHQ QUESTIONS 1-9: 0
2. FEELING DOWN, DEPRESSED OR HOPELESS: 0
SUM OF ALL RESPONSES TO PHQ QUESTIONS 1-9: 0
1. LITTLE INTEREST OR PLEASURE IN DOING THINGS: 0
SUM OF ALL RESPONSES TO PHQ9 QUESTIONS 1 & 2: 0

## 2023-11-06 ASSESSMENT — LIFESTYLE VARIABLES
HOW MANY STANDARD DRINKS CONTAINING ALCOHOL DO YOU HAVE ON A TYPICAL DAY: 1 OR 2
HOW OFTEN DO YOU HAVE A DRINK CONTAINING ALCOHOL: 2-3 TIMES A WEEK

## 2023-11-06 NOTE — PATIENT INSTRUCTIONS
Advance Directives: Care Instructions  Overview  An advance directive is a legal way to state your wishes at the end of your life. It tells your family and your doctor what to do if you can't say what you want. There are two main types of advance directives. You can change them any time your wishes change. Living will. This form tells your family and your doctor your wishes about life support and other treatment. The form is also called a declaration. Medical power of . This form lets you name a person to make treatment decisions for you when you can't speak for yourself. This person is called a health care agent (health care proxy, health care surrogate). The form is also called a durable power of  for health care. If you do not have an advance directive, decisions about your medical care may be made by a family member, or by a doctor or a  who doesn't know you. It may help to think of an advance directive as a gift to the people who care for you. If you have one, they won't have to make tough decisions by themselves. For more information, including forms for your state, see the [address] [place] [address] website (www.caringinfo.org/planning/advance-directives/). Follow-up care is a key part of your treatment and safety. Be sure to make and go to all appointments, and call your doctor if you are having problems. It's also a good idea to know your test results and keep a list of the medicines you take. What should you include in an advance directive? Many states have a unique advance directive form. (It may ask you to address specific issues.) Or you might use a universal form that's approved by many states. If your form doesn't tell you what to address, it may be hard to know what to include in your advance directive. Use the questions below to help you get started. Who do you want to make decisions about your medical care if you are not able to?   What life-support measures do you want

## 2023-12-29 ENCOUNTER — NURSE ONLY (OUTPATIENT)
Age: 68
End: 2023-12-29

## 2023-12-29 DIAGNOSIS — I48.0 PAF (PAROXYSMAL ATRIAL FIBRILLATION) (HCC): ICD-10-CM

## 2023-12-29 DIAGNOSIS — Z12.5 PROSTATE CANCER SCREENING: ICD-10-CM

## 2023-12-29 DIAGNOSIS — E78.2 MIXED HYPERLIPIDEMIA: ICD-10-CM

## 2023-12-29 DIAGNOSIS — R73.03 PREDIABETES: ICD-10-CM

## 2023-12-29 DIAGNOSIS — I50.22 CHRONIC SYSTOLIC CONGESTIVE HEART FAILURE (HCC): ICD-10-CM

## 2023-12-29 DIAGNOSIS — M1A.00X0 IDIOPATHIC CHRONIC GOUT WITHOUT TOPHUS, UNSPECIFIED SITE: ICD-10-CM

## 2023-12-29 DIAGNOSIS — I10 ESSENTIAL (PRIMARY) HYPERTENSION: ICD-10-CM

## 2023-12-29 LAB
ALBUMIN SERPL-MCNC: 4 G/DL (ref 3.5–5)
ALBUMIN/GLOB SERPL: 1.3 (ref 1.1–2.2)
ALP SERPL-CCNC: 67 U/L (ref 45–117)
ALT SERPL-CCNC: 32 U/L (ref 12–78)
ANION GAP SERPL CALC-SCNC: 7 MMOL/L (ref 5–15)
AST SERPL-CCNC: 15 U/L (ref 15–37)
BASOPHILS # BLD: 0.1 K/UL (ref 0–0.1)
BASOPHILS NFR BLD: 1 % (ref 0–1)
BILIRUB SERPL-MCNC: 0.7 MG/DL (ref 0.2–1)
BUN SERPL-MCNC: 17 MG/DL (ref 6–20)
BUN/CREAT SERPL: 14 (ref 12–20)
CALCIUM SERPL-MCNC: 9 MG/DL (ref 8.5–10.1)
CHLORIDE SERPL-SCNC: 107 MMOL/L (ref 97–108)
CHOLEST SERPL-MCNC: 242 MG/DL
CO2 SERPL-SCNC: 27 MMOL/L (ref 21–32)
CREAT SERPL-MCNC: 1.22 MG/DL (ref 0.7–1.3)
DIFFERENTIAL METHOD BLD: NORMAL
EOSINOPHIL # BLD: 0.3 K/UL (ref 0–0.4)
EOSINOPHIL NFR BLD: 4 % (ref 0–7)
ERYTHROCYTE [DISTWIDTH] IN BLOOD BY AUTOMATED COUNT: 14.4 % (ref 11.5–14.5)
EST. AVERAGE GLUCOSE BLD GHB EST-MCNC: 111 MG/DL
GLOBULIN SER CALC-MCNC: 3 G/DL (ref 2–4)
GLUCOSE SERPL-MCNC: 127 MG/DL (ref 65–100)
HBA1C MFR BLD: 5.5 % (ref 4–5.6)
HCT VFR BLD AUTO: 44.9 % (ref 36.6–50.3)
HDLC SERPL-MCNC: 48 MG/DL
HDLC SERPL: 5 (ref 0–5)
HGB BLD-MCNC: 14.6 G/DL (ref 12.1–17)
IMM GRANULOCYTES # BLD AUTO: 0 K/UL (ref 0–0.04)
IMM GRANULOCYTES NFR BLD AUTO: 0 % (ref 0–0.5)
LDLC SERPL CALC-MCNC: 158.4 MG/DL (ref 0–100)
LYMPHOCYTES # BLD: 2.1 K/UL (ref 0.8–3.5)
LYMPHOCYTES NFR BLD: 29 % (ref 12–49)
MAGNESIUM SERPL-MCNC: 2 MG/DL (ref 1.6–2.4)
MCH RBC QN AUTO: 32 PG (ref 26–34)
MCHC RBC AUTO-ENTMCNC: 32.5 G/DL (ref 30–36.5)
MCV RBC AUTO: 98.5 FL (ref 80–99)
MONOCYTES # BLD: 0.5 K/UL (ref 0–1)
MONOCYTES NFR BLD: 7 % (ref 5–13)
NEUTS SEG # BLD: 4.4 K/UL (ref 1.8–8)
NEUTS SEG NFR BLD: 59 % (ref 32–75)
NRBC # BLD: 0 K/UL (ref 0–0.01)
NRBC BLD-RTO: 0 PER 100 WBC
PLATELET # BLD AUTO: 175 K/UL (ref 150–400)
PMV BLD AUTO: 11.7 FL (ref 8.9–12.9)
POTASSIUM SERPL-SCNC: 3.6 MMOL/L (ref 3.5–5.1)
PROT SERPL-MCNC: 7 G/DL (ref 6.4–8.2)
PSA SERPL-MCNC: 0 NG/ML (ref 0.01–4)
RBC # BLD AUTO: 4.56 M/UL (ref 4.1–5.7)
SODIUM SERPL-SCNC: 141 MMOL/L (ref 136–145)
TRIGL SERPL-MCNC: 178 MG/DL
TSH SERPL DL<=0.05 MIU/L-ACNC: 4.38 UIU/ML (ref 0.36–3.74)
URATE SERPL-MCNC: 4.8 MG/DL (ref 3.5–7.2)
VLDLC SERPL CALC-MCNC: 35.6 MG/DL
WBC # BLD AUTO: 7.4 K/UL (ref 4.1–11.1)

## 2024-03-31 RX ORDER — PANTOPRAZOLE SODIUM 40 MG/1
40 TABLET, DELAYED RELEASE ORAL DAILY
Qty: 90 TABLET | Refills: 3 | Status: SHIPPED | OUTPATIENT
Start: 2024-03-31

## 2024-05-06 ENCOUNTER — OFFICE VISIT (OUTPATIENT)
Age: 69
End: 2024-05-06
Payer: MEDICARE

## 2024-05-06 VITALS
RESPIRATION RATE: 14 BRPM | DIASTOLIC BLOOD PRESSURE: 77 MMHG | HEART RATE: 86 BPM | BODY MASS INDEX: 33.35 KG/M2 | WEIGHT: 246.2 LBS | SYSTOLIC BLOOD PRESSURE: 151 MMHG | TEMPERATURE: 98 F | OXYGEN SATURATION: 97 % | HEIGHT: 72 IN

## 2024-05-06 DIAGNOSIS — I10 ESSENTIAL (PRIMARY) HYPERTENSION: ICD-10-CM

## 2024-05-06 DIAGNOSIS — I50.22 CHRONIC SYSTOLIC CONGESTIVE HEART FAILURE (HCC): Primary | ICD-10-CM

## 2024-05-06 DIAGNOSIS — E26.1 SECONDARY HYPERALDOSTERONISM (HCC): ICD-10-CM

## 2024-05-06 DIAGNOSIS — I48.0 PAF (PAROXYSMAL ATRIAL FIBRILLATION) (HCC): ICD-10-CM

## 2024-05-06 DIAGNOSIS — E78.2 MIXED HYPERLIPIDEMIA: ICD-10-CM

## 2024-05-06 DIAGNOSIS — M1A.00X0 IDIOPATHIC CHRONIC GOUT WITHOUT TOPHUS, UNSPECIFIED SITE: ICD-10-CM

## 2024-05-06 PROBLEM — J96.01 ACUTE RESPIRATORY FAILURE WITH HYPOXIA (HCC): Status: RESOLVED | Noted: 2020-02-26 | Resolved: 2024-05-06

## 2024-05-06 PROCEDURE — 3078F DIAST BP <80 MM HG: CPT | Performed by: INTERNAL MEDICINE

## 2024-05-06 PROCEDURE — 1036F TOBACCO NON-USER: CPT | Performed by: INTERNAL MEDICINE

## 2024-05-06 PROCEDURE — 1123F ACP DISCUSS/DSCN MKR DOCD: CPT | Performed by: INTERNAL MEDICINE

## 2024-05-06 PROCEDURE — 99213 OFFICE O/P EST LOW 20 MIN: CPT | Performed by: INTERNAL MEDICINE

## 2024-05-06 PROCEDURE — 3077F SYST BP >= 140 MM HG: CPT | Performed by: INTERNAL MEDICINE

## 2024-05-06 PROCEDURE — G8417 CALC BMI ABV UP PARAM F/U: HCPCS | Performed by: INTERNAL MEDICINE

## 2024-05-06 PROCEDURE — G8427 DOCREV CUR MEDS BY ELIG CLIN: HCPCS | Performed by: INTERNAL MEDICINE

## 2024-05-06 PROCEDURE — 3017F COLORECTAL CA SCREEN DOC REV: CPT | Performed by: INTERNAL MEDICINE

## 2024-05-06 RX ORDER — ROSUVASTATIN CALCIUM 5 MG/1
5 TABLET, COATED ORAL NIGHTLY
COMMUNITY

## 2024-05-06 RX ORDER — ZINC 25 MG
TABLET ORAL
COMMUNITY

## 2024-05-06 RX ORDER — MULTIVITAMIN WITH IRON
1 TABLET ORAL DAILY
COMMUNITY

## 2024-05-06 ASSESSMENT — PATIENT HEALTH QUESTIONNAIRE - PHQ9
SUM OF ALL RESPONSES TO PHQ9 QUESTIONS 1 & 2: 0
1. LITTLE INTEREST OR PLEASURE IN DOING THINGS: NOT AT ALL
2. FEELING DOWN, DEPRESSED OR HOPELESS: NOT AT ALL
SUM OF ALL RESPONSES TO PHQ QUESTIONS 1-9: 0

## 2024-05-06 NOTE — PROGRESS NOTES
Chief Complaint   Patient presents with    Hypertension     6 month follow up       \"Have you been to the ER, urgent care clinic since your last visit?  Hospitalized since your last visit?\"    NO    “Have you seen or consulted any other health care providers outside of [facility] since your last visit?”    NO            Click Here for Release of Records Request     
MD Ross at [facility] CARDIAC CATH LAB    CARDIAC PROCEDURE N/A 9/20/2023    Coronary angiography performed by Prieto Dacosta MD at [facility] CARDIAC CATH LAB    CARDIAC PROCEDURE N/A 9/20/2023    Percutaneous coronary intervention performed by Prieto Dacosta MD at [facility] CARDIAC CATH LAB    CARDIAC PROCEDURE N/A 9/20/2023    Insert stent riki coronary performed by Prieto Dacosta MD at [facility] CARDIAC CATH LAB    CHOLECYSTECTOMY  1991    EPHYS EVL TRNSPTL TX ATRIAL FIB ISOLAT PULM VEIN N/A 10/22/2020    ABLATION A-FIB  W COMPLETE EP STUDY performed by Geovanny Dodd MD at [facility] CARDIAC CATH LAB    EXCIS KNEE CARTILAGE,MEDIAL OR LAT  1980's x3    HEENT  1962    T&A    HERNIA REPAIR  1996    INTRACARD ECHO, THER/DX INTERVENT N/A 10/22/2020    Intracardiac Echocardiogram performed by Geovanny Dodd MD at [facility] CARDIAC CATH LAB    INTRACARDIAC ELECTROPHYSIOLOGIC 3D MAPPING N/A 10/22/2020    Ep 3d Mapping performed by Geovanny Dodd MD at [facility] CARDIAC CATH LAB    INVASIVE VASCULAR N/A 9/20/2023    Ultrasound guided vascular access performed by Eliel Hawkins MD at [facility] CARDIAC CATH LAB    MALIGNANT SKIN LESION EXCISION      2004 and 2017    ORTHOPEDIC SURGERY Left 2017    shoulder repair    ORTHOPEDIC SURGERY  1974    Ganglion cyst left wrist    OR UNLISTED PROCEDURE CARDIAC SURGERY  2020    ablation    OR UNLISTED PROCEDURE CARDIAC SURGERY  2010    coronary stent    PROSTATECTOMY  2010    UPPER GI ENDOSCOPY,BIOPSY  2/25/2020            Family History   Problem Relation Age of Onset    Other Father 46        cerebral hemorrhage    Dementia Mother        Social History     Socioeconomic History    Marital status:      Spouse name: Not on file    Number of children: Not on file    Years of education: Not on file    Highest education level: Not on file   Occupational History    Not on file   Tobacco Use    Smoking status: Never    Smokeless tobacco: Former   Substance and Sexual Activity    Alcohol use: Not

## 2024-07-15 RX ORDER — ALLOPURINOL 300 MG/1
TABLET ORAL
Qty: 90 TABLET | Refills: 0 | Status: SHIPPED | OUTPATIENT
Start: 2024-07-15

## 2024-08-06 RX ORDER — LEVOTHYROXINE SODIUM 0.05 MG/1
TABLET ORAL
Qty: 90 TABLET | Refills: 3 | Status: SHIPPED | OUTPATIENT
Start: 2024-08-06

## 2024-10-09 RX ORDER — ALLOPURINOL 300 MG/1
TABLET ORAL
Qty: 90 TABLET | Refills: 3 | Status: SHIPPED | OUTPATIENT
Start: 2024-10-09

## 2024-11-30 SDOH — HEALTH STABILITY: PHYSICAL HEALTH
ON AVERAGE, HOW MANY DAYS PER WEEK DO YOU ENGAGE IN MODERATE TO STRENUOUS EXERCISE (LIKE A BRISK WALK)?: PATIENT DECLINED

## 2024-11-30 SDOH — ECONOMIC STABILITY: TRANSPORTATION INSECURITY
IN THE PAST 12 MONTHS, HAS LACK OF TRANSPORTATION KEPT YOU FROM MEETINGS, WORK, OR FROM GETTING THINGS NEEDED FOR DAILY LIVING?: PATIENT DECLINED

## 2024-11-30 SDOH — ECONOMIC STABILITY: FOOD INSECURITY: WITHIN THE PAST 12 MONTHS, YOU WORRIED THAT YOUR FOOD WOULD RUN OUT BEFORE YOU GOT MONEY TO BUY MORE.: PATIENT DECLINED

## 2024-11-30 SDOH — ECONOMIC STABILITY: FOOD INSECURITY: WITHIN THE PAST 12 MONTHS, THE FOOD YOU BOUGHT JUST DIDN'T LAST AND YOU DIDN'T HAVE MONEY TO GET MORE.: PATIENT DECLINED

## 2024-11-30 SDOH — ECONOMIC STABILITY: INCOME INSECURITY: HOW HARD IS IT FOR YOU TO PAY FOR THE VERY BASICS LIKE FOOD, HOUSING, MEDICAL CARE, AND HEATING?: PATIENT DECLINED

## 2024-11-30 ASSESSMENT — LIFESTYLE VARIABLES
HOW MANY STANDARD DRINKS CONTAINING ALCOHOL DO YOU HAVE ON A TYPICAL DAY: 1
HOW OFTEN DO YOU HAVE A DRINK CONTAINING ALCOHOL: 4
HOW OFTEN DO YOU HAVE SIX OR MORE DRINKS ON ONE OCCASION: 1
HOW MANY STANDARD DRINKS CONTAINING ALCOHOL DO YOU HAVE ON A TYPICAL DAY: 1 OR 2
HOW OFTEN DO YOU HAVE A DRINK CONTAINING ALCOHOL: 2-3 TIMES A WEEK

## 2024-11-30 ASSESSMENT — PATIENT HEALTH QUESTIONNAIRE - PHQ9
SUM OF ALL RESPONSES TO PHQ QUESTIONS 1-9: 0
SUM OF ALL RESPONSES TO PHQ QUESTIONS 1-9: 0
1. LITTLE INTEREST OR PLEASURE IN DOING THINGS: NOT AT ALL
SUM OF ALL RESPONSES TO PHQ QUESTIONS 1-9: 0
SUM OF ALL RESPONSES TO PHQ QUESTIONS 1-9: 0
2. FEELING DOWN, DEPRESSED OR HOPELESS: NOT AT ALL
SUM OF ALL RESPONSES TO PHQ9 QUESTIONS 1 & 2: 0

## 2024-12-03 ENCOUNTER — OFFICE VISIT (OUTPATIENT)
Age: 69
End: 2024-12-03
Payer: MEDICARE

## 2024-12-03 VITALS
OXYGEN SATURATION: 96 % | WEIGHT: 254.8 LBS | DIASTOLIC BLOOD PRESSURE: 75 MMHG | HEIGHT: 72 IN | TEMPERATURE: 97.2 F | RESPIRATION RATE: 16 BRPM | HEART RATE: 86 BPM | SYSTOLIC BLOOD PRESSURE: 146 MMHG | BODY MASS INDEX: 34.51 KG/M2

## 2024-12-03 DIAGNOSIS — R73.03 PREDIABETES: ICD-10-CM

## 2024-12-03 DIAGNOSIS — Z00.00 MEDICARE ANNUAL WELLNESS VISIT, SUBSEQUENT: Primary | ICD-10-CM

## 2024-12-03 DIAGNOSIS — Z86.73 PERSONAL HISTORY OF TRANSIENT ISCHEMIC ATTACK (TIA), AND CEREBRAL INFARCTION WITHOUT RESIDUAL DEFICITS: ICD-10-CM

## 2024-12-03 DIAGNOSIS — I48.0 PAF (PAROXYSMAL ATRIAL FIBRILLATION) (HCC): ICD-10-CM

## 2024-12-03 DIAGNOSIS — E78.2 MIXED HYPERLIPIDEMIA: ICD-10-CM

## 2024-12-03 DIAGNOSIS — M1A.00X0 IDIOPATHIC CHRONIC GOUT WITHOUT TOPHUS, UNSPECIFIED SITE: ICD-10-CM

## 2024-12-03 DIAGNOSIS — I10 ESSENTIAL (PRIMARY) HYPERTENSION: ICD-10-CM

## 2024-12-03 DIAGNOSIS — Z12.5 PROSTATE CANCER SCREENING: ICD-10-CM

## 2024-12-03 DIAGNOSIS — I50.22 CHRONIC SYSTOLIC CONGESTIVE HEART FAILURE (HCC): ICD-10-CM

## 2024-12-03 PROCEDURE — 99213 OFFICE O/P EST LOW 20 MIN: CPT | Performed by: INTERNAL MEDICINE

## 2024-12-03 NOTE — PATIENT INSTRUCTIONS
Come back for fasting labs.  Lab opens 8 am.  No appt needed.         Learning About Dental Care for Older Adults  Dental care for older adults: Overview  Dental care for older people is much the same as for younger adults. But older adults do have concerns that younger adults do not. Older adults may have problems with gum disease and decay on the roots of their teeth. They may need missing teeth replaced or broken fillings fixed. Or they may have dentures that need to be cared for. Some older adults may have trouble holding a toothbrush.  You can help remind the person you are caring for to brush and floss their teeth or to clean their dentures. In some cases, you may need to do the brushing and other dental care tasks. People who have trouble using their hands or who have dementia may need this extra help.  How can you help with dental care?  Normal dental care  To keep the teeth and gums healthy:  Brush the teeth with fluoride toothpaste twice a day--in the morning and at night--and floss at least once a day. Plaque can quickly build up on the teeth of older adults.  Watch for the signs of gum disease. These signs include gums that bleed after brushing or after eating hard foods, such as apples.  See a dentist regularly. Many experts recommend checkups every 6 months.  Keep the dentist up to date on any new medications the person is taking.  Encourage a balanced diet that includes whole grains, vegetables, and fruits, and that is low in saturated fat and sodium.  Encourage the person you're caring for not to use tobacco products. They can affect dental and general health.  Many older adults have a fixed income and feel that they can't afford dental care. But most [facility] and [facility] have programs in which dentists help older adults by lowering fees. Contact your area's public health offices or  for information about dental care in your area.  Using a toothbrush  Older adults with arthritis sometimes

## 2024-12-03 NOTE — PROGRESS NOTES
Geovanny Hopper is a 69 y.o. male    Chief Complaint   Patient presents with    Medicare AWV       BP (!) 146/75   Pulse 86   Temp 97.2 °F (36.2 °C)   Resp 16   Ht 1.829 m (6')   Wt 115.6 kg (254 lb 12.8 oz)   SpO2 96%   BMI 34.56 kg/m²         1. Have you been to the ER, urgent care clinic since your last visit?  Hospitalized since your last visit? No    2. Have you seen or consulted any other health care providers outside of the [facility] since your last visit?  Include any pap smears or colon screening. No    Learning Assessment:       No data to display                Fall Risk Assessment:      11/30/2024     1:32 PM 11/6/2023     3:01 PM 11/4/2022     1:50 PM 11/1/2021     1:33 PM 3/19/2020     9:15 PM 3/19/2020    12:34 PM 3/19/2020    10:45 AM   Amb Fall Risk Assessment and TUG Test   Do you feel unsteady or are you worried about falling?  no no        2 or more falls in past year? no no        Fall with injury in past year? no no        Fall in past 12 months?   0 0      Able to walk?   Yes Yes      Total Score     1 1 1       Abuse Screening:       No data to display                ADL Screening:       No data to display

## 2024-12-03 NOTE — PROGRESS NOTES
Medicare Annual Wellness Visit    Geovanny Hopper is here for Medicare AWV    Assessment & Plan   Medicare annual wellness visit, subsequent  Recommendations for Preventive Services Due: see orders and patient instructions/AVS.  Recommended screening schedule for the next 5-10 years is provided to the patient in written form: see Patient Instructions/AVS.     No follow-ups on file.     Subjective       Patient's complete Health Risk Assessment and screening values have been reviewed and are found in Flowsheets. The following problems were reviewed today and where indicated follow up appointments were made and/or referrals ordered.    Positive Risk Factor Screenings with Interventions:                Abnormal BMI (obese):  Body mass index is 34.56 kg/m². (!) Abnormal  Interventions:  Patient declines any further evaluation or treatment        Dentist Screen:  Have you seen the dentist within the past year?: (!) No    Intervention:  Advised to schedule with their dentist        Advanced Directives:  Do you have a Living Will?: (!) No    Intervention:  has NO advanced directive - information provided                     Objective   Vitals:    12/03/24 1331   BP: (!) 146/75   Pulse: 86   Resp: 16   Temp: 97.2 °F (36.2 °C)   SpO2: 96%   Weight: 115.6 kg (254 lb 12.8 oz)   Height: 1.829 m (6')      Body mass index is 34.56 kg/m².                    Allergies   Allergen Reactions    Ezetimibe Myalgia     Pt reports getting joint and muscle pain.     Furosemide Other (See Comments)     May be the cause of kidney failure    Niacin Other (See Comments)     Decreased vision    Statins Other (See Comments)     Muscle and Joint pains    Clonidine Anxiety     Prior to Visit Medications    Medication Sig Taking? Authorizing Provider   allopurinol (ZYLOPRIM) 300 MG tablet TAKE 1 TABLET BY MOUTH IN THE MORNING Yes Harley Kearney III, DO   levothyroxine (SYNTHROID) 50 MCG tablet TAKE 1 TABLET BY MOUTH ONCE DAILY BEFORE

## 2025-03-23 RX ORDER — PANTOPRAZOLE SODIUM 40 MG/1
40 TABLET, DELAYED RELEASE ORAL DAILY
Qty: 90 TABLET | Refills: 3 | Status: SHIPPED | OUTPATIENT
Start: 2025-03-23

## 2025-03-25 ENCOUNTER — LAB (OUTPATIENT)
Age: 70
End: 2025-03-25

## 2025-03-25 DIAGNOSIS — E78.2 MIXED HYPERLIPIDEMIA: ICD-10-CM

## 2025-03-25 DIAGNOSIS — R73.03 PREDIABETES: ICD-10-CM

## 2025-03-25 DIAGNOSIS — I50.22 CHRONIC SYSTOLIC CONGESTIVE HEART FAILURE (HCC): ICD-10-CM

## 2025-03-25 DIAGNOSIS — Z12.5 PROSTATE CANCER SCREENING: ICD-10-CM

## 2025-03-25 DIAGNOSIS — M1A.00X0 IDIOPATHIC CHRONIC GOUT WITHOUT TOPHUS, UNSPECIFIED SITE: ICD-10-CM

## 2025-03-25 LAB
ALBUMIN SERPL-MCNC: 4 G/DL (ref 3.5–5)
ALBUMIN/GLOB SERPL: 1.4 (ref 1.1–2.2)
ALP SERPL-CCNC: 51 U/L (ref 45–117)
ALT SERPL-CCNC: 23 U/L (ref 12–78)
ANION GAP SERPL CALC-SCNC: 5 MMOL/L (ref 2–12)
AST SERPL-CCNC: 14 U/L (ref 15–37)
BASOPHILS # BLD: 0.06 K/UL (ref 0–0.1)
BASOPHILS NFR BLD: 1 % (ref 0–1)
BILIRUB SERPL-MCNC: 0.7 MG/DL (ref 0.2–1)
BUN SERPL-MCNC: 13 MG/DL (ref 6–20)
BUN/CREAT SERPL: 10 (ref 12–20)
CALCIUM SERPL-MCNC: 9 MG/DL (ref 8.5–10.1)
CHLORIDE SERPL-SCNC: 105 MMOL/L (ref 97–108)
CHOLEST SERPL-MCNC: 154 MG/DL
CO2 SERPL-SCNC: 30 MMOL/L (ref 21–32)
CREAT SERPL-MCNC: 1.3 MG/DL (ref 0.7–1.3)
DIFFERENTIAL METHOD BLD: ABNORMAL
EOSINOPHIL # BLD: 0.15 K/UL (ref 0–0.4)
EOSINOPHIL NFR BLD: 2.5 % (ref 0–7)
ERYTHROCYTE [DISTWIDTH] IN BLOOD BY AUTOMATED COUNT: 13 % (ref 11.5–14.5)
EST. AVERAGE GLUCOSE BLD GHB EST-MCNC: 108 MG/DL
GLOBULIN SER CALC-MCNC: 2.9 G/DL (ref 2–4)
GLUCOSE SERPL-MCNC: 104 MG/DL (ref 65–100)
HBA1C MFR BLD: 5.4 % (ref 4–5.6)
HCT VFR BLD AUTO: 43.3 % (ref 36.6–50.3)
HDLC SERPL-MCNC: 52 MG/DL
HDLC SERPL: 3 (ref 0–5)
HGB BLD-MCNC: 14.1 G/DL (ref 12.1–17)
IMM GRANULOCYTES # BLD AUTO: 0.01 K/UL (ref 0–0.04)
IMM GRANULOCYTES NFR BLD AUTO: 0.2 % (ref 0–0.5)
LDLC SERPL CALC-MCNC: 81.6 MG/DL (ref 0–100)
LYMPHOCYTES # BLD: 1.68 K/UL (ref 0.8–3.5)
LYMPHOCYTES NFR BLD: 27.7 % (ref 12–49)
MCH RBC QN AUTO: 32.8 PG (ref 26–34)
MCHC RBC AUTO-ENTMCNC: 32.6 G/DL (ref 30–36.5)
MCV RBC AUTO: 100.7 FL (ref 80–99)
MONOCYTES # BLD: 0.44 K/UL (ref 0–1)
MONOCYTES NFR BLD: 7.3 % (ref 5–13)
NEUTS SEG # BLD: 3.72 K/UL (ref 1.8–8)
NEUTS SEG NFR BLD: 61.3 % (ref 32–75)
NRBC # BLD: 0 K/UL (ref 0–0.01)
NRBC BLD-RTO: 0 PER 100 WBC
PLATELET # BLD AUTO: 186 K/UL (ref 150–400)
PMV BLD AUTO: 11.7 FL (ref 8.9–12.9)
POTASSIUM SERPL-SCNC: 4.3 MMOL/L (ref 3.5–5.1)
PROT SERPL-MCNC: 6.9 G/DL (ref 6.4–8.2)
PSA SERPL-MCNC: 0 NG/ML (ref 0.01–4)
RBC # BLD AUTO: 4.3 M/UL (ref 4.1–5.7)
SODIUM SERPL-SCNC: 140 MMOL/L (ref 136–145)
TRIGL SERPL-MCNC: 102 MG/DL
TSH SERPL DL<=0.05 MIU/L-ACNC: 4.14 UIU/ML (ref 0.36–3.74)
URATE SERPL-MCNC: 4.6 MG/DL (ref 3.5–7.2)
VLDLC SERPL CALC-MCNC: 20.4 MG/DL
WBC # BLD AUTO: 6.1 K/UL (ref 4.1–11.1)

## 2025-03-27 ENCOUNTER — OFFICE VISIT (OUTPATIENT)
Age: 70
End: 2025-03-27
Payer: MEDICARE

## 2025-03-27 VITALS
BODY MASS INDEX: 32.57 KG/M2 | OXYGEN SATURATION: 96 % | DIASTOLIC BLOOD PRESSURE: 76 MMHG | HEIGHT: 72 IN | RESPIRATION RATE: 14 BRPM | TEMPERATURE: 96.9 F | SYSTOLIC BLOOD PRESSURE: 135 MMHG | WEIGHT: 240.5 LBS | HEART RATE: 78 BPM

## 2025-03-27 DIAGNOSIS — Z01.818 PREOP EXAMINATION: Primary | ICD-10-CM

## 2025-03-27 DIAGNOSIS — I50.22 CHRONIC SYSTOLIC CONGESTIVE HEART FAILURE (HCC): ICD-10-CM

## 2025-03-27 DIAGNOSIS — I25.10 ATHEROSCLEROSIS OF NATIVE CORONARY ARTERY OF NATIVE HEART WITHOUT ANGINA PECTORIS: ICD-10-CM

## 2025-03-27 DIAGNOSIS — H25.011 CORTICAL AGE-RELATED CATARACT OF RIGHT EYE: ICD-10-CM

## 2025-03-27 DIAGNOSIS — I48.0 PAF (PAROXYSMAL ATRIAL FIBRILLATION) (HCC): ICD-10-CM

## 2025-03-27 DIAGNOSIS — I10 ESSENTIAL (PRIMARY) HYPERTENSION: ICD-10-CM

## 2025-03-27 PROCEDURE — 3075F SYST BP GE 130 - 139MM HG: CPT | Performed by: INTERNAL MEDICINE

## 2025-03-27 PROCEDURE — 3017F COLORECTAL CA SCREEN DOC REV: CPT | Performed by: INTERNAL MEDICINE

## 2025-03-27 PROCEDURE — G8427 DOCREV CUR MEDS BY ELIG CLIN: HCPCS | Performed by: INTERNAL MEDICINE

## 2025-03-27 PROCEDURE — G8417 CALC BMI ABV UP PARAM F/U: HCPCS | Performed by: INTERNAL MEDICINE

## 2025-03-27 PROCEDURE — 99214 OFFICE O/P EST MOD 30 MIN: CPT | Performed by: INTERNAL MEDICINE

## 2025-03-27 PROCEDURE — 1123F ACP DISCUSS/DSCN MKR DOCD: CPT | Performed by: INTERNAL MEDICINE

## 2025-03-27 PROCEDURE — 3078F DIAST BP <80 MM HG: CPT | Performed by: INTERNAL MEDICINE

## 2025-03-27 PROCEDURE — 1036F TOBACCO NON-USER: CPT | Performed by: INTERNAL MEDICINE

## 2025-03-27 PROCEDURE — 1160F RVW MEDS BY RX/DR IN RCRD: CPT | Performed by: INTERNAL MEDICINE

## 2025-03-27 PROCEDURE — 1159F MED LIST DOCD IN RCRD: CPT | Performed by: INTERNAL MEDICINE

## 2025-03-27 SDOH — ECONOMIC STABILITY: FOOD INSECURITY: WITHIN THE PAST 12 MONTHS, THE FOOD YOU BOUGHT JUST DIDN'T LAST AND YOU DIDN'T HAVE MONEY TO GET MORE.: NEVER TRUE

## 2025-03-27 SDOH — ECONOMIC STABILITY: FOOD INSECURITY: WITHIN THE PAST 12 MONTHS, YOU WORRIED THAT YOUR FOOD WOULD RUN OUT BEFORE YOU GOT MONEY TO BUY MORE.: NEVER TRUE

## 2025-03-27 ASSESSMENT — PATIENT HEALTH QUESTIONNAIRE - PHQ9
2. FEELING DOWN, DEPRESSED OR HOPELESS: NOT AT ALL
SUM OF ALL RESPONSES TO PHQ QUESTIONS 1-9: 0
SUM OF ALL RESPONSES TO PHQ QUESTIONS 1-9: 0
1. LITTLE INTEREST OR PLEASURE IN DOING THINGS: NOT AT ALL
SUM OF ALL RESPONSES TO PHQ QUESTIONS 1-9: 0
SUM OF ALL RESPONSES TO PHQ QUESTIONS 1-9: 0

## 2025-03-27 NOTE — PROGRESS NOTES
Geovanny Hopper is a 70 y.o. male who presents for evaluation of preop exam for upcoming right eye cataract removal with lens implantation scheduled for end of April with dr yolanda adamson.  Last seen by me dec 3, 2024 in awv.  Other than his vision becoming blurrier, he has been doing well and has no other complaints.       ROS:  Constitutional: negative for fevers, chills, anorexia and weight loss  Eyes:   negative for visual disturbance and irritation  ENT:   negative for tinnitus,sore throat,nasal congestion,ear pain,hoarseness  Respiratory:  negative for cough, hemoptysis, dyspnea,wheezing  CV:   negative for chest pain, palpitations, lower extremity edema  GI:   negative for nausea, vomiting, diarrhea, abdominal pain,melena  Genitourinary: negative for frequency, dysuria and hematuria  Musculoskel: negative for myalgias, arthralgias, back pain, muscle weakness, joint pain  Neurological:  negative for headaches, dizziness, focal weakness, numbness  Psychiatric:     Negative for depression or anxiety      Past Medical History:   Diagnosis Date    Arrhythmia 01/03/2020    AFIB     CAD (coronary artery disease)     h/o stent- '10    Cancer (HCC) 2010    prostate    Congestive heart failure (HCC)     Hypertension     Ill-defined condition     small bowel obstruction    Right rotator cuff tear 1/26/2017    SBO (small bowel obstruction) ([facility]) 11/3/2015    Stroke (HCC) May or June 2010    lacunar  infarctions (found on scan- no sxs)    Thyroid disease     hypo       Past Surgical History:   Procedure Laterality Date    ABLATE L/R ATRIAL FIBRIL W/ISOLATED PULM VEIN N/A 10/22/2020    Ablation Following A-Fib  Addl performed by Geovanny Dodd MD at [facility] CARDIAC CATH LAB    CARDIAC PROCEDURE N/A 9/20/2023    Left heart cath / coronary angiography performed by Eliel Hawkins MD at [facility] CARDIAC CATH LAB    CARDIAC PROCEDURE N/A 9/20/2023    Left ventriculography performed by Eliel Hawkins MD at [facility] CARDIAC CATH LAB

## 2025-07-09 ENCOUNTER — OFFICE VISIT (OUTPATIENT)
Age: 70
End: 2025-07-09
Payer: MEDICARE

## 2025-07-09 VITALS
SYSTOLIC BLOOD PRESSURE: 164 MMHG | TEMPERATURE: 97.3 F | BODY MASS INDEX: 33.81 KG/M2 | HEIGHT: 72 IN | RESPIRATION RATE: 14 BRPM | WEIGHT: 249.6 LBS | OXYGEN SATURATION: 96 % | DIASTOLIC BLOOD PRESSURE: 69 MMHG | HEART RATE: 78 BPM

## 2025-07-09 DIAGNOSIS — I50.22 CHRONIC SYSTOLIC CONGESTIVE HEART FAILURE (HCC): ICD-10-CM

## 2025-07-09 DIAGNOSIS — H25.011 CORTICAL AGE-RELATED CATARACT OF RIGHT EYE: ICD-10-CM

## 2025-07-09 DIAGNOSIS — I10 ESSENTIAL (PRIMARY) HYPERTENSION: ICD-10-CM

## 2025-07-09 DIAGNOSIS — I48.0 PAF (PAROXYSMAL ATRIAL FIBRILLATION) (HCC): ICD-10-CM

## 2025-07-09 DIAGNOSIS — I25.10 ATHEROSCLEROSIS OF NATIVE CORONARY ARTERY OF NATIVE HEART WITHOUT ANGINA PECTORIS: ICD-10-CM

## 2025-07-09 DIAGNOSIS — Z01.818 PREOP EXAMINATION: Primary | ICD-10-CM

## 2025-07-09 DIAGNOSIS — G62.9 NEUROPATHY: ICD-10-CM

## 2025-07-09 PROCEDURE — 99214 OFFICE O/P EST MOD 30 MIN: CPT | Performed by: INTERNAL MEDICINE

## 2025-07-09 RX ORDER — GABAPENTIN 100 MG/1
100 CAPSULE ORAL NIGHTLY
Qty: 90 CAPSULE | Refills: 1 | Status: SHIPPED | OUTPATIENT
Start: 2025-07-09 | End: 2026-01-05

## 2025-07-09 RX ORDER — ISOSORBIDE MONONITRATE 30 MG/1
30 TABLET, EXTENDED RELEASE ORAL DAILY
COMMUNITY
Start: 2025-07-07

## 2025-07-09 NOTE — PROGRESS NOTES
Have you been to the ER, urgent care clinic since your last visit?  Hospitalized since your last visit?   NO    Have you seen or consulted any other health care providers outside our system since your last visit?   NO         
and eliquis for 2 days prior.  Pafib--nsr now, on coreg, norvasc, eliquis  Chronic systolic chf--well compensated, though weight is up 9 lbs.  Continue bumex, entresto  Hyperlipids--continue crestor  Dyspepsia--controlled with protonix  PN--rx to start neurontin.  Start 100 mg qhs.  He will wait until after his surgery to start it.  Hypothyroid--on synthroid    Rtc for awv dec 4        Harley Kearney,

## 2025-07-23 RX ORDER — LEVOTHYROXINE SODIUM 50 UG/1
50 TABLET ORAL
Qty: 90 TABLET | Refills: 3 | Status: SHIPPED | OUTPATIENT
Start: 2025-07-23

## (undated) DEVICE — CONVERTORS STOCKINETTE: Brand: CONVERTORS

## (undated) DEVICE — SPONGE LAP 18X18IN STRL -- 5/PK

## (undated) DEVICE — PROBE ES TEMP HOT AND CLD FAST ACCURATE SFT FLX CIRCA S CATH

## (undated) DEVICE — PINNACLE INTRODUCER SHEATH: Brand: PINNACLE

## (undated) DEVICE — YANKAUER,TAPERED BULBOUS TIP,W/O VENT: Brand: MEDLINE

## (undated) DEVICE — KIT ELECTRD SURF FOR DISPLAYING THE 3D POS OF EP CATH

## (undated) DEVICE — Device

## (undated) DEVICE — CATHETER ETER CARD JL45 5FR 046INX100CM PERFORMA

## (undated) DEVICE — PACK PROCEDURE SURG HRT CATH

## (undated) DEVICE — SOLUTION LACTATED RINGERS INJECTION USP

## (undated) DEVICE — BLOCK BITE ENDOSCP AD 21 MM W/ DIL BLU LF DISP

## (undated) DEVICE — HEWSON SUTURE RETRIEVER: Brand: HEWSON SUTURE RETRIEVER

## (undated) DEVICE — ANGIOGRAPHY KIT CUST [K0910930B] [MERIT MEDICAL SYSTEMS INC]

## (undated) DEVICE — REM POLYHESIVE ADULT PATIENT RETURN ELECTRODE: Brand: VALLEYLAB

## (undated) DEVICE — INTRODUCER SHTH 8FR L63CM DIL 8FR L67CM 0.032IN TRANSSEPTAL

## (undated) DEVICE — NON-ADHERENT STRIPS,OIL EMULSION: Brand: CURITY

## (undated) DEVICE — ANTI-EMBOLISM STOCKINGS,KNEE LENGTH,LARGE,REGULAR,SIZE E-: Brand: T.E.D.

## (undated) DEVICE — SOLIDIFIER MEDC 1200ML -- CONVERT TO 356117

## (undated) DEVICE — SUTURE VCRL SZ 2-0 L36IN ABSRB UD L36MM CT-1 1/2 CIR J945H

## (undated) DEVICE — Z INACTIVE NO USAGE TURNOVER KIT RM CLEANOP

## (undated) DEVICE — DRESSING HEMSTAT W12XL12IN 3 PLY QUIKCLOT CONTROL+

## (undated) DEVICE — STERILE POLYISOPRENE POWDER-FREE SURGICAL GLOVES: Brand: PROTEXIS

## (undated) DEVICE — LIGHT HANDLE: Brand: DEVON

## (undated) DEVICE — GUIDEWIRE VASC L145CM 0.035IN J TIP L3MM PTFE FIX COR NAMIC

## (undated) DEVICE — ROCKER SWITCH PENCIL BLADE ELECTRODE, HOLSTER: Brand: EDGE

## (undated) DEVICE — 1200 GUARD II KIT W/5MM TUBE W/O VAC TUBE: Brand: GUARDIAN

## (undated) DEVICE — TOWEL SURG W17XL27IN STD BLU COT NONFENESTRATED PREWASHED

## (undated) DEVICE — AIRLIFE™  ADULT CUSHION NASAL CANNULA WITH 7 FOOT (2.1 M) CRUSH-RESISTANT OXYGEN TUBING, AND U/CONNECT-IT ADAPTER: Brand: AIRLIFE™

## (undated) DEVICE — COVER,MAYO STAND,STERILE: Brand: MEDLINE

## (undated) DEVICE — CABLE RMFG RSPONS ELEC EXT RED --

## (undated) DEVICE — NEEDLE HYPO 21GA L1.5IN INTRAMUSCULAR S STL LATCH BVL UP

## (undated) DEVICE — SET 2ND L34IN N DEHP THE QUEENS MED CNTR VALUELINK

## (undated) DEVICE — ELECTRODE,RADIOTRANSLUCENT,FOAM,5PK: Brand: MEDLINE

## (undated) DEVICE — 3M™ TEGADERM™ TRANSPARENT FILM DRESSING FRAME STYLE, 1626W, 4 IN X 4-3/4 IN (10 CM X 12 CM), 50/CT 4CT/CASE: Brand: 3M™ TEGADERM™

## (undated) DEVICE — BAG SPEC BIOHZRD 10 X 10 IN --

## (undated) DEVICE — (D)PREP SKN CHLRAPRP APPL 26ML -- CONVERT TO ITEM 371833

## (undated) DEVICE — SHOULDER W/POUCH II-LF: Brand: MEDLINE INDUSTRIES, INC.

## (undated) DEVICE — NEONATAL-ADULT SPO2 SENSOR: Brand: NELLCOR

## (undated) DEVICE — Z DISCONTINUED PER MEDLINE LINE GAS SAMPLING O2/CO2 LNG AD 13 FT NSL W/ TBNG FILTERLINE

## (undated) DEVICE — TIP SUCT CRV REG REDI

## (undated) DEVICE — INTRO SHEATH TRANSSEPTAL 8.5FRX71CM

## (undated) DEVICE — SET ADMIN 16ML TBNG L100IN 2 Y INJ SITE IV PIGGY BK DISP

## (undated) DEVICE — CATHETER ANGIO L100CM OD5FR ID.046IN L2.5CM .038IN PROG R

## (undated) DEVICE — CONTAINER SPEC 20 ML LID NEUT BUFF FORMALIN 10 % POLYPR STS

## (undated) DEVICE — SET TBNG L260CM IRRIG RF THER COOL PNT

## (undated) DEVICE — DRESSING HEMOSTATIC SFT INTVENT W/O SLT DBL WRP QUIKCLOT LF

## (undated) DEVICE — SYR 10ML LUER LOK 1/5ML GRAD --

## (undated) DEVICE — SYR POWER 150ML 8IN FILL TUBE --

## (undated) DEVICE — NDL BRCKNBRGH AD 18GX71CM --

## (undated) DEVICE — BASIN EMSIS 16OZ GRAPHITE PLAS KID SHP MOLD GRAD FOR ORAL

## (undated) DEVICE — 3M™ TEGADERM™ TRANSPARENT FILM DRESSING FRAME STYLE, 1624W, 2-3/8 IN X 2-3/4 IN (6 CM X 7 CM), 100/CT 4CT/CASE: Brand: 3M™ TEGADERM™

## (undated) DEVICE — SURGICAL PROCEDURE PACK BASIN MAJ SET CUST NO CAUT

## (undated) DEVICE — CATH IV AUTOGRD BC PNK 20GA 25 -- INSYTE

## (undated) DEVICE — CATH RMFG DECA DUO 7F2/8 95S --

## (undated) DEVICE — PLUS HANDPIECE WITH SUCTION TUBING AND TRAUMA TIP WITH SMALL SOFT CONE: Brand: SURGILAV

## (undated) DEVICE — TOWEL 4 PLY TISS 19X30 SUE WHT

## (undated) DEVICE — CATHETER ETER DIAG L100CM OD5FR VASC JUDKINS L ID5MM COR W O

## (undated) DEVICE — NEEDLE HYPO 18GA L1.5IN PNK S STL HUB POLYPR SHLD REG BVL

## (undated) DEVICE — 3M™ COBAN™ NL STERILE NON-LATEX SELF-ADHERENT WRAP, 2084S, 4 IN X 5 YD (10 CM X 4,5 M), 18 ROLLS/CASE: Brand: 3M™ COBAN™

## (undated) DEVICE — HEX-LOCKING BLADE ELECTRODE: Brand: EDGE

## (undated) DEVICE — (D)STRIP SKN CLSR 0.5X4IN WHT --

## (undated) DEVICE — CATHETER ETER CARD MULTIPAK MULTIPAK 5FR PERFORMA

## (undated) DEVICE — ZINACTIVE USE 2641837 CLIP LIG M BLU TI HRT SHP WIRE HORZ 600 PER BX

## (undated) DEVICE — PRESSURE GUIDEWIRE: Brand: COMET

## (undated) DEVICE — KENDALL DL ECG CABLE AND LEAD WIRE SYSTEM, 3-LEAD, SINGLE PATIENT USE: Brand: KENDALL

## (undated) DEVICE — CABLE RMFG EXT CATH --

## (undated) DEVICE — 3M™ IOBAN™ 2 ANTIMICROBIAL INCISE DRAPE 6651EZ: Brand: IOBAN™ 2

## (undated) DEVICE — CATHETER EP ADOL AD 9FR L90CM TEMP SGL HND SELF LOK 4 W TIP

## (undated) DEVICE — DRSG GZ OIL EMUL CURAD 3X3 --

## (undated) DEVICE — SYR 3ML LL TIP 1/10ML GRAD --

## (undated) DEVICE — CABLE DIAG FOR ADVISOR HD GRID MAP CATH SENS ENABLED

## (undated) DEVICE — CONTINU-FLO SOLUTION SET, 2 INJECTION SITES, MALE LUER LOCK ADAPTER WITH RETRACTABLE COLLAR, LARGE BORE STOPCOCK WITH ROTATING MALE LUER LOCK EXTENSION SET, 2 INJECTION SITES, MALE LUER LOCK ADAPTER WITH RETRACTABLE COLLAR: Brand: INTERLINK/CONTINU-FLO

## (undated) DEVICE — MASTISOL ADHESIVE LIQ 2/3ML

## (undated) DEVICE — SUTURE VCRL SZ 3-0 L27IN ABSRB UD L26MM SH 1/2 CIR J416H

## (undated) DEVICE — MEDI-TRACE CADENCE ADULT, DEFIBRILLATION ELECTRODE -RTS  (10 PR/PK) - PHILIPS: Brand: MEDI-TRACE CADENCE

## (undated) DEVICE — FORCEPS BX L160CM DIA8MM GRSP DISECT CUP TIP NONLOCKING ROT

## (undated) DEVICE — GAUZE SPONGES,12 PLY: Brand: CURITY

## (undated) DEVICE — PRESSURE MONITORING SET: Brand: TRUWAVE

## (undated) DEVICE — CATHETER MAP 8FR W13XH13MMXL105CM SPC 3MM TIP 1MM L ATRIUM